# Patient Record
Sex: FEMALE | Race: WHITE | NOT HISPANIC OR LATINO | Employment: OTHER | URBAN - NONMETROPOLITAN AREA
[De-identification: names, ages, dates, MRNs, and addresses within clinical notes are randomized per-mention and may not be internally consistent; named-entity substitution may affect disease eponyms.]

---

## 2017-01-19 DIAGNOSIS — Z12.31 ENCOUNTER FOR SCREENING MAMMOGRAM FOR MALIGNANT NEOPLASM OF BREAST: Primary | ICD-10-CM

## 2017-01-19 RX ORDER — PROMETHAZINE HYDROCHLORIDE 25 MG/1
TABLET ORAL
Qty: 50 TABLET | Refills: 0 | Status: SHIPPED | OUTPATIENT
Start: 2017-01-19 | End: 2017-01-25 | Stop reason: SDUPTHER

## 2017-01-25 ENCOUNTER — OFFICE VISIT (OUTPATIENT)
Dept: FAMILY MEDICINE CLINIC | Facility: CLINIC | Age: 72
End: 2017-01-25

## 2017-01-25 VITALS
DIASTOLIC BLOOD PRESSURE: 84 MMHG | WEIGHT: 140 LBS | SYSTOLIC BLOOD PRESSURE: 110 MMHG | HEIGHT: 61 IN | BODY MASS INDEX: 26.43 KG/M2

## 2017-01-25 DIAGNOSIS — F32.89 OTHER DEPRESSION: ICD-10-CM

## 2017-01-25 DIAGNOSIS — F41.9 ANXIETY: Primary | ICD-10-CM

## 2017-01-25 DIAGNOSIS — R53.83 MALAISE AND FATIGUE: ICD-10-CM

## 2017-01-25 DIAGNOSIS — R53.81 MALAISE AND FATIGUE: ICD-10-CM

## 2017-01-25 PROCEDURE — 99213 OFFICE O/P EST LOW 20 MIN: CPT | Performed by: NURSE PRACTITIONER

## 2017-01-25 RX ORDER — DIAZEPAM 5 MG/1
5 TABLET ORAL EVERY 12 HOURS PRN
Qty: 60 TABLET | Refills: 0 | Status: SHIPPED | OUTPATIENT
Start: 2017-01-25 | End: 2017-03-31 | Stop reason: SDUPTHER

## 2017-01-25 NOTE — PROGRESS NOTES
Chief Complaint   Patient presents with   • Anxiety     refill on meds      Subjective   Alva Milsl is a 71 y.o. female.     Anxiety   Presents for follow-up visit. Symptoms include decreased concentration, depressed mood, excessive worry, nervous/anxious behavior, palpitations and panic. Patient reports no compulsions, confusion, dry mouth, irritability, malaise, shortness of breath or suicidal ideas. Symptoms occur constantly. The severity of symptoms is moderate. The quality of sleep is fair. Nighttime awakenings: none.     Her past medical history is significant for depression. Compliance with medications is %. Side effects of treatment include headaches.   Depression   Visit Type: follow-up  Patient presents with the following symptoms: decreased concentration, depressed mood, excessive worry, nervousness/anxiety, palpitations and panic.  Patient is not experiencing: anhedonia, chest pain, choking sensation, compulsions, confusion, dizziness, dry mouth, fatigue, feelings of hopelessness, feelings of worthlessness, hypersomnia, irritability, malaise, memory impairment, nausea, psychomotor agitation, psychomotor retardation, shortness of breath, suicidal ideas, suicidal planning, thoughts of death, weight gain and weight loss.  Frequency of symptoms: constantly   Severity: severe          The following portions of the patient's history were reviewed and updated as appropriate: allergies, current medications, past social history and problem list.    Review of Systems   Constitutional: Positive for activity change, fatigue and unexpected weight change. Negative for irritability, weight gain and weight loss.   HENT:        Dry mouth constant    Eyes: Negative.    Respiratory: Negative for choking and shortness of breath.    Cardiovascular: Positive for palpitations.   Gastrointestinal: Negative.    Endocrine: Negative.    Genitourinary: Negative.    Musculoskeletal: Positive for arthralgias, back pain,  "gait problem, joint swelling, myalgias, neck pain and neck stiffness.   Skin: Negative.    Allergic/Immunologic: Negative.    Hematological: Negative.    Psychiatric/Behavioral: Positive for agitation and decreased concentration. Negative for confusion and suicidal ideas. The patient is nervous/anxious.        Objective   Visit Vitals   • /84 (BP Location: Left arm, Patient Position: Sitting, Cuff Size: Adult)   • Ht 61\" (154.9 cm)   • Wt 140 lb (63.5 kg)   • BMI 26.45 kg/m2     Physical Exam   Constitutional: She is oriented to person, place, and time. She appears well-developed and well-nourished.   Family stressors, patient is very stressed at this time    HENT:   Head: Normocephalic and atraumatic.   Eyes: Conjunctivae and EOM are normal. Pupils are equal, round, and reactive to light.   Neck: Normal range of motion. Neck supple.   Cardiovascular: Normal rate and regular rhythm.    Pulmonary/Chest: Effort normal and breath sounds normal.   Abdominal: Soft. Bowel sounds are normal.   Musculoskeletal: Normal range of motion. She exhibits tenderness.   Neurological: She is alert and oriented to person, place, and time. She has normal reflexes.   Skin: Skin is warm and dry.   Nursing note and vitals reviewed.      Assessment/Plan   Problem List Items Addressed This Visit     None           New Medications Ordered This Visit   Medications   • diazePAM (VALIUM) 5 MG tablet     Sig: Take 1 tablet by mouth Every 12 (Twelve) Hours As Needed for anxiety or sleep.     Dispense:  60 tablet     Refill:  0       Stress relief discussed in length. Consider therapy, suggest yoga, exercise, meditation -patient is agrees-will call back if worsen for referral     Patient understands the risks associated with this controlled medication, including tolerance and addiction.  she also agrees to only obtain this medication from me, and not from a another provider, unless that provider is covering for me in my absence.  she also " agrees to be compliant in dosing, and not self adjust the dose of medication.  A signed controlled substance agreement is on file, and she has received a controlled substance education sheet at this a previous visit.  she has also signed a consent for treatment with a controlled substance as per [facility] policy. FARRAH was obtained.

## 2017-02-13 RX ORDER — PROMETHAZINE HYDROCHLORIDE 25 MG/1
TABLET ORAL
Qty: 50 TABLET | Refills: 0 | Status: SHIPPED | OUTPATIENT
Start: 2017-02-13 | End: 2017-03-09 | Stop reason: SDUPTHER

## 2017-02-23 ENCOUNTER — TELEPHONE (OUTPATIENT)
Dept: FAMILY MEDICINE CLINIC | Facility: CLINIC | Age: 72
End: 2017-02-23

## 2017-02-23 RX ORDER — CEFUROXIME AXETIL 500 MG/1
500 TABLET ORAL 2 TIMES DAILY
Qty: 14 TABLET | Refills: 0 | Status: SHIPPED | OUTPATIENT
Start: 2017-02-23 | End: 2017-04-21

## 2017-02-23 NOTE — TELEPHONE ENCOUNTER
Patient called requesting rx for sinus infection . Did you want to give her anything or does she need to be seen?

## 2017-03-09 RX ORDER — PROPRANOLOL HYDROCHLORIDE 10 MG/1
10 TABLET ORAL DAILY
Qty: 90 TABLET | Refills: 3 | Status: SHIPPED | OUTPATIENT
Start: 2017-03-09 | End: 2018-03-15

## 2017-03-09 RX ORDER — ACYCLOVIR 400 MG/1
400 TABLET ORAL 3 TIMES DAILY
Qty: 270 TABLET | Refills: 3 | Status: SHIPPED | OUTPATIENT
Start: 2017-03-09 | End: 2017-12-01

## 2017-03-09 RX ORDER — PROMETHAZINE HYDROCHLORIDE 25 MG/1
25 TABLET ORAL EVERY 6 HOURS PRN
Qty: 90 TABLET | Refills: 3 | Status: SHIPPED | OUTPATIENT
Start: 2017-03-09 | End: 2017-06-29 | Stop reason: SDUPTHER

## 2017-03-09 RX ORDER — DULOXETIN HYDROCHLORIDE 60 MG/1
60 CAPSULE, DELAYED RELEASE ORAL DAILY
Qty: 90 CAPSULE | Refills: 3 | Status: SHIPPED | OUTPATIENT
Start: 2017-03-09 | End: 2017-09-11 | Stop reason: SDUPTHER

## 2017-03-09 RX ORDER — LEVOTHYROXINE SODIUM 0.05 MG/1
50 TABLET ORAL DAILY
Qty: 90 TABLET | Refills: 3 | Status: SHIPPED | OUTPATIENT
Start: 2017-03-09 | End: 2017-06-07 | Stop reason: SDUPTHER

## 2017-03-31 ENCOUNTER — OFFICE VISIT (OUTPATIENT)
Dept: FAMILY MEDICINE CLINIC | Facility: CLINIC | Age: 72
End: 2017-03-31

## 2017-03-31 VITALS
BODY MASS INDEX: 26.06 KG/M2 | TEMPERATURE: 98.6 F | OXYGEN SATURATION: 96 % | WEIGHT: 138 LBS | HEIGHT: 61 IN | SYSTOLIC BLOOD PRESSURE: 120 MMHG | HEART RATE: 81 BPM | DIASTOLIC BLOOD PRESSURE: 72 MMHG

## 2017-03-31 DIAGNOSIS — F41.9 ANXIETY: ICD-10-CM

## 2017-03-31 DIAGNOSIS — F32.89 OTHER DEPRESSION: ICD-10-CM

## 2017-03-31 DIAGNOSIS — J06.9 ACUTE UPPER RESPIRATORY INFECTION: Primary | ICD-10-CM

## 2017-03-31 DIAGNOSIS — J40 BRONCHITIS: Primary | ICD-10-CM

## 2017-03-31 PROCEDURE — 99213 OFFICE O/P EST LOW 20 MIN: CPT | Performed by: NURSE PRACTITIONER

## 2017-03-31 PROCEDURE — 96372 THER/PROPH/DIAG INJ SC/IM: CPT | Performed by: NURSE PRACTITIONER

## 2017-03-31 RX ORDER — CLARITHROMYCIN 500 MG/1
500 TABLET, COATED ORAL 2 TIMES DAILY
Qty: 20 TABLET | Refills: 0 | Status: SHIPPED | OUTPATIENT
Start: 2017-03-31 | End: 2017-07-06

## 2017-03-31 RX ORDER — DIAZEPAM 5 MG/1
5 TABLET ORAL EVERY 12 HOURS PRN
Qty: 60 TABLET | Refills: 0 | Status: SHIPPED | OUTPATIENT
Start: 2017-03-31 | End: 2017-07-06 | Stop reason: SDUPTHER

## 2017-03-31 RX ORDER — TRIAMCINOLONE ACETONIDE 40 MG/ML
60 INJECTION, SUSPENSION INTRA-ARTICULAR; INTRAMUSCULAR ONCE
Status: COMPLETED | OUTPATIENT
Start: 2017-03-31 | End: 2017-03-31

## 2017-03-31 RX ADMIN — TRIAMCINOLONE ACETONIDE 60 MG: 40 INJECTION, SUSPENSION INTRA-ARTICULAR; INTRAMUSCULAR at 14:09

## 2017-03-31 NOTE — PROGRESS NOTES
Chief Complaint   Patient presents with   • Bronchitis     rx refill     Subjective   Alva Mills is a 71 y.o. female.     Bronchitis   This is a recurrent problem. The current episode started in the past 7 days. The problem occurs constantly. The problem has been gradually worsening. Associated symptoms include chills, congestion, coughing, fatigue and a sore throat. Pertinent negatives include no abdominal pain, anorexia, arthralgias, change in bowel habit, chest pain, diaphoresis, fever, headaches, joint swelling, myalgias, nausea, neck pain, numbness, rash, swollen glands, urinary symptoms, vertigo, visual change, vomiting or weakness. Nothing aggravates the symptoms. She has tried acetaminophen for the symptoms. The treatment provided mild relief.   Anxiety   Presents for follow-up visit. Symptoms include decreased concentration, depressed mood, excessive worry, nervous/anxious behavior, obsessions, palpitations, panic and shortness of breath. Patient reports no chest pain, compulsions, confusion, dizziness, dry mouth, feeling of choking, hyperventilation, impotence, insomnia, irritability, malaise, muscle tension, nausea, restlessness or suicidal ideas. Symptoms occur constantly. The severity of symptoms is severe. The quality of sleep is good. Nighttime awakenings: none.     Compliance with medications is %.        The following portions of the patient's history were reviewed and updated as appropriate: allergies, current medications, past social history and problem list.    Review of Systems   Constitutional: Positive for chills and fatigue. Negative for diaphoresis, fever and irritability.   HENT: Positive for congestion and sore throat.    Eyes: Negative.    Respiratory: Positive for cough and shortness of breath. Negative for wheezing and stridor.    Cardiovascular: Positive for palpitations. Negative for chest pain.   Gastrointestinal: Negative.  Negative for abdominal pain, anorexia, change in  "bowel habit, nausea and vomiting.   Endocrine: Negative.    Genitourinary: Negative.  Negative for impotence, vaginal discharge and vaginal pain.   Musculoskeletal: Negative.  Negative for arthralgias, joint swelling, myalgias and neck pain.   Skin: Negative.  Negative for rash.   Allergic/Immunologic: Negative.    Neurological: Negative.  Negative for dizziness, vertigo, tremors, seizures, syncope, facial asymmetry, speech difficulty, weakness, light-headedness, numbness and headaches.   Hematological: Negative.  Negative for adenopathy. Does not bruise/bleed easily.   Psychiatric/Behavioral: Positive for decreased concentration. Negative for agitation, behavioral problems, confusion, dysphoric mood, hallucinations, self-injury, sleep disturbance and suicidal ideas. The patient is nervous/anxious. The patient does not have insomnia and is not hyperactive.        Objective   /72  Pulse 81  Temp 98.6 °F (37 °C)  Ht 61\" (154.9 cm)  Wt 138 lb (62.6 kg)  SpO2 96%  BMI 26.07 kg/m2  Physical Exam   Constitutional: She is oriented to person, place, and time. She appears well-developed and well-nourished.   Family stressors, patient is very stressed at this time    HENT:   Head: Normocephalic and atraumatic.   Eyes: Conjunctivae and EOM are normal. Pupils are equal, round, and reactive to light.   Neck: Normal range of motion. Neck supple.   Cardiovascular: Normal rate and regular rhythm.  Exam reveals no gallop and no friction rub.    No murmur heard.  Pulmonary/Chest: Effort normal and breath sounds normal. No respiratory distress. She has no wheezes. She has no rales. She exhibits no tenderness.   Abdominal: Soft. Bowel sounds are normal. She exhibits no distension and no mass. There is no tenderness. There is no rebound and no guarding. No hernia.   Musculoskeletal: Normal range of motion. She exhibits tenderness.   Neurological: She is alert and oriented to person, place, and time. She has normal reflexes. "   Skin: Skin is warm and dry.   Nursing note and vitals reviewed.      Assessment/Plan   Problem List Items Addressed This Visit        Respiratory    Acute upper respiratory infection    Relevant Medications    clarithromycin (BIAXIN) 500 MG tablet       Other    Anxiety - Primary    Depression    Relevant Medications    diazePAM (VALIUM) 5 MG tablet           New Medications Ordered This Visit   Medications   • diazePAM (VALIUM) 5 MG tablet     Sig: Take 1 tablet by mouth Every 12 (Twelve) Hours As Needed for Anxiety or Sleep.     Dispense:  60 tablet     Refill:  0   • clarithromycin (BIAXIN) 500 MG tablet     Sig: Take 1 tablet by mouth 2 (Two) Times a Day.     Dispense:  20 tablet     Refill:  0       Patient understands the risks associated with this controlled medication, including tolerance and addiction.  she also agrees to only obtain this medication from me, and not from a another provider, unless that provider is covering for me in my absence.  she also agrees to be compliant in dosing, and not self adjust the dose of medication.  A signed controlled substance agreement is on file, and she has received a controlled substance education sheet at this a previous visit.  she has also signed a consent for treatment with a controlled substance as per [facility] policy. FARRAH was obtained.

## 2017-04-21 ENCOUNTER — OFFICE VISIT (OUTPATIENT)
Dept: FAMILY MEDICINE CLINIC | Facility: CLINIC | Age: 72
End: 2017-04-21

## 2017-04-21 VITALS
HEIGHT: 61 IN | SYSTOLIC BLOOD PRESSURE: 138 MMHG | TEMPERATURE: 98 F | WEIGHT: 138 LBS | DIASTOLIC BLOOD PRESSURE: 80 MMHG | BODY MASS INDEX: 26.06 KG/M2

## 2017-04-21 DIAGNOSIS — J06.9 ACUTE UPPER RESPIRATORY INFECTION: Primary | ICD-10-CM

## 2017-04-21 PROCEDURE — 99213 OFFICE O/P EST LOW 20 MIN: CPT | Performed by: NURSE PRACTITIONER

## 2017-04-21 RX ORDER — MOXIFLOXACIN HYDROCHLORIDE 400 MG/1
400 TABLET ORAL DAILY
Qty: 10 TABLET | Refills: 0 | Status: SHIPPED | OUTPATIENT
Start: 2017-04-21 | End: 2017-08-09

## 2017-04-21 NOTE — PROGRESS NOTES
Chief Complaint   Patient presents with   • Earache   • Sore Throat     Subjective   Alva Mills is a 71 y.o. female.     Earache    There is pain in the left ear. This is a new problem. The current episode started 1 to 4 weeks ago. The problem occurs every few hours. The problem has been gradually improving. There has been no fever. The pain is at a severity of 2/10. Associated symptoms include a sore throat. Pertinent negatives include no abdominal pain, coughing, diarrhea, ear discharge, headaches, hearing loss, neck pain, rash, rhinorrhea or vomiting. She has tried NSAIDs for the symptoms. The treatment provided mild relief. There is no history of hearing loss.   Sore Throat    This is a recurrent problem. The current episode started 1 to 4 weeks ago. The problem has been gradually improving. There has been no fever. The fever has been present for 3 to 4 days. The pain is at a severity of 2/10. Associated symptoms include congestion and ear pain. Pertinent negatives include no abdominal pain, coughing, diarrhea, drooling, ear discharge, headaches, neck pain, shortness of breath, trouble swallowing or vomiting. She has tried NSAIDs for the symptoms. The treatment provided mild relief.        The following portions of the patient's history were reviewed and updated as appropriate: allergies, current medications, past social history and problem list.    Review of Systems   HENT: Positive for congestion, ear pain, postnasal drip, sinus pressure, sore throat, tinnitus and voice change. Negative for dental problem, drooling, ear discharge, facial swelling, hearing loss, mouth sores, nosebleeds, rhinorrhea, sneezing and trouble swallowing.    Eyes: Negative.  Negative for pain, discharge, redness and itching.   Respiratory: Negative.  Negative for cough, chest tightness, shortness of breath and wheezing.    Cardiovascular: Negative.  Negative for chest pain, palpitations and leg swelling.   Gastrointestinal:  "Negative.  Negative for abdominal pain, diarrhea and vomiting.   Endocrine: Negative.    Genitourinary: Negative.    Musculoskeletal: Negative.  Negative for neck pain and neck stiffness.   Skin: Negative.  Negative for color change and rash.   Allergic/Immunologic: Positive for environmental allergies. Negative for food allergies and immunocompromised state.   Neurological: Negative for dizziness, tremors, syncope, facial asymmetry, speech difficulty, light-headedness and headaches.   Hematological: Negative.  Negative for adenopathy. Does not bruise/bleed easily.   Psychiatric/Behavioral: Negative for agitation, behavioral problems, confusion, decreased concentration, dysphoric mood, hallucinations, self-injury, sleep disturbance and suicidal ideas. The patient is nervous/anxious. The patient is not hyperactive.        Objective   /80  Temp 98 °F (36.7 °C)  Ht 61\" (154.9 cm)  Wt 138 lb (62.6 kg)  BMI 26.07 kg/m2  Physical Exam   Constitutional: She is oriented to person, place, and time. She appears well-developed and well-nourished.   HENT:   Head: Normocephalic.   Right Ear: External ear and ear canal normal. Tympanic membrane is bulging. Tympanic membrane is not perforated and not erythematous.   Left Ear: External ear and ear canal normal. Tympanic membrane is erythematous and bulging. Tympanic membrane is not perforated.   Nose: Nose normal. No mucosal edema or rhinorrhea.   Mouth/Throat: Uvula is midline, oropharynx is clear and moist and mucous membranes are normal.   Eyes: Conjunctivae and lids are normal.   Neck: Trachea normal and normal range of motion.   Cardiovascular: Normal rate, regular rhythm, S1 normal, S2 normal and normal heart sounds.    Pulmonary/Chest: Effort normal and breath sounds normal.   Musculoskeletal: Normal range of motion.   Neurological: She is alert and oriented to person, place, and time.   Skin: Skin is warm and dry.       Assessment/Plan   Problem List Items " Addressed This Visit        Respiratory    Acute upper respiratory infection - Primary    Relevant Medications    moxifloxacin (AVELOX) 400 MG tablet           New Medications Ordered This Visit   Medications   • moxifloxacin (AVELOX) 400 MG tablet     Sig: Take 1 tablet by mouth Daily.     Dispense:  10 tablet     Refill:  0

## 2017-05-18 ENCOUNTER — TELEPHONE (OUTPATIENT)
Dept: FAMILY MEDICINE CLINIC | Facility: CLINIC | Age: 72
End: 2017-05-18

## 2017-05-18 RX ORDER — AZELASTINE 1 MG/ML
2 SPRAY, METERED NASAL 2 TIMES DAILY
Qty: 30 ML | Refills: 5 | Status: SHIPPED | OUTPATIENT
Start: 2017-05-18 | End: 2017-12-01

## 2017-06-07 RX ORDER — LEVOTHYROXINE SODIUM 0.05 MG/1
50 TABLET ORAL DAILY
Qty: 30 TABLET | Refills: 5 | Status: SHIPPED | OUTPATIENT
Start: 2017-06-07 | End: 2017-08-09 | Stop reason: SDUPTHER

## 2017-06-07 RX ORDER — METOPROLOL SUCCINATE 25 MG/1
25 TABLET, EXTENDED RELEASE ORAL DAILY
Qty: 30 TABLET | Refills: 5 | Status: SHIPPED | OUTPATIENT
Start: 2017-06-07 | End: 2017-08-09 | Stop reason: SDUPTHER

## 2017-06-08 ENCOUNTER — TELEPHONE (OUTPATIENT)
Dept: FAMILY MEDICINE CLINIC | Facility: CLINIC | Age: 72
End: 2017-06-08

## 2017-06-08 NOTE — TELEPHONE ENCOUNTER
PATIENT CALLED AND SAID SHE HAD A SINUS INFECTION COMING ON BECAUSE SHE HAS A SORE THROAT . SHE IS WANTING TO SEE IF YOU WOULD GIVE HER SOMETHING FOR IT .

## 2017-06-29 RX ORDER — PROMETHAZINE HYDROCHLORIDE 25 MG/1
25 TABLET ORAL EVERY 6 HOURS PRN
Qty: 90 TABLET | Refills: 0 | Status: SHIPPED | OUTPATIENT
Start: 2017-06-29 | End: 2017-08-16 | Stop reason: SDUPTHER

## 2017-07-06 ENCOUNTER — APPOINTMENT (OUTPATIENT)
Dept: LAB | Facility: HOSPITAL | Age: 72
End: 2017-07-06

## 2017-07-06 ENCOUNTER — OFFICE VISIT (OUTPATIENT)
Dept: FAMILY MEDICINE CLINIC | Facility: CLINIC | Age: 72
End: 2017-07-06

## 2017-07-06 VITALS
BODY MASS INDEX: 26.43 KG/M2 | WEIGHT: 140 LBS | DIASTOLIC BLOOD PRESSURE: 76 MMHG | SYSTOLIC BLOOD PRESSURE: 138 MMHG | HEIGHT: 61 IN

## 2017-07-06 DIAGNOSIS — R22.9 SOFT TISSUE SWELLING: ICD-10-CM

## 2017-07-06 DIAGNOSIS — F41.9 ANXIETY: ICD-10-CM

## 2017-07-06 DIAGNOSIS — R53.83 MALAISE AND FATIGUE: Primary | ICD-10-CM

## 2017-07-06 DIAGNOSIS — I10 ESSENTIAL HYPERTENSION: ICD-10-CM

## 2017-07-06 DIAGNOSIS — L98.9 SKIN LESION: ICD-10-CM

## 2017-07-06 DIAGNOSIS — R53.81 MALAISE AND FATIGUE: Primary | ICD-10-CM

## 2017-07-06 LAB
ALBUMIN SERPL-MCNC: 4 G/DL (ref 3.4–4.8)
ALBUMIN/GLOB SERPL: 1.2 G/DL (ref 1.1–1.8)
ALP SERPL-CCNC: 99 U/L (ref 38–126)
ALT SERPL W P-5'-P-CCNC: 31 U/L (ref 9–52)
ANION GAP SERPL CALCULATED.3IONS-SCNC: 10 MMOL/L (ref 5–15)
ARTICHOKE IGE QN: 102 MG/DL (ref 1–129)
AST SERPL-CCNC: 39 U/L (ref 14–36)
BASOPHILS # BLD AUTO: 0.05 10*3/MM3 (ref 0–0.2)
BASOPHILS NFR BLD AUTO: 0.9 % (ref 0–2)
BILIRUB SERPL-MCNC: 0.4 MG/DL (ref 0.2–1.3)
BUN BLD-MCNC: 10 MG/DL (ref 7–21)
BUN/CREAT SERPL: 12.3 (ref 7–25)
CALCIUM SPEC-SCNC: 9 MG/DL (ref 8.4–10.2)
CHLORIDE SERPL-SCNC: 100 MMOL/L (ref 95–110)
CHOLEST SERPL-MCNC: 209 MG/DL (ref 0–199)
CO2 SERPL-SCNC: 28 MMOL/L (ref 22–31)
CREAT BLD-MCNC: 0.81 MG/DL (ref 0.5–1)
DEPRECATED RDW RBC AUTO: 43.8 FL (ref 36.4–46.3)
EOSINOPHIL # BLD AUTO: 0.15 10*3/MM3 (ref 0–0.7)
EOSINOPHIL NFR BLD AUTO: 2.7 % (ref 0–7)
ERYTHROCYTE [DISTWIDTH] IN BLOOD BY AUTOMATED COUNT: 12.6 % (ref 11.5–14.5)
GFR SERPL CREATININE-BSD FRML MDRD: 70 ML/MIN/1.73 (ref 39–90)
GLOBULIN UR ELPH-MCNC: 3.3 GM/DL (ref 2.3–3.5)
GLUCOSE BLD-MCNC: 90 MG/DL (ref 60–100)
HCT VFR BLD AUTO: 37.4 % (ref 35–45)
HDLC SERPL-MCNC: 35 MG/DL (ref 60–200)
HGB BLD-MCNC: 12.1 G/DL (ref 12–15.5)
IMM GRANULOCYTES # BLD: 0.01 10*3/MM3 (ref 0–0.02)
IMM GRANULOCYTES NFR BLD: 0.2 % (ref 0–0.5)
LDLC/HDLC SERPL: 2.87 {RATIO} (ref 0–3.22)
LYMPHOCYTES # BLD AUTO: 1.44 10*3/MM3 (ref 0.6–4.2)
LYMPHOCYTES NFR BLD AUTO: 25.5 % (ref 10–50)
MAGNESIUM SERPL-MCNC: 2 MG/DL (ref 1.6–2.3)
MCH RBC QN AUTO: 31.1 PG (ref 26.5–34)
MCHC RBC AUTO-ENTMCNC: 32.4 G/DL (ref 31.4–36)
MCV RBC AUTO: 96.1 FL (ref 80–98)
MONOCYTES # BLD AUTO: 0.26 10*3/MM3 (ref 0–0.9)
MONOCYTES NFR BLD AUTO: 4.6 % (ref 0–12)
NEUTROPHILS # BLD AUTO: 3.74 10*3/MM3 (ref 2–8.6)
NEUTROPHILS NFR BLD AUTO: 66.1 % (ref 37–80)
PLATELET # BLD AUTO: 322 10*3/MM3 (ref 150–450)
PMV BLD AUTO: 10.2 FL (ref 8–12)
POTASSIUM BLD-SCNC: 4.4 MMOL/L (ref 3.5–5.1)
PROT SERPL-MCNC: 7.3 G/DL (ref 6.3–8.6)
RBC # BLD AUTO: 3.89 10*6/MM3 (ref 3.77–5.16)
SODIUM BLD-SCNC: 138 MMOL/L (ref 137–145)
TRIGL SERPL-MCNC: 368 MG/DL (ref 20–199)
TSH SERPL DL<=0.05 MIU/L-ACNC: 1.23 MIU/ML (ref 0.46–4.68)
VIT B12 BLD-MCNC: >1000 PG/ML (ref 239–931)
WBC NRBC COR # BLD: 5.65 10*3/MM3 (ref 3.2–9.8)

## 2017-07-06 PROCEDURE — 80053 COMPREHEN METABOLIC PANEL: CPT | Performed by: NURSE PRACTITIONER

## 2017-07-06 PROCEDURE — 90715 TDAP VACCINE 7 YRS/> IM: CPT | Performed by: NURSE PRACTITIONER

## 2017-07-06 PROCEDURE — 90471 IMMUNIZATION ADMIN: CPT | Performed by: NURSE PRACTITIONER

## 2017-07-06 PROCEDURE — 99214 OFFICE O/P EST MOD 30 MIN: CPT | Performed by: NURSE PRACTITIONER

## 2017-07-06 PROCEDURE — 84443 ASSAY THYROID STIM HORMONE: CPT | Performed by: NURSE PRACTITIONER

## 2017-07-06 PROCEDURE — 86705 HEP B CORE ANTIBODY IGM: CPT | Performed by: NURSE PRACTITIONER

## 2017-07-06 PROCEDURE — 36415 COLL VENOUS BLD VENIPUNCTURE: CPT | Performed by: NURSE PRACTITIONER

## 2017-07-06 PROCEDURE — 80061 LIPID PANEL: CPT | Performed by: NURSE PRACTITIONER

## 2017-07-06 PROCEDURE — 87340 HEPATITIS B SURFACE AG IA: CPT | Performed by: NURSE PRACTITIONER

## 2017-07-06 PROCEDURE — 86709 HEPATITIS A IGM ANTIBODY: CPT | Performed by: NURSE PRACTITIONER

## 2017-07-06 PROCEDURE — 82607 VITAMIN B-12: CPT | Performed by: NURSE PRACTITIONER

## 2017-07-06 PROCEDURE — 83735 ASSAY OF MAGNESIUM: CPT | Performed by: NURSE PRACTITIONER

## 2017-07-06 PROCEDURE — 85025 COMPLETE CBC W/AUTO DIFF WBC: CPT | Performed by: NURSE PRACTITIONER

## 2017-07-06 RX ORDER — DIAZEPAM 5 MG/1
5 TABLET ORAL EVERY 12 HOURS PRN
Qty: 60 TABLET | Refills: 0 | Status: SHIPPED | OUTPATIENT
Start: 2017-07-06 | End: 2017-08-09 | Stop reason: SDUPTHER

## 2017-07-06 RX ORDER — DULOXETIN HYDROCHLORIDE 30 MG/1
30 CAPSULE, DELAYED RELEASE ORAL DAILY
Qty: 30 CAPSULE | Refills: 11 | Status: SHIPPED | OUTPATIENT
Start: 2017-07-06 | End: 2017-09-11 | Stop reason: SDUPTHER

## 2017-07-06 NOTE — PROGRESS NOTES
Chief Complaint   Patient presents with   • Follow-up     patient is fasting   • Anxiety     wanting to see if she can get her medication dose increased   • Hypertension   • Hypothyroidism     Subjective   Alva Mills is a 71 y.o. female.     Anxiety   Presents for follow-up visit. Symptoms include depressed mood, excessive worry, insomnia, irritability, malaise, nervous/anxious behavior, panic and restlessness. Patient reports no chest pain, compulsions, confusion, decreased concentration, dizziness, feeling of choking, hyperventilation, impotence, muscle tension, nausea, palpitations, shortness of breath or suicidal ideas. Symptoms occur most days. The quality of sleep is good. Nighttime awakenings: none.     Compliance with medications is %.   Hypertension   This is a recurrent problem. The current episode started more than 1 month ago. The problem has been rapidly worsening since onset. The problem is controlled. Associated symptoms include anxiety and malaise/fatigue. Pertinent negatives include no blurred vision, chest pain, headaches, neck pain, palpitations, peripheral edema, PND, shortness of breath or sweats. Risk factors for coronary artery disease include sedentary lifestyle. Past treatments include ACE inhibitors. The current treatment provides mild improvement. Compliance problems include diet.  Hypertensive end-organ damage includes a thyroid problem. There is no history of angina, kidney disease, CAD/MI, CVA, heart failure, left ventricular hypertrophy, PVD, renovascular disease or retinopathy. There is no history of chronic renal disease, coarctation of the aorta, hyperaldosteronism, hypercortisolism, hyperparathyroidism, a hypertension causing med, pheochromocytoma or sleep apnea.   Hypothyroidism   This is a recurrent problem. The current episode started more than 1 year ago. The problem occurs constantly. The problem has been rapidly worsening. Associated symptoms include congestion,  fatigue and a sore throat. Pertinent negatives include no abdominal pain, anorexia, arthralgias, change in bowel habit, chest pain, chills, coughing, diaphoresis, fever, headaches, joint swelling, myalgias, nausea, neck pain, numbness, rash, swollen glands, urinary symptoms, vertigo, visual change, vomiting or weakness. Nothing aggravates the symptoms. She has tried acetaminophen, sleep, rest and relaxation (synthroid ) for the symptoms. The treatment provided mild relief.        The following portions of the patient's history were reviewed and updated as appropriate: allergies, current medications, past social history and problem list.    Review of Systems   Constitutional: Positive for fatigue, irritability and malaise/fatigue. Negative for activity change, appetite change, chills, diaphoresis, fever and unexpected weight change.   HENT: Positive for congestion, ear pain, postnasal drip, sinus pressure, sore throat, tinnitus and voice change. Negative for dental problem, drooling, ear discharge, facial swelling, hearing loss, mouth sores, nosebleeds, rhinorrhea, sneezing and trouble swallowing.    Eyes: Negative.  Negative for blurred vision, pain, discharge, redness and itching.   Respiratory: Negative.  Negative for cough, chest tightness, shortness of breath and wheezing.    Cardiovascular: Negative.  Negative for chest pain, palpitations, leg swelling and PND.   Gastrointestinal: Negative.  Negative for abdominal pain, anorexia, change in bowel habit, diarrhea, nausea and vomiting.   Endocrine: Negative.    Genitourinary: Negative.  Negative for impotence.   Musculoskeletal: Negative.  Negative for arthralgias, joint swelling, myalgias, neck pain and neck stiffness.   Skin: Negative.  Negative for color change and rash.        Cut left arm -plastic in garage    Allergic/Immunologic: Positive for environmental allergies. Negative for food allergies and immunocompromised state.   Neurological: Negative for  "dizziness, vertigo, tremors, syncope, facial asymmetry, speech difficulty, weakness, light-headedness, numbness and headaches.   Hematological: Negative.  Negative for adenopathy. Does not bruise/bleed easily.   Psychiatric/Behavioral: Negative for agitation, behavioral problems, confusion, decreased concentration, dysphoric mood, hallucinations, self-injury, sleep disturbance and suicidal ideas. The patient is nervous/anxious and has insomnia. The patient is not hyperactive.        Objective   /76  Ht 61\" (154.9 cm)  Wt 140 lb (63.5 kg)  BMI 26.45 kg/m2  Physical Exam   Constitutional: She is oriented to person, place, and time. She appears well-developed and well-nourished.   Family stressors, patient is very stressed at this time    HENT:   Head: Normocephalic and atraumatic.   Mouth/Throat: No oropharyngeal exudate.   Eyes: Conjunctivae and EOM are normal. Pupils are equal, round, and reactive to light. Right eye exhibits no discharge. Left eye exhibits no discharge. No scleral icterus.   Neck: Normal range of motion. Neck supple. No JVD present. No tracheal deviation present. No thyromegaly present.   Cardiovascular: Normal rate and regular rhythm.  Exam reveals no gallop and no friction rub.    No murmur heard.  Pulmonary/Chest: Effort normal and breath sounds normal. No stridor. No respiratory distress. She has no wheezes. She has no rales. She exhibits no tenderness.   Abdominal: Soft. Bowel sounds are normal. She exhibits no distension and no mass. There is no tenderness. There is no rebound and no guarding. No hernia.   Musculoskeletal: Normal range of motion. She exhibits tenderness. She exhibits no edema or deformity.   Lymphadenopathy:     She has no cervical adenopathy.   Neurological: She is alert and oriented to person, place, and time. She has normal reflexes. She displays normal reflexes. No cranial nerve deficit. She exhibits normal muscle tone. Coordination normal.   Skin: Skin is warm " and dry. No rash noted. No erythema. No pallor.   Skin abrasion left arm from cutting something in garage -needs updated tdap    Nursing note and vitals reviewed.      Assessment/Plan   Problem List Items Addressed This Visit        Cardiovascular and Mediastinum    Essential hypertension    Relevant Orders    CBC Auto Differential    Comprehensive Metabolic Panel    Lipid Panel    Magnesium    TSH    Vitamin B12    Hepatitis Diagnostic Profile       Musculoskeletal and Integument    Skin lesion       Other    Anxiety    Relevant Orders    CBC Auto Differential    Comprehensive Metabolic Panel    Lipid Panel    Magnesium    TSH    Vitamin B12    Hepatitis Diagnostic Profile    Malaise and fatigue - Primary    Relevant Orders    CBC Auto Differential    Comprehensive Metabolic Panel    Lipid Panel    Magnesium    TSH    Vitamin B12    Hepatitis Diagnostic Profile    Soft tissue swelling    Relevant Orders    US Head Neck Soft Tissue           New Medications Ordered This Visit   Medications   • DULoxetine (CYMBALTA) 30 MG capsule     Sig: Take 1 capsule by mouth Daily. Patient needs to add 30mg to total 90mg of cymbalta     Dispense:  30 capsule     Refill:  11       It's not just what you eat, but when you eat  Eat breakfast, and eat smaller meals throughout the day. A healthy breakfast can jumpstart your metabolism, while eating small, healthy meals (rather than the standard three large meals) keeps your energy up.   Avoid eating at night. Try to eat dinner earlier and fast for 14-16 hours until breakfast the next morning. Studies suggest that eating only when you’re most active and giving your digestive system a long break each day may help to regulate weight.

## 2017-07-07 LAB
HAV IGM SERPL QL IA: NEGATIVE
HBV CORE IGM SERPL QL IA: NEGATIVE
HBV SURFACE AG SERPL QL IA: NEGATIVE

## 2017-07-10 ENCOUNTER — HOSPITAL ENCOUNTER (OUTPATIENT)
Dept: ULTRASOUND IMAGING | Facility: HOSPITAL | Age: 72
Discharge: HOME | End: 2017-07-10
Admitting: NURSE PRACTITIONER

## 2017-07-10 PROCEDURE — 76536 US EXAM OF HEAD AND NECK: CPT

## 2017-08-09 ENCOUNTER — OFFICE VISIT (OUTPATIENT)
Dept: FAMILY MEDICINE CLINIC | Facility: CLINIC | Age: 72
End: 2017-08-09

## 2017-08-09 VITALS
SYSTOLIC BLOOD PRESSURE: 110 MMHG | HEIGHT: 61 IN | BODY MASS INDEX: 26.81 KG/M2 | WEIGHT: 142 LBS | DIASTOLIC BLOOD PRESSURE: 80 MMHG

## 2017-08-09 DIAGNOSIS — J06.9 ACUTE UPPER RESPIRATORY INFECTION: ICD-10-CM

## 2017-08-09 DIAGNOSIS — F41.9 ANXIETY: Primary | ICD-10-CM

## 2017-08-09 DIAGNOSIS — R53.81 MALAISE AND FATIGUE: ICD-10-CM

## 2017-08-09 DIAGNOSIS — R53.83 MALAISE AND FATIGUE: ICD-10-CM

## 2017-08-09 PROCEDURE — 99213 OFFICE O/P EST LOW 20 MIN: CPT | Performed by: NURSE PRACTITIONER

## 2017-08-09 RX ORDER — LEVOTHYROXINE SODIUM 0.05 MG/1
50 TABLET ORAL DAILY
Qty: 90 TABLET | Refills: 4 | Status: SHIPPED | OUTPATIENT
Start: 2017-08-09 | End: 2018-07-21 | Stop reason: SDUPTHER

## 2017-08-09 RX ORDER — METOPROLOL SUCCINATE 25 MG/1
25 TABLET, EXTENDED RELEASE ORAL DAILY
Qty: 90 TABLET | Refills: 4 | Status: SHIPPED | OUTPATIENT
Start: 2017-08-09 | End: 2018-03-15 | Stop reason: SDUPTHER

## 2017-08-09 RX ORDER — BUSPIRONE HYDROCHLORIDE 5 MG/1
TABLET ORAL
Qty: 90 TABLET | Refills: 3 | Status: SHIPPED | OUTPATIENT
Start: 2017-08-09 | End: 2017-10-23

## 2017-08-09 RX ORDER — DIAZEPAM 5 MG/1
5 TABLET ORAL EVERY 12 HOURS PRN
Qty: 60 TABLET | Refills: 0 | Status: SHIPPED | OUTPATIENT
Start: 2017-08-09 | End: 2017-08-28 | Stop reason: SDUPTHER

## 2017-08-09 NOTE — PROGRESS NOTES
Chief Complaint   Patient presents with   • Anxiety     check meds to see if there is anything else she needs to be on    • Sinus Problem     having alot of drainage and keeps a headache   • Hernia     Subjective   Alva Mills is a 72 y.o. female.     Anxiety   Presents for follow-up visit. Symptoms include depressed mood, excessive worry, insomnia, irritability, malaise, nervous/anxious behavior, panic and restlessness. Patient reports no chest pain, compulsions, confusion, decreased concentration, dizziness, feeling of choking, hyperventilation, impotence, muscle tension, nausea, palpitations, shortness of breath or suicidal ideas. Symptoms occur most days. The quality of sleep is good. Nighttime awakenings: none.     Compliance with medications is %.   Sinus Problem   This is a recurrent problem. The problem has been gradually worsening since onset. There has been no fever. She is experiencing no pain. Associated symptoms include congestion, ear pain, sinus pressure and a sore throat. Pertinent negatives include no chills, coughing, diaphoresis, headaches, neck pain, shortness of breath, sneezing or swollen glands.   Hernia   Associated symptoms include congestion, fatigue and a sore throat. Pertinent negatives include no abdominal pain, arthralgias, chest pain, chills, coughing, diaphoresis, fever, headaches, joint swelling, myalgias, nausea, neck pain, numbness, rash, swollen glands, urinary symptoms, visual change, vomiting or weakness.   Hypertension   This is a recurrent problem. The current episode started more than 1 month ago. The problem has been rapidly worsening since onset. The problem is controlled. Associated symptoms include anxiety and malaise/fatigue. Pertinent negatives include no blurred vision, chest pain, headaches, neck pain, palpitations, peripheral edema, PND, shortness of breath or sweats. Risk factors for coronary artery disease include sedentary lifestyle. Past treatments  include ACE inhibitors. The current treatment provides mild improvement. Compliance problems include diet.  Hypertensive end-organ damage includes a thyroid problem. There is no history of angina, kidney disease, CAD/MI, CVA, heart failure, left ventricular hypertrophy, PVD, renovascular disease or retinopathy. There is no history of chronic renal disease, coarctation of the aorta, hyperaldosteronism, hypercortisolism, hyperparathyroidism, a hypertension causing med, pheochromocytoma or sleep apnea.   Hypothyroidism   This is a recurrent problem. The current episode started more than 1 year ago. The problem occurs constantly. The problem has been rapidly worsening. Associated symptoms include congestion, fatigue and a sore throat. Pertinent negatives include no abdominal pain, arthralgias, chest pain, chills, coughing, diaphoresis, fever, headaches, joint swelling, myalgias, nausea, neck pain, numbness, rash, swollen glands, urinary symptoms, visual change, vomiting or weakness. Nothing aggravates the symptoms. She has tried acetaminophen, sleep, rest and relaxation (synthroid ) for the symptoms. The treatment provided mild relief.        The following portions of the patient's history were reviewed and updated as appropriate: allergies, current medications, past social history and problem list.    Review of Systems   Constitutional: Positive for fatigue, irritability and malaise/fatigue. Negative for activity change, appetite change, chills, diaphoresis, fever and unexpected weight change.   HENT: Positive for congestion, ear pain, postnasal drip, sinus pressure, sore throat, tinnitus and voice change. Negative for dental problem, drooling, ear discharge, facial swelling, hearing loss, mouth sores, nosebleeds, rhinorrhea, sneezing and trouble swallowing.    Eyes: Negative.  Negative for blurred vision, photophobia, pain, discharge, redness, itching and visual disturbance.   Respiratory: Negative.  Negative for  "apnea, cough, choking, chest tightness, shortness of breath and wheezing.    Cardiovascular: Negative.  Negative for chest pain, palpitations, leg swelling and PND.   Gastrointestinal: Negative.  Negative for abdominal pain, diarrhea, nausea and vomiting.   Endocrine: Negative.    Genitourinary: Negative.  Negative for difficulty urinating, dyspareunia, dysuria and impotence.   Musculoskeletal: Negative.  Negative for arthralgias, back pain, gait problem, joint swelling, myalgias, neck pain and neck stiffness.   Skin: Negative.  Negative for color change and rash.   Allergic/Immunologic: Positive for environmental allergies. Negative for food allergies and immunocompromised state.   Neurological: Negative.  Negative for dizziness, tremors, syncope, facial asymmetry, speech difficulty, weakness, light-headedness, numbness and headaches.   Hematological: Negative.  Negative for adenopathy. Does not bruise/bleed easily.   Psychiatric/Behavioral: Positive for agitation. Negative for behavioral problems, confusion, decreased concentration, dysphoric mood, hallucinations, self-injury, sleep disturbance and suicidal ideas. The patient is nervous/anxious and has insomnia. The patient is not hyperactive.    All other systems reviewed and are negative.      Objective   /80  Ht 61\" (154.9 cm)  Wt 142 lb (64.4 kg)  BMI 26.83 kg/m2  Physical Exam   Constitutional: She is oriented to person, place, and time. She appears well-developed and well-nourished.   Family stressors, patient is very stressed at this time    HENT:   Head: Normocephalic and atraumatic.   Mouth/Throat: No oropharyngeal exudate.   Oral cavity dry    Eyes: Conjunctivae and EOM are normal. Pupils are equal, round, and reactive to light. Right eye exhibits no discharge. Left eye exhibits no discharge. No scleral icterus.   Neck: Normal range of motion. Neck supple. No JVD present. No tracheal deviation present. No thyromegaly present.   Cardiovascular: " Normal rate and regular rhythm.  Exam reveals no gallop and no friction rub.    No murmur heard.  Pulmonary/Chest: Effort normal and breath sounds normal. No stridor. No respiratory distress. She has no wheezes. She has no rales. She exhibits no tenderness.   Abdominal: Soft. Bowel sounds are normal. She exhibits no distension and no mass. There is tenderness. There is no rebound and no guarding. No hernia.   Musculoskeletal: Normal range of motion. She exhibits tenderness. She exhibits no edema or deformity.   Lymphadenopathy:     She has no cervical adenopathy.   Neurological: She is alert and oriented to person, place, and time. She has normal reflexes. She displays normal reflexes. No cranial nerve deficit. She exhibits normal muscle tone. Coordination normal.   Skin: Skin is warm and dry. No rash noted. No erythema. No pallor.   Nursing note and vitals reviewed.      Assessment/Plan   Problem List Items Addressed This Visit        Respiratory    Acute upper respiratory infection       Other    Anxiety - Primary    Malaise and fatigue           New Medications Ordered This Visit   Medications   • metoprolol succinate XL (TOPROL-XL) 25 MG 24 hr tablet     Sig: Take 1 tablet by mouth Daily.     Dispense:  90 tablet     Refill:  4   • levothyroxine (SYNTHROID) 50 MCG tablet     Sig: Take 1 tablet by mouth Daily.     Dispense:  90 tablet     Refill:  4   • diazePAM (VALIUM) 5 MG tablet     Sig: Take 1 tablet by mouth Every 12 (Twelve) Hours As Needed for Anxiety or Sleep.     Dispense:  60 tablet     Refill:  0   • busPIRone (BUSPAR) 5 MG tablet     Si-2 tabs tid prn     Dispense:  90 tablet     Refill:  3     Patient understands the risks associated with this controlled medication, including tolerance and addiction.  she also agrees to only obtain this medication from me, and not from a another provider, unless that provider is covering for me in my absence.  she also agrees to be compliant in dosing, and not  self adjust the dose of medication.  A signed controlled substance agreement is on file, and she has received a controlled substance education sheet at this a previous visit.  she has also signed a consent for treatment with a controlled substance as per [facility] policy. FARRAH was obtained.    It's not just what you eat, but when you eat  Eat breakfast, and eat smaller meals throughout the day. A healthy breakfast can jumpstart your metabolism, while eating small, healthy meals (rather than the standard three large meals) keeps your energy up.   Avoid eating at night. Try to eat dinner earlier and fast for 14-16 hours until breakfast the next morning. Studies suggest that eating only when you’re most active and giving your digestive system a long break each day may help to regulate weight.

## 2017-08-16 RX ORDER — PROMETHAZINE HYDROCHLORIDE 25 MG/1
25 TABLET ORAL EVERY 6 HOURS PRN
Qty: 180 TABLET | Refills: 2 | Status: SHIPPED | OUTPATIENT
Start: 2017-08-16 | End: 2018-05-15 | Stop reason: SDUPTHER

## 2017-08-17 ENCOUNTER — TELEPHONE (OUTPATIENT)
Dept: FAMILY MEDICINE CLINIC | Facility: CLINIC | Age: 72
End: 2017-08-17

## 2017-08-17 DIAGNOSIS — L03.032 INFECTED NAILBED OF TOE, LEFT: Primary | ICD-10-CM

## 2017-08-17 RX ORDER — CEPHALEXIN 500 MG/1
500 CAPSULE ORAL 3 TIMES DAILY
Qty: 28 CAPSULE | Refills: 0 | Status: SHIPPED | OUTPATIENT
Start: 2017-08-17 | End: 2017-08-27

## 2017-08-28 ENCOUNTER — OFFICE VISIT (OUTPATIENT)
Dept: FAMILY MEDICINE CLINIC | Facility: CLINIC | Age: 72
End: 2017-08-28

## 2017-08-28 VITALS
HEIGHT: 61 IN | BODY MASS INDEX: 26.62 KG/M2 | TEMPERATURE: 98.3 F | DIASTOLIC BLOOD PRESSURE: 100 MMHG | OXYGEN SATURATION: 99 % | WEIGHT: 141 LBS | SYSTOLIC BLOOD PRESSURE: 142 MMHG

## 2017-08-28 DIAGNOSIS — R05.9 COUGH: Primary | ICD-10-CM

## 2017-08-28 DIAGNOSIS — J06.9 ACUTE UPPER RESPIRATORY INFECTION: ICD-10-CM

## 2017-08-28 DIAGNOSIS — F41.9 ANXIETY: ICD-10-CM

## 2017-08-28 PROCEDURE — 99213 OFFICE O/P EST LOW 20 MIN: CPT | Performed by: NURSE PRACTITIONER

## 2017-08-28 PROCEDURE — 96372 THER/PROPH/DIAG INJ SC/IM: CPT | Performed by: NURSE PRACTITIONER

## 2017-08-28 RX ORDER — CLARITHROMYCIN 500 MG/1
500 TABLET, COATED ORAL 2 TIMES DAILY
Qty: 20 TABLET | Refills: 0 | Status: SHIPPED | OUTPATIENT
Start: 2017-08-28 | End: 2017-10-23

## 2017-08-28 RX ORDER — BENZONATATE 100 MG/1
100 CAPSULE ORAL 3 TIMES DAILY PRN
Qty: 60 CAPSULE | Refills: 0 | Status: SHIPPED | OUTPATIENT
Start: 2017-08-28 | End: 2017-12-01

## 2017-08-28 RX ORDER — DIAZEPAM 5 MG/1
5 TABLET ORAL EVERY 12 HOURS PRN
Qty: 60 TABLET | Refills: 0 | Status: SHIPPED | OUTPATIENT
Start: 2017-08-28 | End: 2019-11-06

## 2017-08-28 RX ORDER — TRIAMCINOLONE ACETONIDE 40 MG/ML
80 INJECTION, SUSPENSION INTRA-ARTICULAR; INTRAMUSCULAR ONCE
Status: COMPLETED | OUTPATIENT
Start: 2017-08-28 | End: 2017-08-28

## 2017-08-28 RX ADMIN — TRIAMCINOLONE ACETONIDE 80 MG: 40 INJECTION, SUSPENSION INTRA-ARTICULAR; INTRAMUSCULAR at 10:40

## 2017-08-28 NOTE — PROGRESS NOTES
Chief Complaint   Patient presents with   • Cough     spitting up phelm   • Headache   • Anxiety     Subjective   Alva Mills is a 72 y.o. female.     Cough   This is a recurrent problem. Associated symptoms include ear pain, postnasal drip and a sore throat. Pertinent negatives include no chest pain, chills, eye redness, fever, headaches, myalgias, rash, rhinorrhea, shortness of breath, sweats or wheezing. Her past medical history is significant for environmental allergies.   Headache    Associated symptoms include ear pain, sinus pressure, a sore throat and tinnitus. Pertinent negatives include no abdominal pain, back pain, coughing, dizziness, eye pain, eye redness, fever, hearing loss, nausea, neck pain, numbness, photophobia, rhinorrhea, swollen glands, visual change, vomiting or weakness.   Anxiety   Presents for follow-up visit. Symptoms include depressed mood, excessive worry, irritability, malaise, nervous/anxious behavior, panic and restlessness. Patient reports no chest pain, compulsions, confusion, decreased concentration, dizziness, feeling of choking, hyperventilation, impotence, muscle tension, nausea, palpitations, shortness of breath or suicidal ideas. Symptoms occur most days. The quality of sleep is good. Nighttime awakenings: none.     Compliance with medications is %.   Sinus Problem   This is a recurrent problem. The problem has been gradually worsening since onset. There has been no fever. She is experiencing no pain. Associated symptoms include congestion, ear pain, sinus pressure and a sore throat. Pertinent negatives include no chills, coughing, diaphoresis, headaches, neck pain, shortness of breath, sneezing or swollen glands.   Hernia   Associated symptoms include congestion, fatigue and a sore throat. Pertinent negatives include no abdominal pain, arthralgias, chest pain, chills, coughing, diaphoresis, fever, headaches, joint swelling, myalgias, nausea, neck pain, numbness,  rash, swollen glands, urinary symptoms, visual change, vomiting or weakness.   Hypertension   This is a recurrent problem. The current episode started more than 1 month ago. The problem has been rapidly worsening since onset. The problem is controlled. Associated symptoms include anxiety and malaise/fatigue. Pertinent negatives include no chest pain, headaches, neck pain, palpitations, peripheral edema, PND, shortness of breath or sweats. Risk factors for coronary artery disease include sedentary lifestyle. Past treatments include ACE inhibitors. The current treatment provides mild improvement. Compliance problems include diet.  Hypertensive end-organ damage includes a thyroid problem. There is no history of angina, kidney disease, CAD/MI, CVA, heart failure, left ventricular hypertrophy, PVD, renovascular disease or retinopathy. There is no history of chronic renal disease, coarctation of the aorta, hyperaldosteronism, hypercortisolism, hyperparathyroidism, a hypertension causing med, pheochromocytoma or sleep apnea.   Hypothyroidism   This is a recurrent problem. The current episode started more than 1 year ago. The problem occurs constantly. The problem has been rapidly worsening. Associated symptoms include congestion, fatigue and a sore throat. Pertinent negatives include no abdominal pain, arthralgias, chest pain, chills, coughing, diaphoresis, fever, headaches, joint swelling, myalgias, nausea, neck pain, numbness, rash, swollen glands, urinary symptoms, visual change, vomiting or weakness. Nothing aggravates the symptoms. She has tried acetaminophen, sleep, rest and relaxation (synthroid ) for the symptoms. The treatment provided mild relief.        The following portions of the patient's history were reviewed and updated as appropriate: allergies, current medications, past social history and problem list.    Review of Systems   Constitutional: Positive for fatigue, irritability and malaise/fatigue. Negative  "for activity change, appetite change, chills, diaphoresis, fever and unexpected weight change.   HENT: Positive for congestion, ear pain, postnasal drip, sinus pressure, sore throat, tinnitus and voice change. Negative for dental problem, drooling, ear discharge, facial swelling, hearing loss, mouth sores, nosebleeds, rhinorrhea, sneezing and trouble swallowing.    Eyes: Negative.  Negative for photophobia, pain, discharge, redness, itching and visual disturbance.   Respiratory: Negative.  Negative for apnea, cough, choking, chest tightness, shortness of breath and wheezing.    Cardiovascular: Negative.  Negative for chest pain, palpitations, leg swelling and PND.   Gastrointestinal: Negative.  Negative for abdominal distention, abdominal pain, anal bleeding, blood in stool, constipation, diarrhea, nausea and vomiting.   Endocrine: Negative.    Genitourinary: Negative.  Negative for difficulty urinating, dyspareunia, dysuria and impotence.   Musculoskeletal: Negative.  Negative for arthralgias, back pain, gait problem, joint swelling, myalgias, neck pain and neck stiffness.   Skin: Negative.  Negative for color change and rash.   Allergic/Immunologic: Positive for environmental allergies. Negative for food allergies and immunocompromised state.   Neurological: Negative.  Negative for dizziness, tremors, syncope, facial asymmetry, speech difficulty, weakness, light-headedness, numbness and headaches.   Hematological: Negative.  Negative for adenopathy. Does not bruise/bleed easily.   Psychiatric/Behavioral: Positive for agitation and dysphoric mood. Negative for behavioral problems, confusion, decreased concentration, hallucinations, self-injury, sleep disturbance and suicidal ideas. The patient is nervous/anxious. The patient is not hyperactive.    All other systems reviewed and are negative.      Objective   /100  Temp 98.3 °F (36.8 °C) (Tympanic)   Ht 61\" (154.9 cm)  Wt 141 lb (64 kg)  SpO2 99%  BMI " 26.64 kg/m2  Physical Exam   Constitutional: She is oriented to person, place, and time. She appears well-developed and well-nourished.   Family stressors, patient is very stressed at this time    HENT:   Head: Normocephalic and atraumatic.   Mouth/Throat: No oropharyngeal exudate.   Oral cavity dry    Eyes: Conjunctivae and EOM are normal. Pupils are equal, round, and reactive to light. Right eye exhibits no discharge. Left eye exhibits no discharge. No scleral icterus.   Neck: Normal range of motion. Neck supple. No JVD present. No tracheal deviation present. No thyromegaly present.   Cardiovascular: Normal rate and regular rhythm.  Exam reveals no gallop and no friction rub.    No murmur heard.  Pulmonary/Chest: Effort normal. No stridor. No respiratory distress. She has wheezes. She has no rales. She exhibits no tenderness.   Abdominal: Soft. Bowel sounds are normal. She exhibits no distension and no mass. There is tenderness. There is no rebound and no guarding. No hernia.   Musculoskeletal: Normal range of motion. She exhibits tenderness. She exhibits no edema or deformity.   Lymphadenopathy:     She has no cervical adenopathy.   Neurological: She is alert and oriented to person, place, and time. She has normal reflexes. She displays normal reflexes. No cranial nerve deficit. She exhibits normal muscle tone. Coordination normal.   Skin: Skin is warm and dry. No rash noted. No erythema. No pallor.   Nursing note and vitals reviewed.      Assessment/Plan   Problem List Items Addressed This Visit        Respiratory    Acute upper respiratory infection    Relevant Medications    clarithromycin (BIAXIN) 500 MG tablet    Cough - Primary    Relevant Medications    triamcinolone acetonide (KENALOG-40) injection 80 mg (Completed) (Start on 8/28/2017 11:15 AM)       Other    Anxiety           New Medications Ordered This Visit   Medications   • clarithromycin (BIAXIN) 500 MG tablet     Sig: Take 1 tablet by mouth 2  (Two) Times a Day.     Dispense:  20 tablet     Refill:  0   • diazePAM (VALIUM) 5 MG tablet     Sig: Take 1 tablet by mouth Every 12 (Twelve) Hours As Needed for Anxiety or Sleep.     Dispense:  60 tablet     Refill:  0   • benzonatate (TESSALON PERLES) 100 MG capsule     Sig: Take 1 capsule by mouth 3 (Three) Times a Day As Needed for Cough.     Dispense:  60 capsule     Refill:  0   • triamcinolone acetonide (KENALOG-40) injection 80 mg       It's not just what you eat, but when you eat  Eat breakfast, and eat smaller meals throughout the day. A healthy breakfast can jumpstart your metabolism, while eating small, healthy meals (rather than the standard three large meals) keeps your energy up.   Avoid eating at night. Try to eat dinner earlier and fast for 14-16 hours until breakfast the next morning. Studies suggest that eating only when you’re most active and giving your digestive system a long break each day may help to regulate weight.     Patient will be getting next rx from Dr Pilar Schaffer due to her tevin report and getting lortab from her monthly-patient instructed that we will not prescribe after this month.

## 2017-09-11 RX ORDER — DULOXETIN HYDROCHLORIDE 60 MG/1
60 CAPSULE, DELAYED RELEASE ORAL DAILY
Qty: 90 CAPSULE | Refills: 3 | Status: SHIPPED | OUTPATIENT
Start: 2017-09-11 | End: 2017-11-29 | Stop reason: SDUPTHER

## 2017-09-11 RX ORDER — DULOXETIN HYDROCHLORIDE 30 MG/1
30 CAPSULE, DELAYED RELEASE ORAL DAILY
Qty: 90 CAPSULE | Refills: 3 | Status: SHIPPED | OUTPATIENT
Start: 2017-09-11 | End: 2018-03-15

## 2017-10-23 ENCOUNTER — OFFICE VISIT (OUTPATIENT)
Dept: FAMILY MEDICINE CLINIC | Facility: CLINIC | Age: 72
End: 2017-10-23

## 2017-10-23 VITALS
WEIGHT: 140.5 LBS | DIASTOLIC BLOOD PRESSURE: 80 MMHG | SYSTOLIC BLOOD PRESSURE: 140 MMHG | BODY MASS INDEX: 26.53 KG/M2 | HEIGHT: 61 IN

## 2017-10-23 DIAGNOSIS — F41.9 ANXIETY: Primary | ICD-10-CM

## 2017-10-23 DIAGNOSIS — F32.89 OTHER DEPRESSION: ICD-10-CM

## 2017-10-23 PROCEDURE — 90471 IMMUNIZATION ADMIN: CPT | Performed by: NURSE PRACTITIONER

## 2017-10-23 PROCEDURE — 99213 OFFICE O/P EST LOW 20 MIN: CPT | Performed by: NURSE PRACTITIONER

## 2017-10-23 PROCEDURE — 90662 IIV NO PRSV INCREASED AG IM: CPT | Performed by: NURSE PRACTITIONER

## 2017-10-23 RX ORDER — BUSPIRONE HYDROCHLORIDE 30 MG/1
30 TABLET ORAL 2 TIMES DAILY
Qty: 60 TABLET | Refills: 11 | Status: SHIPPED | OUTPATIENT
Start: 2017-10-23 | End: 2017-10-23 | Stop reason: SDUPTHER

## 2017-10-23 RX ORDER — BUSPIRONE HYDROCHLORIDE 30 MG/1
30 TABLET ORAL 2 TIMES DAILY
Qty: 180 TABLET | Refills: 3 | Status: SHIPPED | OUTPATIENT
Start: 2017-10-23 | End: 2018-03-15

## 2017-10-23 NOTE — PROGRESS NOTES
Chief Complaint   Patient presents with   • Anxiety     Subjective   Alva Mills is a 72 y.o. female.     Anxiety   Presents for follow-up visit. Symptoms include depressed mood, excessive worry, insomnia, irritability, malaise, nervous/anxious behavior, panic and restlessness. Patient reports no chest pain, compulsions, confusion, decreased concentration, dizziness, feeling of choking, hyperventilation, impotence, muscle tension, nausea, palpitations or suicidal ideas. Symptoms occur most days. The quality of sleep is good. Nighttime awakenings: none.     Compliance with medications is %.   Hernia   Associated symptoms include fatigue. Pertinent negatives include no abdominal pain, arthralgias, chest pain, fever, joint swelling, myalgias, nausea, neck pain, numbness, rash, urinary symptoms, visual change, vomiting or weakness.   Hypertension   This is a recurrent problem. The current episode started more than 1 month ago. The problem has been rapidly worsening since onset. The problem is controlled. Associated symptoms include anxiety and malaise/fatigue. Pertinent negatives include no blurred vision, chest pain, neck pain, palpitations, peripheral edema, PND or sweats. Risk factors for coronary artery disease include sedentary lifestyle. Past treatments include ACE inhibitors. The current treatment provides mild improvement. Compliance problems include diet.  Hypertensive end-organ damage includes a thyroid problem. There is no history of angina, kidney disease, CAD/MI, CVA, heart failure, left ventricular hypertrophy, PVD, renovascular disease or retinopathy. There is no history of chronic renal disease, coarctation of the aorta, hyperaldosteronism, hypercortisolism, hyperparathyroidism, a hypertension causing med, pheochromocytoma or sleep apnea.   Hypothyroidism   This is a recurrent problem. The current episode started more than 1 year ago. The problem occurs constantly. The problem has been rapidly  worsening. Associated symptoms include fatigue. Pertinent negatives include no abdominal pain, arthralgias, chest pain, fever, joint swelling, myalgias, nausea, neck pain, numbness, rash, urinary symptoms, visual change, vomiting or weakness. Nothing aggravates the symptoms. She has tried acetaminophen, sleep, rest and relaxation (synthroid ) for the symptoms. The treatment provided mild relief.        The following portions of the patient's history were reviewed and updated as appropriate: allergies, current medications, past social history and problem list.    Review of Systems   Constitutional: Positive for fatigue, irritability and malaise/fatigue. Negative for activity change, appetite change, fever and unexpected weight change.   HENT: Positive for postnasal drip, tinnitus and voice change. Negative for dental problem, drooling, ear discharge, facial swelling, hearing loss, mouth sores, nosebleeds, rhinorrhea and trouble swallowing.    Eyes: Negative.  Negative for blurred vision, photophobia, pain, discharge, redness, itching and visual disturbance.   Respiratory: Negative.  Negative for apnea, choking, chest tightness and wheezing.    Cardiovascular: Negative.  Negative for chest pain, palpitations, leg swelling and PND.   Gastrointestinal: Negative.  Negative for abdominal pain, diarrhea, nausea and vomiting.   Endocrine: Negative.  Negative for cold intolerance, heat intolerance, polydipsia, polyphagia and polyuria.   Genitourinary: Negative.  Negative for difficulty urinating, dyspareunia, dysuria and impotence.   Musculoskeletal: Negative.  Negative for arthralgias, back pain, gait problem, joint swelling, myalgias, neck pain and neck stiffness.   Skin: Negative.  Negative for color change and rash.   Allergic/Immunologic: Positive for environmental allergies. Negative for food allergies and immunocompromised state.   Neurological: Negative.  Negative for dizziness, tremors, syncope, facial asymmetry,  "speech difficulty, weakness, light-headedness and numbness.   Hematological: Negative.  Negative for adenopathy. Does not bruise/bleed easily.   Psychiatric/Behavioral: Positive for agitation. Negative for behavioral problems, confusion, decreased concentration, dysphoric mood, hallucinations, self-injury, sleep disturbance and suicidal ideas. The patient is nervous/anxious and has insomnia. The patient is not hyperactive.    All other systems reviewed and are negative.      Objective   /80  Ht 61\" (154.9 cm)  Wt 140 lb 8 oz (63.7 kg)  BMI 26.55 kg/m2  Physical Exam   Constitutional: She is oriented to person, place, and time. She appears well-developed and well-nourished.   Family stressors, patient is very stressed at this time    HENT:   Head: Normocephalic and atraumatic.   Mouth/Throat: No oropharyngeal exudate.   Oral cavity dry    Eyes: Conjunctivae and EOM are normal. Pupils are equal, round, and reactive to light. Right eye exhibits no discharge. Left eye exhibits no discharge. No scleral icterus.   Neck: Normal range of motion. Neck supple. No JVD present. No tracheal deviation present. No thyromegaly present.   Cardiovascular: Normal rate and regular rhythm.  Exam reveals no gallop and no friction rub.    No murmur heard.  Pulmonary/Chest: Effort normal and breath sounds normal. No stridor. No respiratory distress. She has no wheezes. She has no rales. She exhibits no tenderness.   Abdominal: Soft. Bowel sounds are normal. She exhibits no distension and no mass. There is tenderness. There is no rebound and no guarding. No hernia.   Musculoskeletal: Normal range of motion. She exhibits tenderness. She exhibits no edema or deformity.   Lymphadenopathy:     She has no cervical adenopathy.   Neurological: She is alert and oriented to person, place, and time. She has normal reflexes. She displays normal reflexes. No cranial nerve deficit. She exhibits normal muscle tone. Coordination normal.   Skin: " Skin is warm and dry. No rash noted. No erythema. No pallor.   Nursing note and vitals reviewed.      Assessment/Plan   Problem List Items Addressed This Visit        Other    Anxiety - Primary    Depression    Relevant Medications    busPIRone (BUSPAR) 30 MG tablet           New Medications Ordered This Visit   Medications   • busPIRone (BUSPAR) 30 MG tablet     Sig: Take 1 tablet by mouth 2 (Two) Times a Day.     Dispense:  180 tablet     Refill:  3      increase bupsar to 30mg bid as directed, follow up if symptoms worsen     It's not just what you eat, but when you eat  Eat breakfast, and eat smaller meals throughout the day. A healthy breakfast can jumpstart your metabolism, while eating small, healthy meals (rather than the standard three large meals) keeps your energy up.   Avoid eating at night. Try to eat dinner earlier and fast for 14-16 hours until breakfast the next morning. Studies suggest that eating only when you’re most active and giving your digestive system a long break each day may help to regulate weight.

## 2017-11-29 RX ORDER — DULOXETIN HYDROCHLORIDE 60 MG/1
60 CAPSULE, DELAYED RELEASE ORAL DAILY
Qty: 90 CAPSULE | Refills: 3 | Status: SHIPPED | OUTPATIENT
Start: 2017-11-29 | End: 2018-03-15

## 2017-12-01 ENCOUNTER — OFFICE VISIT (OUTPATIENT)
Dept: FAMILY MEDICINE CLINIC | Facility: CLINIC | Age: 72
End: 2017-12-01

## 2017-12-01 VITALS
TEMPERATURE: 98.3 F | OXYGEN SATURATION: 96 % | HEART RATE: 74 BPM | SYSTOLIC BLOOD PRESSURE: 118 MMHG | HEIGHT: 61 IN | BODY MASS INDEX: 27.17 KG/M2 | DIASTOLIC BLOOD PRESSURE: 78 MMHG | WEIGHT: 143.9 LBS

## 2017-12-01 DIAGNOSIS — J06.9 ACUTE UPPER RESPIRATORY INFECTION: Primary | ICD-10-CM

## 2017-12-01 DIAGNOSIS — R05.9 COUGH: ICD-10-CM

## 2017-12-01 PROCEDURE — 99213 OFFICE O/P EST LOW 20 MIN: CPT | Performed by: NURSE PRACTITIONER

## 2017-12-01 RX ORDER — CEFUROXIME AXETIL 500 MG/1
500 TABLET ORAL 2 TIMES DAILY
COMMUNITY
Start: 2017-11-27 | End: 2018-03-15

## 2017-12-01 RX ORDER — CLARITHROMYCIN 500 MG/1
500 TABLET, COATED ORAL 2 TIMES DAILY
Qty: 20 TABLET | Refills: 0 | Status: SHIPPED | OUTPATIENT
Start: 2017-12-01 | End: 2018-03-15

## 2017-12-01 RX ORDER — FLUTICASONE PROPIONATE 50 MCG
2 SPRAY, SUSPENSION (ML) NASAL DAILY
Qty: 16 G | Refills: 5 | Status: SHIPPED | OUTPATIENT
Start: 2017-12-01 | End: 2018-03-22 | Stop reason: SDUPTHER

## 2017-12-01 NOTE — PROGRESS NOTES
Chief Complaint   Patient presents with   • Cough   • Nasal Congestion   • Sore Throat   • Headache     Subjective   Alva Mills is a 72 y.o. female.     Cough   This is a recurrent problem. Associated symptoms include ear pain, headaches, postnasal drip and a sore throat. Pertinent negatives include no chest pain, chills, eye redness, fever, myalgias, rash, rhinorrhea, shortness of breath, sweats or wheezing. The treatment provided mild relief. Her past medical history is significant for environmental allergies. There is no history of asthma, bronchiectasis, bronchitis, COPD or pneumonia.   Sore Throat    Associated symptoms include congestion, coughing, ear pain and headaches. Pertinent negatives include no abdominal pain, diarrhea, drooling, ear discharge, neck pain, shortness of breath, swollen glands, trouble swallowing or vomiting.   Headache    Associated symptoms include coughing, ear pain, sinus pressure, a sore throat and tinnitus. Pertinent negatives include no abdominal pain, back pain, dizziness, eye pain, eye redness, fever, hearing loss, nausea, neck pain, numbness, photophobia, rhinorrhea, swollen glands, visual change, vomiting or weakness.   Anxiety   Presents for follow-up visit. Symptoms include depressed mood, excessive worry, irritability, malaise, nervous/anxious behavior, panic and restlessness. Patient reports no chest pain, compulsions, confusion, decreased concentration, dizziness, feeling of choking, hyperventilation, impotence, muscle tension, nausea, palpitations, shortness of breath or suicidal ideas. Symptoms occur most days. The quality of sleep is good. Nighttime awakenings: none.     There is no history of asthma. Compliance with medications is %.   Sinus Problem   This is a recurrent problem. The problem has been gradually worsening since onset. There has been no fever. She is experiencing no pain. Associated symptoms include congestion, coughing, ear pain, headaches,  sinus pressure and a sore throat. Pertinent negatives include no chills, diaphoresis, neck pain, shortness of breath, sneezing or swollen glands.   Hernia   Associated symptoms include congestion, coughing, fatigue, headaches and a sore throat. Pertinent negatives include no abdominal pain, arthralgias, chest pain, chills, diaphoresis, fever, joint swelling, myalgias, nausea, neck pain, numbness, rash, swollen glands, urinary symptoms, visual change, vomiting or weakness.   Hypertension   This is a recurrent problem. The current episode started more than 1 month ago. The problem has been rapidly worsening since onset. The problem is controlled. Associated symptoms include anxiety, headaches and malaise/fatigue. Pertinent negatives include no chest pain, neck pain, palpitations, peripheral edema, PND, shortness of breath or sweats. Risk factors for coronary artery disease include sedentary lifestyle. Past treatments include ACE inhibitors. The current treatment provides mild improvement. Compliance problems include diet.  Hypertensive end-organ damage includes a thyroid problem. There is no history of angina, kidney disease, CAD/MI, CVA, heart failure, left ventricular hypertrophy, PVD, renovascular disease or retinopathy. There is no history of chronic renal disease, coarctation of the aorta, hyperaldosteronism, hypercortisolism, hyperparathyroidism, a hypertension causing med, pheochromocytoma or sleep apnea.   Hypothyroidism   This is a recurrent problem. The current episode started more than 1 year ago. The problem occurs constantly. The problem has been rapidly worsening. Associated symptoms include congestion, coughing, fatigue, headaches and a sore throat. Pertinent negatives include no abdominal pain, arthralgias, chest pain, chills, diaphoresis, fever, joint swelling, myalgias, nausea, neck pain, numbness, rash, swollen glands, urinary symptoms, visual change, vomiting or weakness. Nothing aggravates the  symptoms. She has tried acetaminophen, sleep, rest and relaxation (synthroid ) for the symptoms. The treatment provided mild relief.        The following portions of the patient's history were reviewed and updated as appropriate: allergies, current medications, past social history and problem list.    Review of Systems   Constitutional: Positive for fatigue, irritability and malaise/fatigue. Negative for activity change, appetite change, chills, diaphoresis, fever and unexpected weight change.   HENT: Positive for congestion, ear pain, postnasal drip, sinus pressure, sore throat, tinnitus and voice change. Negative for dental problem, drooling, ear discharge, facial swelling, hearing loss, mouth sores, nosebleeds, rhinorrhea, sneezing and trouble swallowing.    Eyes: Negative.  Negative for photophobia, pain, discharge, redness, itching and visual disturbance.   Respiratory: Positive for cough. Negative for apnea, choking, chest tightness, shortness of breath and wheezing.    Cardiovascular: Negative.  Negative for chest pain, palpitations, leg swelling and PND.   Gastrointestinal: Negative.  Negative for abdominal distention, abdominal pain, anal bleeding, blood in stool, constipation, diarrhea, nausea and vomiting.   Endocrine: Negative.    Genitourinary: Negative.  Negative for difficulty urinating, dyspareunia, dysuria and impotence.   Musculoskeletal: Negative.  Negative for arthralgias, back pain, gait problem, joint swelling, myalgias, neck pain and neck stiffness.   Skin: Negative.  Negative for color change and rash.   Allergic/Immunologic: Positive for environmental allergies. Negative for food allergies and immunocompromised state.   Neurological: Positive for headaches. Negative for dizziness, tremors, syncope, facial asymmetry, speech difficulty, weakness, light-headedness and numbness.   Hematological: Negative.  Negative for adenopathy. Does not bruise/bleed easily.   Psychiatric/Behavioral: Positive  "for agitation and dysphoric mood. Negative for behavioral problems, confusion, decreased concentration, hallucinations, self-injury, sleep disturbance and suicidal ideas. The patient is nervous/anxious. The patient is not hyperactive.    All other systems reviewed and are negative.      Objective   /78 (BP Location: Right arm, Patient Position: Sitting, Cuff Size: Large Adult)  Pulse 74  Temp 98.3 °F (36.8 °C)  Ht 61\" (154.9 cm)  Wt 143 lb 14.4 oz (65.3 kg)  SpO2 96%  BMI 27.19 kg/m2  Physical Exam   Constitutional: She is oriented to person, place, and time. She appears well-developed and well-nourished. No distress.   Family stressors, patient is very stressed at this time    HENT:   Head: Normocephalic and atraumatic.   Mouth/Throat: No oropharyngeal exudate.   Oral cavity dry    Eyes: Conjunctivae and EOM are normal. Pupils are equal, round, and reactive to light. Right eye exhibits no discharge. Left eye exhibits no discharge. No scleral icterus.   Neck: Normal range of motion. Neck supple. No JVD present. No tracheal deviation present. No thyromegaly present.   Cardiovascular: Normal rate and regular rhythm.  Exam reveals no gallop and no friction rub.    No murmur heard.  Pulmonary/Chest: Effort normal. No stridor. No respiratory distress. She has wheezes. She has no rales. She exhibits no tenderness.   Abdominal: Soft. Bowel sounds are normal. She exhibits no distension and no mass. There is tenderness. There is no rebound and no guarding. No hernia.   Musculoskeletal: Normal range of motion. She exhibits tenderness. She exhibits no edema or deformity.   Lymphadenopathy:     She has no cervical adenopathy.   Neurological: She is alert and oriented to person, place, and time. She has normal reflexes. She displays normal reflexes. No cranial nerve deficit. She exhibits normal muscle tone. Coordination normal.   Skin: Skin is warm and dry. No rash noted. She is not diaphoretic. No erythema. No " pallor.   Nursing note and vitals reviewed.      Assessment/Plan   Problem List Items Addressed This Visit        Respiratory    Acute upper respiratory infection - Primary    Relevant Medications    cefuroxime (CEFTIN) 500 MG tablet    clarithromycin (BIAXIN) 500 MG tablet    Cough           New Medications Ordered This Visit   Medications   • cefuroxime (CEFTIN) 500 MG tablet     Sig: Take 500 mg by mouth 2 (Two) Times a Day.   • fluticasone (FLONASE) 50 MCG/ACT nasal spray     Si sprays into each nostril Daily.     Dispense:  16 g     Refill:  5   • clarithromycin (BIAXIN) 500 MG tablet     Sig: Take 1 tablet by mouth 2 (Two) Times a Day.     Dispense:  20 tablet     Refill:  0       fluids, rest meds , return if worsen     Diet discussed, meds as directed, return if worsen

## 2017-12-08 ENCOUNTER — OFFICE VISIT (OUTPATIENT)
Dept: SURGERY | Facility: CLINIC | Age: 72
End: 2017-12-08

## 2017-12-08 VITALS
SYSTOLIC BLOOD PRESSURE: 148 MMHG | DIASTOLIC BLOOD PRESSURE: 86 MMHG | HEIGHT: 61 IN | BODY MASS INDEX: 26.62 KG/M2 | WEIGHT: 141 LBS

## 2017-12-08 DIAGNOSIS — K80.50 COMMON BILE DUCT CALCULI: Primary | ICD-10-CM

## 2017-12-08 PROCEDURE — 99214 OFFICE O/P EST MOD 30 MIN: CPT | Performed by: SURGERY

## 2017-12-08 RX ORDER — FOLIC ACID 0.8 MG
1 TABLET ORAL DAILY
COMMUNITY

## 2017-12-08 NOTE — PROGRESS NOTES
Chief Complaint   Patient presents with   • Abdominal Pain        Abdominal Pain   This is a new problem. The current episode started more than 1 month ago. The onset quality is gradual. The problem occurs intermittently. The problem has been gradually worsening. The pain is located in the RUQ. The pain is moderate. The quality of the pain is colicky. The abdominal pain does not radiate. Pertinent negatives include no anorexia, arthralgias, belching, constipation, diarrhea, dysuria, fever, flatus, frequency, headaches, hematochezia, hematuria, melena, myalgias, nausea, vomiting or weight loss. Nothing aggravates the pain. The pain is relieved by nothing. She has tried nothing for the symptoms.     72 year old woman, cholecystectomy, CBD exploration 20 years ago. Now has RUQ abdominal pain, No hx of pancreatitis or jaundice.  Past Medical History:   Diagnosis Date   • Abdominal pain    • Acquired hypothyroidism    • Acute bronchitis    • Acute sinusitis    • Acute upper respiratory infection    • Benign hypertension    • Breast cyst    • Breast lump     history of, right   • Calf pain    • Common bile duct calculus    • Constipation    • Cough    • Depressive disorder    • Epigastric pain    • External hemorrhoids without complication    • Functional heart murmur    • Gastroesophageal reflux disease    • General medical exam    • Hemorrhoids    • Hyperlipidemia    • Incisional hernia     no evident recurrence at this juncture   • Localized, primary osteoarthritis of ankle or foot    • Muscle strain    • Skin lesion        Past Surgical History:   Procedure Laterality Date   • BILE DUCT EXPLORATION  12/10/2013    Remove bile duct stone (1)    Common duct exploration, stone extraction, choledochoduodenostomy and choledochoscopy.    • BREAST BIOPSY Right 1991    Stereotactic breast biopsy (1)    Right breast    • CHOLECYSTECTOMY OPEN  1989   • HERNIA REPAIR  06/16/2014    Anesth, repair of hernia (1)    laparoscopic  ventral hernioplasty with mesh. Ventral hernia.    • HYSTERECTOMY  10/17/2001    Anesth, hysterectomy (1)    Laparoscopic subtotal hysterectomy & BSO. Enterolysis of sigmoid colon.    • INJECTION OF MEDICATION  08/12/2013    Dexamethasone (2)      • INJECTION OF MEDICATION  01/31/2013    Kenalog (1)      • INJECTION OF MEDICATION  02/11/2015    Rocephin (1)      • OTHER SURGICAL HISTORY  08/12/2013    Small Joint Injection/Aspiration 20600 (2)      • TUBAL ABDOMINAL LIGATION           Current Outpatient Prescriptions:   •  busPIRone (BUSPAR) 30 MG tablet, Take 1 tablet by mouth 2 (Two) Times a Day. (Patient taking differently: Take 15 mg by mouth 2 (Two) Times a Day.), Disp: 180 tablet, Rfl: 3  •  CALCIUM PO, Take 1 tablet by mouth Daily With Breakfast., Disp: , Rfl:   •  carisoprodol (SOMA) 350 MG tablet, Take 350 mg by mouth Every 6 (Six) Hours As Needed., Disp: , Rfl:   •  cetirizine (ZYRTEC ALLERGY) 10 MG tablet, Take 10 mg by mouth Daily., Disp: , Rfl:   •  diazePAM (VALIUM) 5 MG tablet, Take 1 tablet by mouth Every 12 (Twelve) Hours As Needed for Anxiety or Sleep., Disp: 60 tablet, Rfl: 0  •  DULoxetine (CYMBALTA) 30 MG capsule, Take 1 capsule by mouth Daily. Patient needs to add 30mg to total 90mg of cymbalta, Disp: 90 capsule, Rfl: 3  •  DULoxetine (CYMBALTA) 60 MG capsule, Take 1 capsule by mouth Daily. This replaces last rx, Disp: 90 capsule, Rfl: 3  •  fluticasone (FLONASE) 50 MCG/ACT nasal spray, 2 sprays into each nostril Daily., Disp: 16 g, Rfl: 5  •  HYDROcodone-acetaminophen (NORCO)  MG per tablet, Take 1 tablet by mouth Every 6 (Six) Hours As Needed., Disp: , Rfl:   •  levothyroxine (SYNTHROID) 50 MCG tablet, Take 1 tablet by mouth Daily., Disp: 90 tablet, Rfl: 4  •  Magnesium 500 MG capsule, Take 1 capsule by mouth Daily., Disp: , Rfl:   •  metoprolol succinate XL (TOPROL-XL) 25 MG 24 hr tablet, Take 1 tablet by mouth Daily., Disp: 90 tablet, Rfl: 4  •  promethazine (PHENERGAN) 25 MG tablet,  "Take 1 tablet by mouth Every 6 (Six) Hours As Needed for Nausea or Vomiting., Disp: 180 tablet, Rfl: 2  •  cefuroxime (CEFTIN) 500 MG tablet, Take 500 mg by mouth 2 (Two) Times a Day., Disp: , Rfl:   •  clarithromycin (BIAXIN) 500 MG tablet, Take 1 tablet by mouth 2 (Two) Times a Day., Disp: 20 tablet, Rfl: 0  •  propranolol (INDERAL) 10 MG tablet, Take 1 tablet by mouth Daily., Disp: 90 tablet, Rfl: 3  •  rOPINIRole (REQUIP) 1 MG tablet, Take 1 tablet by mouth 3 (Three) Times a Day. Take 1 hour before bedtime., Disp: 90 tablet, Rfl: 5    Allergies   Allergen Reactions   • Clonazepam Other (See Comments)     \"WENT CRAZY\"; CONFUSION   • Hydromorphone Other (See Comments)     \"WENT CRAZY\"; CONFUSION   • Morphine Other (See Comments)     HEADACHE   • Aripiprazole Nausea And Vomiting   • Morphine And Related Other (See Comments)     Headache.       Family History   Problem Relation Age of Onset   • Heart disease Other    • Hypertension Other        Social History     Social History   • Marital status:      Spouse name: N/A   • Number of children: N/A   • Years of education: N/A     Occupational History   • Not on file.     Social History Main Topics   • Smoking status: Former Smoker   • Smokeless tobacco: Never Used      Comment: about 45 years ago (Dec 04 2013)   • Alcohol use Not on file   • Drug use: Not on file   • Sexual activity: Not on file     Other Topics Concern   • Not on file     Social History Narrative       Review of Systems   Constitutional: Negative for fever and weight loss.   Gastrointestinal: Positive for abdominal pain. Negative for anorexia, constipation, diarrhea, flatus, hematochezia, melena, nausea and vomiting.   Genitourinary: Negative for dysuria, frequency and hematuria.   Musculoskeletal: Negative for arthralgias and myalgias.   Neurological: Negative for headaches.       Physical Exam   Constitutional: She is oriented to person, place, and time. She appears well-developed and " well-nourished. No distress.   HENT:   Head: Normocephalic and atraumatic.   Eyes: Conjunctivae are normal. Pupils are equal, round, and reactive to light. No scleral icterus.   Neck: Normal range of motion. Neck supple. No JVD present. No tracheal deviation present. No thyromegaly present.   Cardiovascular: Normal rate and regular rhythm.    Pulmonary/Chest: Effort normal and breath sounds normal. No stridor.   Abdominal: Soft. Bowel sounds are normal. She exhibits no distension and no mass. There is tenderness. There is no rebound and no guarding. No hernia.   Musculoskeletal: Normal range of motion. She exhibits no edema, tenderness or deformity.   Lymphadenopathy:     She has no cervical adenopathy.   Neurological: She is alert and oriented to person, place, and time.   Skin: Skin is warm and dry. No rash noted. She is not diaphoretic. No erythema. No pallor.   Psychiatric: She has a normal mood and affect. Her behavior is normal. Judgment and thought content normal.   Vitals reviewed.        ASSESSMENT    Alva was seen today for abdominal pain.    Diagnoses and all orders for this visit:    Common bile duct calculi  -     CBC & Differential; Future  -     Comprehensive Metabolic Panel; Future  -     MRI Abdomen With Contrast; Future        PLAN    1. Recheck after above          This document has been electronically signed by Thiago Thomas MD on December 14, 2017 9:10 PM

## 2017-12-18 ENCOUNTER — HOSPITAL ENCOUNTER (OUTPATIENT)
Dept: MRI IMAGING | Facility: HOSPITAL | Age: 72
Discharge: HOME | End: 2017-12-18
Admitting: SURGERY

## 2017-12-18 DIAGNOSIS — K80.50 COMMON BILE DUCT CALCULI: ICD-10-CM

## 2017-12-18 PROCEDURE — 74183 MRI ABD W/O CNTR FLWD CNTR: CPT

## 2017-12-18 PROCEDURE — 25010000002 GADOTERIDOL PER 1 ML: Performed by: SURGERY

## 2017-12-18 PROCEDURE — A9576 INJ PROHANCE MULTIPACK: HCPCS | Performed by: SURGERY

## 2017-12-18 RX ADMIN — GADOTERIDOL 14 ML: 279.3 INJECTION, SOLUTION INTRAVENOUS at 07:26

## 2017-12-20 ENCOUNTER — OFFICE VISIT (OUTPATIENT)
Dept: SURGERY | Facility: CLINIC | Age: 72
End: 2017-12-20

## 2017-12-20 VITALS
BODY MASS INDEX: 26.96 KG/M2 | WEIGHT: 142.8 LBS | SYSTOLIC BLOOD PRESSURE: 134 MMHG | HEIGHT: 61 IN | DIASTOLIC BLOOD PRESSURE: 82 MMHG

## 2017-12-20 DIAGNOSIS — R10.13 EPIGASTRIC PAIN: Primary | ICD-10-CM

## 2017-12-20 PROCEDURE — 99212 OFFICE O/P EST SF 10 MIN: CPT | Performed by: SURGERY

## 2017-12-20 NOTE — PROGRESS NOTES
Chief Complaint   Patient presents with   • Follow-up     Multiplanar multisequence image results.        HPI  Study Result   MRI abdomen with and without contrast. MRCP.     HISTORY: Abdominal pain. Evaluate for common bile duct stones.     Prior exam: CT May 19, 2014.     TECHNIQUE: Multiplanar multisequence images are obtained both  prior to and following the intravenous infusion of 14 mL  ProHance.     Computer generated 3-D images, subtraction and rotational images  for MRCP are also obtained.     There is no intrahepatic biliary dilatation. There is air within  the biliary tree, pneumobilia corresponding to findings seen on  prior imaging studies, CT May 19, 2014. There is a dilated  structure in the araceli hepatis probably dilated loop of small  bowel, jejunum from a high choledochojejunostomy. This is similar  in appearance to CT of May 19, 2014.     The liver is normal. No focal masses. Normal spleen. Atrophic  changes in the pancreas with dilatation of the mid and distal  pancreatic duct. This is also similar to prior exam. Benign cyst  left kidney. MRI abdomen with and without contrast, MRCP is  otherwise unremarkable.     IMPRESSION:  CONCLUSION: Pneumobilia. Dilated tubular structure in the araceli  hepatis most likely a loop of bowel, jejunum from a high  choledochojejunostomy.     MRI abdomen with and without contrast, MRCP is otherwise  unremarkable.      Electronically signed by:  Wei Yung MD  12/18/2017 8:39 AM CST  Workstation: [id number]         Current Outpatient Prescriptions:   •  busPIRone (BUSPAR) 30 MG tablet, Take 1 tablet by mouth 2 (Two) Times a Day. (Patient taking differently: Take 15 mg by mouth 2 (Two) Times a Day.), Disp: 180 tablet, Rfl: 3  •  CALCIUM PO, Take 1 tablet by mouth Daily With Breakfast., Disp: , Rfl:   •  carisoprodol (SOMA) 350 MG tablet, Take 350 mg by mouth Every 6 (Six) Hours As Needed., Disp: , Rfl:   •  cefuroxime (CEFTIN) 500 MG tablet, Take 500 mg by mouth 2 (Two)  "Times a Day., Disp: , Rfl:   •  cetirizine (ZYRTEC ALLERGY) 10 MG tablet, Take 10 mg by mouth Daily., Disp: , Rfl:   •  clarithromycin (BIAXIN) 500 MG tablet, Take 1 tablet by mouth 2 (Two) Times a Day., Disp: 20 tablet, Rfl: 0  •  diazePAM (VALIUM) 5 MG tablet, Take 1 tablet by mouth Every 12 (Twelve) Hours As Needed for Anxiety or Sleep., Disp: 60 tablet, Rfl: 0  •  DULoxetine (CYMBALTA) 30 MG capsule, Take 1 capsule by mouth Daily. Patient needs to add 30mg to total 90mg of cymbalta, Disp: 90 capsule, Rfl: 3  •  DULoxetine (CYMBALTA) 60 MG capsule, Take 1 capsule by mouth Daily. This replaces last rx, Disp: 90 capsule, Rfl: 3  •  fluticasone (FLONASE) 50 MCG/ACT nasal spray, 2 sprays into each nostril Daily., Disp: 16 g, Rfl: 5  •  HYDROcodone-acetaminophen (NORCO)  MG per tablet, Take 1 tablet by mouth Every 6 (Six) Hours As Needed., Disp: , Rfl:   •  levothyroxine (SYNTHROID) 50 MCG tablet, Take 1 tablet by mouth Daily., Disp: 90 tablet, Rfl: 4  •  Magnesium 500 MG capsule, Take 1 capsule by mouth Daily., Disp: , Rfl:   •  metoprolol succinate XL (TOPROL-XL) 25 MG 24 hr tablet, Take 1 tablet by mouth Daily., Disp: 90 tablet, Rfl: 4  •  promethazine (PHENERGAN) 25 MG tablet, Take 1 tablet by mouth Every 6 (Six) Hours As Needed for Nausea or Vomiting., Disp: 180 tablet, Rfl: 2  •  propranolol (INDERAL) 10 MG tablet, Take 1 tablet by mouth Daily., Disp: 90 tablet, Rfl: 3  •  rOPINIRole (REQUIP) 1 MG tablet, Take 1 tablet by mouth 3 (Three) Times a Day. Take 1 hour before bedtime., Disp: 90 tablet, Rfl: 5    Allergies   Allergen Reactions   • Clonazepam Other (See Comments)     \"WENT CRAZY\"; CONFUSION   • Hydromorphone Other (See Comments)     \"WENT CRAZY\"; CONFUSION   • Morphine Other (See Comments)     HEADACHE   • Aripiprazole Nausea And Vomiting   • Morphine And Related Other (See Comments)     Headache.       Review of Systems  Nothing to add  Physical Exam   Abdominal: Soft. Bowel sounds are normal. She " exhibits no distension and no mass. There is no tenderness. There is no rebound and no guarding.             ASSESSMENT    Alva was seen today for follow-up.    Diagnoses and all orders for this visit:    Epigastric pain  Comments:  There is currently no evidence of common bile duct stones.  Her pain is likely as a result of abdominal wall pain at the site of previous incision.  No hernia.      PLAN    1.  Recheck as needed.  2.  I'll gladly see her if the bulging or pain worsens          This document has been electronically signed by Thiago Thomas MD on December 21, 2017 8:07 PM

## 2018-03-14 ENCOUNTER — OFFICE VISIT (OUTPATIENT)
Dept: SURGERY | Facility: CLINIC | Age: 73
End: 2018-03-14

## 2018-03-14 VITALS
DIASTOLIC BLOOD PRESSURE: 80 MMHG | HEIGHT: 61 IN | BODY MASS INDEX: 26.85 KG/M2 | SYSTOLIC BLOOD PRESSURE: 120 MMHG | WEIGHT: 142.2 LBS

## 2018-03-14 DIAGNOSIS — R10.13 EPIGASTRIC PAIN: Primary | ICD-10-CM

## 2018-03-14 PROCEDURE — 99213 OFFICE O/P EST LOW 20 MIN: CPT | Performed by: SURGERY

## 2018-03-14 NOTE — PROGRESS NOTES
Chief Complaint   Patient presents with   • Follow-up     Recheck ventral hernia.        HPI  72 year old hx of multiple abdominal surgeries. Has bulging and pain in the upper abdomen along subcostal incision. No hx of incarceration or intestinal obstruction. Pain is chronic and sharp.   Past Medical History:   Diagnosis Date   • Abdominal pain    • Acquired hypothyroidism    • Acute bronchitis    • Acute sinusitis    • Acute upper respiratory infection    • Benign hypertension    • Breast cyst    • Breast lump     history of, right   • Calf pain    • Common bile duct calculus    • Constipation    • Cough    • Depressive disorder    • Epigastric pain    • External hemorrhoids without complication    • Functional heart murmur    • Gastroesophageal reflux disease    • General medical exam    • Hemorrhoids    • Hyperlipidemia    • Incisional hernia     no evident recurrence at this juncture   • Localized, primary osteoarthritis of ankle or foot    • Muscle strain    • Skin lesion        Past Surgical History:   Procedure Laterality Date   • BILE DUCT EXPLORATION  12/10/2013    Remove bile duct stone (1)    Common duct exploration, stone extraction, choledochoduodenostomy and choledochoscopy.    • BREAST BIOPSY Right 1991    Stereotactic breast biopsy (1)    Right breast    • CHOLECYSTECTOMY OPEN  1989   • HERNIA REPAIR  06/16/2014    Anesth, repair of hernia (1)    laparoscopic ventral hernioplasty with mesh. Ventral hernia.    • HYSTERECTOMY  10/17/2001    Anesth, hysterectomy (1)    Laparoscopic subtotal hysterectomy & BSO. Enterolysis of sigmoid colon.    • INJECTION OF MEDICATION  08/12/2013    Dexamethasone (2)      • INJECTION OF MEDICATION  01/31/2013    Kenalog (1)      • INJECTION OF MEDICATION  02/11/2015    Rocephin (1)      • OTHER SURGICAL HISTORY  08/12/2013    Small Joint Injection/Aspiration 20600 (2)      • TUBAL ABDOMINAL LIGATION           Current Outpatient Prescriptions:   •  CALCIUM PO, Take 1  "tablet by mouth Daily With Breakfast., Disp: , Rfl:   •  carisoprodol (SOMA) 350 MG tablet, Take 350 mg by mouth Every 6 (Six) Hours As Needed., Disp: , Rfl:   •  diazePAM (VALIUM) 5 MG tablet, Take 1 tablet by mouth Every 12 (Twelve) Hours As Needed for Anxiety or Sleep., Disp: 60 tablet, Rfl: 0  •  fluticasone (FLONASE) 50 MCG/ACT nasal spray, 2 sprays into each nostril Daily., Disp: 16 g, Rfl: 5  •  HYDROcodone-acetaminophen (NORCO)  MG per tablet, Take 1 tablet by mouth Every 6 (Six) Hours As Needed., Disp: , Rfl:   •  levothyroxine (SYNTHROID) 50 MCG tablet, Take 1 tablet by mouth Daily., Disp: 90 tablet, Rfl: 4  •  Magnesium 500 MG capsule, Take 1 capsule by mouth Daily., Disp: , Rfl:   •  promethazine (PHENERGAN) 25 MG tablet, Take 1 tablet by mouth Every 6 (Six) Hours As Needed for Nausea or Vomiting., Disp: 180 tablet, Rfl: 2  •  desvenlafaxine (PRISTIQ) 100 MG 24 hr tablet, Take 1 tablet by mouth Daily., Disp: 90 tablet, Rfl: 3  •  metoprolol succinate XL (TOPROL-XL) 25 MG 24 hr tablet, Take 1 tablet by mouth Daily., Disp: 90 tablet, Rfl: 3    Allergies   Allergen Reactions   • Clonazepam Other (See Comments)     \"WENT CRAZY\"; CONFUSION   • Hydromorphone Other (See Comments)     \"WENT CRAZY\"; CONFUSION   • Morphine Other (See Comments)     HEADACHE   • Aripiprazole Nausea And Vomiting   • Morphine And Related Other (See Comments)     Headache.       Family History   Problem Relation Age of Onset   • Heart disease Other    • Hypertension Other        Social History     Social History   • Marital status:      Spouse name: N/A   • Number of children: N/A   • Years of education: N/A     Occupational History   • Not on file.     Social History Main Topics   • Smoking status: Former Smoker   • Smokeless tobacco: Never Used      Comment: about 45 years ago (Dec 04 2013)   • Alcohol use Not on file   • Drug use: Unknown   • Sexual activity: Not on file     Other Topics Concern   • Not on file "     Social History Narrative   • No narrative on file       Review of Systems   Gastrointestinal: Positive for abdominal pain. Negative for abdominal distention, anal bleeding, blood in stool, constipation, diarrhea, nausea, rectal pain and vomiting.   All other systems reviewed and are negative.      Physical Exam   Constitutional: She appears well-developed and well-nourished.   HENT:   Head: Normocephalic and atraumatic.   Eyes: EOM are normal. Pupils are equal, round, and reactive to light. Right eye exhibits no discharge. Left eye exhibits no discharge. No scleral icterus.   Neck: Normal range of motion. Neck supple. No JVD present. No tracheal deviation present. No thyromegaly present.   Cardiovascular: Normal rate and regular rhythm.    Pulmonary/Chest: Effort normal. No respiratory distress.   Abdominal: Soft. Bowel sounds are normal. She exhibits no distension and no mass. There is no tenderness. There is no rebound and no guarding.       Lymphadenopathy:     She has no cervical adenopathy.   Vitals reviewed.        ASSESSMENT    Alva was seen today for follow-up.    Diagnoses and all orders for this visit:    Epigastric pain  -     CT abdomen wo contrast; Future        PLAN    1. CT with oral contrast  2. Discussed ventral hernia repair          This document has been electronically signed by Thiago Thomas MD on March 21, 2018 11:25 PM

## 2018-03-14 NOTE — PATIENT INSTRUCTIONS

## 2018-03-15 ENCOUNTER — OFFICE VISIT (OUTPATIENT)
Dept: FAMILY MEDICINE CLINIC | Facility: CLINIC | Age: 73
End: 2018-03-15

## 2018-03-15 VITALS
SYSTOLIC BLOOD PRESSURE: 142 MMHG | HEIGHT: 61 IN | BODY MASS INDEX: 26.81 KG/M2 | DIASTOLIC BLOOD PRESSURE: 88 MMHG | WEIGHT: 142 LBS

## 2018-03-15 DIAGNOSIS — F32.89 OTHER DEPRESSION: ICD-10-CM

## 2018-03-15 DIAGNOSIS — F41.9 ANXIETY: Primary | ICD-10-CM

## 2018-03-15 PROCEDURE — 99213 OFFICE O/P EST LOW 20 MIN: CPT | Performed by: NURSE PRACTITIONER

## 2018-03-15 RX ORDER — DESVENLAFAXINE 100 MG/1
100 TABLET, EXTENDED RELEASE ORAL DAILY
Qty: 30 TABLET | Refills: 11 | Status: SHIPPED | OUTPATIENT
Start: 2018-03-15 | End: 2018-03-15 | Stop reason: SDUPTHER

## 2018-03-15 RX ORDER — DESVENLAFAXINE 100 MG/1
100 TABLET, EXTENDED RELEASE ORAL DAILY
Qty: 90 TABLET | Refills: 3 | Status: SHIPPED | OUTPATIENT
Start: 2018-03-15 | End: 2018-04-13 | Stop reason: DRUGHIGH

## 2018-03-15 RX ORDER — METOPROLOL SUCCINATE 25 MG/1
25 TABLET, EXTENDED RELEASE ORAL DAILY
Qty: 90 TABLET | Refills: 3 | Status: SHIPPED | OUTPATIENT
Start: 2018-03-15 | End: 2019-03-20 | Stop reason: SDUPTHER

## 2018-03-15 NOTE — PROGRESS NOTES
Chief Complaint   Patient presents with   • Anxiety     Subjective   Alva Mills is a 72 y.o. female.     Anxiety   Presents for follow-up visit. Symptoms include depressed mood, excessive worry, insomnia, irritability, malaise, nervous/anxious behavior, panic and restlessness. Patient reports no chest pain, compulsions, confusion, decreased concentration, dizziness, feeling of choking, hyperventilation, impotence, muscle tension, nausea, palpitations or suicidal ideas. Symptoms occur most days. The quality of sleep is good. Nighttime awakenings: none.     Compliance with medications is %.   Hernia   Associated symptoms include fatigue. Pertinent negatives include no abdominal pain, arthralgias, chest pain, chills, diaphoresis, fever, headaches, joint swelling, myalgias, nausea, neck pain, numbness, rash, urinary symptoms, visual change, vomiting or weakness.   Hypertension   This is a recurrent problem. The current episode started more than 1 month ago. The problem has been rapidly worsening since onset. The problem is controlled. Associated symptoms include anxiety and malaise/fatigue. Pertinent negatives include no blurred vision, chest pain, headaches, neck pain, palpitations, peripheral edema, PND or sweats. Risk factors for coronary artery disease include sedentary lifestyle. Past treatments include ACE inhibitors. The current treatment provides mild improvement. Compliance problems include diet.  Hypertensive end-organ damage includes a thyroid problem. There is no history of angina, kidney disease, CAD/MI, CVA, heart failure, left ventricular hypertrophy, PVD, renovascular disease or retinopathy. There is no history of chronic renal disease, coarctation of the aorta, hyperaldosteronism, hypercortisolism, hyperparathyroidism, a hypertension causing med, pheochromocytoma or sleep apnea. Current antihypertension treatment includes ACE inhibitors.   Hypothyroidism   This is a recurrent problem. The  current episode started more than 1 year ago. The problem occurs constantly. The problem has been rapidly worsening. Associated symptoms include fatigue. Pertinent negatives include no abdominal pain, arthralgias, chest pain, chills, diaphoresis, fever, headaches, joint swelling, myalgias, nausea, neck pain, numbness, rash, urinary symptoms, visual change, vomiting or weakness. Nothing aggravates the symptoms. She has tried acetaminophen, sleep, rest and relaxation (synthroid ) for the symptoms. The treatment provided mild relief.       Chief Complaint   Patient presents with   • Anxiety     Subjective   Alva Mills is a 72 y.o. female.     Anxiety   Presents for follow-up visit. Symptoms include depressed mood, excessive worry, insomnia, irritability, malaise, nervous/anxious behavior, panic and restlessness. Patient reports no chest pain, compulsions, confusion, decreased concentration, dizziness, feeling of choking, hyperventilation, impotence, muscle tension, nausea, palpitations or suicidal ideas. Symptoms occur most days. The quality of sleep is good. Nighttime awakenings: none.     Compliance with medications is %.   Hernia   Associated symptoms include fatigue. Pertinent negatives include no abdominal pain, arthralgias, chest pain, fever, joint swelling, myalgias, nausea, neck pain, numbness, rash, urinary symptoms, visual change, vomiting or weakness.   Hypertension   This is a recurrent problem. The current episode started more than 1 month ago. The problem has been rapidly worsening since onset. The problem is controlled. Associated symptoms include anxiety and malaise/fatigue. Pertinent negatives include no blurred vision, chest pain, neck pain, palpitations, peripheral edema, PND or sweats. Risk factors for coronary artery disease include sedentary lifestyle. Past treatments include ACE inhibitors. The current treatment provides mild improvement. Compliance problems include diet.  Hypertensive  end-organ damage includes a thyroid problem. There is no history of angina, kidney disease, CAD/MI, CVA, heart failure, left ventricular hypertrophy, PVD, renovascular disease or retinopathy. There is no history of chronic renal disease, coarctation of the aorta, hyperaldosteronism, hypercortisolism, hyperparathyroidism, a hypertension causing med, pheochromocytoma or sleep apnea.   Hypothyroidism   This is a recurrent problem. The current episode started more than 1 year ago. The problem occurs constantly. The problem has been rapidly worsening. Associated symptoms include fatigue. Pertinent negatives include no abdominal pain, arthralgias, chest pain, fever, joint swelling, myalgias, nausea, neck pain, numbness, rash, urinary symptoms, visual change, vomiting or weakness. Nothing aggravates the symptoms. She has tried acetaminophen, sleep, rest and relaxation (synthroid ) for the symptoms. The treatment provided mild relief.        The following portions of the patient's history were reviewed and updated as appropriate: allergies, current medications, past social history and problem list.    Review of Systems   Constitutional: Positive for fatigue, irritability and malaise/fatigue. Negative for activity change, appetite change, fever and unexpected weight change.   HENT: Positive for postnasal drip, tinnitus and voice change. Negative for dental problem, drooling, ear discharge, facial swelling, hearing loss, mouth sores, nosebleeds, rhinorrhea and trouble swallowing.    Eyes: Negative.  Negative for blurred vision, photophobia, pain, discharge, redness, itching and visual disturbance.   Respiratory: Negative.  Negative for apnea, choking, chest tightness and wheezing.    Cardiovascular: Negative.  Negative for chest pain, palpitations, leg swelling and PND.   Gastrointestinal: Negative.  Negative for abdominal pain, diarrhea, nausea and vomiting.   Endocrine: Negative.  Negative for cold intolerance, heat  "intolerance, polydipsia, polyphagia and polyuria.   Genitourinary: Negative.  Negative for difficulty urinating, dyspareunia, dysuria and impotence.   Musculoskeletal: Negative.  Negative for arthralgias, back pain, gait problem, joint swelling, myalgias, neck pain and neck stiffness.   Skin: Negative.  Negative for color change and rash.   Allergic/Immunologic: Positive for environmental allergies. Negative for food allergies and immunocompromised state.   Neurological: Negative.  Negative for dizziness, tremors, syncope, facial asymmetry, speech difficulty, weakness, light-headedness and numbness.   Hematological: Negative.  Negative for adenopathy. Does not bruise/bleed easily.   Psychiatric/Behavioral: Positive for agitation. Negative for behavioral problems, confusion, decreased concentration, dysphoric mood, hallucinations, self-injury, sleep disturbance and suicidal ideas. The patient is nervous/anxious and has insomnia. The patient is not hyperactive.    All other systems reviewed and are negative.      Objective   /88   Ht 154.9 cm (61\")   Wt 64.4 kg (142 lb)   BMI 26.83 kg/m²   Physical Exam   Constitutional: She is oriented to person, place, and time. She appears well-developed and well-nourished.   Family stressors, patient is very stressed at this time    HENT:   Head: Normocephalic and atraumatic.   Mouth/Throat: No oropharyngeal exudate.   Oral cavity dry    Eyes: Conjunctivae and EOM are normal. Pupils are equal, round, and reactive to light. Right eye exhibits no discharge. Left eye exhibits no discharge. No scleral icterus.   Neck: Normal range of motion. Neck supple. No JVD present. No tracheal deviation present. No thyromegaly present.   Cardiovascular: Normal rate and regular rhythm.  Exam reveals no gallop and no friction rub.    No murmur heard.  Pulmonary/Chest: Effort normal and breath sounds normal. No stridor. No respiratory distress. She has no wheezes. She has no rales. She " exhibits no tenderness.   Abdominal: Soft. Bowel sounds are normal. She exhibits no distension and no mass. There is tenderness. There is no rebound and no guarding. No hernia.   Musculoskeletal: Normal range of motion. She exhibits tenderness. She exhibits no edema or deformity.   Lymphadenopathy:     She has no cervical adenopathy.   Neurological: She is alert and oriented to person, place, and time. She has normal reflexes. She displays normal reflexes. No cranial nerve deficit. She exhibits normal muscle tone. Coordination normal.   Skin: Skin is warm and dry. No rash noted. No erythema. No pallor.   Nursing note and vitals reviewed.      Assessment/Plan   Problem List Items Addressed This Visit        Other    Anxiety - Primary    Depression    Relevant Medications    desvenlafaxine (PRISTIQ) 100 MG 24 hr tablet      Other Visit Diagnoses    None.          New Medications Ordered This Visit   Medications   • desvenlafaxine (PRISTIQ) 100 MG 24 hr tablet     Sig: Take 1 tablet by mouth Daily.     Dispense:  90 tablet     Refill:  3   • metoprolol succinate XL (TOPROL-XL) 25 MG 24 hr tablet     Sig: Take 1 tablet by mouth Daily.     Dispense:  90 tablet     Refill:  3      increase bupsar to 30mg bid as directed, follow up if symptoms worsen     It's not just what you eat, but when you eat  Eat breakfast, and eat smaller meals throughout the day. A healthy breakfast can jumpstart your metabolism, while eating small, healthy meals (rather than the standard three large meals) keeps your energy up.   Avoid eating at night. Try to eat dinner earlier and fast for 14-16 hours until breakfast the next morning. Studies suggest that eating only when you’re most active and giving your digestive system a long break each day may help to regulate weight.         Chief Complaint   Patient presents with   • Anxiety     Subjective   Alva Mills is a 72 y.o. female.     Anxiety   Presents for follow-up visit. Symptoms include  depressed mood, excessive worry, insomnia, irritability, malaise, nervous/anxious behavior, panic and restlessness. Patient reports no chest pain, compulsions, confusion, decreased concentration, dizziness, feeling of choking, hyperventilation, impotence, muscle tension, nausea, palpitations or suicidal ideas. Symptoms occur most days. The quality of sleep is good. Nighttime awakenings: none.     Compliance with medications is %.   Hernia   Associated symptoms include fatigue. Pertinent negatives include no abdominal pain, arthralgias, chest pain, fever, joint swelling, myalgias, nausea, neck pain, numbness, rash, urinary symptoms, visual change, vomiting or weakness.   Hypertension   This is a recurrent problem. The current episode started more than 1 month ago. The problem has been rapidly worsening since onset. The problem is controlled. Associated symptoms include anxiety and malaise/fatigue. Pertinent negatives include no blurred vision, chest pain, neck pain, palpitations, peripheral edema, PND or sweats. Risk factors for coronary artery disease include sedentary lifestyle. Past treatments include ACE inhibitors. The current treatment provides mild improvement. Compliance problems include diet.  Hypertensive end-organ damage includes a thyroid problem. There is no history of angina, kidney disease, CAD/MI, CVA, heart failure, left ventricular hypertrophy, PVD, renovascular disease or retinopathy. There is no history of chronic renal disease, coarctation of the aorta, hyperaldosteronism, hypercortisolism, hyperparathyroidism, a hypertension causing med, pheochromocytoma or sleep apnea.   Hypothyroidism   This is a recurrent problem. The current episode started more than 1 year ago. The problem occurs constantly. The problem has been rapidly worsening. Associated symptoms include fatigue. Pertinent negatives include no abdominal pain, arthralgias, chest pain, fever, joint swelling, myalgias, nausea, neck  pain, numbness, rash, urinary symptoms, visual change, vomiting or weakness. Nothing aggravates the symptoms. She has tried acetaminophen, sleep, rest and relaxation (synthroid ) for the symptoms. The treatment provided mild relief.        The following portions of the patient's history were reviewed and updated as appropriate: allergies, current medications, past social history and problem list.    Review of Systems   Constitutional: Positive for fatigue, irritability and malaise/fatigue. Negative for activity change, appetite change, fever and unexpected weight change.   HENT: Positive for postnasal drip, tinnitus and voice change. Negative for dental problem, drooling, ear discharge, facial swelling, hearing loss, mouth sores, nosebleeds, rhinorrhea and trouble swallowing.    Eyes: Negative.  Negative for blurred vision, photophobia, pain, discharge, redness, itching and visual disturbance.   Respiratory: Negative.  Negative for apnea, choking, chest tightness and wheezing.    Cardiovascular: Negative.  Negative for chest pain, palpitations, leg swelling and PND.   Gastrointestinal: Negative.  Negative for abdominal pain, diarrhea, nausea and vomiting.   Endocrine: Negative.  Negative for cold intolerance, heat intolerance, polydipsia, polyphagia and polyuria.   Genitourinary: Negative.  Negative for difficulty urinating, dyspareunia, dysuria and impotence.   Musculoskeletal: Negative.  Negative for arthralgias, back pain, gait problem, joint swelling, myalgias, neck pain and neck stiffness.   Skin: Negative.  Negative for color change and rash.   Allergic/Immunologic: Positive for environmental allergies. Negative for food allergies and immunocompromised state.   Neurological: Negative.  Negative for dizziness, tremors, syncope, facial asymmetry, speech difficulty, weakness, light-headedness and numbness.   Hematological: Negative.  Negative for adenopathy. Does not bruise/bleed easily.   Psychiatric/Behavioral:  "Positive for agitation. Negative for behavioral problems, confusion, decreased concentration, dysphoric mood, hallucinations, self-injury, sleep disturbance and suicidal ideas. The patient is nervous/anxious and has insomnia. The patient is not hyperactive.    All other systems reviewed and are negative.      Objective   /88   Ht 154.9 cm (61\")   Wt 64.4 kg (142 lb)   BMI 26.83 kg/m²   Physical Exam   Constitutional: She is oriented to person, place, and time. She appears well-developed and well-nourished.   Family stressors, patient is very stressed at this time    HENT:   Head: Normocephalic and atraumatic.   Mouth/Throat: No oropharyngeal exudate.   Oral cavity dry    Eyes: Conjunctivae and EOM are normal. Pupils are equal, round, and reactive to light. Right eye exhibits no discharge. Left eye exhibits no discharge. No scleral icterus.   Neck: Normal range of motion. Neck supple. No JVD present. No tracheal deviation present. No thyromegaly present.   Cardiovascular: Normal rate and regular rhythm.  Exam reveals no gallop and no friction rub.    No murmur heard.  Pulmonary/Chest: Effort normal and breath sounds normal. No stridor. No respiratory distress. She has no wheezes. She has no rales. She exhibits no tenderness.   Abdominal: Soft. Bowel sounds are normal. She exhibits no distension and no mass. There is tenderness. There is no rebound and no guarding. No hernia.   Musculoskeletal: Normal range of motion. She exhibits tenderness. She exhibits no edema or deformity.   Lymphadenopathy:     She has no cervical adenopathy.   Neurological: She is alert and oriented to person, place, and time. She has normal reflexes. She displays normal reflexes. No cranial nerve deficit. She exhibits normal muscle tone. Coordination normal.   Skin: Skin is warm and dry. No rash noted. No erythema. No pallor.   Nursing note and vitals reviewed.      Assessment/Plan   Problem List Items Addressed This Visit        " Other    Anxiety - Primary    Depression    Relevant Medications    desvenlafaxine (PRISTIQ) 100 MG 24 hr tablet      Other Visit Diagnoses    None.          New Medications Ordered This Visit   Medications   • desvenlafaxine (PRISTIQ) 100 MG 24 hr tablet     Sig: Take 1 tablet by mouth Daily.     Dispense:  90 tablet     Refill:  3   • metoprolol succinate XL (TOPROL-XL) 25 MG 24 hr tablet     Sig: Take 1 tablet by mouth Daily.     Dispense:  90 tablet     Refill:  3      increase bupsar to 30mg bid as directed, follow up if symptoms worsen     It's not just what you eat, but when you eat  Eat breakfast, and eat smaller meals throughout the day. A healthy breakfast can jumpstart your metabolism, while eating small, healthy meals (rather than the standard three large meals) keeps your energy up.   Avoid eating at night. Try to eat dinner earlier and fast for 14-16 hours until breakfast the next morning. Studies suggest that eating only when you’re most active and giving your digestive system a long break each day may help to regulate weight.            The following portions of the patient's history were reviewed and updated as appropriate: allergies, current medications, past social history and problem list.    Review of Systems   Constitutional: Positive for fatigue, irritability and malaise/fatigue. Negative for activity change, appetite change, chills, diaphoresis, fever and unexpected weight change.        Hot flashes    HENT: Positive for postnasal drip, tinnitus and voice change. Negative for dental problem, drooling, ear discharge, facial swelling, hearing loss, mouth sores, nosebleeds, rhinorrhea and trouble swallowing.    Eyes: Negative.  Negative for blurred vision, photophobia, pain, discharge, redness, itching and visual disturbance.   Respiratory: Negative.  Negative for apnea, choking, chest tightness and wheezing.    Cardiovascular: Negative.  Negative for chest pain, palpitations, leg swelling and  "PND.   Gastrointestinal: Negative.  Negative for abdominal pain, diarrhea, nausea and vomiting.   Endocrine: Negative.  Negative for cold intolerance, heat intolerance, polydipsia, polyphagia and polyuria.   Genitourinary: Negative.  Negative for difficulty urinating, dyspareunia, dysuria and impotence.   Musculoskeletal: Negative.  Negative for arthralgias, back pain, gait problem, joint swelling, myalgias, neck pain and neck stiffness.   Skin: Negative.  Negative for color change and rash.   Allergic/Immunologic: Positive for environmental allergies. Negative for food allergies and immunocompromised state.   Neurological: Negative.  Negative for dizziness, tremors, syncope, facial asymmetry, speech difficulty, weakness, light-headedness, numbness and headaches.   Hematological: Negative.  Negative for adenopathy. Does not bruise/bleed easily.   Psychiatric/Behavioral: Positive for agitation. Negative for behavioral problems, confusion, decreased concentration, dysphoric mood, hallucinations, self-injury, sleep disturbance and suicidal ideas. The patient is nervous/anxious and has insomnia. The patient is not hyperactive.    All other systems reviewed and are negative.      Objective   /88   Ht 154.9 cm (61\")   Wt 64.4 kg (142 lb)   BMI 26.83 kg/m²   Physical Exam   Constitutional: She is oriented to person, place, and time. She appears well-developed and well-nourished.   Family stressors, patient is very stressed at this time    HENT:   Head: Normocephalic and atraumatic.   Mouth/Throat: No oropharyngeal exudate.   Oral cavity dry    Eyes: Conjunctivae and EOM are normal. Pupils are equal, round, and reactive to light. Right eye exhibits no discharge. Left eye exhibits no discharge. No scleral icterus.   Neck: Normal range of motion. Neck supple. No JVD present. No tracheal deviation present. No thyromegaly present.   Cardiovascular: Normal rate and regular rhythm.  Exam reveals no gallop and no friction " rub.    No murmur heard.  Pulmonary/Chest: Effort normal and breath sounds normal. No stridor. No respiratory distress. She has no wheezes. She has no rales. She exhibits no tenderness.   Abdominal: Soft. Bowel sounds are normal. She exhibits no distension and no mass. There is tenderness. There is no rebound and no guarding. No hernia.   Musculoskeletal: Normal range of motion. She exhibits tenderness. She exhibits no edema or deformity.   Lymphadenopathy:     She has no cervical adenopathy.   Neurological: She is alert and oriented to person, place, and time. She has normal reflexes. She displays normal reflexes. No cranial nerve deficit. She exhibits normal muscle tone. Coordination normal.   Skin: Skin is warm and dry. No rash noted. No erythema. No pallor.   Nursing note and vitals reviewed.      Assessment/Plan   Problem List Items Addressed This Visit        Other    Anxiety - Primary    Depression    Relevant Medications    desvenlafaxine (PRISTIQ) 100 MG 24 hr tablet      Other Visit Diagnoses    None.          New Medications Ordered This Visit   Medications   • desvenlafaxine (PRISTIQ) 100 MG 24 hr tablet     Sig: Take 1 tablet by mouth Daily.     Dispense:  90 tablet     Refill:  3   • metoprolol succinate XL (TOPROL-XL) 25 MG 24 hr tablet     Sig: Take 1 tablet by mouth Daily.     Dispense:  90 tablet     Refill:  3      increase bupsar to 30mg bid as directed, follow up if symptoms worsen     It's not just what you eat, but when you eat  Eat breakfast, and eat smaller meals throughout the day. A healthy breakfast can jumpstart your metabolism, while eating small, healthy meals (rather than the standard three large meals) keeps your energy up.   Avoid eating at night. Try to eat dinner earlier and fast for 14-16 hours until breakfast the next morning. Studies suggest that eating only when you’re most active and giving your digestive system a long break each day may help to regulate weight.     Patient  has tried and failed on multiple medications including zoloft, prozac, wellbutrin, cymbalta, valium , buspar-none have helped and all have multiple side effects----Change from cymbalta to pristiq due to side effects of cymbalta -meds as directed, diet discussed, follow up if worsen

## 2018-03-20 ENCOUNTER — HOSPITAL ENCOUNTER (OUTPATIENT)
Dept: CT IMAGING | Facility: HOSPITAL | Age: 73
Discharge: HOME | End: 2018-03-20
Admitting: SURGERY

## 2018-03-20 DIAGNOSIS — R10.13 EPIGASTRIC PAIN: ICD-10-CM

## 2018-03-20 PROCEDURE — 74150 CT ABDOMEN W/O CONTRAST: CPT

## 2018-03-22 RX ORDER — FLUTICASONE PROPIONATE 50 MCG
2 SPRAY, SUSPENSION (ML) NASAL DAILY
Qty: 16 G | Refills: 5 | Status: SHIPPED | OUTPATIENT
Start: 2018-03-22 | End: 2019-03-11

## 2018-03-23 ENCOUNTER — OFFICE VISIT (OUTPATIENT)
Dept: SURGERY | Facility: CLINIC | Age: 73
End: 2018-03-23

## 2018-03-23 VITALS
HEIGHT: 61 IN | DIASTOLIC BLOOD PRESSURE: 60 MMHG | WEIGHT: 143 LBS | BODY MASS INDEX: 27 KG/M2 | SYSTOLIC BLOOD PRESSURE: 110 MMHG

## 2018-03-23 DIAGNOSIS — K43.9 VENTRAL HERNIA WITHOUT OBSTRUCTION OR GANGRENE: Primary | ICD-10-CM

## 2018-03-23 PROCEDURE — 99213 OFFICE O/P EST LOW 20 MIN: CPT | Performed by: SURGERY

## 2018-03-23 NOTE — PROGRESS NOTES
Chief Complaint   Patient presents with   • Follow-up     Recheck abdominal CT results.        HPI  Here for results of CT scanning. While there is a clinical hernia, it does not show up well on CT scan while she is supine. She continues to have discomfort in the area. No hx of incarceration or obstruction.    Study Result        EXAMINATION:  Computed Tomography           REGION:    Abdomen    INDICATION:   Abdominal pain and hernia, R10.13 Epigastric pain    HISTORY:  RACHEL. IMAGING:    CT A/P 5/19/14          TECHNIQUE:      - reconstructions:    axial, coronal, sagittal      - contrast:      oral:  Yes ;   intravenous:  no     - Please note:        - Lack of IV contrast limits assessment of solid organ  parenchyma, urinary system, or vascular structures.        This exam was performed according to the departmental  dose-optimization program which includes automated exposure  control, adjustment of the mA and/or kV according to patient size  and/or use of iterative reconstruction technique.                COMMENTS:     THORAX (INFERIOR):  The lung bases are clear.     The pleura is without fluid or a mass.     The heart size is normal size and there is no pericardial fluid.          ABDOMEN:    Limited assessment of the solid organ parenchyma is grossly  unremarkable demonstrating no evidence of organomegaly.    Status post cholecystectomy with a small degree of intrahepatic  biliary air, presumably attributable to prior sphincterotomy.  Limited assessment of the viscera is grossly unremarkable  demonstrating normal caliber bowel loops.    No evidence of free fluid or free intraperitoneal air.     There is been interval progression of a vertebral body  compression deformity at the T10 level. In the interval, the  patient is undergone posterior lumbar spine stabilization surgery  involving levels  L3-L5 with laminectomy defects.     RETROPERITONEUM:  Limited assessment of the kidneys demonstrates overall  normal  size.     Limited assessment of the ureters demonstrates normal caliber and  course.    The adrenal glands are of normal size and contour.     No gross evidence of significant retroperitoneal adenopathy.    The vascular structures are grossly within normal limits for age.                 .        IMPRESSION:  CONCLUSION:     1. Limited examination due to the lack of intravenous contrast.         2. No evidence of an acute intra-abdominal process.    3. Interval progression of a vertebral body compression deformity  at T11 and status post inferior lumbar spine posterior  stabilization and multilevel laminectomies.       Past Medical History:   Diagnosis Date   • Abdominal pain    • Acquired hypothyroidism    • Acute bronchitis    • Acute sinusitis    • Acute upper respiratory infection    • Benign hypertension    • Breast cyst    • Breast lump     history of, right   • Calf pain    • Common bile duct calculus    • Constipation    • Cough    • Depressive disorder    • Epigastric pain    • External hemorrhoids without complication    • Fibrocystic breast    • Functional heart murmur    • Gastroesophageal reflux disease    • General medical exam    • Hemorrhoids    • Hyperlipidemia    • Incisional hernia     no evident recurrence at this juncture   • Localized, primary osteoarthritis of ankle or foot    • Muscle strain    • Skin lesion        Past Surgical History:   Procedure Laterality Date   • BILE DUCT EXPLORATION  12/10/2013    Remove bile duct stone (1)    Common duct exploration, stone extraction, choledochoduodenostomy and choledochoscopy.    • BREAST BIOPSY Right 1991    Stereotactic breast biopsy (1)    Right breast    • CHOLECYSTECTOMY OPEN  1989   • HERNIA REPAIR  06/16/2014    Anesth, repair of hernia (1)    laparoscopic ventral hernioplasty with mesh. Ventral hernia.    • HYSTERECTOMY  10/17/2001    Anesth, hysterectomy (1)    Laparoscopic subtotal hysterectomy & BSO. Enterolysis of sigmoid colon.   "  • INJECTION OF MEDICATION  08/12/2013    Dexamethasone (2)      • INJECTION OF MEDICATION  01/31/2013    Kenalog (1)      • INJECTION OF MEDICATION  02/11/2015    Rocephin (1)      • OOPHORECTOMY     • OTHER SURGICAL HISTORY  08/12/2013    Small Joint Injection/Aspiration 20600 (2)      • TUBAL ABDOMINAL LIGATION           Current Outpatient Prescriptions:   •  CALCIUM PO, Take 1 tablet by mouth Daily With Breakfast., Disp: , Rfl:   •  carisoprodol (SOMA) 350 MG tablet, Take 350 mg by mouth Every 6 (Six) Hours As Needed., Disp: , Rfl:   •  desvenlafaxine (PRISTIQ) 100 MG 24 hr tablet, Take 1 tablet by mouth Daily., Disp: 90 tablet, Rfl: 3  •  diazePAM (VALIUM) 5 MG tablet, Take 1 tablet by mouth Every 12 (Twelve) Hours As Needed for Anxiety or Sleep., Disp: 60 tablet, Rfl: 0  •  fluticasone (FLONASE) 50 MCG/ACT nasal spray, 2 sprays into each nostril Daily., Disp: 16 g, Rfl: 5  •  HYDROcodone-acetaminophen (NORCO)  MG per tablet, Take 1 tablet by mouth Every 6 (Six) Hours As Needed., Disp: , Rfl:   •  levothyroxine (SYNTHROID) 50 MCG tablet, Take 1 tablet by mouth Daily., Disp: 90 tablet, Rfl: 4  •  Magnesium 500 MG capsule, Take 1 capsule by mouth Daily., Disp: , Rfl:   •  metoprolol succinate XL (TOPROL-XL) 25 MG 24 hr tablet, Take 1 tablet by mouth Daily., Disp: 90 tablet, Rfl: 3  •  promethazine (PHENERGAN) 25 MG tablet, Take 1 tablet by mouth Every 6 (Six) Hours As Needed for Nausea or Vomiting., Disp: 180 tablet, Rfl: 2    Allergies   Allergen Reactions   • Clonazepam Other (See Comments)     \"WENT CRAZY\"; CONFUSION   • Hydromorphone Other (See Comments)     \"WENT CRAZY\"; CONFUSION   • Morphine Other (See Comments)     HEADACHE   • Aripiprazole Nausea And Vomiting   • Morphine And Related Other (See Comments)     Headache.       Family History   Problem Relation Age of Onset   • Heart disease Other    • Hypertension Other        Social History     Social History   • Marital status:      Spouse " name: N/A   • Number of children: N/A   • Years of education: N/A     Occupational History   • Not on file.     Social History Main Topics   • Smoking status: Former Smoker   • Smokeless tobacco: Never Used      Comment: about 45 years ago (Dec 04 2013)   • Alcohol use Not on file   • Drug use: Unknown   • Sexual activity: Not on file     Other Topics Concern   • Not on file     Social History Narrative   • No narrative on file       Review of Systems   Constitutional: Negative for activity change, appetite change, chills, diaphoresis, fatigue, fever and unexpected weight change.   HENT: Negative.    Eyes: Negative.    Respiratory: Negative.    Cardiovascular: Negative.    Gastrointestinal: Positive for abdominal pain. Negative for abdominal distention, anal bleeding, blood in stool, constipation, diarrhea, nausea, rectal pain and vomiting.   Endocrine: Negative.    Genitourinary: Negative.    Musculoskeletal: Negative for arthralgias and back pain.   Skin: Negative.    Allergic/Immunologic: Negative.    Neurological: Negative.    Hematological: Negative.    Psychiatric/Behavioral: Negative.        Physical Exam   Constitutional: She is oriented to person, place, and time. She appears well-developed and well-nourished. No distress.   HENT:   Head: Normocephalic and atraumatic.   Eyes: EOM are normal. Pupils are equal, round, and reactive to light. No scleral icterus.   Neck: Normal range of motion. Neck supple. No JVD present. No tracheal deviation present. No thyromegaly present.   Cardiovascular: Normal rate and regular rhythm.    Pulmonary/Chest: Effort normal and breath sounds normal. No stridor.   Abdominal: Soft. Bowel sounds are normal. She exhibits no distension and no mass. There is no tenderness. There is no rebound and no guarding. A hernia is present.       Musculoskeletal: Normal range of motion. She exhibits no edema.   Lymphadenopathy:     She has no cervical adenopathy.   Neurological: She is alert  "and oriented to person, place, and time.   Skin: Skin is warm and dry. She is not diaphoretic. No erythema.   Psychiatric: She has a normal mood and affect. Her behavior is normal. Judgment and thought content normal.   Vitals reviewed.        ASSESSMENT    Alva was seen today for follow-up.    Diagnoses and all orders for this visit:    Ventral hernia without obstruction or gangrene        PLAN  1. Laparoscopically assisted ventral hernia repair is suggested. Multiple other operations complicate this procedure.  The following were discussed:    What are the indications that have led your doctor to the opinion that an operation is necessary?    A symptomatic bulge is present for which operation has been suggested.    What, if any, alternative treatments are available for your condition?    Alternatives to surgery have been explained including watchful waiting, noting that patients who are asymptomatic or \"minimally symptomatic\" may be managed without surgical intervention.    What will be the likely result if you don't have the operation?    Hernias may grow in size, or become tender, incarcerated, or cause intestinal obstruction. While the risk of these events is low, the risk with symptomatic hernias is higher.     What are the basic procedures involved in the operation?    A small incision or incisions are made and the defect is repaired and supported with permanent mesh.     What are the risks?    There are risks of bleeding, infection requiring antibiotics and possibly further surgery, injury to nearby structures, nerve injury, wound complications, recurrence of hernia. The risk of chronic pain may be as high as 10%, although the risk debilitating pain is approximately 2%. Urinary retention requiring catheterization may occur due to spasm of the bladder muscles in 1 in 100, and is more common in elderly males.   Events such as severe bleeding, the need for blood transfusion(s), heart irregularity or stoppage " may occur, but are uncommon. Blood clot in the leg (DVT) causing pain and swelling is possible. In rare cases part of the clot may break off and go to the lungs.   Any complication may require a prolonged hospitalization, a modified incision or additional surgery.    How is the operation expected to improve your health or quality of life?    Operations will decrease risks of serious events. It will relieve the discomfort of bulging.    Is hospitalization necessary and, if so, how long can you expect to be hospitalized?    The operation is usually performed on an outpatient basis under general anesthesia. Occasionally, overnight stay is necessary due to medical co-morbidities, the nature of the operation, or pain control.    What can you expect during your recovery period?    Incisional pain controlled is controlled with a combination of narcotic and non-narcotic oral pain medicines. The incisional areas may become swollen and bruised.       When can you expect to resume normal activities?    Activities (except lifting and straining) may be resumed within a few days. There is to be no lifting over 5 pounds for 6 weeks.    Are there likely to be residual effects from the operation?    Usually none, although pain and numbness are possible.     2. She desires to think about it  3. Recheck in 1 week          This document has been electronically signed by Thiago Thomas MD on March 25, 2018 12:21 PM

## 2018-03-23 NOTE — PATIENT INSTRUCTIONS

## 2018-03-29 ENCOUNTER — TELEPHONE (OUTPATIENT)
Dept: FAMILY MEDICINE CLINIC | Facility: CLINIC | Age: 73
End: 2018-03-29

## 2018-03-29 NOTE — TELEPHONE ENCOUNTER
Patient called and said she didn't think the Pristiq was working has been on it for almost 2 weeks now but says she is having more anxiety. Please advise

## 2018-03-29 NOTE — TELEPHONE ENCOUNTER
Tell her to give it a little more time to work if not better by Monday then go back to the cymbalta

## 2018-03-30 ENCOUNTER — OFFICE VISIT (OUTPATIENT)
Dept: SURGERY | Facility: CLINIC | Age: 73
End: 2018-03-30

## 2018-03-30 VITALS
BODY MASS INDEX: 27 KG/M2 | WEIGHT: 143 LBS | DIASTOLIC BLOOD PRESSURE: 88 MMHG | HEIGHT: 61 IN | SYSTOLIC BLOOD PRESSURE: 132 MMHG

## 2018-03-30 DIAGNOSIS — K43.9 VENTRAL HERNIA WITHOUT OBSTRUCTION OR GANGRENE: Primary | ICD-10-CM

## 2018-03-30 PROCEDURE — 99212 OFFICE O/P EST SF 10 MIN: CPT | Performed by: SURGERY

## 2018-04-01 NOTE — PROGRESS NOTES
Chief Complaint   Patient presents with   • Follow-up     Recheck abdominal pain.      HPI  Pain has diminished for now. Hernia is still noted, but is not symptomatic. No hx of incarceration or obstruction. Normal bowel function is noted.  Past Medical History:   Diagnosis Date   • Abdominal pain    • Acquired hypothyroidism    • Acute bronchitis    • Acute sinusitis    • Acute upper respiratory infection    • Benign hypertension    • Breast cyst    • Breast lump     history of, right   • Calf pain    • Common bile duct calculus    • Constipation    • Cough    • Depressive disorder    • Epigastric pain    • External hemorrhoids without complication    • Fibrocystic breast    • Functional heart murmur    • Gastroesophageal reflux disease    • General medical exam    • Hemorrhoids    • Hyperlipidemia    • Incisional hernia     no evident recurrence at this juncture   • Localized, primary osteoarthritis of ankle or foot    • Muscle strain    • Skin lesion        Past Surgical History:   Procedure Laterality Date   • BILE DUCT EXPLORATION  12/10/2013    Remove bile duct stone (1)    Common duct exploration, stone extraction, choledochoduodenostomy and choledochoscopy.    • BREAST BIOPSY Right 1991    Stereotactic breast biopsy (1)    Right breast    • CHOLECYSTECTOMY OPEN  1989   • HERNIA REPAIR  06/16/2014    Anesth, repair of hernia (1)    laparoscopic ventral hernioplasty with mesh. Ventral hernia.    • HYSTERECTOMY  10/17/2001    Anesth, hysterectomy (1)    Laparoscopic subtotal hysterectomy & BSO. Enterolysis of sigmoid colon.    • INJECTION OF MEDICATION  08/12/2013    Dexamethasone (2)      • INJECTION OF MEDICATION  01/31/2013    Kenalog (1)      • INJECTION OF MEDICATION  02/11/2015    Rocephin (1)      • OOPHORECTOMY     • OTHER SURGICAL HISTORY  08/12/2013    Small Joint Injection/Aspiration 20600 (2)      • TUBAL ABDOMINAL LIGATION           Current Outpatient Prescriptions:   •  CALCIUM PO, Take 1 tablet by  "mouth Daily With Breakfast., Disp: , Rfl:   •  carisoprodol (SOMA) 350 MG tablet, Take 350 mg by mouth Every 6 (Six) Hours As Needed., Disp: , Rfl:   •  desvenlafaxine (PRISTIQ) 100 MG 24 hr tablet, Take 1 tablet by mouth Daily., Disp: 90 tablet, Rfl: 3  •  diazePAM (VALIUM) 5 MG tablet, Take 1 tablet by mouth Every 12 (Twelve) Hours As Needed for Anxiety or Sleep., Disp: 60 tablet, Rfl: 0  •  fluticasone (FLONASE) 50 MCG/ACT nasal spray, 2 sprays into each nostril Daily., Disp: 16 g, Rfl: 5  •  HYDROcodone-acetaminophen (NORCO)  MG per tablet, Take 1 tablet by mouth Every 6 (Six) Hours As Needed., Disp: , Rfl:   •  levothyroxine (SYNTHROID) 50 MCG tablet, Take 1 tablet by mouth Daily., Disp: 90 tablet, Rfl: 4  •  Magnesium 500 MG capsule, Take 1 capsule by mouth Daily., Disp: , Rfl:   •  metoprolol succinate XL (TOPROL-XL) 25 MG 24 hr tablet, Take 1 tablet by mouth Daily., Disp: 90 tablet, Rfl: 3  •  promethazine (PHENERGAN) 25 MG tablet, Take 1 tablet by mouth Every 6 (Six) Hours As Needed for Nausea or Vomiting., Disp: 180 tablet, Rfl: 2    Allergies   Allergen Reactions   • Clonazepam Other (See Comments)     \"WENT CRAZY\"; CONFUSION   • Hydromorphone Other (See Comments)     \"WENT CRAZY\"; CONFUSION   • Morphine Other (See Comments)     HEADACHE   • Aripiprazole Nausea And Vomiting   • Morphine And Related Other (See Comments)     Headache.       Family History   Problem Relation Age of Onset   • Heart disease Other    • Hypertension Other        Social History     Social History   • Marital status:      Spouse name: N/A   • Number of children: N/A   • Years of education: N/A     Occupational History   • Not on file.     Social History Main Topics   • Smoking status: Former Smoker   • Smokeless tobacco: Never Used      Comment: about 45 years ago (Dec 04 2013)   • Alcohol use Not on file   • Drug use: Unknown   • Sexual activity: Not on file     Other Topics Concern   • Not on file     Social History " Narrative   • No narrative on file       Review of Systems   Gastrointestinal: Negative for abdominal distention, abdominal pain, anal bleeding, blood in stool, constipation, diarrhea, nausea, rectal pain and vomiting.       Physical Exam   Abdominal: Soft. Bowel sounds are normal. She exhibits no distension and no mass. There is no tenderness. There is no rebound and no guarding. No hernia.             ASSESSMENT    Alva was seen today for follow-up.    Diagnoses and all orders for this visit:    Ventral hernia without obstruction or gangrene        PLAN    1. Recheck 2 months- desires no surgeries at the present time          This document has been electronically signed by Thiago Thomas MD on April 1, 2018 1:27 PM

## 2018-04-13 ENCOUNTER — TELEPHONE (OUTPATIENT)
Dept: FAMILY MEDICINE CLINIC | Facility: CLINIC | Age: 73
End: 2018-04-13

## 2018-04-13 RX ORDER — DESVENLAFAXINE SUCCINATE 50 MG/1
50 TABLET, EXTENDED RELEASE ORAL DAILY
Qty: 30 TABLET | Refills: 5 | Status: SHIPPED | OUTPATIENT
Start: 2018-04-13 | End: 2018-04-30 | Stop reason: SDUPTHER

## 2018-04-13 NOTE — TELEPHONE ENCOUNTER
Alva has called again regarding the Pristiq saying she thinks the dose is to high . She is having hot flashes and dizziness and her balance is off. Also she wanted something for toenail fungus.

## 2018-04-13 NOTE — TELEPHONE ENCOUNTER
Can decrease to 50mg as long as her anxiety is controlled-also add lamisil daily-I think the dose is 250 or 300 x 2 months

## 2018-04-30 RX ORDER — DESVENLAFAXINE SUCCINATE 50 MG/1
50 TABLET, EXTENDED RELEASE ORAL DAILY
Qty: 30 TABLET | Refills: 5 | Status: SHIPPED | OUTPATIENT
Start: 2018-04-30 | End: 2018-05-07

## 2018-05-07 ENCOUNTER — OFFICE VISIT (OUTPATIENT)
Dept: FAMILY MEDICINE CLINIC | Facility: CLINIC | Age: 73
End: 2018-05-07

## 2018-05-07 VITALS
BODY MASS INDEX: 26.71 KG/M2 | WEIGHT: 141.5 LBS | HEIGHT: 61 IN | SYSTOLIC BLOOD PRESSURE: 128 MMHG | DIASTOLIC BLOOD PRESSURE: 82 MMHG

## 2018-05-07 DIAGNOSIS — R05.9 COUGH: Primary | ICD-10-CM

## 2018-05-07 PROCEDURE — 99213 OFFICE O/P EST LOW 20 MIN: CPT | Performed by: NURSE PRACTITIONER

## 2018-05-07 RX ORDER — DESVENLAFAXINE 100 MG/1
100 TABLET, EXTENDED RELEASE ORAL DAILY
Qty: 90 TABLET | Refills: 3 | Status: SHIPPED | OUTPATIENT
Start: 2018-05-07 | End: 2019-07-30 | Stop reason: SDUPTHER

## 2018-05-07 RX ORDER — BENZONATATE 100 MG/1
100 CAPSULE ORAL 3 TIMES DAILY PRN
Qty: 90 CAPSULE | Refills: 3 | Status: ON HOLD | OUTPATIENT
Start: 2018-05-07 | End: 2019-01-08

## 2018-05-07 RX ORDER — DEXTROMETHORPHAN HYDROBROMIDE AND PROMETHAZINE HYDROCHLORIDE 15; 6.25 MG/5ML; MG/5ML
5 SYRUP ORAL 4 TIMES DAILY PRN
Qty: 180 ML | Refills: 0 | Status: SHIPPED | OUTPATIENT
Start: 2018-05-07 | End: 2018-06-27

## 2018-05-07 RX ORDER — ALBUTEROL SULFATE 90 UG/1
2 AEROSOL, METERED RESPIRATORY (INHALATION) EVERY 4 HOURS PRN
Qty: 1 INHALER | Refills: 2 | Status: SHIPPED | OUTPATIENT
Start: 2018-05-07 | End: 2020-08-07 | Stop reason: ALTCHOICE

## 2018-05-15 RX ORDER — PROMETHAZINE HYDROCHLORIDE 25 MG/1
TABLET ORAL
Qty: 180 TABLET | Refills: 1 | Status: SHIPPED | OUTPATIENT
Start: 2018-05-15 | End: 2018-11-15 | Stop reason: SDUPTHER

## 2018-06-01 ENCOUNTER — OFFICE VISIT (OUTPATIENT)
Dept: SURGERY | Facility: CLINIC | Age: 73
End: 2018-06-01

## 2018-06-01 VITALS
WEIGHT: 138.2 LBS | HEIGHT: 61 IN | DIASTOLIC BLOOD PRESSURE: 80 MMHG | BODY MASS INDEX: 26.09 KG/M2 | SYSTOLIC BLOOD PRESSURE: 130 MMHG

## 2018-06-01 DIAGNOSIS — K43.9 VENTRAL HERNIA WITHOUT OBSTRUCTION OR GANGRENE: Primary | ICD-10-CM

## 2018-06-01 PROCEDURE — 99212 OFFICE O/P EST SF 10 MIN: CPT | Performed by: SURGERY

## 2018-06-01 RX ORDER — PENICILLIN V POTASSIUM 500 MG/1
TABLET ORAL
COMMUNITY
Start: 2018-05-30 | End: 2018-06-27

## 2018-06-01 NOTE — PROGRESS NOTES
Chief Complaint   Patient presents with   • Follow-up     Recheck abdominal pain        HPI    Pain remains diminished for now. Hernia is still noted, but is not symptomatic. No hx of incarceration or obstruction. Normal bowel function is noted.    Past Medical History:   Diagnosis Date   • Abdominal pain    • Acquired hypothyroidism    • Acute bronchitis    • Acute sinusitis    • Acute upper respiratory infection    • Benign hypertension    • Breast cyst    • Breast lump     history of, right   • Calf pain    • Common bile duct calculus    • Constipation    • Cough    • Depressive disorder    • Epigastric pain    • External hemorrhoids without complication    • Fibrocystic breast    • Functional heart murmur    • Gastroesophageal reflux disease    • General medical exam    • Hemorrhoids    • Hyperlipidemia    • Incisional hernia     no evident recurrence at this juncture   • Localized, primary osteoarthritis of ankle or foot    • Muscle strain    • Skin lesion        Past Surgical History:   Procedure Laterality Date   • BILE DUCT EXPLORATION  12/10/2013    Remove bile duct stone (1)    Common duct exploration, stone extraction, choledochoduodenostomy and choledochoscopy.    • BREAST BIOPSY Right 1991    Stereotactic breast biopsy (1)    Right breast    • CHOLECYSTECTOMY OPEN  1989   • HERNIA REPAIR  06/16/2014    Anesth, repair of hernia (1)    laparoscopic ventral hernioplasty with mesh. Ventral hernia.    • HYSTERECTOMY  10/17/2001    Anesth, hysterectomy (1)    Laparoscopic subtotal hysterectomy & BSO. Enterolysis of sigmoid colon.    • INJECTION OF MEDICATION  08/12/2013    Dexamethasone (2)      • INJECTION OF MEDICATION  01/31/2013    Kenalog (1)      • INJECTION OF MEDICATION  02/11/2015    Rocephin (1)      • OOPHORECTOMY     • OTHER SURGICAL HISTORY  08/12/2013    Small Joint Injection/Aspiration 20600 (2)      • TUBAL ABDOMINAL LIGATION           Current Outpatient Prescriptions:   •  albuterol  "(PROVENTIL HFA;VENTOLIN HFA) 108 (90 Base) MCG/ACT inhaler, Inhale 2 puffs Every 4 (Four) Hours As Needed for Wheezing., Disp: 1 inhaler, Rfl: 2  •  benzonatate (TESSALON PERLES) 100 MG capsule, Take 1 capsule by mouth 3 (Three) Times a Day As Needed for Cough., Disp: 90 capsule, Rfl: 3  •  CALCIUM PO, Take 1 tablet by mouth Daily With Breakfast., Disp: , Rfl:   •  carisoprodol (SOMA) 350 MG tablet, Take 350 mg by mouth Every 6 (Six) Hours As Needed., Disp: , Rfl:   •  desvenlafaxine (PRISTIQ) 100 MG 24 hr tablet, Take 1 tablet by mouth Daily., Disp: 90 tablet, Rfl: 3  •  diazePAM (VALIUM) 5 MG tablet, Take 1 tablet by mouth Every 12 (Twelve) Hours As Needed for Anxiety or Sleep., Disp: 60 tablet, Rfl: 0  •  fluticasone (FLONASE) 50 MCG/ACT nasal spray, 2 sprays into each nostril Daily., Disp: 16 g, Rfl: 5  •  HYDROcodone-acetaminophen (NORCO)  MG per tablet, Take 1 tablet by mouth Every 6 (Six) Hours As Needed., Disp: , Rfl:   •  levothyroxine (SYNTHROID) 50 MCG tablet, Take 1 tablet by mouth Daily., Disp: 90 tablet, Rfl: 4  •  Magnesium 500 MG capsule, Take 1 capsule by mouth Daily., Disp: , Rfl:   •  metoprolol succinate XL (TOPROL-XL) 25 MG 24 hr tablet, Take 1 tablet by mouth Daily., Disp: 90 tablet, Rfl: 3  •  penicillin v potassium (VEETID) 500 MG tablet, , Disp: , Rfl:   •  promethazine (PHENERGAN) 25 MG tablet, TAKE 1 TABLET EVERY 6 HOURSAS NEEDED FOR NAUSEA OR    VOMITING, Disp: 180 tablet, Rfl: 1  •  promethazine-dextromethorphan (PROMETHAZINE-DM) 6.25-15 MG/5ML syrup, Take 5 mL by mouth 4 (Four) Times a Day As Needed for Cough., Disp: 180 mL, Rfl: 0    Allergies   Allergen Reactions   • Clonazepam Other (See Comments)     \"WENT CRAZY\"; CONFUSION   • Hydromorphone Other (See Comments)     \"WENT CRAZY\"; CONFUSION   • Morphine Other (See Comments)     HEADACHE   • Aripiprazole Nausea And Vomiting   • Morphine And Related Other (See Comments)     Headache.       Family History   Problem Relation Age of " Onset   • Heart disease Other    • Hypertension Other        Social History     Social History   • Marital status:      Spouse name: N/A   • Number of children: N/A   • Years of education: N/A     Occupational History   • Not on file.     Social History Main Topics   • Smoking status: Former Smoker   • Smokeless tobacco: Never Used      Comment: about 45 years ago (Dec 04 2013)   • Alcohol use Not on file   • Drug use: Unknown   • Sexual activity: Not on file     Other Topics Concern   • Not on file     Social History Narrative   • No narrative on file       Review of Systems   Gastrointestinal: Positive for abdominal pain. Negative for abdominal distention, anal bleeding, blood in stool, constipation, diarrhea, nausea, rectal pain and vomiting.       Physical Exam   Abdominal: Soft. Bowel sounds are normal. She exhibits no distension and no mass. There is tenderness. There is no rebound and no guarding. A hernia (Unchanged) is present.             ASSESSMENT    Alva was seen today for follow-up.    Diagnoses and all orders for this visit:    Ventral hernia without obstruction or gangrene        PLAN    1. Recheck 4 months          This document has been electronically signed by Thiago Thomas MD on June 5, 2018 4:39 PM

## 2018-06-11 ENCOUNTER — OFFICE VISIT (OUTPATIENT)
Dept: FAMILY MEDICINE CLINIC | Facility: CLINIC | Age: 73
End: 2018-06-11

## 2018-06-11 VITALS
SYSTOLIC BLOOD PRESSURE: 140 MMHG | WEIGHT: 134 LBS | DIASTOLIC BLOOD PRESSURE: 82 MMHG | HEIGHT: 61 IN | BODY MASS INDEX: 25.3 KG/M2

## 2018-06-11 DIAGNOSIS — F41.9 ANXIETY: Primary | ICD-10-CM

## 2018-06-11 PROCEDURE — 99213 OFFICE O/P EST LOW 20 MIN: CPT | Performed by: NURSE PRACTITIONER

## 2018-06-11 RX ORDER — BUPROPION HYDROCHLORIDE 75 MG/1
75 TABLET ORAL 2 TIMES DAILY
Qty: 60 TABLET | Refills: 11 | Status: SHIPPED | OUTPATIENT
Start: 2018-06-11 | End: 2018-06-27

## 2018-06-11 NOTE — PROGRESS NOTES
Chief Complaint   Patient presents with   • Anxiety     Subjective   Alva Mills is a 72 y.o. female.     Anxiety   Presents for follow-up visit. Symptoms include depressed mood, excessive worry, insomnia, irritability, malaise, nervous/anxious behavior, panic and restlessness. Patient reports no chest pain, compulsions, confusion, decreased concentration, dizziness, feeling of choking, hyperventilation, impotence, muscle tension, nausea, palpitations or suicidal ideas. Symptoms occur most days. The quality of sleep is good. Nighttime awakenings: none.     Compliance with medications is %.   Hernia   Associated symptoms include fatigue. Pertinent negatives include no abdominal pain, arthralgias, chest pain, chills, coughing, diaphoresis, fever, headaches, joint swelling, myalgias, nausea, neck pain, numbness, rash, urinary symptoms, visual change, vomiting or weakness.   Hypertension   This is a recurrent problem. The current episode started more than 1 month ago. The problem has been rapidly worsening since onset. The problem is controlled. Associated symptoms include anxiety and malaise/fatigue. Pertinent negatives include no blurred vision, chest pain, headaches, neck pain, palpitations, peripheral edema, PND or sweats. Risk factors for coronary artery disease include sedentary lifestyle. Current antihypertension treatment includes ACE inhibitors. The current treatment provides mild improvement. Compliance problems include diet.  There is no history of angina, kidney disease, CAD/MI, CVA, heart failure, left ventricular hypertrophy, PVD or retinopathy. Identifiable causes of hypertension include a thyroid problem. There is no history of chronic renal disease, coarctation of the aorta, hyperaldosteronism, hypercortisolism, hyperparathyroidism, a hypertension causing med, pheochromocytoma, renovascular disease or sleep apnea.   Hypothyroidism   This is a recurrent problem. The current episode started more  than 1 year ago. The problem occurs constantly. The problem has been rapidly worsening. Associated symptoms include fatigue. Pertinent negatives include no abdominal pain, arthralgias, chest pain, chills, coughing, diaphoresis, fever, headaches, joint swelling, myalgias, nausea, neck pain, numbness, rash, urinary symptoms, visual change, vomiting or weakness. Nothing aggravates the symptoms. She has tried acetaminophen, sleep, rest and relaxation (synthroid ) for the symptoms. The treatment provided mild relief.        The following portions of the patient's history were reviewed and updated as appropriate: allergies, current medications, past social history and problem list.    Review of Systems   Constitutional: Positive for fatigue, irritability and malaise/fatigue. Negative for activity change, appetite change, chills, diaphoresis, fever and unexpected weight change.        Hot flashes    HENT: Positive for postnasal drip, tinnitus and voice change. Negative for dental problem, drooling, ear discharge, facial swelling, hearing loss, mouth sores, nosebleeds, rhinorrhea and trouble swallowing.    Eyes: Negative.  Negative for blurred vision, photophobia, pain, discharge, redness, itching and visual disturbance.   Respiratory: Negative.  Negative for apnea, cough, choking, chest tightness and wheezing.    Cardiovascular: Negative.  Negative for chest pain, palpitations, leg swelling and PND.   Gastrointestinal: Negative.  Negative for abdominal pain, anal bleeding, diarrhea, nausea and vomiting.   Endocrine: Negative.  Negative for cold intolerance, heat intolerance, polydipsia, polyphagia and polyuria.   Genitourinary: Negative.  Negative for difficulty urinating, dyspareunia, dysuria and impotence.   Musculoskeletal: Negative.  Negative for arthralgias, back pain, gait problem, joint swelling, myalgias, neck pain and neck stiffness.   Skin: Negative.  Negative for color change, pallor, rash and wound.  "  Allergic/Immunologic: Positive for environmental allergies. Negative for food allergies and immunocompromised state.   Neurological: Negative.  Negative for dizziness, tremors, syncope, facial asymmetry, speech difficulty, weakness, light-headedness, numbness and headaches.   Hematological: Negative.  Negative for adenopathy. Does not bruise/bleed easily.   Psychiatric/Behavioral: Positive for agitation and behavioral problems. Negative for confusion, decreased concentration, dysphoric mood, hallucinations, self-injury, sleep disturbance and suicidal ideas. The patient is nervous/anxious and has insomnia. The patient is not hyperactive.         Is going to therapy -not scheduled regularly    All other systems reviewed and are negative.      Objective   /82   Ht 154.9 cm (61\")   Wt 60.8 kg (134 lb)   BMI 25.32 kg/m²   Physical Exam   Constitutional: She is oriented to person, place, and time. She appears well-developed and well-nourished.   Family stressors, patient is very stressed at this time    HENT:   Head: Normocephalic and atraumatic.   Mouth/Throat: No oropharyngeal exudate.   Oral cavity dry improving    Eyes: Conjunctivae and EOM are normal. Pupils are equal, round, and reactive to light. Right eye exhibits no discharge. Left eye exhibits no discharge. No scleral icterus.   Neck: Normal range of motion. Neck supple. No JVD present. No tracheal deviation present. No thyromegaly present.   Cardiovascular: Normal rate, regular rhythm and normal heart sounds.  Exam reveals no gallop and no friction rub.    No murmur heard.  Pulmonary/Chest: Effort normal and breath sounds normal. No stridor. No respiratory distress. She has no wheezes. She has no rales. She exhibits no tenderness.   Abdominal: Soft. Bowel sounds are normal. She exhibits no distension and no mass. There is tenderness. There is no rebound and no guarding. No hernia.   Musculoskeletal: Normal range of motion. She exhibits tenderness. " She exhibits no edema or deformity.   Lymphadenopathy:     She has no cervical adenopathy.   Neurological: She is alert and oriented to person, place, and time. She has normal reflexes. She displays normal reflexes. No cranial nerve deficit or sensory deficit. She exhibits normal muscle tone. Coordination normal.   Skin: Skin is warm and dry. No rash noted. No erythema. No pallor.   Nursing note and vitals reviewed.      Assessment/Plan   Problem List Items Addressed This Visit        Other    Anxiety - Primary           New Medications Ordered This Visit   Medications   • buPROPion (WELLBUTRIN) 75 MG tablet     Sig: Take 1 tablet by mouth 2 (Two) Times a Day.     Dispense:  60 tablet     Refill:  11      continue therapy as directed get a scheduled appointment

## 2018-06-27 ENCOUNTER — OFFICE VISIT (OUTPATIENT)
Dept: FAMILY MEDICINE CLINIC | Facility: CLINIC | Age: 73
End: 2018-06-27

## 2018-06-27 VITALS
DIASTOLIC BLOOD PRESSURE: 80 MMHG | SYSTOLIC BLOOD PRESSURE: 150 MMHG | HEIGHT: 61 IN | WEIGHT: 137 LBS | BODY MASS INDEX: 25.86 KG/M2

## 2018-06-27 DIAGNOSIS — L29.9 ITCHING: Primary | ICD-10-CM

## 2018-06-27 DIAGNOSIS — F41.9 ANXIETY: ICD-10-CM

## 2018-06-27 PROCEDURE — 99213 OFFICE O/P EST LOW 20 MIN: CPT | Performed by: NURSE PRACTITIONER

## 2018-06-27 RX ORDER — PERMETHRIN 50 MG/G
CREAM TOPICAL ONCE
Qty: 60 G | Refills: 1 | Status: SHIPPED | OUTPATIENT
Start: 2018-06-27 | End: 2018-06-27

## 2018-06-27 RX ORDER — HYDROXYZINE HYDROCHLORIDE 10 MG/1
10 TABLET, FILM COATED ORAL 3 TIMES DAILY PRN
Qty: 90 TABLET | Refills: 5 | Status: ON HOLD | OUTPATIENT
Start: 2018-06-27 | End: 2019-01-08

## 2018-06-27 RX ORDER — TRIAMCINOLONE ACETONIDE 1 MG/G
CREAM TOPICAL 3 TIMES DAILY
Qty: 80 G | Refills: 5 | Status: SHIPPED | OUTPATIENT
Start: 2018-06-27 | End: 2018-08-09

## 2018-06-27 NOTE — PROGRESS NOTES
Chief Complaint   Patient presents with   • Anxiety   • Itching     mostly her arms and at night when it's worse     Subjective   Alva Mills is a 72 y.o. female.     Anxiety   Presents for follow-up visit. Symptoms include depressed mood, excessive worry, insomnia, irritability, malaise, nervous/anxious behavior, panic and restlessness. Patient reports no chest pain, compulsions, confusion, decreased concentration, dizziness, feeling of choking, hyperventilation, impotence, muscle tension, nausea, palpitations, shortness of breath or suicidal ideas. Symptoms occur most days. The quality of sleep is good. Nighttime awakenings: none.     Compliance with medications is %.   Itching   Associated symptoms include fatigue. Pertinent negatives include no abdominal pain, arthralgias, chest pain, chills, coughing, diaphoresis, fever, headaches, joint swelling, myalgias, nausea, neck pain, numbness, rash, urinary symptoms, visual change, vomiting or weakness.   Hernia   Associated symptoms include fatigue. Pertinent negatives include no abdominal pain, arthralgias, chest pain, chills, coughing, diaphoresis, fever, headaches, joint swelling, myalgias, nausea, neck pain, numbness, rash, urinary symptoms, visual change, vomiting or weakness.   Hypertension   This is a recurrent problem. The current episode started more than 1 month ago. The problem has been rapidly worsening since onset. The problem is controlled. Associated symptoms include anxiety and malaise/fatigue. Pertinent negatives include no blurred vision, chest pain, headaches, neck pain, palpitations, peripheral edema, PND, shortness of breath or sweats. Risk factors for coronary artery disease include sedentary lifestyle. Current antihypertension treatment includes ACE inhibitors. The current treatment provides mild improvement. Compliance problems include diet.  There is no history of angina, kidney disease, CAD/MI, CVA, heart failure, left ventricular  hypertrophy, PVD or retinopathy. Identifiable causes of hypertension include a thyroid problem. There is no history of chronic renal disease, coarctation of the aorta, hyperaldosteronism, hypercortisolism, hyperparathyroidism, a hypertension causing med, pheochromocytoma, renovascular disease or sleep apnea.   Hypothyroidism   This is a recurrent problem. The current episode started more than 1 year ago. The problem occurs constantly. The problem has been rapidly worsening. Associated symptoms include fatigue. Pertinent negatives include no abdominal pain, arthralgias, chest pain, chills, coughing, diaphoresis, fever, headaches, joint swelling, myalgias, nausea, neck pain, numbness, rash, urinary symptoms, visual change, vomiting or weakness. Nothing aggravates the symptoms. She has tried acetaminophen, sleep, rest and relaxation (synthroid ) for the symptoms. The treatment provided mild relief.        The following portions of the patient's history were reviewed and updated as appropriate: allergies, current medications, past social history and problem list.    Review of Systems   Constitutional: Positive for fatigue, irritability and malaise/fatigue. Negative for activity change, appetite change, chills, diaphoresis, fever and unexpected weight change.        Hot flashes    HENT: Positive for postnasal drip, tinnitus and voice change. Negative for dental problem, drooling, ear discharge, facial swelling, hearing loss, mouth sores, nosebleeds, rhinorrhea and trouble swallowing.    Eyes: Negative.  Negative for blurred vision, photophobia, pain, discharge, redness, itching and visual disturbance.   Respiratory: Negative.  Negative for apnea, cough, choking, chest tightness, shortness of breath and wheezing.    Cardiovascular: Negative.  Negative for chest pain, palpitations, leg swelling and PND.   Gastrointestinal: Negative.  Negative for abdominal pain, anal bleeding, blood in stool, constipation, diarrhea, nausea  "and vomiting.   Endocrine: Negative.  Negative for cold intolerance, heat intolerance, polydipsia, polyphagia and polyuria.   Genitourinary: Negative.  Negative for difficulty urinating, dyspareunia, dysuria and impotence.   Musculoskeletal: Negative.  Negative for arthralgias, back pain, gait problem, joint swelling, myalgias, neck pain and neck stiffness.   Skin: Positive for itching. Negative for color change, pallor, rash and wound.   Allergic/Immunologic: Positive for environmental allergies. Negative for food allergies and immunocompromised state.   Neurological: Negative.  Negative for dizziness, tremors, syncope, facial asymmetry, speech difficulty, weakness, light-headedness, numbness and headaches.   Hematological: Negative.  Negative for adenopathy. Does not bruise/bleed easily.   Psychiatric/Behavioral: Positive for agitation and behavioral problems. Negative for confusion, decreased concentration, dysphoric mood, hallucinations, self-injury, sleep disturbance and suicidal ideas. The patient is nervous/anxious and has insomnia. The patient is not hyperactive.         Is going to therapy -not scheduled regularly    All other systems reviewed and are negative.      Objective   /80   Ht 154.9 cm (61\")   Wt 62.1 kg (137 lb)   BMI 25.89 kg/m²   Physical Exam   Constitutional: She is oriented to person, place, and time. She appears well-developed and well-nourished.   Family stressors, patient is very stressed at this time    HENT:   Head: Normocephalic and atraumatic.   Right Ear: External ear normal.   Left Ear: External ear normal.   Mouth/Throat: No oropharyngeal exudate.   Oral cavity dry improving    Eyes: Conjunctivae and EOM are normal. Pupils are equal, round, and reactive to light. Right eye exhibits no discharge. Left eye exhibits no discharge. No scleral icterus.   Neck: Normal range of motion. Neck supple. No JVD present. No tracheal deviation present. No thyromegaly present. "   Cardiovascular: Normal rate, regular rhythm and normal heart sounds.  Exam reveals no gallop and no friction rub.    No murmur heard.  Pulmonary/Chest: Effort normal and breath sounds normal. No stridor. No respiratory distress. She has no wheezes. She has no rales. She exhibits no tenderness.   Abdominal: Soft. Bowel sounds are normal. She exhibits no distension and no mass. There is tenderness. There is no rebound and no guarding. No hernia.   Musculoskeletal: Normal range of motion. She exhibits tenderness. She exhibits no edema or deformity.   Lymphadenopathy:     She has no cervical adenopathy.   Neurological: She is alert and oriented to person, place, and time. She has normal reflexes. She displays normal reflexes. No cranial nerve deficit or sensory deficit. She exhibits normal muscle tone. Coordination normal.   Skin: Skin is warm and dry. No rash noted. No erythema. No pallor.   Nursing note and vitals reviewed.      Assessment/Plan   Problem List Items Addressed This Visit        Nervous and Auditory    Itching - Primary       Other    Anxiety           New Medications Ordered This Visit   Medications   • hydrOXYzine (ATARAX) 10 MG tablet     Sig: Take 1 tablet by mouth 3 (Three) Times a Day As Needed for Itching or Anxiety.     Dispense:  90 tablet     Refill:  5   • triamcinolone (KENALOG) 0.1 % cream     Sig: Apply  topically 3 (Three) Times a Day.     Dispense:  80 g     Refill:  5   • permethrin (ELIMITE) 5 % cream     Sig: Apply  topically 1 (One) Time for 1 dose. Put on head and soles and wash off in the am-wash sheets tomorrow.     Dispense:  60 g     Refill:  1       Stress relief discussed in length. Consider therapy, suggest yoga, exercise, meditation -patient is agrees-will call back if worsen for referral

## 2018-07-18 ENCOUNTER — TELEPHONE (OUTPATIENT)
Dept: FAMILY MEDICINE CLINIC | Facility: CLINIC | Age: 73
End: 2018-07-18

## 2018-07-18 NOTE — TELEPHONE ENCOUNTER
Alva kirkpatrick ans said the rx you gave her for the her rash and it has not helped . Please advise

## 2018-07-19 RX ORDER — PERMETHRIN 50 MG/G
CREAM TOPICAL ONCE
Qty: 60 G | Refills: 1 | Status: SHIPPED | OUTPATIENT
Start: 2018-07-19 | End: 2018-07-19

## 2018-07-23 RX ORDER — LEVOTHYROXINE SODIUM 50 MCG
TABLET ORAL
Qty: 90 TABLET | Refills: 3 | Status: SHIPPED | OUTPATIENT
Start: 2018-07-23 | End: 2019-07-30 | Stop reason: SDUPTHER

## 2018-08-09 ENCOUNTER — TELEPHONE (OUTPATIENT)
Dept: FAMILY MEDICINE CLINIC | Facility: CLINIC | Age: 73
End: 2018-08-09

## 2018-08-09 ENCOUNTER — OFFICE VISIT (OUTPATIENT)
Dept: FAMILY MEDICINE CLINIC | Facility: CLINIC | Age: 73
End: 2018-08-09

## 2018-08-09 VITALS
BODY MASS INDEX: 25.3 KG/M2 | HEIGHT: 61 IN | SYSTOLIC BLOOD PRESSURE: 120 MMHG | DIASTOLIC BLOOD PRESSURE: 80 MMHG | WEIGHT: 134 LBS

## 2018-08-09 DIAGNOSIS — L29.9 ITCHING: ICD-10-CM

## 2018-08-09 DIAGNOSIS — B35.1 TOENAIL FUNGUS: ICD-10-CM

## 2018-08-09 DIAGNOSIS — R21 RASH: Primary | ICD-10-CM

## 2018-08-09 PROCEDURE — 99212 OFFICE O/P EST SF 10 MIN: CPT | Performed by: NURSE PRACTITIONER

## 2018-08-09 RX ORDER — TERBINAFINE HYDROCHLORIDE 250 MG/1
250 TABLET ORAL DAILY
Qty: 30 TABLET | Refills: 2 | Status: SHIPPED | OUTPATIENT
Start: 2018-08-09 | End: 2018-08-30

## 2018-08-09 NOTE — PROGRESS NOTES
"  Chief Complaint   Patient presents with   • Rash     on arms     Subjective   Alva Mills is a 73 y.o. female.     Rash   This is a new problem. The current episode started in the past 7 days. The problem has been gradually worsening since onset. The rash is diffuse. The rash is characterized by redness, scaling and swelling. Pertinent negatives include no anorexia, congestion, cough, diarrhea, eye pain, facial edema, fatigue, fever, joint pain, nail changes, rhinorrhea, shortness of breath, sore throat or vomiting. Past treatments include antibiotic cream, antihistamine, anti-itch cream and topical steroids. The treatment provided mild relief. There is no history of allergies, asthma, eczema or varicella.        The following portions of the patient's history were reviewed and updated as appropriate: allergies, current medications, past social history and problem list.    Review of Systems   Constitutional: Negative.  Negative for fatigue and fever.   HENT: Negative for congestion, rhinorrhea and sore throat.    Eyes: Negative for photophobia, pain and redness.   Respiratory: Negative.  Negative for apnea, cough, choking, chest tightness and shortness of breath.    Cardiovascular: Negative.  Negative for chest pain, palpitations and leg swelling.   Gastrointestinal: Negative.  Negative for abdominal distention, abdominal pain, anal bleeding, anorexia, blood in stool, constipation, diarrhea and vomiting.   Endocrine: Negative.    Genitourinary: Negative.    Musculoskeletal: Negative.  Negative for arthralgias, back pain and joint pain.   Skin: Positive for rash. Negative for color change, nail changes and pallor.        Toenail fungus    Allergic/Immunologic: Negative.    Neurological: Negative.    Hematological: Negative.    Psychiatric/Behavioral: Negative.  Negative for sleep disturbance and suicidal ideas.       Objective   /80   Ht 154.9 cm (61\")   Wt 60.8 kg (134 lb)   BMI 25.32 kg/m²   Physical " Exam   Constitutional: She is oriented to person, place, and time. She appears well-developed and well-nourished.   Family stressors, patient is very stressed at this time    HENT:   Head: Normocephalic and atraumatic.   Right Ear: External ear normal.   Left Ear: External ear normal.   Mouth/Throat: No oropharyngeal exudate.   Oral cavity dry improving -thicked irregular toenails    Eyes: Pupils are equal, round, and reactive to light. Conjunctivae and EOM are normal. Right eye exhibits no discharge. Left eye exhibits no discharge. No scleral icterus.   Neck: Normal range of motion. Neck supple. No JVD present. No tracheal deviation present. No thyromegaly present.   Cardiovascular: Normal rate, regular rhythm and normal heart sounds.  Exam reveals no gallop and no friction rub.    No murmur heard.  Pulmonary/Chest: Effort normal and breath sounds normal. No stridor. No respiratory distress. She has no wheezes. She has no rales. She exhibits no tenderness.   Abdominal: Soft. Bowel sounds are normal. She exhibits no distension and no mass. There is tenderness. There is no rebound and no guarding. No hernia.   Musculoskeletal: Normal range of motion. She exhibits tenderness. She exhibits no edema or deformity.   Lymphadenopathy:     She has no cervical adenopathy.   Neurological: She is alert and oriented to person, place, and time. She has normal reflexes. She displays normal reflexes. No cranial nerve deficit or sensory deficit. She exhibits normal muscle tone. Coordination normal.   Skin: Skin is warm and dry. Rash noted. No erythema. No pallor.   Toenail fungus both feet    Nursing note and vitals reviewed.      Assessment/Plan   Problem List Items Addressed This Visit        Nervous and Auditory    Itching    Relevant Medications    terbinafine (LAMISIL) 250 MG tablet    Other Relevant Orders    Comprehensive Metabolic Panel       Musculoskeletal and Integument    Rash - Primary    Relevant Medications     terbinafine (LAMISIL) 250 MG tablet    Other Relevant Orders    Comprehensive Metabolic Panel    Toenail fungus    Relevant Medications    terbinafine (LAMISIL) 250 MG tablet    Other Relevant Orders    Comprehensive Metabolic Panel           New Medications Ordered This Visit   Medications   • terbinafine (LAMISIL) 250 MG tablet     Sig: Take 1 tablet by mouth Daily.     Dispense:  30 tablet     Refill:  2       It's not just what you eat, but when you eat  Eat breakfast, and eat smaller meals throughout the day. A healthy breakfast can jumpstart your metabolism, while eating small, healthy meals (rather than the standard three large meals) keeps your energy up.   Avoid eating at night. Try to eat dinner earlier and fast for 14-16 hours until breakfast the next morning. Studies suggest that eating only when you’re most active and giving your digestive system a long break each day may help to regulate weight.     The patient has cortisone at home to use as directed    Liver enzymes before lamsil-orders are present

## 2018-08-09 NOTE — TELEPHONE ENCOUNTER
Patient seen this morning for Rash and she forgot to tell you she wanted something for a toenail fungus.

## 2018-08-23 ENCOUNTER — OFFICE VISIT (OUTPATIENT)
Dept: FAMILY MEDICINE CLINIC | Facility: CLINIC | Age: 73
End: 2018-08-23

## 2018-08-23 VITALS
SYSTOLIC BLOOD PRESSURE: 118 MMHG | HEIGHT: 61 IN | TEMPERATURE: 98.7 F | DIASTOLIC BLOOD PRESSURE: 80 MMHG | WEIGHT: 131 LBS | BODY MASS INDEX: 24.73 KG/M2

## 2018-08-23 DIAGNOSIS — R21 RASH: ICD-10-CM

## 2018-08-23 DIAGNOSIS — J06.9 ACUTE UPPER RESPIRATORY INFECTION: Primary | ICD-10-CM

## 2018-08-23 PROCEDURE — 99213 OFFICE O/P EST LOW 20 MIN: CPT | Performed by: NURSE PRACTITIONER

## 2018-08-23 PROCEDURE — 96372 THER/PROPH/DIAG INJ SC/IM: CPT | Performed by: NURSE PRACTITIONER

## 2018-08-23 RX ORDER — TRIAMCINOLONE ACETONIDE 40 MG/ML
80 INJECTION, SUSPENSION INTRA-ARTICULAR; INTRAMUSCULAR ONCE
Status: COMPLETED | OUTPATIENT
Start: 2018-08-23 | End: 2018-08-23

## 2018-08-23 RX ORDER — BETAMETHASONE DIPROPIONATE 0.5 MG/G
CREAM TOPICAL DAILY
Qty: 45 G | Refills: 1 | Status: SHIPPED | OUTPATIENT
Start: 2018-08-23 | End: 2019-03-11

## 2018-08-23 RX ORDER — FLUCONAZOLE 150 MG/1
150 TABLET ORAL ONCE
Qty: 1 TABLET | Refills: 0 | Status: SHIPPED | OUTPATIENT
Start: 2018-08-23 | End: 2018-08-23

## 2018-08-23 RX ORDER — DESVENLAFAXINE 100 MG/1
TABLET, EXTENDED RELEASE ORAL
COMMUNITY
End: 2018-08-23 | Stop reason: SDUPTHER

## 2018-08-23 RX ORDER — CLARITHROMYCIN 500 MG/1
500 TABLET, COATED ORAL EVERY 12 HOURS SCHEDULED
Qty: 20 TABLET | Refills: 0 | Status: SHIPPED | OUTPATIENT
Start: 2018-08-23 | End: 2019-01-03

## 2018-08-23 RX ADMIN — TRIAMCINOLONE ACETONIDE 80 MG: 40 INJECTION, SUSPENSION INTRA-ARTICULAR; INTRAMUSCULAR at 11:23

## 2018-08-23 NOTE — PROGRESS NOTES
Chief Complaint   Patient presents with   • Sinusitis   • Sore Throat     Subjective   Alva Mills is a 73 y.o. female.     Sinusitis   This is a recurrent problem. The current episode started in the past 7 days. The problem has been gradually worsening since onset. There has been no fever. The fever has been present for less than 1 day. Her pain is at a severity of 4/10. The pain is moderate. Associated symptoms include congestion, coughing, sinus pressure, sneezing and a sore throat. Pertinent negatives include no chills, diaphoresis, ear pain, headaches, hoarse voice, neck pain, shortness of breath or swollen glands. Past treatments include acetaminophen and sitting up. The treatment provided mild relief.   Sore Throat    This is a recurrent problem. The current episode started 1 to 4 weeks ago. The problem has been gradually worsening. There has been no fever. The pain is at a severity of 2/10. The pain is mild. Associated symptoms include congestion and coughing. Pertinent negatives include no abdominal pain, diarrhea, drooling, ear discharge, ear pain, headaches, hoarse voice, neck pain, shortness of breath, stridor, swollen glands, trouble swallowing or vomiting. She has had no exposure to strep or mono.   Rash   This is a recurrent problem. The current episode started in the past 7 days. The problem has been gradually worsening since onset. The rash is diffuse. Associated symptoms include congestion, coughing, fatigue, rhinorrhea and a sore throat. Pertinent negatives include no diarrhea, facial edema, fever, joint pain, nail changes, shortness of breath or vomiting. Past treatments include antihistamine, anti-itch cream, moisturizer and topical steroids. The treatment provided mild relief. Her past medical history is significant for allergies. There is no history of asthma, eczema or varicella.        The following portions of the patient's history were reviewed and updated as appropriate: allergies,  "current medications, past social history and problem list.    Review of Systems   Constitutional: Positive for activity change, appetite change and fatigue. Negative for chills, diaphoresis and fever.   HENT: Positive for congestion, postnasal drip, rhinorrhea, sinus pain, sinus pressure, sneezing, sore throat and voice change. Negative for dental problem, drooling, ear discharge, ear pain, hoarse voice, tinnitus and trouble swallowing.    Eyes: Negative.    Respiratory: Positive for cough. Negative for shortness of breath and stridor.    Cardiovascular: Negative.    Gastrointestinal: Negative for abdominal pain, diarrhea and vomiting.   Endocrine: Negative.    Musculoskeletal: Negative for joint pain and neck pain.   Skin: Positive for rash. Negative for color change, nail changes and pallor.   Allergic/Immunologic: Negative.    Neurological: Negative for headaches.       Objective   /80   Temp 98.7 °F (37.1 °C) (Tympanic)   Ht 154.9 cm (61\")   Wt 59.4 kg (131 lb)   BMI 24.75 kg/m²   Physical Exam   Constitutional: She is oriented to person, place, and time. She appears well-developed and well-nourished.   Family stressors, patient is very stressed at this time    HENT:   Head: Normocephalic and atraumatic.   Mouth/Throat: Oropharyngeal exudate present.   Oral cavity moist-thick pnd -mid sinus pressure    Eyes: Pupils are equal, round, and reactive to light. Conjunctivae and EOM are normal. Right eye exhibits no discharge. Left eye exhibits no discharge. No scleral icterus.   Neck: Normal range of motion. Neck supple. No JVD present. No tracheal deviation present. No thyromegaly present.   Cardiovascular: Normal rate, regular rhythm, normal heart sounds and intact distal pulses.  Exam reveals no gallop and no friction rub.    No murmur heard.  Pulmonary/Chest: Effort normal and breath sounds normal. No stridor. No respiratory distress. She has no wheezes. She has no rales. She exhibits no tenderness. "   Abdominal: Soft. Bowel sounds are normal. She exhibits no distension and no mass. There is tenderness. There is no rebound and no guarding. No hernia.   Musculoskeletal: Normal range of motion. She exhibits tenderness. She exhibits no edema or deformity.   Lymphadenopathy:     She has no cervical adenopathy.   Neurological: She is alert and oriented to person, place, and time. She has normal reflexes. She displays normal reflexes. No cranial nerve deficit or sensory deficit. She exhibits normal muscle tone. Coordination normal.   Skin: Skin is warm and dry. No rash noted. No erythema. No pallor.   Nursing note and vitals reviewed.      Assessment/Plan   Problem List Items Addressed This Visit        Respiratory    Acute upper respiratory infection - Primary    Relevant Medications    clarithromycin (BIAXIN) 500 MG tablet    triamcinolone acetonide (KENALOG-40) injection 80 mg (Completed)       Musculoskeletal and Integument    Rash    Relevant Medications    betamethasone dipropionate (DIPROLENE) 0.05 % cream           New Medications Ordered This Visit   Medications   • clarithromycin (BIAXIN) 500 MG tablet     Sig: Take 1 tablet by mouth Every 12 (Twelve) Hours.     Dispense:  20 tablet     Refill:  0   • fluconazole (DIFLUCAN) 150 MG tablet     Sig: Take 1 tablet by mouth 1 (One) Time for 1 dose.     Dispense:  1 tablet     Refill:  0   • betamethasone dipropionate (DIPROLENE) 0.05 % cream     Sig: Apply  topically to the appropriate area as directed Daily.     Dispense:  45 g     Refill:  1   • triamcinolone acetonide (KENALOG-40) injection 80 mg

## 2018-08-30 ENCOUNTER — APPOINTMENT (OUTPATIENT)
Dept: LAB | Facility: HOSPITAL | Age: 73
End: 2018-08-30

## 2018-08-30 ENCOUNTER — OFFICE VISIT (OUTPATIENT)
Dept: FAMILY MEDICINE CLINIC | Facility: CLINIC | Age: 73
End: 2018-08-30

## 2018-08-30 VITALS
BODY MASS INDEX: 24.73 KG/M2 | HEIGHT: 61 IN | WEIGHT: 131 LBS | DIASTOLIC BLOOD PRESSURE: 80 MMHG | SYSTOLIC BLOOD PRESSURE: 130 MMHG

## 2018-08-30 DIAGNOSIS — R21 RASH: Primary | ICD-10-CM

## 2018-08-30 LAB
ALBUMIN SERPL-MCNC: 4.2 G/DL (ref 3.4–4.8)
ALBUMIN/GLOB SERPL: 1.2 G/DL (ref 1.1–1.8)
ALP SERPL-CCNC: 114 U/L (ref 38–126)
ALT SERPL W P-5'-P-CCNC: 61 U/L (ref 9–52)
ANION GAP SERPL CALCULATED.3IONS-SCNC: 9 MMOL/L (ref 5–15)
AST SERPL-CCNC: 60 U/L (ref 14–36)
BILIRUB SERPL-MCNC: 0.5 MG/DL (ref 0.2–1.3)
BUN BLD-MCNC: 13 MG/DL (ref 7–21)
BUN/CREAT SERPL: 18.1 (ref 7–25)
CALCIUM SPEC-SCNC: 9.3 MG/DL (ref 8.4–10.2)
CHLORIDE SERPL-SCNC: 101 MMOL/L (ref 95–110)
CO2 SERPL-SCNC: 30 MMOL/L (ref 22–31)
CREAT BLD-MCNC: 0.72 MG/DL (ref 0.5–1)
GFR SERPL CREATININE-BSD FRML MDRD: 80 ML/MIN/1.73 (ref 39–90)
GLOBULIN UR ELPH-MCNC: 3.5 GM/DL (ref 2.3–3.5)
GLUCOSE BLD-MCNC: 68 MG/DL (ref 60–100)
POTASSIUM BLD-SCNC: 3.9 MMOL/L (ref 3.5–5.1)
PROT SERPL-MCNC: 7.7 G/DL (ref 6.3–8.6)
SODIUM BLD-SCNC: 140 MMOL/L (ref 137–145)

## 2018-08-30 PROCEDURE — 80053 COMPREHEN METABOLIC PANEL: CPT | Performed by: NURSE PRACTITIONER

## 2018-08-30 PROCEDURE — 86757 RICKETTSIA ANTIBODY: CPT | Performed by: NURSE PRACTITIONER

## 2018-08-30 PROCEDURE — 99213 OFFICE O/P EST LOW 20 MIN: CPT | Performed by: NURSE PRACTITIONER

## 2018-08-30 PROCEDURE — 36415 COLL VENOUS BLD VENIPUNCTURE: CPT | Performed by: NURSE PRACTITIONER

## 2018-08-30 RX ORDER — HYDROXYZINE HYDROCHLORIDE 10 MG/1
10 TABLET, FILM COATED ORAL 3 TIMES DAILY PRN
Qty: 60 TABLET | Refills: 1 | Status: ON HOLD | OUTPATIENT
Start: 2018-08-30 | End: 2019-01-08

## 2018-08-30 RX ORDER — FAMCICLOVIR 500 MG/1
500 TABLET ORAL 3 TIMES DAILY
Qty: 30 TABLET | Refills: 1 | Status: ON HOLD | OUTPATIENT
Start: 2018-08-30 | End: 2019-01-08

## 2018-08-30 RX ORDER — FAMCICLOVIR 500 MG/1
500 TABLET ORAL 3 TIMES DAILY
Qty: 30 TABLET | Refills: 1 | Status: SHIPPED | OUTPATIENT
Start: 2018-08-30 | End: 2018-08-30 | Stop reason: SDUPTHER

## 2018-08-30 NOTE — PROGRESS NOTES
Chief Complaint   Patient presents with   • Rash     right arm, itches      Subjective   Alva Mills is a 73 y.o. female.     Rash   This is a recurrent problem. The current episode started in the past 7 days. The problem has been gradually worsening since onset. The rash is diffuse (right arm ). Associated symptoms include fatigue and rhinorrhea. Pertinent negatives include no cough, diarrhea, eye pain, facial edema, fever, joint pain, nail changes, shortness of breath, sore throat or vomiting. Past treatments include antihistamine, anti-itch cream, moisturizer and topical steroids. The treatment provided mild relief. Her past medical history is significant for allergies. There is no history of asthma, eczema or varicella.        The following portions of the patient's history were reviewed and updated as appropriate: allergies, current medications, past social history and problem list.    Review of Systems   Constitutional: Positive for fatigue. Negative for fever.   HENT: Positive for rhinorrhea. Negative for sore throat.    Eyes: Negative.  Negative for photophobia, pain and redness.   Respiratory: Negative for apnea, cough, choking, chest tightness, shortness of breath, wheezing and stridor.    Cardiovascular: Negative.  Negative for chest pain, palpitations and leg swelling.   Gastrointestinal: Negative.  Negative for abdominal distention, abdominal pain, anal bleeding, blood in stool, constipation, diarrhea, nausea and vomiting.   Endocrine: Negative.    Genitourinary: Negative.    Musculoskeletal: Negative.  Negative for arthralgias, back pain, gait problem, joint pain, neck pain and neck stiffness.   Skin: Positive for rash. Negative for color change, nail changes and pallor.         Has tried keflex and medrol in the past   Allergic/Immunologic: Negative.  Negative for environmental allergies, food allergies and immunocompromised state.   Neurological: Negative.  Negative for dizziness and facial  "asymmetry.   Hematological: Negative.  Negative for adenopathy. Does not bruise/bleed easily.   Psychiatric/Behavioral: Negative.  Negative for sleep disturbance and suicidal ideas.   All other systems reviewed and are negative.      Objective   /80   Ht 154.9 cm (61\")   Wt 59.4 kg (131 lb)   BMI 24.75 kg/m²   Physical Exam   Constitutional: She is oriented to person, place, and time. She appears well-developed and well-nourished.   Family stressors, patient is very stressed at this time    HENT:   Head: Normocephalic and atraumatic.   Right Ear: External ear normal.   Left Ear: External ear normal.   Mouth/Throat: No oropharyngeal exudate.   Oral cavity dry improving -thicked irregular toenails    Eyes: Pupils are equal, round, and reactive to light. Conjunctivae and EOM are normal. Right eye exhibits no discharge. Left eye exhibits no discharge. No scleral icterus.   Neck: Normal range of motion. Neck supple. No JVD present. No tracheal deviation present. No thyromegaly present.   Cardiovascular: Normal rate, regular rhythm, normal heart sounds and intact distal pulses.  Exam reveals no gallop and no friction rub.    No murmur heard.  Pulmonary/Chest: Effort normal and breath sounds normal. No stridor. No respiratory distress. She has no wheezes. She has no rales. She exhibits no tenderness.   Abdominal: Soft. Bowel sounds are normal. She exhibits no distension and no mass. There is tenderness. There is no rebound and no guarding. No hernia.   Musculoskeletal: Normal range of motion. She exhibits tenderness. She exhibits no edema or deformity.   Lymphadenopathy:     She has no cervical adenopathy.   Neurological: She is alert and oriented to person, place, and time. She has normal reflexes. She displays normal reflexes. No cranial nerve deficit or sensory deficit. She exhibits normal muscle tone. Coordination normal.   Skin: Skin is warm and dry. Rash noted. No erythema. No pallor.   Toenail fungus both " feet    Nursing note and vitals reviewed.      Assessment/Plan   Problem List Items Addressed This Visit        Musculoskeletal and Integument    Rash - Primary    Relevant Medications    famciclovir (FAMVIR) 500 MG tablet    Other Relevant Orders    Ambulatory Referral to Dermatology    [facility] ([facility] / )           New Medications Ordered This Visit   Medications   • hydrOXYzine (ATARAX) 10 MG tablet     Sig: Take 1 tablet by mouth 3 (Three) Times a Day As Needed for Itching.     Dispense:  60 tablet     Refill:  1   • famciclovir (FAMVIR) 500 MG tablet     Sig: Take 1 tablet by mouth 3 (Three) Times a Day.     Dispense:  30 tablet     Refill:  1      try antiviral and see if helps-meds as directed-refer to dermatology as directed

## 2018-09-01 LAB
R RICKETTSI IGG SER QL IA: NEGATIVE
R RICKETTSI IGM TITR SER: 0.24 INDEX (ref 0–0.89)

## 2018-09-04 ENCOUNTER — TELEPHONE (OUTPATIENT)
Dept: FAMILY MEDICINE CLINIC | Facility: CLINIC | Age: 73
End: 2018-09-04

## 2018-09-04 NOTE — TELEPHONE ENCOUNTER
Per DOMI Saucedo, Ms. Mills has been called with her recent lab results & recommendations.  Continue her current medications and follow-up as planned or sooner if any problems.      ----- Message from DOMI Vizcaino sent at 9/4/2018 12:37 PM CDT -----  Can you let her know negative results

## 2018-09-11 ENCOUNTER — LAB (OUTPATIENT)
Dept: LAB | Facility: HOSPITAL | Age: 73
End: 2018-09-11

## 2018-09-11 ENCOUNTER — TRANSCRIBE ORDERS (OUTPATIENT)
Dept: LAB | Facility: HOSPITAL | Age: 73
End: 2018-09-11

## 2018-09-11 DIAGNOSIS — L29.9 PRURITUS: ICD-10-CM

## 2018-09-11 DIAGNOSIS — L93.0 LUPUS ERYTHEMATOSUS, UNSPECIFIED FORM: Primary | ICD-10-CM

## 2018-09-11 DIAGNOSIS — R53.83 OTHER FATIGUE: ICD-10-CM

## 2018-09-11 DIAGNOSIS — L93.0 LUPUS ERYTHEMATOSUS, UNSPECIFIED FORM: ICD-10-CM

## 2018-09-11 LAB
ALBUMIN SERPL-MCNC: 4.2 G/DL (ref 3.4–4.8)
ALBUMIN/GLOB SERPL: 1.4 G/DL (ref 1.1–1.8)
ALP SERPL-CCNC: 98 U/L (ref 38–126)
ALT SERPL W P-5'-P-CCNC: 33 U/L (ref 9–52)
ANION GAP SERPL CALCULATED.3IONS-SCNC: 9 MMOL/L (ref 5–15)
AST SERPL-CCNC: 35 U/L (ref 14–36)
BASOPHILS # BLD AUTO: 0.04 10*3/MM3 (ref 0–0.2)
BASOPHILS NFR BLD AUTO: 0.7 % (ref 0–2)
BILIRUB SERPL-MCNC: 0.4 MG/DL (ref 0.2–1.3)
BUN BLD-MCNC: 13 MG/DL (ref 7–21)
BUN/CREAT SERPL: 16.9 (ref 7–25)
CALCIUM SPEC-SCNC: 9 MG/DL (ref 8.4–10.2)
CHLORIDE SERPL-SCNC: 104 MMOL/L (ref 95–110)
CO2 SERPL-SCNC: 28 MMOL/L (ref 22–31)
CREAT BLD-MCNC: 0.77 MG/DL (ref 0.5–1)
DEPRECATED RDW RBC AUTO: 47.9 FL (ref 36.4–46.3)
EOSINOPHIL # BLD AUTO: 0 10*3/MM3 (ref 0–0.7)
EOSINOPHIL NFR BLD AUTO: 0 % (ref 0–7)
ERYTHROCYTE [DISTWIDTH] IN BLOOD BY AUTOMATED COUNT: 13.1 % (ref 11.5–14.5)
GFR SERPL CREATININE-BSD FRML MDRD: 74 ML/MIN/1.73 (ref 39–90)
GLOBULIN UR ELPH-MCNC: 3.1 GM/DL (ref 2.3–3.5)
GLUCOSE BLD-MCNC: 128 MG/DL (ref 60–100)
HCT VFR BLD AUTO: 40.7 % (ref 35–45)
HGB BLD-MCNC: 13.1 G/DL (ref 12–15.5)
IMM GRANULOCYTES # BLD: 0.01 10*3/MM3 (ref 0–0.02)
IMM GRANULOCYTES NFR BLD: 0.2 % (ref 0–0.5)
LYMPHOCYTES # BLD AUTO: 1.44 10*3/MM3 (ref 0.6–4.2)
LYMPHOCYTES NFR BLD AUTO: 27 % (ref 10–50)
MCH RBC QN AUTO: 32 PG (ref 26.5–34)
MCHC RBC AUTO-ENTMCNC: 32.2 G/DL (ref 31.4–36)
MCV RBC AUTO: 99.3 FL (ref 80–98)
MONOCYTES # BLD AUTO: 0.41 10*3/MM3 (ref 0–0.9)
MONOCYTES NFR BLD AUTO: 7.7 % (ref 0–12)
NEUTROPHILS # BLD AUTO: 3.44 10*3/MM3 (ref 2–8.6)
NEUTROPHILS NFR BLD AUTO: 64.4 % (ref 37–80)
PLATELET # BLD AUTO: 365 10*3/MM3 (ref 150–450)
PMV BLD AUTO: 9.9 FL (ref 8–12)
POTASSIUM BLD-SCNC: 3.9 MMOL/L (ref 3.5–5.1)
PROT SERPL-MCNC: 7.3 G/DL (ref 6.3–8.6)
RBC # BLD AUTO: 4.1 10*6/MM3 (ref 3.77–5.16)
SODIUM BLD-SCNC: 141 MMOL/L (ref 137–145)
T4 SERPL-MCNC: 6.81 MCG/DL (ref 5.5–11)
TSH SERPL DL<=0.05 MIU/L-ACNC: 1.16 MIU/ML (ref 0.46–4.68)
WBC NRBC COR # BLD: 5.34 10*3/MM3 (ref 3.2–9.8)

## 2018-09-11 PROCEDURE — 85025 COMPLETE CBC W/AUTO DIFF WBC: CPT

## 2018-09-11 PROCEDURE — 84443 ASSAY THYROID STIM HORMONE: CPT

## 2018-09-11 PROCEDURE — 80053 COMPREHEN METABOLIC PANEL: CPT

## 2018-09-11 PROCEDURE — 86038 ANTINUCLEAR ANTIBODIES: CPT

## 2018-09-11 PROCEDURE — 36415 COLL VENOUS BLD VENIPUNCTURE: CPT

## 2018-09-11 PROCEDURE — 84436 ASSAY OF TOTAL THYROXINE: CPT

## 2018-09-12 LAB — ANA SER QL IA: NEGATIVE

## 2018-10-01 ENCOUNTER — OFFICE VISIT (OUTPATIENT)
Dept: SURGERY | Facility: CLINIC | Age: 73
End: 2018-10-01

## 2018-10-01 VITALS
BODY MASS INDEX: 24.39 KG/M2 | WEIGHT: 129.2 LBS | SYSTOLIC BLOOD PRESSURE: 130 MMHG | HEIGHT: 61 IN | HEART RATE: 63 BPM | TEMPERATURE: 97.3 F | DIASTOLIC BLOOD PRESSURE: 78 MMHG

## 2018-10-01 DIAGNOSIS — K43.9 VENTRAL HERNIA WITHOUT OBSTRUCTION OR GANGRENE: Primary | ICD-10-CM

## 2018-10-01 PROCEDURE — 99212 OFFICE O/P EST SF 10 MIN: CPT | Performed by: SURGERY

## 2018-10-01 RX ORDER — METOPROLOL SUCCINATE 25 MG/1
TABLET, EXTENDED RELEASE ORAL
Status: ON HOLD | COMMUNITY
End: 2019-01-08

## 2018-10-01 RX ORDER — DEXTROMETHORPHAN HYDROBROMIDE AND PROMETHAZINE HYDROCHLORIDE 15; 6.25 MG/5ML; MG/5ML
SYRUP ORAL
Status: ON HOLD | COMMUNITY
End: 2019-01-08

## 2018-10-01 RX ORDER — FLUTICASONE PROPIONATE 50 MCG
SPRAY, SUSPENSION (ML) NASAL
Status: ON HOLD | COMMUNITY
End: 2019-01-08

## 2018-10-01 RX ORDER — LEVOTHYROXINE SODIUM 0.05 MG/1
1 TABLET ORAL DAILY
COMMUNITY
End: 2019-01-11 | Stop reason: HOSPADM

## 2018-10-01 RX ORDER — PROPRANOLOL HYDROCHLORIDE 10 MG/1
10 TABLET ORAL
Status: ON HOLD | COMMUNITY
Start: 2016-10-13 | End: 2019-01-08

## 2018-10-01 RX ORDER — ROPINIROLE 1 MG/1
TABLET, FILM COATED ORAL
Status: ON HOLD | COMMUNITY
End: 2019-01-08

## 2018-10-01 NOTE — PATIENT INSTRUCTIONS

## 2018-10-04 PROBLEM — K43.9 VENTRAL HERNIA WITHOUT OBSTRUCTION OR GANGRENE: Status: ACTIVE | Noted: 2018-10-04

## 2018-10-04 NOTE — PROGRESS NOTES
Chief Complaint   Patient presents with   • Follow-up     Recheck Ventral hernia.        HPI  Patient is 73 years old and has a history of multiple abdominal operations.  She is again rechecked for a upper abdominal wall hernia.  Previous incisions are included a Chevron incision as well as other midline incisions.  In the area of the hernia, there are no incisions.  No history of incarceration or obstruction.  She has had no increase in size or symptomatology  Past Medical History:   Diagnosis Date   • Abdominal pain    • Acquired hypothyroidism    • Acute bronchitis    • Acute sinusitis    • Acute upper respiratory infection    • Benign hypertension    • Breast cyst    • Breast lump     history of, right   • Calf pain    • Common bile duct calculus    • Constipation    • Cough    • Depressive disorder    • Epigastric pain    • External hemorrhoids without complication    • Fibrocystic breast    • Functional heart murmur    • Gastroesophageal reflux disease    • General medical exam    • Hemorrhoids    • Hyperlipidemia    • Incisional hernia     no evident recurrence at this juncture   • Localized, primary osteoarthritis of ankle or foot    • Muscle strain    • Skin lesion        Past Surgical History:   Procedure Laterality Date   • BILE DUCT EXPLORATION  12/10/2013    Remove bile duct stone (1)    Common duct exploration, stone extraction, choledochoduodenostomy and choledochoscopy.    • BREAST BIOPSY Right 1991    Stereotactic breast biopsy (1)    Right breast    • CHOLECYSTECTOMY OPEN  1989   • HERNIA REPAIR  06/16/2014    Anesth, repair of hernia (1)    laparoscopic ventral hernioplasty with mesh. Ventral hernia.    • HYSTERECTOMY  10/17/2001    Anesth, hysterectomy (1)    Laparoscopic subtotal hysterectomy & BSO. Enterolysis of sigmoid colon.    • INJECTION OF MEDICATION  08/12/2013    Dexamethasone (2)      • INJECTION OF MEDICATION  01/31/2013    Kenalog (1)      • INJECTION OF MEDICATION  02/11/2015     Rocephin (1)      • OOPHORECTOMY     • OTHER SURGICAL HISTORY  08/12/2013    Small Joint Injection/Aspiration 20600 (2)      • TUBAL ABDOMINAL LIGATION           Current Outpatient Prescriptions:   •  albuterol (PROVENTIL HFA;VENTOLIN HFA) 108 (90 Base) MCG/ACT inhaler, Inhale 2 puffs Every 4 (Four) Hours As Needed for Wheezing., Disp: 1 inhaler, Rfl: 2  •  AZELASTINE HCL NA, azelastine 137 mcg (0.1 %) nasal spray aerosol, Disp: , Rfl:   •  CALCIUM PO, Take 1 tablet by mouth Daily With Breakfast., Disp: , Rfl:   •  carisoprodol (SOMA) 350 MG tablet, Take 350 mg by mouth Every 6 (Six) Hours As Needed., Disp: , Rfl:   •  Cetirizine HCl (ZYRTEC ALLERGY) 10 MG capsule, Take 10 mg by mouth., Disp: , Rfl:   •  desvenlafaxine (PRISTIQ) 100 MG 24 hr tablet, Take 1 tablet by mouth Daily., Disp: 90 tablet, Rfl: 3  •  diazePAM (VALIUM) 5 MG tablet, Take 1 tablet by mouth Every 12 (Twelve) Hours As Needed for Anxiety or Sleep., Disp: 60 tablet, Rfl: 0  •  HYDROcodone-acetaminophen (NORCO)  MG per tablet, Take 1 tablet by mouth Every 6 (Six) Hours As Needed., Disp: , Rfl:   •  levothyroxine (SYNTHROID) 50 MCG tablet, Take 1 tablet by mouth Daily., Disp: , Rfl:   •  Magnesium 500 MG capsule, Take 1 capsule by mouth Daily., Disp: , Rfl:   •  metoprolol succinate XL (TOPROL-XL) 25 MG 24 hr tablet, Take 1 tablet by mouth Daily., Disp: 90 tablet, Rfl: 3  •  metoprolol succinate XL (TOPROL-XL) 25 MG 24 hr tablet, metoprolol succinate ER 25 mg tablet,extended release 24 hr, Disp: , Rfl:   •  promethazine (PHENERGAN) 25 MG tablet, TAKE 1 TABLET EVERY 6 HOURSAS NEEDED FOR NAUSEA OR    VOMITING, Disp: 180 tablet, Rfl: 1  •  rOPINIRole (REQUIP) 1 MG tablet, ropinirole 1 mg tablet, Disp: , Rfl:   •  SYNTHROID 50 MCG tablet, TAKE 1 TABLET DAILY, Disp: 90 tablet, Rfl: 3  •  benzonatate (TESSALON PERLES) 100 MG capsule, Take 1 capsule by mouth 3 (Three) Times a Day As Needed for Cough., Disp: 90 capsule, Rfl: 3  •  betamethasone  "dipropionate (DIPROLENE) 0.05 % cream, Apply  topically to the appropriate area as directed Daily., Disp: 45 g, Rfl: 1  •  clarithromycin (BIAXIN) 500 MG tablet, Take 1 tablet by mouth Every 12 (Twelve) Hours., Disp: 20 tablet, Rfl: 0  •  famciclovir (FAMVIR) 500 MG tablet, Take 1 tablet by mouth 3 (Three) Times a Day., Disp: 30 tablet, Rfl: 1  •  fluticasone (FLONASE) 50 MCG/ACT nasal spray, 2 sprays into each nostril Daily., Disp: 16 g, Rfl: 5  •  fluticasone (FLONASE) 50 MCG/ACT nasal spray, fluticasone 50 mcg/actuation nasal spray,suspension, Disp: , Rfl:   •  hydrOXYzine (ATARAX) 10 MG tablet, Take 1 tablet by mouth 3 (Three) Times a Day As Needed for Itching or Anxiety., Disp: 90 tablet, Rfl: 5  •  hydrOXYzine (ATARAX) 10 MG tablet, Take 1 tablet by mouth 3 (Three) Times a Day As Needed for Itching., Disp: 60 tablet, Rfl: 1  •  promethazine-dextromethorphan (PROMETHAZINE-DM) 6.25-15 MG/5ML syrup, promethazine-DM 6.25 mg-15 mg/5 mL syrup, Disp: , Rfl:   •  propranolol (INDERAL) 10 MG tablet, Take 10 mg by mouth., Disp: , Rfl:     Allergies   Allergen Reactions   • Clonazepam Other (See Comments)     \"WENT CRAZY\"; CONFUSION   • Hydromorphone Other (See Comments)     \"WENT CRAZY\"; CONFUSION   • Morphine Other (See Comments)     HEADACHE   • Aripiprazole Nausea And Vomiting   • Morphine And Related Other (See Comments)     Headache.       Family History   Problem Relation Age of Onset   • Heart disease Other    • Hypertension Other        Social History     Social History   • Marital status:      Spouse name: N/A   • Number of children: N/A   • Years of education: N/A     Occupational History   • Not on file.     Social History Main Topics   • Smoking status: Former Smoker   • Smokeless tobacco: Never Used      Comment: about 45 years ago (Dec 04 2013)   • Alcohol use Not on file   • Drug use: Unknown   • Sexual activity: Not on file     Other Topics Concern   • Not on file     Social History Narrative   • " No narrative on file       Review of Systems   Gastrointestinal: Negative for abdominal distention, abdominal pain, anal bleeding, blood in stool, constipation, diarrhea, nausea, rectal pain and vomiting.       Physical Exam   Abdominal: Soft. Bowel sounds are normal. She exhibits no distension and no mass. There is no tenderness. There is no rebound and no guarding. A hernia is present.             ASSESSMENT    Alva was seen today for follow-up.    Diagnoses and all orders for this visit:    Ventral hernia without obstruction or gangrene        PLAN    1.  Recheck should she become symptomatic.              This document has been electronically signed by Thiago Thomas MD on October 4, 2018 4:11 PM

## 2018-11-15 RX ORDER — PROMETHAZINE HYDROCHLORIDE 25 MG/1
TABLET ORAL
Qty: 180 TABLET | Refills: 0 | Status: SHIPPED | OUTPATIENT
Start: 2018-11-15 | End: 2019-04-29 | Stop reason: SDUPTHER

## 2019-01-03 ENCOUNTER — OFFICE VISIT (OUTPATIENT)
Dept: FAMILY MEDICINE CLINIC | Facility: CLINIC | Age: 74
End: 2019-01-03

## 2019-01-03 VITALS
HEIGHT: 61 IN | DIASTOLIC BLOOD PRESSURE: 80 MMHG | SYSTOLIC BLOOD PRESSURE: 152 MMHG | WEIGHT: 128 LBS | BODY MASS INDEX: 24.17 KG/M2

## 2019-01-03 DIAGNOSIS — R11.0 NAUSEA: Primary | ICD-10-CM

## 2019-01-03 DIAGNOSIS — F41.9 ANXIETY: ICD-10-CM

## 2019-01-03 PROCEDURE — 99213 OFFICE O/P EST LOW 20 MIN: CPT | Performed by: NURSE PRACTITIONER

## 2019-01-03 RX ORDER — PROCHLORPERAZINE MALEATE 5 MG/1
5 TABLET ORAL EVERY 6 HOURS PRN
Qty: 100 TABLET | Refills: 5 | Status: SHIPPED | OUTPATIENT
Start: 2019-01-03 | End: 2019-11-06

## 2019-01-03 RX ORDER — AMOXICILLIN 500 MG/1
CAPSULE ORAL
COMMUNITY
Start: 2019-01-02 | End: 2019-01-11 | Stop reason: HOSPADM

## 2019-01-03 NOTE — PROGRESS NOTES
Chief Complaint   Patient presents with   • Anxiety   • Nausea     Subjective   Alva Mills is a 73 y.o. female.     Anxiety   Presents for follow-up visit. Symptoms include depressed mood, excessive worry, insomnia, irritability, malaise, nausea, nervous/anxious behavior, panic and restlessness. Patient reports no chest pain, compulsions, confusion, decreased concentration, dizziness, dry mouth, feeling of choking, hyperventilation, impotence, muscle tension, obsessions, palpitations, shortness of breath or suicidal ideas. Symptoms occur most days. The quality of sleep is good. Nighttime awakenings: none.     Compliance with medications is %.   Nausea   Associated symptoms include fatigue and nausea. Pertinent negatives include no abdominal pain, arthralgias, chest pain, chills, coughing, diaphoresis, fever, headaches, joint swelling, myalgias, neck pain, numbness, rash, urinary symptoms, visual change, vomiting or weakness.   Hernia   Associated symptoms include fatigue and nausea. Pertinent negatives include no abdominal pain, arthralgias, chest pain, chills, coughing, diaphoresis, fever, headaches, joint swelling, myalgias, neck pain, numbness, rash, urinary symptoms, visual change, vomiting or weakness.   Hypertension   This is a recurrent problem. The current episode started more than 1 month ago. The problem has been rapidly worsening since onset. The problem is controlled. Associated symptoms include anxiety and malaise/fatigue. Pertinent negatives include no blurred vision, chest pain, headaches, neck pain, palpitations, peripheral edema, PND, shortness of breath or sweats. Risk factors for coronary artery disease include sedentary lifestyle. Current antihypertension treatment includes ACE inhibitors. The current treatment provides mild improvement. Compliance problems include diet.  There is no history of angina, kidney disease, CAD/MI, CVA, heart failure, left ventricular hypertrophy, PVD or  retinopathy. Identifiable causes of hypertension include a thyroid problem. There is no history of chronic renal disease, coarctation of the aorta, hyperaldosteronism, hypercortisolism, hyperparathyroidism, a hypertension causing med, pheochromocytoma, renovascular disease or sleep apnea.   Hypothyroidism   This is a recurrent problem. The current episode started more than 1 year ago. The problem occurs constantly. The problem has been rapidly worsening. Associated symptoms include fatigue and nausea. Pertinent negatives include no abdominal pain, arthralgias, chest pain, chills, coughing, diaphoresis, fever, headaches, joint swelling, myalgias, neck pain, numbness, rash, urinary symptoms, visual change, vomiting or weakness. Nothing aggravates the symptoms. She has tried acetaminophen, sleep, rest and relaxation (synthroid ) for the symptoms. The treatment provided mild relief.        The following portions of the patient's history were reviewed and updated as appropriate: allergies, current medications, past social history and problem list.    Review of Systems   Constitutional: Positive for fatigue, irritability and malaise/fatigue. Negative for activity change, appetite change, chills, diaphoresis, fever and unexpected weight change.        Hot flashes    HENT: Positive for postnasal drip, tinnitus and voice change. Negative for dental problem, drooling, ear discharge, facial swelling, hearing loss, mouth sores, nosebleeds, rhinorrhea and trouble swallowing.    Eyes: Negative.  Negative for blurred vision, photophobia, pain, discharge, redness, itching and visual disturbance.   Respiratory: Negative.  Negative for apnea, cough, choking, chest tightness, shortness of breath and wheezing.    Cardiovascular: Negative.  Negative for chest pain, palpitations, leg swelling and PND.   Gastrointestinal: Positive for nausea. Negative for abdominal distention, abdominal pain, anal bleeding, blood in stool, constipation,  "diarrhea, rectal pain and vomiting.   Endocrine: Negative.  Negative for cold intolerance, heat intolerance, polydipsia, polyphagia and polyuria.   Genitourinary: Negative.  Negative for difficulty urinating, dyspareunia, dysuria and impotence.   Musculoskeletal: Negative.  Negative for arthralgias, back pain, gait problem, joint swelling, myalgias, neck pain and neck stiffness.   Skin: Negative.  Negative for color change, pallor, rash and wound.   Allergic/Immunologic: Positive for environmental allergies. Negative for food allergies and immunocompromised state.   Neurological: Negative.  Negative for dizziness, tremors, syncope, facial asymmetry, speech difficulty, weakness, light-headedness, numbness and headaches.   Hematological: Negative.  Negative for adenopathy. Does not bruise/bleed easily.   Psychiatric/Behavioral: Positive for agitation and behavioral problems. Negative for confusion, decreased concentration, dysphoric mood, hallucinations, self-injury, sleep disturbance and suicidal ideas. The patient is nervous/anxious and has insomnia. The patient is not hyperactive.         Is going to therapy -not scheduled regularly    All other systems reviewed and are negative.      Objective   /80   Ht 154.9 cm (61\")   Wt 58.1 kg (128 lb)   BMI 24.19 kg/m²   Physical Exam   Constitutional: She is oriented to person, place, and time. She appears well-developed and well-nourished.   Family stressors, patient is very stressed at this time    HENT:   Head: Normocephalic and atraumatic.   Mouth/Throat: No oropharyngeal exudate.   Oral cavity dry improving    Eyes: Conjunctivae and EOM are normal. Pupils are equal, round, and reactive to light. Right eye exhibits no discharge. Left eye exhibits no discharge. No scleral icterus.   Neck: Normal range of motion. Neck supple. No JVD present. No tracheal deviation present. No thyromegaly present.   Cardiovascular: Normal rate, regular rhythm and normal heart " sounds. Exam reveals no gallop and no friction rub.   No murmur heard.  Pulmonary/Chest: Effort normal and breath sounds normal. No stridor. No respiratory distress. She has no wheezes. She has no rales. She exhibits no tenderness.   Abdominal: Soft. Bowel sounds are normal. She exhibits no distension and no mass. There is tenderness. There is no rebound and no guarding. No hernia.   Musculoskeletal: Normal range of motion. She exhibits tenderness. She exhibits no edema or deformity.   Lymphadenopathy:     She has no cervical adenopathy.   Neurological: She is alert and oriented to person, place, and time. She has normal reflexes. She displays normal reflexes. No cranial nerve deficit or sensory deficit. She exhibits normal muscle tone. Coordination normal.   Skin: Skin is warm and dry. No rash noted. No erythema. No pallor.   Nursing note and vitals reviewed.      Assessment/Plan   Problem List Items Addressed This Visit        Digestive    Nausea - Primary       Other    Anxiety           New Medications Ordered This Visit   Medications   • prochlorperazine (COMPAZINE) 5 MG tablet     Sig: Take 1 tablet by mouth Every 6 (Six) Hours As Needed for Nausea or Vomiting.     Dispense:  100 tablet     Refill:  5      continue therapy as scheduled next week as directed -diet discussed

## 2019-01-08 ENCOUNTER — APPOINTMENT (OUTPATIENT)
Dept: CT IMAGING | Facility: HOSPITAL | Age: 74
End: 2019-01-08

## 2019-01-08 ENCOUNTER — HOSPITAL ENCOUNTER (OUTPATIENT)
Facility: HOSPITAL | Age: 74
Discharge: HOME | End: 2019-01-11
Attending: EMERGENCY MEDICINE | Admitting: INTERNAL MEDICINE

## 2019-01-08 ENCOUNTER — APPOINTMENT (OUTPATIENT)
Dept: MRI IMAGING | Facility: HOSPITAL | Age: 74
End: 2019-01-08

## 2019-01-08 DIAGNOSIS — K83.8 DILATION OF BILIARY TRACT: ICD-10-CM

## 2019-01-08 DIAGNOSIS — K83.8 PNEUMOBILIA: Primary | ICD-10-CM

## 2019-01-08 DIAGNOSIS — K83.1 COMMON BILE DUCT (CBD) OBSTRUCTION: ICD-10-CM

## 2019-01-08 DIAGNOSIS — K80.50 CHOLEDOCHOLITHIASIS: ICD-10-CM

## 2019-01-08 LAB
ALBUMIN SERPL-MCNC: 4.3 G/DL (ref 3.4–4.8)
ALBUMIN/GLOB SERPL: 1.7 G/DL (ref 1.1–1.8)
ALP SERPL-CCNC: 82 U/L (ref 38–126)
ALT SERPL W P-5'-P-CCNC: 101 U/L (ref 9–52)
ANION GAP SERPL CALCULATED.3IONS-SCNC: 10 MMOL/L (ref 5–15)
AST SERPL-CCNC: 294 U/L (ref 14–36)
BASOPHILS # BLD AUTO: 0.03 10*3/MM3 (ref 0–0.2)
BASOPHILS NFR BLD AUTO: 0.2 % (ref 0–2)
BILIRUB SERPL-MCNC: 0.5 MG/DL (ref 0.2–1.3)
BILIRUB UR QL STRIP: NEGATIVE
BUN BLD-MCNC: 12 MG/DL (ref 7–21)
BUN/CREAT SERPL: 17.4 (ref 7–25)
CALCIUM SPEC-SCNC: 9.2 MG/DL (ref 8.4–10.2)
CHLORIDE SERPL-SCNC: 97 MMOL/L (ref 95–110)
CLARITY UR: CLEAR
CO2 SERPL-SCNC: 30 MMOL/L (ref 22–31)
COLOR UR: YELLOW
CREAT BLD-MCNC: 0.69 MG/DL (ref 0.5–1)
D-LACTATE SERPL-SCNC: 1.1 MMOL/L (ref 0.5–2)
DEPRECATED RDW RBC AUTO: 42.7 FL (ref 36.4–46.3)
EOSINOPHIL # BLD AUTO: 0.01 10*3/MM3 (ref 0–0.7)
EOSINOPHIL NFR BLD AUTO: 0.1 % (ref 0–7)
ERYTHROCYTE [DISTWIDTH] IN BLOOD BY AUTOMATED COUNT: 12 % (ref 11.5–14.5)
GFR SERPL CREATININE-BSD FRML MDRD: 84 ML/MIN/1.73 (ref 39–90)
GLOBULIN UR ELPH-MCNC: 2.5 GM/DL (ref 2.3–3.5)
GLUCOSE BLD-MCNC: 118 MG/DL (ref 60–100)
GLUCOSE UR STRIP-MCNC: NEGATIVE MG/DL
HCT VFR BLD AUTO: 39.4 % (ref 35–45)
HGB BLD-MCNC: 13.1 G/DL (ref 12–15.5)
HGB UR QL STRIP.AUTO: NEGATIVE
HOLD SPECIMEN: NORMAL
HOLD SPECIMEN: NORMAL
IMM GRANULOCYTES # BLD AUTO: 0.05 10*3/MM3 (ref 0–0.02)
IMM GRANULOCYTES NFR BLD AUTO: 0.4 % (ref 0–0.5)
KETONES UR QL STRIP: NEGATIVE
LEUKOCYTE ESTERASE UR QL STRIP.AUTO: NEGATIVE
LIPASE SERPL-CCNC: 133 U/L (ref 23–300)
LYMPHOCYTES # BLD AUTO: 1.04 10*3/MM3 (ref 0.6–4.2)
LYMPHOCYTES NFR BLD AUTO: 8.2 % (ref 10–50)
MCH RBC QN AUTO: 32.6 PG (ref 26.5–34)
MCHC RBC AUTO-ENTMCNC: 33.2 G/DL (ref 31.4–36)
MCV RBC AUTO: 98 FL (ref 80–98)
MONOCYTES # BLD AUTO: 1.05 10*3/MM3 (ref 0–0.9)
MONOCYTES NFR BLD AUTO: 8.3 % (ref 0–12)
NEUTROPHILS # BLD AUTO: 10.48 10*3/MM3 (ref 2–8.6)
NEUTROPHILS NFR BLD AUTO: 82.8 % (ref 37–80)
NITRITE UR QL STRIP: NEGATIVE
PH UR STRIP.AUTO: 6 [PH] (ref 5–9)
PLATELET # BLD AUTO: 383 10*3/MM3 (ref 150–450)
PMV BLD AUTO: 9.7 FL (ref 8–12)
POTASSIUM BLD-SCNC: 3.5 MMOL/L (ref 3.5–5.1)
PROT SERPL-MCNC: 6.8 G/DL (ref 6.3–8.6)
PROT UR QL STRIP: NEGATIVE
RBC # BLD AUTO: 4.02 10*6/MM3 (ref 3.77–5.16)
SODIUM BLD-SCNC: 137 MMOL/L (ref 137–145)
SP GR UR STRIP: 1.02 (ref 1–1.03)
UROBILINOGEN UR QL STRIP: NORMAL
WBC NRBC COR # BLD: 12.66 10*3/MM3 (ref 3.2–9.8)
WHOLE BLOOD HOLD SPECIMEN: NORMAL
WHOLE BLOOD HOLD SPECIMEN: NORMAL

## 2019-01-08 PROCEDURE — 83690 ASSAY OF LIPASE: CPT | Performed by: EMERGENCY MEDICINE

## 2019-01-08 PROCEDURE — G0378 HOSPITAL OBSERVATION PER HR: HCPCS

## 2019-01-08 PROCEDURE — 25010000002 LORAZEPAM PER 2 MG: Performed by: EMERGENCY MEDICINE

## 2019-01-08 PROCEDURE — 96361 HYDRATE IV INFUSION ADD-ON: CPT

## 2019-01-08 PROCEDURE — 25010000002 ONDANSETRON PER 1 MG: Performed by: EMERGENCY MEDICINE

## 2019-01-08 PROCEDURE — 94799 UNLISTED PULMONARY SVC/PX: CPT

## 2019-01-08 PROCEDURE — 81003 URINALYSIS AUTO W/O SCOPE: CPT | Performed by: EMERGENCY MEDICINE

## 2019-01-08 PROCEDURE — 99284 EMERGENCY DEPT VISIT MOD MDM: CPT

## 2019-01-08 PROCEDURE — 94760 N-INVAS EAR/PLS OXIMETRY 1: CPT

## 2019-01-08 PROCEDURE — 83605 ASSAY OF LACTIC ACID: CPT | Performed by: EMERGENCY MEDICINE

## 2019-01-08 PROCEDURE — 80053 COMPREHEN METABOLIC PANEL: CPT | Performed by: EMERGENCY MEDICINE

## 2019-01-08 PROCEDURE — 25010000002 KETOROLAC TROMETHAMINE PER 15 MG: Performed by: EMERGENCY MEDICINE

## 2019-01-08 PROCEDURE — 96375 TX/PRO/DX INJ NEW DRUG ADDON: CPT

## 2019-01-08 PROCEDURE — 74181 MRI ABDOMEN W/O CONTRAST: CPT

## 2019-01-08 PROCEDURE — 25010000002 IOPAMIDOL 61 % SOLUTION: Performed by: EMERGENCY MEDICINE

## 2019-01-08 PROCEDURE — 74177 CT ABD & PELVIS W/CONTRAST: CPT

## 2019-01-08 PROCEDURE — 99214 OFFICE O/P EST MOD 30 MIN: CPT | Performed by: INTERNAL MEDICINE

## 2019-01-08 PROCEDURE — 85025 COMPLETE CBC W/AUTO DIFF WBC: CPT | Performed by: EMERGENCY MEDICINE

## 2019-01-08 RX ORDER — LEVOTHYROXINE SODIUM 0.05 MG/1
50 TABLET ORAL DAILY
Status: DISCONTINUED | OUTPATIENT
Start: 2019-01-08 | End: 2019-01-08

## 2019-01-08 RX ORDER — DIAZEPAM 5 MG/1
5 TABLET ORAL EVERY 12 HOURS PRN
Status: DISCONTINUED | OUTPATIENT
Start: 2019-01-08 | End: 2019-01-11 | Stop reason: HOSPADM

## 2019-01-08 RX ORDER — FENOFIBRATE 145 MG/1
145 TABLET, COATED ORAL DAILY
COMMUNITY
End: 2019-06-28 | Stop reason: SDUPTHER

## 2019-01-08 RX ORDER — HYDROCODONE BITARTRATE AND ACETAMINOPHEN 10; 325 MG/1; MG/1
1 TABLET ORAL EVERY 6 HOURS PRN
Status: DISCONTINUED | OUTPATIENT
Start: 2019-01-08 | End: 2019-01-11 | Stop reason: HOSPADM

## 2019-01-08 RX ORDER — AZELASTINE 1 MG/ML
2 SPRAY, METERED NASAL DAILY
Status: DISCONTINUED | OUTPATIENT
Start: 2019-01-08 | End: 2019-01-11 | Stop reason: HOSPADM

## 2019-01-08 RX ORDER — VENLAFAXINE HYDROCHLORIDE 75 MG/1
75 CAPSULE, EXTENDED RELEASE ORAL
Status: DISCONTINUED | OUTPATIENT
Start: 2019-01-09 | End: 2019-01-11 | Stop reason: HOSPADM

## 2019-01-08 RX ORDER — ONDANSETRON 2 MG/ML
4 INJECTION INTRAMUSCULAR; INTRAVENOUS EVERY 6 HOURS PRN
Status: DISCONTINUED | OUTPATIENT
Start: 2019-01-08 | End: 2019-01-11 | Stop reason: HOSPADM

## 2019-01-08 RX ORDER — SODIUM CHLORIDE 0.9 % (FLUSH) 0.9 %
3 SYRINGE (ML) INJECTION EVERY 12 HOURS SCHEDULED
Status: DISCONTINUED | OUTPATIENT
Start: 2019-01-08 | End: 2019-01-11 | Stop reason: HOSPADM

## 2019-01-08 RX ORDER — PROMETHAZINE HYDROCHLORIDE 25 MG/1
25 TABLET ORAL EVERY 6 HOURS PRN
Status: DISCONTINUED | OUTPATIENT
Start: 2019-01-08 | End: 2019-01-11 | Stop reason: HOSPADM

## 2019-01-08 RX ORDER — SODIUM CHLORIDE 0.9 % (FLUSH) 0.9 %
10 SYRINGE (ML) INJECTION AS NEEDED
Status: DISCONTINUED | OUTPATIENT
Start: 2019-01-08 | End: 2019-01-11 | Stop reason: HOSPADM

## 2019-01-08 RX ORDER — KETOROLAC TROMETHAMINE 15 MG/ML
15 INJECTION, SOLUTION INTRAMUSCULAR; INTRAVENOUS EVERY 6 HOURS PRN
Status: COMPLETED | OUTPATIENT
Start: 2019-01-08 | End: 2019-01-09

## 2019-01-08 RX ORDER — CARISOPRODOL 350 MG/1
350 TABLET ORAL EVERY 6 HOURS SCHEDULED
Status: DISCONTINUED | OUTPATIENT
Start: 2019-01-08 | End: 2019-01-11 | Stop reason: HOSPADM

## 2019-01-08 RX ORDER — SODIUM CHLORIDE 9 MG/ML
125 INJECTION, SOLUTION INTRAVENOUS CONTINUOUS
Status: DISCONTINUED | OUTPATIENT
Start: 2019-01-08 | End: 2019-01-10 | Stop reason: SDUPTHER

## 2019-01-08 RX ORDER — TRIAMCINOLONE ACETONIDE 1 MG/G
CREAM TOPICAL DAILY
Status: DISCONTINUED | OUTPATIENT
Start: 2019-01-08 | End: 2019-01-11 | Stop reason: HOSPADM

## 2019-01-08 RX ORDER — METOPROLOL SUCCINATE 25 MG/1
25 TABLET, EXTENDED RELEASE ORAL DAILY
Status: DISCONTINUED | OUTPATIENT
Start: 2019-01-09 | End: 2019-01-11 | Stop reason: HOSPADM

## 2019-01-08 RX ORDER — LANOLIN ALCOHOL/MO/W.PET/CERES
400 CREAM (GRAM) TOPICAL DAILY
Status: DISCONTINUED | OUTPATIENT
Start: 2019-01-09 | End: 2019-01-11 | Stop reason: HOSPADM

## 2019-01-08 RX ORDER — SODIUM CHLORIDE 0.9 % (FLUSH) 0.9 %
3-10 SYRINGE (ML) INJECTION AS NEEDED
Status: DISCONTINUED | OUTPATIENT
Start: 2019-01-08 | End: 2019-01-11 | Stop reason: HOSPADM

## 2019-01-08 RX ORDER — PROCHLORPERAZINE MALEATE 5 MG/1
5 TABLET ORAL EVERY 6 HOURS PRN
Status: DISCONTINUED | OUTPATIENT
Start: 2019-01-08 | End: 2019-01-11 | Stop reason: HOSPADM

## 2019-01-08 RX ORDER — ONDANSETRON 2 MG/ML
4 INJECTION INTRAMUSCULAR; INTRAVENOUS ONCE
Status: COMPLETED | OUTPATIENT
Start: 2019-01-08 | End: 2019-01-08

## 2019-01-08 RX ORDER — KETOROLAC TROMETHAMINE 15 MG/ML
15 INJECTION, SOLUTION INTRAMUSCULAR; INTRAVENOUS ONCE
Status: COMPLETED | OUTPATIENT
Start: 2019-01-08 | End: 2019-01-08

## 2019-01-08 RX ORDER — LORAZEPAM 2 MG/ML
0.5 INJECTION INTRAMUSCULAR ONCE
Status: COMPLETED | OUTPATIENT
Start: 2019-01-08 | End: 2019-01-08

## 2019-01-08 RX ORDER — LEVOTHYROXINE SODIUM 0.05 MG/1
50 TABLET ORAL DAILY
Status: DISCONTINUED | OUTPATIENT
Start: 2019-01-09 | End: 2019-01-11 | Stop reason: HOSPADM

## 2019-01-08 RX ORDER — FENOFIBRATE 145 MG/1
145 TABLET, COATED ORAL DAILY
Status: DISCONTINUED | OUTPATIENT
Start: 2019-01-09 | End: 2019-01-11 | Stop reason: HOSPADM

## 2019-01-08 RX ADMIN — SODIUM CHLORIDE 125 ML/HR: 900 INJECTION, SOLUTION INTRAVENOUS at 09:26

## 2019-01-08 RX ADMIN — IOPAMIDOL 70 ML: 612 INJECTION, SOLUTION INTRAVENOUS at 11:49

## 2019-01-08 RX ADMIN — SODIUM CHLORIDE 500 ML: 9 INJECTION, SOLUTION INTRAVENOUS at 09:41

## 2019-01-08 RX ADMIN — CARISOPRODOL 350 MG: 350 TABLET ORAL at 17:48

## 2019-01-08 RX ADMIN — LORAZEPAM 0.5 MG: 2 INJECTION INTRAMUSCULAR; INTRAVENOUS at 14:06

## 2019-01-08 RX ADMIN — SODIUM CHLORIDE 125 ML/HR: 900 INJECTION, SOLUTION INTRAVENOUS at 16:27

## 2019-01-08 RX ADMIN — CARISOPRODOL 350 MG: 350 TABLET ORAL at 23:50

## 2019-01-08 RX ADMIN — AZELASTINE HYDROCHLORIDE 2 SPRAY: 137 SPRAY, METERED NASAL at 17:48

## 2019-01-08 RX ADMIN — ONDANSETRON 4 MG: 2 INJECTION INTRAMUSCULAR; INTRAVENOUS at 09:41

## 2019-01-08 RX ADMIN — DIAZEPAM 5 MG: 5 TABLET ORAL at 23:50

## 2019-01-08 RX ADMIN — SODIUM CHLORIDE, PRESERVATIVE FREE 3 ML: 5 INJECTION INTRAVENOUS at 21:30

## 2019-01-08 RX ADMIN — KETOROLAC TROMETHAMINE 15 MG: 15 INJECTION, SOLUTION INTRAMUSCULAR; INTRAVENOUS at 09:41

## 2019-01-08 NOTE — CONSULTS
SUBJECTIVE:   1/8/2019    Name: Alva Mills  DOD: 1945    REASON FOR CONSULT: Abnormal liver enzyme. Abnormal CT scan.    Chief Complaint:     Chief Complaint   Patient presents with   • Abdominal Pain       Subjective     Patient is 73 y.o. female presents with abdominal pain in epigastric, started this morning, severity was 10 out of 10. Pain is better after receiving pain medication, severity now is 3 out of 10. Patient denies nausea, vomiting, fever or chills. She underwent cholecystectomy around 1998, she reports she had bile duct stones several years later, underwent ERCP but the stone could not be removed by ERCP because it was too large and she later underwent surgery to get the stone out. She says all her surgeries were done by Dr. Thomas.   She denies drinking alcohol, she's a former smoker. She denies past history of liver disease.        ROS/HISTORY/ CURRENT MEDICATIONS/OBJECTIVE/VS/PE:   Review of Systems:   Review of Systems   Constitutional: Negative for chills and fever.   HENT: Negative for sore throat and trouble swallowing.    Eyes: Negative for discharge and redness.   Respiratory: Negative for shortness of breath and wheezing.    Cardiovascular: Negative for chest pain, palpitations and leg swelling.   Gastrointestinal: Positive for abdominal pain. Negative for constipation, nausea and vomiting.   Musculoskeletal: Negative for neck pain and neck stiffness.   Skin: Negative for color change.   Neurological: Negative for seizures and speech difficulty.   Hematological: Negative for adenopathy.   Psychiatric/Behavioral: Negative for confusion and hallucinations.        History:     Past Medical History:   Diagnosis Date   • Abdominal pain    • Acquired hypothyroidism    • Acute bronchitis    • Acute sinusitis    • Acute upper respiratory infection    • Benign hypertension    • Breast cyst    • Breast lump     history of, right   • Calf pain    • Common bile duct calculus    • Constipation    •  Cough    • Depressive disorder    • Elevated cholesterol    • Epigastric pain    • External hemorrhoids without complication    • Fibrocystic breast    • Functional heart murmur    • Gastroesophageal reflux disease    • General medical exam    • Hemorrhoids    • Hyperlipidemia    • Incisional hernia     no evident recurrence at this juncture   • Localized, primary osteoarthritis of ankle or foot    • Muscle strain    • Skin lesion      Past Surgical History:   Procedure Laterality Date   • BACK SURGERY      x2   • BILE DUCT EXPLORATION  12/10/2013    Remove bile duct stone (1)    Common duct exploration, stone extraction, choledochoduodenostomy and choledochoscopy.    • BREAST BIOPSY Right 1991    Stereotactic breast biopsy (1)    Right breast    • CHOLECYSTECTOMY OPEN  1989   • HERNIA REPAIR  06/16/2014    Anesth, repair of hernia (1)    laparoscopic ventral hernioplasty with mesh. Ventral hernia.    • HYSTERECTOMY  10/17/2001    Anesth, hysterectomy (1)    Laparoscopic subtotal hysterectomy & BSO. Enterolysis of sigmoid colon.    • INJECTION OF MEDICATION  08/12/2013    Dexamethasone (2)      • INJECTION OF MEDICATION  01/31/2013    Kenalog (1)      • INJECTION OF MEDICATION  02/11/2015    Rocephin (1)      • OOPHORECTOMY     • OTHER SURGICAL HISTORY  08/12/2013    Small Joint Injection/Aspiration 20600 (2)      • TUBAL ABDOMINAL LIGATION       Family History   Problem Relation Age of Onset   • Heart disease Other    • Hypertension Other      Social History     Tobacco Use   • Smoking status: Former Smoker   • Smokeless tobacco: Never Used   • Tobacco comment: about 45 years ago (Dec 04 2013)   Substance Use Topics   • Alcohol use: No     Frequency: Never   • Drug use: No     Medications Prior to Admission   Medication Sig Dispense Refill Last Dose   • betamethasone dipropionate (DIPROLENE) 0.05 % cream Apply  topically to the appropriate area as directed Daily. 45 g 1 1/7/2019 at 2000   • CALCIUM PO Take 1 tablet  by mouth Daily With Breakfast. 500g   1/8/2019 at 0500   • desvenlafaxine (PRISTIQ) 100 MG 24 hr tablet Take 1 tablet by mouth Daily. 90 tablet 3 1/8/2019 at 0500   • diazePAM (VALIUM) 5 MG tablet Take 1 tablet by mouth Every 12 (Twelve) Hours As Needed for Anxiety or Sleep. 60 tablet 0 1/7/2019 at 0500   • fenofibrate (TRICOR) 145 MG tablet Take 145 mg by mouth Daily.   1/8/2019 at 0500   • fluticasone (FLONASE) 50 MCG/ACT nasal spray 2 sprays into each nostril Daily. 16 g 5 Past Week at Unknown time   • HYDROcodone-acetaminophen (NORCO)  MG per tablet Take 1 tablet by mouth Every 6 (Six) Hours As Needed.   1/8/2019 at 1300   • levothyroxine (SYNTHROID) 50 MCG tablet Take 1 tablet by mouth Daily.   1/8/2019 at 0500   • Magnesium 500 MG capsule Take 1 capsule by mouth Daily.   1/8/2019 at 0500   • metoprolol succinate XL (TOPROL-XL) 25 MG 24 hr tablet Take 1 tablet by mouth Daily. 90 tablet 3 1/8/2019 at 0500   • prochlorperazine (COMPAZINE) 5 MG tablet Take 1 tablet by mouth Every 6 (Six) Hours As Needed for Nausea or Vomiting. 100 tablet 5 1/8/2019 at 0500   • promethazine (PHENERGAN) 25 MG tablet TAKE 1 TABLET EVERY 6 HOURSAS NEEDED FOR NAUSEA OR    VOMITING 180 tablet 0 1/8/2019 at 1000   • SYNTHROID 50 MCG tablet TAKE 1 TABLET DAILY 90 tablet 3 1/8/2019 at 0500   • albuterol (PROVENTIL HFA;VENTOLIN HFA) 108 (90 Base) MCG/ACT inhaler Inhale 2 puffs Every 4 (Four) Hours As Needed for Wheezing. 1 inhaler 2 1/6/2019   • amoxicillin (AMOXIL) 500 MG capsule    1/8/2019 at 0500   • AZELASTINE HCL NA azelastine 137 mcg (0.1 %) nasal spray aerosol   1/6/2019   • carisoprodol (SOMA) 350 MG tablet Take 350 mg by mouth Every 6 (Six) Hours As Needed.   1/8/2019 at 0500     Allergies:  Clonazepam; Hydromorphone; Morphine; Aripiprazole; and Morphine and related    I have reviewed the patient's medical history, surgical history and family history in the available medical record system.     Current Medications:      Current Facility-Administered Medications   Medication Dose Route Frequency Provider Last Rate Last Dose   • azelastine (ASTELIN) nasal spray 2 spray  2 spray Each Nare Daily Levill, Meenakshi G, APRN       • [START ON 1/9/2019] calcium carbonate-vitamin d 600-400 MG-UNIT per tablet 1 tablet  1 tablet Oral Daily Levill, Meenakshi G, APRN       • carisoprodol (SOMA) tablet 350 mg  350 mg Oral Q6H Kody Hendricks, DO       • diazePAM (VALIUM) tablet 5 mg  5 mg Oral Q12H PRN Kody Hendricks, DO       • [START ON 1/9/2019] fenofibrate (TRICOR) tablet 145 mg  145 mg Oral Daily Levill, Meenakshi G, APRN       • HYDROcodone-acetaminophen (NORCO)  MG per tablet 1 tablet  1 tablet Oral Q6H PRN Kody Hendricks, DO       • ketorolac (TORADOL) injection 15 mg  15 mg Intravenous Q6H PRN Levill, Meenakshi G, APRN       • [START ON 1/9/2019] levothyroxine (SYNTHROID, LEVOTHROID) tablet 50 mcg  50 mcg Oral Daily Levill, Meenakshi G, APRN       • [START ON 1/9/2019] Magnesium Oxide tablet 400 mg  400 mg Oral Daily Levill, Meenakshi G, APRN       • [START ON 1/9/2019] metoprolol succinate XL (TOPROL-XL) 24 hr tablet 25 mg  25 mg Oral Daily Levill, Meenakshi G, APRN       • ondansetron (ZOFRAN) injection 4 mg  4 mg Intravenous Q6H PRN Levill, Meenakshi G, APRN       • prochlorperazine (COMPAZINE) tablet 5 mg  5 mg Oral Q6H PRN Levill, Meenakshi G, APRN       • promethazine (PHENERGAN) tablet 25 mg  25 mg Oral Q6H PRN Levill, Meenakshi G, APRN       • sodium chloride 0.9 % flush 10 mL  10 mL Intravenous PRN Ted Leach MD       • sodium chloride 0.9 % flush 3 mL  3 mL Intravenous Q12H Levill, Meenakshi G, APRN       • sodium chloride 0.9 % flush 3-10 mL  3-10 mL Intravenous PRN Levill, Meenakshi G, APRN       • Sodium chloride 0.9 % infusion  125 mL/hr Intravenous Continuous Ted Leach  mL/hr at 01/08/19 1627 125 mL/hr at 01/08/19 1627   • triamcinolone (KENALOG) 0.1 % cream   Topical Daily Meenakshi Cortez APRN       • [START ON 1/9/2019]  venlafaxine XR (EFFEXOR-XR) 24 hr capsule 75 mg  75 mg Oral Daily With Breakfast Meenakshi Cortez, APRN           Objective     Physical Exam:   Temp:  [97 °F (36.1 °C)-97.3 °F (36.3 °C)] 97.3 °F (36.3 °C)  Heart Rate:  [61-85] 68  Resp:  [16-18] 16  BP: (137-190)/(61-82) 140/74    Physical Exam:  General Appearance:    Alert, cooperative, in no acute distress   Head:    Normocephalic, without obvious abnormality, atraumatic   Eyes:            Lids and lashes normal, conjunctivae and sclerae normal, no icterus, no pallor, corneas clear.   Ears:    External ears appear intact with no abnormalities noted   Throat:   No oral lesions, no thrush, oral mucosa moist   Neck:   No adenopathy, supple, trachea midline, no thyromegaly   Back:     No kyphosis present, no scoliosis present, no tenderness to percussion or palpation.   Lungs:     Clear to auscultation,respirations regular, even and                   unlabored    Heart:    Regular rhythm and normal rate, normal S1 and S2   Breast Exam:    Deferred   Abdomen:     Normal bowel sounds, soft, no masses palpable, no organomegaly, tenderness in epigastric and periumbilical, non-distended, no guarding, no rebound tenderness.   Genitalia:    Deferred   Extremities:   Moves all extremities well, no edema, no cyanosis, no              redness   Pulses:   deferred   Skin:   No bleeding.   Lymph nodes:   No palpable cervical adenopathy   Neurologic:  Awake, alert, cooperative.      Results Review:     Laboratory Data:   Results from last 7 days   Lab Units  01/08/19   0935   GLUCOSE mg/dL  118*   BUN mg/dL  12   CREATININE mg/dL  0.69   SODIUM mmol/L  137   POTASSIUM mmol/L  3.5   CHLORIDE mmol/L  97   CO2 mmol/L  30.0   CALCIUM mg/dL  9.2   TOTAL PROTEIN g/dL  6.8   ALBUMIN g/dL  4.30   ALT (SGPT) U/L  101*   AST (SGOT) U/L  294*   ALK PHOS U/L  82   BILIRUBIN mg/dL  0.5   EGFR IF NONAFRICN AM mL/min/1.73  84   GLOBULIN gm/dL  2.5   A/G RATIO g/dL  1.7   BUN / CREAT RATIO    17.4   ANION GAP mmol/L  10.0     Results from last 7 days   Lab Units  01/08/19   0935   WBC 10*3/mm3  12.66*   RBC 10*6/mm3  4.02   HEMOGLOBIN g/dL  13.1   HEMATOCRIT %  39.4   MCV fL  98.0   MCH pg  32.6   MCHC g/dL  33.2   RDW %  12.0   RDW-SD fl  42.7   MPV fL  9.7   PLATELETS 10*3/mm3  383             Results from last 7 days   Lab Units  01/08/19   0935   LIPASE U/L  133           Radiology Review:  Imaging Results (last 7 days)     Procedure Component Value Units Date/Time    MRI abdomen wo contrast mrcp [[id number]] Resulted:  01/08/19 1359     Updated:  01/08/19 1449    CT Abdomen Pelvis With Contrast [[id number]] Collected:  01/08/19 1138     Updated:  01/08/19 1411    Narrative:         PROCEDURE: Ct abdomen and pelvis with contrast    REASON FOR EXAM: ventral hernia pain    FINDINGS: Comparison study dated  March 20, 2018. Axial computer  tomography sequential imaging was performed from the diaphragms  through the symphysis pubis with IV contrast administration.  Sagittal and coronal reformates was performed. This exam was  performed according to our departmental dose optimization  program, which includes automated exposure control, adjustment of  the mA and/or KV according to patient size and/or use of  iterative reconstruction technique.     Imaging through lung bases reveals no abnormality.    The liver is normal. The gallbladder surgically absent.  Intrahepatic and extrahepatic biliary dilatation with pneumobilia  as well as contrast. Proximal common bile duct small 5.2 mm  choledocholithiasis. The right common bile duct measures 1.8 cm  in greatest diameter. There is focal proximal right common bile  duct caliber change. Soft tissue prominence in the region of the  head of the pancreas in the common bile duct region with  associated dilated intra-  Hepatic biliary system as well as pancreatic duct. The pancreas  is otherwise unremarkable. The spleen is normal. Bilateral  adrenal glands are normal.  Right kidney and ureter are normal.  Left kidney upper pole small simple renal cortical cyst which  measures 1.3 cm in greatest diameter. Left kidney and ureter are  otherwise unremarkable. The bladder is normal. The uterus is  surgically absent. Nonspecific soft tissue prominence in region  of the head of the pancreas and ampulla of the duodenum.  Otherwise hollow viscera is unremarkable. No lymphadenopathy in  the abdomen or the pelvis. No acute osseous abnormality. Stable  postsurgical changes with bilateral decompressive laminectomies  at the L3 and L4 vertebral body level with associated discectomy  at the L3-L4 and L4-L5 disc space level with small intervertebral  fusion device in place at the L3-4 disc space level and Ray cage  device in place at the L4-L5 disc space level. Bilateral  posterior tramaine fusion of the L3 through the L5 vertebral body with  satisfactory anatomical alignment.      Impression:       1. Diffusely dilated intra and extrahepatic biliary system with  contrast as well as pneumobilia and focal caliber change in the  region of the proximal common bile duct. Diffusely dilated  pancreatic duct. Proximal common bile duct 5.2 mm  choledocholithiasis. Soft tissue prominence in the region of the  duodenal ampulla and head of the pancreas with associated focal  caliber change of the common bile duct would be suspicious for  postsurgical changes versus duodenal ampulla or pancreatic head  neoplasm. Recommend GI consultation.  2. Stable extensive lumbar spine postsurgical changes as  described above..  3. Remainder CT abdomen pelvis study with contrast unremarkable..    Electronically signed by:  Teodoro Roberts MD  1/8/2019 12:37 PM CST  Workstation: [id number]          I reviewed the patient's new clinical results.    I reviewed the patient's new imaging results and agree with the interpretation.     ASSESSMENT/PLAN:   ASSESSMENT:     Active Problems:    Pneumobilia.    Abdominal pain in  epigastric.  Elevated liver enzymes. Transaminases. (Alk phos and bili are normal.)  Abnormal CT scan abdomen/pelvis.  Diffusely dilated intra and extrahepatic biliary system with  contrast as well as pneumobilia and focal caliber change in the  region of the proximal common bile duct.   Diffusely dilated pancreatic duct. Proximal common bile duct 5.2 mm  choledocholithiasis.   Soft tissue prominence in the region of the duodenal ampulla and head of the pancreas with associated focal caliber change of the common bile duct would be suspicious for  postsurgical changes versus duodenal ampulla or pancreatic head neoplasm.  Leucocytosis.      PLAN:   MRI abdomen MRCP for further evaluation. Awaiting result.  Follow up liver enzymes.  PT/INR.      I discussed the assessment and recommendations with the patient. She verbalized understanding. All questions were answered. The patient denies any further question.  Discussed with patient's nurse.         Aderemi Jaiyeola, MD  01/08/19  5:36 PM    EMR Dragon/Transcription disclaimer:   Some of this note may be an electronic transcription/translation of spoken language to printed text. The electronic translation of spoken language may permit erroneous, or at times, nonsensical words or phrases to be inadvertently transcribed; Although I have reviewed the note for such errors, some may still exist.

## 2019-01-08 NOTE — PLAN OF CARE
Problem: Patient Care Overview  Goal: Plan of Care Review  Outcome: Ongoing (interventions implemented as appropriate)   01/08/19 2776   Coping/Psychosocial   Plan of Care Reviewed With patient   Plan of Care Review   Progress no change   OTHER   Outcome Summary Pt arrived to unit from ER. VS stable; pt NPO per DOMI Arrieta. Will continue to monitor.     Goal: Individualization and Mutuality  Outcome: Ongoing (interventions implemented as appropriate)      Problem: Pain, Acute (Adult)  Goal: Identify Related Risk Factors and Signs and Symptoms  Outcome: Outcome(s) achieved Date Met: 01/08/19    Goal: Acceptable Pain Control/Comfort Level  Outcome: Ongoing (interventions implemented as appropriate)

## 2019-01-08 NOTE — H&P
[facility] Medicine Admission      Date of Admission: 1/8/2019      Primary Care Physician: Estefania Wylie APRN      Chief Complaint: Abdominal pain and nausea.    HPI: This is a 73-year-old  female with past medical history of hypertension, anxiety and depression, HLD and GERD that presents to [facility] on 1/8/2019 with complaints of right upper quadrant abdominal pain and nausea that began this morning.  Lab work shows elevated liver enzymes and white blood cell count (12.66).  CT of the abdomen and pelvis showed proximal common bile duct at 5.2 mm choledocholithiasis.  Dr. Murillo with gastroenterology has been consulted.  MRI is pending.  Lipase and amylase unremarkable.    Concurrent Medical History:  has a past medical history of Abdominal pain, Acquired hypothyroidism, Acute bronchitis, Acute sinusitis, Acute upper respiratory infection, Benign hypertension, Breast cyst, Breast lump, Calf pain, Common bile duct calculus, Constipation, Cough, Depressive disorder, Elevated cholesterol, Epigastric pain, External hemorrhoids without complication, Fibrocystic breast, Functional heart murmur, Gastroesophageal reflux disease, General medical exam, Hemorrhoids, Hyperlipidemia, Incisional hernia, Localized, primary osteoarthritis of ankle or foot, Muscle strain, and Skin lesion.    Past Surgical History:  has a past surgical history that includes Hysterectomy (10/17/2001); Hernia repair (06/16/2014); Cholecystectomy open (1989); Injection of Medication (08/12/2013); Injection of Medication (01/31/2013); Bile duct exploration (12/10/2013); Injection of Medication (02/11/2015); Other surgical history (08/12/2013); Breast biopsy (Right, 1991); Tubal ligation; Oophorectomy; and Back surgery.    Family History: family history includes Heart disease in her other; Hypertension in her other.    Social History:  reports that she has quit smoking. she has never used  "smokeless tobacco. She reports that she does not drink alcohol or use drugs.    Allergies:   Allergies   Allergen Reactions   • Clonazepam Other (See Comments)     \"WENT CRAZY\"; CONFUSION   • Hydromorphone Other (See Comments)     \"WENT CRAZY\"; CONFUSION   • Morphine Other (See Comments)     HEADACHE   • Aripiprazole Nausea And Vomiting   • Morphine And Related Other (See Comments)     Headache.       Medications:   Prior to Admission medications    Medication Sig Start Date End Date Taking? Authorizing Provider   betamethasone dipropionate (DIPROLENE) 0.05 % cream Apply  topically to the appropriate area as directed Daily. 8/23/18  Yes Estefania Wylie APRN   CALCIUM PO Take 1 tablet by mouth Daily With Breakfast. 500g   Yes Kev Gramajo MD   desvenlafaxine (PRISTIQ) 100 MG 24 hr tablet Take 1 tablet by mouth Daily. 5/7/18  Yes Estefania Wylie APRN   diazePAM (VALIUM) 5 MG tablet Take 1 tablet by mouth Every 12 (Twelve) Hours As Needed for Anxiety or Sleep. 8/28/17  Yes Estefania Wylie APRN   fenofibrate (TRICOR) 145 MG tablet Take 145 mg by mouth Daily.   Yes Kev Gramajo MD   fluticasone (FLONASE) 50 MCG/ACT nasal spray 2 sprays into each nostril Daily. 3/22/18  Yes Estefania Wylie APRN   HYDROcodone-acetaminophen (NORCO)  MG per tablet Take 1 tablet by mouth Every 6 (Six) Hours As Needed.   Yes Kev Gramajo MD   levothyroxine (SYNTHROID) 50 MCG tablet Take 1 tablet by mouth Daily.   Yes Kev Gramajo MD   Magnesium 500 MG capsule Take 1 capsule by mouth Daily.   Yes Kev Gramajo MD   metoprolol succinate XL (TOPROL-XL) 25 MG 24 hr tablet Take 1 tablet by mouth Daily. 3/15/18  Yes Estefania Wylie APRN   prochlorperazine (COMPAZINE) 5 MG tablet Take 1 tablet by mouth Every 6 (Six) Hours As Needed for Nausea or Vomiting. 1/3/19  Yes Estefania Wylie APRN   promethazine (PHENERGAN) 25 MG tablet TAKE 1 TABLET EVERY 6 HOURSAS NEEDED FOR NAUSEA OR    VOMITING 11/15/18  " Yes Estefania Wylie APRN   SYNTHROID 50 MCG tablet TAKE 1 TABLET DAILY 7/23/18  Yes Estefania Wylie APRN   albuterol (PROVENTIL HFA;VENTOLIN HFA) 108 (90 Base) MCG/ACT inhaler Inhale 2 puffs Every 4 (Four) Hours As Needed for Wheezing. 5/7/18   Estefania Wylie APRN   amoxicillin (AMOXIL) 500 MG capsule  1/2/19   ProviderKev MD   AZELASTINE HCL NA azelastine 137 mcg (0.1 %) nasal spray aerosol    ProviderKev MD   carisoprodol (SOMA) 350 MG tablet Take 350 mg by mouth Every 6 (Six) Hours As Needed.    ProviderKev MD   benzonatate (TESSALON PERLES) 100 MG capsule Take 1 capsule by mouth 3 (Three) Times a Day As Needed for Cough. 5/7/18 1/8/19  Estefania Wylie APRN   Cetirizine HCl (ZYRTEC ALLERGY) 10 MG capsule Take 10 mg by mouth.  1/8/19  Kev Gramajo MD   famciclovir (FAMVIR) 500 MG tablet Take 1 tablet by mouth 3 (Three) Times a Day. 8/30/18 1/8/19  Estefania Wylie APRN   fluticasone (FLONASE) 50 MCG/ACT nasal spray fluticasone 50 mcg/actuation nasal spray,suspension  1/8/19  ProviderKev MD   hydrOXYzine (ATARAX) 10 MG tablet Take 1 tablet by mouth 3 (Three) Times a Day As Needed for Itching or Anxiety. 6/27/18 1/8/19  Estefania Wylie APRN   hydrOXYzine (ATARAX) 10 MG tablet Take 1 tablet by mouth 3 (Three) Times a Day As Needed for Itching. 8/30/18 1/8/19  Estefania Wylie APRN   metoprolol succinate XL (TOPROL-XL) 25 MG 24 hr tablet metoprolol succinate ER 25 mg tablet,extended release 24 hr  1/8/19  ProviderKev MD   promethazine-dextromethorphan (PROMETHAZINE-DM) 6.25-15 MG/5ML syrup promethazine-DM 6.25 mg-15 mg/5 mL syrup  1/8/19  Provider, MD Kev   propranolol (INDERAL) 10 MG tablet Take 10 mg by mouth. 10/13/16 1/8/19  Provider, MD Kev   rOPINIRole (REQUIP) 1 MG tablet ropinirole 1 mg tablet  1/8/19  Provider, MD Kev       Review of Systems:  Review of Systems   Constitutional: Negative for activity change and fatigue.   HENT:  Negative for ear pain and sore throat.    Eyes: Negative for pain and discharge.   Respiratory: Negative for cough and shortness of breath.    Cardiovascular: Negative for chest pain and palpitations.   Gastrointestinal: Positive for abdominal pain and nausea.   Endocrine: Negative for cold intolerance and heat intolerance.   Genitourinary: Negative for difficulty urinating and dysuria.   Musculoskeletal: Negative for arthralgias and gait problem.   Skin: Negative for color change and rash.   Neurological: Negative for dizziness and weakness.   Psychiatric/Behavioral: Negative for agitation and confusion.      Otherwise complete ROS is negative except as mentioned above.    Physical Exam:   Temp:  [97 °F (36.1 °C)-97.3 °F (36.3 °C)] 97.3 °F (36.3 °C)  Heart Rate:  [61-85] 67  Resp:  [16-18] 18  BP: (137-190)/(61-82) 140/74  Physical Exam   Constitutional: She is oriented to person, place, and time. She appears well-developed and well-nourished.   HENT:   Head: Normocephalic and atraumatic.   Eyes: EOM are normal. Pupils are equal, round, and reactive to light.   Neck: Normal range of motion. Neck supple.   Cardiovascular: Normal rate and regular rhythm.   Pulmonary/Chest: Effort normal and breath sounds normal.   Abdominal: Soft. Bowel sounds are normal.   Musculoskeletal: Normal range of motion.   Neurological: She is alert and oriented to person, place, and time.   Skin: Skin is warm and dry.   Psychiatric: She has a normal mood and affect. Her behavior is normal.         Results Reviewed:  I have personally reviewed current lab, radiology, and data and agree with results.  Lab Results (last 24 hours)     Procedure Component Value Units Date/Time    Guys Draw [[id number]] Collected:  01/08/19 0935    Specimen:  Blood Updated:  01/08/19 1045    Narrative:       The following orders were created for panel order Guys Draw.  Procedure                               Abnormality         Status                      ---------                               -----------         ------                     Light Blue Top[[id number]]                                   Final result               Green Top (Gel)[[id number]]                                  Final result               Lavender Top[[id number]]                                     Final result               Gold Top - SST[[id number]]                                   Final result                 Please view results for these tests on the individual orders.    Lavender Top [[id number]] Collected:  01/08/19 0935    Specimen:  Blood Updated:  01/08/19 1045     Extra Tube hold for add-on     Comment: Auto resulted       Gold Top - SST [[id number]] Collected:  01/08/19 0935    Specimen:  Blood Updated:  01/08/19 1045     Extra Tube Hold for add-ons.     Comment: Auto resulted.       Light Blue Top [[id number]] Collected:  01/08/19 0935    Specimen:  Blood Updated:  01/08/19 1045     Extra Tube hold for add-on     Comment: Auto resulted       Green Top (Gel) [[id number]] Collected:  01/08/19 0935    Specimen:  Blood Updated:  01/08/19 1045     Extra Tube Hold for add-ons.     Comment: Auto resulted.       Lactic Acid, Plasma [[id number]]  (Normal) Collected:  01/08/19 0935    Specimen:  Blood Updated:  01/08/19 1002     Lactate 1.1 mmol/L     Comprehensive Metabolic Panel [[id number]]  (Abnormal) Collected:  01/08/19 0935    Specimen:  Blood Updated:  01/08/19 1001     Glucose 118 mg/dL      BUN 12 mg/dL      Creatinine 0.69 mg/dL      Sodium 137 mmol/L      Potassium 3.5 mmol/L      Chloride 97 mmol/L      CO2 30.0 mmol/L      Calcium 9.2 mg/dL      Total Protein 6.8 g/dL      Albumin 4.30 g/dL      ALT (SGPT) 101 U/L      AST (SGOT) 294 U/L      Alkaline Phosphatase 82 U/L      Total Bilirubin 0.5 mg/dL      eGFR Non African Amer 84 mL/min/1.73      Globulin 2.5 gm/dL      A/G Ratio 1.7 g/dL      BUN/Creatinine Ratio 17.4     Anion Gap 10.0 mmol/L     Narrative:       The MDRD GFR  formula is only valid for adults with stable renal function between ages 18 and 70.    Lipase [[id number]]  (Normal) Collected:  01/08/19 0935    Specimen:  Blood Updated:  01/08/19 1001     Lipase 133 U/L     Urinalysis With Microscopic If Indicated (No Culture) - Urine, Clean Catch [[id number]]  (Normal) Collected:  01/08/19 0935    Specimen:  Urine, Clean Catch Updated:  01/08/19 0951     Color, UA Yellow     Appearance, UA Clear     pH, UA 6.0     Specific Gravity, UA 1.023     Glucose, UA Negative     Ketones, UA Negative     Bilirubin, UA Negative     Blood, UA Negative     Protein, UA Negative     Leuk Esterase, UA Negative     Nitrite, UA Negative     Urobilinogen, UA 0.2 E.U./dL    Narrative:       Urine microscopic not indicated.    CBC & Differential [[id number]] Collected:  01/08/19 0935    Specimen:  Blood Updated:  01/08/19 0951    Narrative:       The following orders were created for panel order CBC & Differential.  Procedure                               Abnormality         Status                     ---------                               -----------         ------                     CBC Auto Differential[[id number]]        Abnormal            Final result                 Please view results for these tests on the individual orders.    CBC Auto Differential [[id number]]  (Abnormal) Collected:  01/08/19 0935    Specimen:  Blood Updated:  01/08/19 0951     WBC 12.66 10*3/mm3      RBC 4.02 10*6/mm3      Hemoglobin 13.1 g/dL      Hematocrit 39.4 %      MCV 98.0 fL      MCH 32.6 pg      MCHC 33.2 g/dL      RDW 12.0 %      RDW-SD 42.7 fl      MPV 9.7 fL      Platelets 383 10*3/mm3      Neutrophil % 82.8 %      Lymphocyte % 8.2 %      Monocyte % 8.3 %      Eosinophil % 0.1 %      Basophil % 0.2 %      Immature Grans % 0.4 %      Neutrophils, Absolute 10.48 10*3/mm3      Lymphocytes, Absolute 1.04 10*3/mm3      Monocytes, Absolute 1.05 10*3/mm3      Eosinophils, Absolute 0.01 10*3/mm3      Basophils, Absolute  0.03 10*3/mm3      Immature Grans, Absolute 0.05 10*3/mm3         Imaging Results (last 24 hours)     Procedure Component Value Units Date/Time    MRI abdomen wo contrast mrcp [[id number]] Resulted:  01/08/19 1359     Updated:  01/08/19 1449    CT Abdomen Pelvis With Contrast [[id number]] Collected:  01/08/19 1138     Updated:  01/08/19 1411    Narrative:         PROCEDURE: Ct abdomen and pelvis with contrast    REASON FOR EXAM: ventral hernia pain    FINDINGS: Comparison study dated  March 20, 2018. Axial computer  tomography sequential imaging was performed from the diaphragms  through the symphysis pubis with IV contrast administration.  Sagittal and coronal reformates was performed. This exam was  performed according to our departmental dose optimization  program, which includes automated exposure control, adjustment of  the mA and/or KV according to patient size and/or use of  iterative reconstruction technique.     Imaging through lung bases reveals no abnormality.    The liver is normal. The gallbladder surgically absent.  Intrahepatic and extrahepatic biliary dilatation with pneumobilia  as well as contrast. Proximal common bile duct small 5.2 mm  choledocholithiasis. The right common bile duct measures 1.8 cm  in greatest diameter. There is focal proximal right common bile  duct caliber change. Soft tissue prominence in the region of the  head of the pancreas in the common bile duct region with  associated dilated intra-  Hepatic biliary system as well as pancreatic duct. The pancreas  is otherwise unremarkable. The spleen is normal. Bilateral  adrenal glands are normal. Right kidney and ureter are normal.  Left kidney upper pole small simple renal cortical cyst which  measures 1.3 cm in greatest diameter. Left kidney and ureter are  otherwise unremarkable. The bladder is normal. The uterus is  surgically absent. Nonspecific soft tissue prominence in region  of the head of the pancreas and ampulla of the  duodenum.  Otherwise hollow viscera is unremarkable. No lymphadenopathy in  the abdomen or the pelvis. No acute osseous abnormality. Stable  postsurgical changes with bilateral decompressive laminectomies  at the L3 and L4 vertebral body level with associated discectomy  at the L3-L4 and L4-L5 disc space level with small intervertebral  fusion device in place at the L3-4 disc space level and Ray cage  device in place at the L4-L5 disc space level. Bilateral  posterior tramaine fusion of the L3 through the L5 vertebral body with  satisfactory anatomical alignment.      Impression:       1. Diffusely dilated intra and extrahepatic biliary system with  contrast as well as pneumobilia and focal caliber change in the  region of the proximal common bile duct. Diffusely dilated  pancreatic duct. Proximal common bile duct 5.2 mm  choledocholithiasis. Soft tissue prominence in the region of the  duodenal ampulla and head of the pancreas with associated focal  caliber change of the common bile duct would be suspicious for  postsurgical changes versus duodenal ampulla or pancreatic head  neoplasm. Recommend GI consultation.  2. Stable extensive lumbar spine postsurgical changes as  described above..  3. Remainder CT abdomen pelvis study with contrast unremarkable..    Electronically signed by:  Teodoro Roberts MD  1/8/2019 12:37 PM CST  Workstation: [id number]            Assessment:        Active Hospital Problems    Diagnosis Date Noted   • Pneumobilia [K83.8] 01/08/2019       Plan:  1.  Abdominal pain, RUQ/ nausea:  Proximal common bile duct 5.2 mm choledocholithiasis.  GI consultation appreciated.  MRI of the abdomen pending.  Ketorolac when necessary.  IV fluids.    2.  Hypothyroidism: Continue home Synthroid.  3.  Anxiety and depression: Continue home Effexor and Valium.  4.  Chronic pain: Continue Norco and Soma.  5.  Hypertension: Continue home metoprolol.    I discussed the patients findings and my recommendations with:  Patient    Patient wishes to be a full code.      This document has been electronically signed by DOMI Phillips on January 8, 2019 5:02 PM

## 2019-01-08 NOTE — ED PROVIDER NOTES
Subjective   Patient is a 73-year-old female with history of ventral hernia of the abdominal wall post a cholecystectomy.  They has been a watchful waiting approach to this herniation.  Patient notes that she's had increased pain nausea since about 5:30 this morning.  Patient denies any diarrhea.  Patient states she has passed some gas this morning.  This may be a little less than her normal period patient is the pain and cramping was worse at home and has improved somewhat before her arrival to the emergency department.  There is been no vomiting to this point.  Patient has been under the care of Dr. Thomas, her surgeon.            Review of Systems   Constitutional: Negative.  Negative for appetite change, chills and fever.   HENT: Negative.  Negative for congestion.    Eyes: Negative.  Negative for photophobia and visual disturbance.   Respiratory: Negative.  Negative for cough, chest tightness and shortness of breath.    Cardiovascular: Negative.  Negative for chest pain and palpitations.   Gastrointestinal: Positive for abdominal pain. Negative for constipation, diarrhea, nausea and vomiting.   Endocrine: Negative.    Genitourinary: Negative.  Negative for decreased urine volume, dysuria, flank pain and hematuria.   Musculoskeletal: Negative.  Negative for arthralgias, back pain, myalgias, neck pain and neck stiffness.   Skin: Negative.  Negative for pallor.   Neurological: Negative.  Negative for dizziness, syncope, weakness, light-headedness, numbness and headaches.   Psychiatric/Behavioral: Negative.  Negative for confusion and suicidal ideas. The patient is not nervous/anxious.    All other systems reviewed and are negative.      Past Medical History:   Diagnosis Date   • Abdominal pain    • Acquired hypothyroidism    • Acute bronchitis    • Acute sinusitis    • Acute upper respiratory infection    • Benign hypertension    • Breast cyst    • Breast lump     history of, right   • Calf pain    • Common bile duct  "calculus    • Constipation    • Cough    • Depressive disorder    • Epigastric pain    • External hemorrhoids without complication    • Fibrocystic breast    • Functional heart murmur    • Gastroesophageal reflux disease    • General medical exam    • Hemorrhoids    • Hyperlipidemia    • Incisional hernia     no evident recurrence at this juncture   • Localized, primary osteoarthritis of ankle or foot    • Muscle strain    • Skin lesion        Allergies   Allergen Reactions   • Clonazepam Other (See Comments)     \"WENT CRAZY\"; CONFUSION   • Hydromorphone Other (See Comments)     \"WENT CRAZY\"; CONFUSION   • Morphine Other (See Comments)     HEADACHE   • Aripiprazole Nausea And Vomiting   • Morphine And Related Other (See Comments)     Headache.       Past Surgical History:   Procedure Laterality Date   • BILE DUCT EXPLORATION  12/10/2013    Remove bile duct stone (1)    Common duct exploration, stone extraction, choledochoduodenostomy and choledochoscopy.    • BREAST BIOPSY Right 1991    Stereotactic breast biopsy (1)    Right breast    • CHOLECYSTECTOMY OPEN  1989   • HERNIA REPAIR  06/16/2014    Anesth, repair of hernia (1)    laparoscopic ventral hernioplasty with mesh. Ventral hernia.    • HYSTERECTOMY  10/17/2001    Anesth, hysterectomy (1)    Laparoscopic subtotal hysterectomy & BSO. Enterolysis of sigmoid colon.    • INJECTION OF MEDICATION  08/12/2013    Dexamethasone (2)      • INJECTION OF MEDICATION  01/31/2013    Kenalog (1)      • INJECTION OF MEDICATION  02/11/2015    Rocephin (1)      • OOPHORECTOMY     • OTHER SURGICAL HISTORY  08/12/2013    Small Joint Injection/Aspiration 20600 (2)      • TUBAL ABDOMINAL LIGATION         Family History   Problem Relation Age of Onset   • Heart disease Other    • Hypertension Other        Social History     Socioeconomic History   • Marital status:      Spouse name: Not on file   • Number of children: Not on file   • Years of education: Not on file   • Highest " education level: Not on file   Tobacco Use   • Smoking status: Former Smoker   • Smokeless tobacco: Never Used   • Tobacco comment: about 45 years ago (Dec 04 2013)   Substance and Sexual Activity   • Alcohol use: No     Frequency: Never   • Drug use: No           Objective   Physical Exam   Constitutional: She is oriented to person, place, and time. She appears well-developed and well-nourished. She appears distressed.   HENT:   Head: Normocephalic and atraumatic.   Nose: Nose normal.   Mouth/Throat: Oropharynx is clear and moist.   Eyes: Conjunctivae and EOM are normal. No scleral icterus.   Neck: Normal range of motion. Neck supple. No JVD present.   Cardiovascular: Normal rate, regular rhythm, normal heart sounds and intact distal pulses. Exam reveals no gallop and no friction rub.   No murmur heard.  Pulmonary/Chest: Effort normal. No respiratory distress. She has no wheezes. She has no rales. She exhibits no tenderness.   Abdominal: Soft. She exhibits no distension and no mass. There is tenderness in the epigastric area and periumbilical area. There is no rebound and no guarding. A hernia is present.   There does appear to be herniated bowel segments in the ventral abdominal wall superior to the umbilicus.  This extends to the epigastric region.  This is a bowel with gas and it does appear to be reducible at this time.  There are no peritoneal signs noted.   Musculoskeletal: Normal range of motion. She exhibits no edema, tenderness or deformity.   Lymphadenopathy:     She has no cervical adenopathy.   Neurological: She is alert and oriented to person, place, and time. No cranial nerve deficit. She exhibits normal muscle tone.   Skin: Skin is warm and dry. Capillary refill takes less than 2 seconds. No rash noted. She is not diaphoretic. No erythema. No pallor.   Psychiatric: She has a normal mood and affect. Her behavior is normal. Judgment and thought content normal.   Nursing note and vitals  reviewed.      Procedures           ED Course      Labs Reviewed   COMPREHENSIVE METABOLIC PANEL - Abnormal; Notable for the following components:       Result Value    Glucose 118 (*)     ALT (SGPT) 101 (*)     AST (SGOT) 294 (*)     All other components within normal limits    Narrative:     The MDRD GFR formula is only valid for adults with stable renal function between ages 18 and 70.   CBC WITH AUTO DIFFERENTIAL - Abnormal; Notable for the following components:    WBC 12.66 (*)     Neutrophil % 82.8 (*)     Lymphocyte % 8.2 (*)     Neutrophils, Absolute 10.48 (*)     Monocytes, Absolute 1.05 (*)     Immature Grans, Absolute 0.05 (*)     All other components within normal limits   LIPASE - Normal   URINALYSIS W/ MICROSCOPIC IF INDICATED (NO CULTURE) - Normal    Narrative:     Urine microscopic not indicated.   LACTIC ACID, PLASMA - Normal   RAINBOW DRAW    Narrative:     The following orders were created for panel order [place] Draw.  Procedure                               Abnormality         Status                     ---------                               -----------         ------                     Light Blue Top[[id number]]                                   Final result               Green Top (Gel)[[id number]]                                  Final result               Lavender Top[[id number]]                                     Final result               Gold Top - SST[[id number]]                                   Final result                 Please view results for these tests on the individual orders.   CBC AND DIFFERENTIAL    Narrative:     The following orders were created for panel order CBC & Differential.  Procedure                               Abnormality         Status                     ---------                               -----------         ------                     CBC Auto Differential[[id number]]        Abnormal            Final result                 Please view results for these tests  on the individual orders.   LIGHT BLUE TOP   GREEN TOP   LAVENDER TOP   GOLD TOP - SST       CT Abdomen Pelvis With Contrast   Final Result   1. Diffusely dilated intra and extrahepatic biliary system with   contrast as well as pneumobilia and focal caliber change in the   region of the proximal common bile duct. Diffusely dilated   pancreatic duct. Proximal common bile duct 5.2 mm   choledocholithiasis. Soft tissue prominence in the region of the   duodenal ampulla and head of the pancreas with associated focal   caliber change of the common bile duct would be suspicious for   postsurgical changes versus duodenal ampulla or pancreatic head   neoplasm. Recommend GI consultation.   2. Stable extensive lumbar spine postsurgical changes as   described above..   3. Remainder CT abdomen pelvis study with contrast unremarkable..      Electronically signed by:  Teodoro Roberts MD  1/8/2019 12:37 PM CST   Workstation: [id number]      MRI abdomen wo contrast mrcp    (Results Pending)       Patient with dilated common bile duct elevated LFTs.  Patient with the appearance of new choledocholithiasis in the setting of worsening pain.  Plan inpatient MRCP, possibly ERCP with GI consultation.            MDM      Final diagnoses:   Pneumobilia   Dilation of biliary tract   Choledocholithiasis            Ted Leach MD  01/08/19 2107

## 2019-01-09 LAB
ALBUMIN SERPL-MCNC: 4 G/DL (ref 3.4–4.8)
ALP SERPL-CCNC: 111 U/L (ref 38–126)
ALT SERPL W P-5'-P-CCNC: 272 U/L (ref 9–52)
ANION GAP SERPL CALCULATED.3IONS-SCNC: 8 MMOL/L (ref 5–15)
AST SERPL-CCNC: 343 U/L (ref 14–36)
B PERT DNA SPEC QL NAA+PROBE: NOT DETECTED
BASOPHILS # BLD AUTO: 0.03 10*3/MM3 (ref 0–0.2)
BASOPHILS NFR BLD AUTO: 0.1 % (ref 0–2)
BILIRUB CONJ SERPL-MCNC: 0 MG/DL (ref 0–0.3)
BILIRUB INDIRECT SERPL-MCNC: 0.6 MG/DL (ref 0–1.1)
BILIRUB SERPL-MCNC: 0.8 MG/DL (ref 0.2–1.3)
BUN BLD-MCNC: 11 MG/DL (ref 7–21)
BUN/CREAT SERPL: 15.3 (ref 7–25)
C PNEUM DNA NPH QL NAA+NON-PROBE: NOT DETECTED
CALCIUM SPEC-SCNC: 8.5 MG/DL (ref 8.4–10.2)
CHLORIDE SERPL-SCNC: 100 MMOL/L (ref 95–110)
CO2 SERPL-SCNC: 29 MMOL/L (ref 22–31)
CREAT BLD-MCNC: 0.72 MG/DL (ref 0.5–1)
DEPRECATED RDW RBC AUTO: 42.9 FL (ref 36.4–46.3)
EOSINOPHIL # BLD AUTO: 0.05 10*3/MM3 (ref 0–0.7)
EOSINOPHIL NFR BLD AUTO: 0.2 % (ref 0–7)
ERYTHROCYTE [DISTWIDTH] IN BLOOD BY AUTOMATED COUNT: 11.9 % (ref 11.5–14.5)
FLUAV H1 2009 PAND RNA NPH QL NAA+PROBE: NOT DETECTED
FLUAV H1 HA GENE NPH QL NAA+PROBE: NOT DETECTED
FLUAV H3 RNA NPH QL NAA+PROBE: NOT DETECTED
FLUAV SUBTYP SPEC NAA+PROBE: NOT DETECTED
FLUBV RNA ISLT QL NAA+PROBE: NOT DETECTED
GFR SERPL CREATININE-BSD FRML MDRD: 80 ML/MIN/1.73 (ref 39–90)
GLUCOSE BLD-MCNC: 112 MG/DL (ref 60–100)
HADV DNA SPEC NAA+PROBE: NOT DETECTED
HAV IGM SERPL QL IA: NEGATIVE
HBV CORE IGM SERPL QL IA: NEGATIVE
HBV SURFACE AG SERPL QL IA: NEGATIVE
HCOV 229E RNA SPEC QL NAA+PROBE: NOT DETECTED
HCOV HKU1 RNA SPEC QL NAA+PROBE: NOT DETECTED
HCOV NL63 RNA SPEC QL NAA+PROBE: NOT DETECTED
HCOV OC43 RNA SPEC QL NAA+PROBE: NOT DETECTED
HCT VFR BLD AUTO: 38.4 % (ref 35–45)
HCV AB SER DONR QL: NEGATIVE
HGB BLD-MCNC: 12.8 G/DL (ref 12–15.5)
HMPV RNA NPH QL NAA+NON-PROBE: NOT DETECTED
HPIV1 RNA SPEC QL NAA+PROBE: NOT DETECTED
HPIV2 RNA SPEC QL NAA+PROBE: NOT DETECTED
HPIV3 RNA NPH QL NAA+PROBE: NOT DETECTED
HPIV4 P GENE NPH QL NAA+PROBE: NOT DETECTED
IMM GRANULOCYTES # BLD AUTO: 0.1 10*3/MM3 (ref 0–0.02)
IMM GRANULOCYTES NFR BLD AUTO: 0.5 % (ref 0–0.5)
LYMPHOCYTES # BLD AUTO: 0.88 10*3/MM3 (ref 0.6–4.2)
LYMPHOCYTES NFR BLD AUTO: 4.1 % (ref 10–50)
M PNEUMO IGG SER IA-ACNC: NOT DETECTED
MCH RBC QN AUTO: 32.6 PG (ref 26.5–34)
MCHC RBC AUTO-ENTMCNC: 33.3 G/DL (ref 31.4–36)
MCV RBC AUTO: 97.7 FL (ref 80–98)
MONOCYTES # BLD AUTO: 2.49 10*3/MM3 (ref 0–0.9)
MONOCYTES NFR BLD AUTO: 11.7 % (ref 0–12)
NEUTROPHILS # BLD AUTO: 17.69 10*3/MM3 (ref 2–8.6)
NEUTROPHILS NFR BLD AUTO: 83.4 % (ref 37–80)
PLATELET # BLD AUTO: 323 10*3/MM3 (ref 150–450)
PMV BLD AUTO: 9.6 FL (ref 8–12)
POTASSIUM BLD-SCNC: 3.9 MMOL/L (ref 3.5–5.1)
PROT SERPL-MCNC: 6.6 G/DL (ref 6.3–8.6)
RBC # BLD AUTO: 3.93 10*6/MM3 (ref 3.77–5.16)
RHINOVIRUS RNA SPEC NAA+PROBE: NOT DETECTED
RSV RNA NPH QL NAA+NON-PROBE: NOT DETECTED
SODIUM BLD-SCNC: 137 MMOL/L (ref 137–145)
WBC NRBC COR # BLD: 21.24 10*3/MM3 (ref 3.2–9.8)

## 2019-01-09 PROCEDURE — 87040 BLOOD CULTURE FOR BACTERIA: CPT | Performed by: HOSPITALIST

## 2019-01-09 PROCEDURE — 87633 RESP VIRUS 12-25 TARGETS: CPT | Performed by: HOSPITALIST

## 2019-01-09 PROCEDURE — 87486 CHLMYD PNEUM DNA AMP PROBE: CPT | Performed by: HOSPITALIST

## 2019-01-09 PROCEDURE — 80074 ACUTE HEPATITIS PANEL: CPT | Performed by: HOSPITALIST

## 2019-01-09 PROCEDURE — 25010000002 KETOROLAC TROMETHAMINE PER 15 MG: Performed by: NURSE PRACTITIONER

## 2019-01-09 PROCEDURE — 36415 COLL VENOUS BLD VENIPUNCTURE: CPT | Performed by: HOSPITALIST

## 2019-01-09 PROCEDURE — 99214 OFFICE O/P EST MOD 30 MIN: CPT | Performed by: INTERNAL MEDICINE

## 2019-01-09 PROCEDURE — 87798 DETECT AGENT NOS DNA AMP: CPT | Performed by: HOSPITALIST

## 2019-01-09 PROCEDURE — 85025 COMPLETE CBC W/AUTO DIFF WBC: CPT | Performed by: NURSE PRACTITIONER

## 2019-01-09 PROCEDURE — 96366 THER/PROPH/DIAG IV INF ADDON: CPT

## 2019-01-09 PROCEDURE — 80076 HEPATIC FUNCTION PANEL: CPT | Performed by: INTERNAL MEDICINE

## 2019-01-09 PROCEDURE — 96365 THER/PROPH/DIAG IV INF INIT: CPT

## 2019-01-09 PROCEDURE — G0378 HOSPITAL OBSERVATION PER HR: HCPCS

## 2019-01-09 PROCEDURE — 25010000002 LEVOFLOXACIN PER 250 MG: Performed by: HOSPITALIST

## 2019-01-09 PROCEDURE — 87581 M.PNEUMON DNA AMP PROBE: CPT | Performed by: HOSPITALIST

## 2019-01-09 PROCEDURE — 96376 TX/PRO/DX INJ SAME DRUG ADON: CPT

## 2019-01-09 PROCEDURE — 80048 BASIC METABOLIC PNL TOTAL CA: CPT | Performed by: NURSE PRACTITIONER

## 2019-01-09 PROCEDURE — 25010000002 ONDANSETRON PER 1 MG: Performed by: NURSE PRACTITIONER

## 2019-01-09 PROCEDURE — 96361 HYDRATE IV INFUSION ADD-ON: CPT

## 2019-01-09 RX ORDER — DOCUSATE SODIUM 100 MG/1
100 CAPSULE, LIQUID FILLED ORAL 2 TIMES DAILY
Status: DISCONTINUED | OUTPATIENT
Start: 2019-01-09 | End: 2019-01-11 | Stop reason: HOSPADM

## 2019-01-09 RX ORDER — LABETALOL HYDROCHLORIDE 5 MG/ML
10 INJECTION, SOLUTION INTRAVENOUS EVERY 6 HOURS PRN
Status: DISCONTINUED | OUTPATIENT
Start: 2019-01-09 | End: 2019-01-11 | Stop reason: HOSPADM

## 2019-01-09 RX ORDER — LEVOFLOXACIN 5 MG/ML
750 INJECTION, SOLUTION INTRAVENOUS EVERY 24 HOURS
Status: DISCONTINUED | OUTPATIENT
Start: 2019-01-09 | End: 2019-01-11 | Stop reason: HOSPADM

## 2019-01-09 RX ADMIN — HYDROCODONE BITARTRATE AND ACETAMINOPHEN 1 TABLET: 10; 325 TABLET ORAL at 23:37

## 2019-01-09 RX ADMIN — DIAZEPAM 5 MG: 5 TABLET ORAL at 23:38

## 2019-01-09 RX ADMIN — CARISOPRODOL 350 MG: 350 TABLET ORAL at 23:38

## 2019-01-09 RX ADMIN — KETOROLAC TROMETHAMINE 15 MG: 15 INJECTION, SOLUTION INTRAMUSCULAR; INTRAVENOUS at 17:36

## 2019-01-09 RX ADMIN — DOCUSATE SODIUM 100 MG: 100 CAPSULE, LIQUID FILLED ORAL at 20:01

## 2019-01-09 RX ADMIN — POLYETHYLENE GLYCOL 3350 17 G: 17 POWDER, FOR SOLUTION ORAL at 16:31

## 2019-01-09 RX ADMIN — SODIUM CHLORIDE 125 ML/HR: 900 INJECTION, SOLUTION INTRAVENOUS at 09:14

## 2019-01-09 RX ADMIN — ONDANSETRON HYDROCHLORIDE 4 MG: 2 INJECTION INTRAMUSCULAR; INTRAVENOUS at 06:40

## 2019-01-09 RX ADMIN — HYDROCODONE BITARTRATE AND ACETAMINOPHEN 1 TABLET: 10; 325 TABLET ORAL at 13:42

## 2019-01-09 RX ADMIN — Medication 400 MG: at 13:41

## 2019-01-09 RX ADMIN — LEVOFLOXACIN 750 MG: 5 INJECTION, SOLUTION INTRAVENOUS at 16:17

## 2019-01-09 RX ADMIN — SODIUM CHLORIDE 125 ML/HR: 900 INJECTION, SOLUTION INTRAVENOUS at 00:00

## 2019-01-09 RX ADMIN — METRONIDAZOLE 500 MG: 500 INJECTION, SOLUTION INTRAVENOUS at 13:39

## 2019-01-09 RX ADMIN — VENLAFAXINE HYDROCHLORIDE 75 MG: 75 CAPSULE, EXTENDED RELEASE ORAL at 13:41

## 2019-01-09 RX ADMIN — METRONIDAZOLE 500 MG: 500 INJECTION, SOLUTION INTRAVENOUS at 19:55

## 2019-01-09 RX ADMIN — Medication 1 TABLET: at 13:42

## 2019-01-09 RX ADMIN — AZELASTINE HYDROCHLORIDE 2 SPRAY: 137 SPRAY, METERED NASAL at 13:43

## 2019-01-09 RX ADMIN — SODIUM CHLORIDE 125 ML/HR: 900 INJECTION, SOLUTION INTRAVENOUS at 19:55

## 2019-01-09 RX ADMIN — TRIAMCINOLONE ACETONIDE: 1 CREAM TOPICAL at 13:42

## 2019-01-09 RX ADMIN — SODIUM CHLORIDE, PRESERVATIVE FREE 3 ML: 5 INJECTION INTRAVENOUS at 20:00

## 2019-01-09 RX ADMIN — FENOFIBRATE 145 MG: 145 TABLET ORAL at 13:42

## 2019-01-09 RX ADMIN — CARISOPRODOL 350 MG: 350 TABLET ORAL at 17:36

## 2019-01-09 RX ADMIN — KETOROLAC TROMETHAMINE 15 MG: 15 INJECTION, SOLUTION INTRAMUSCULAR; INTRAVENOUS at 01:25

## 2019-01-09 RX ADMIN — METOPROLOL SUCCINATE 25 MG: 25 TABLET, EXTENDED RELEASE ORAL at 13:41

## 2019-01-09 RX ADMIN — KETOROLAC TROMETHAMINE 15 MG: 15 INJECTION, SOLUTION INTRAMUSCULAR; INTRAVENOUS at 09:14

## 2019-01-09 RX ADMIN — LEVOTHYROXINE SODIUM 50 MCG: 50 TABLET ORAL at 13:42

## 2019-01-09 NOTE — PATIENT INSTRUCTIONS

## 2019-01-09 NOTE — PROGRESS NOTES
[facility]  INPATIENT PROGRESS NOTE    Length of Stay: 0  Date of Admission: 1/8/2019  Primary Care Physician: Estefania Vicente APRN    Subjective   Chief Complaint: Abdominal pain, nausea  HPI:  Persistent abdominal pain and nausea.  Febrile today; WBC trending up.  Liver enzymes trending up, but with normal bilirubin.  MRCP concerning for possible pancreatic mass/stones.  Patient to get ERCP tomorrow by Dr. Allen.    Review of Systems   Respiratory: Negative for shortness of breath.    Cardiovascular: Negative for chest pain.   Gastrointestinal: Positive for abdominal pain and nausea.        All pertinent negatives and positives are as above. All other systems have been reviewed and are negative unless otherwise stated.     Objective    Temp:  [97.3 °F (36.3 °C)-101.9 °F (38.8 °C)] 101.9 °F (38.8 °C)  Heart Rate:  [] 106  Resp:  [16-20] 20  BP: (140-184)/(61-89) 184/89    Physical Exam   Constitutional: She appears well-developed.   HENT:   Head: Normocephalic and atraumatic.   Eyes: Pupils are equal, round, and reactive to light.   Neck: Normal range of motion.   Cardiovascular: Tachycardia present.   Pulmonary/Chest: She has decreased breath sounds. She has no wheezes.   Abdominal: Soft. There is tenderness.   Musculoskeletal: She exhibits no edema.   Neurological: She is alert.   Skin: Skin is warm and dry.   Psychiatric: She has a normal mood and affect.           Results Review:  I have reviewed the labs, radiology results, and diagnostic studies.    Laboratory Data:   Results from last 7 days   Lab Units  01/09/19   0633  01/08/19   0935   SODIUM mmol/L  137  137   POTASSIUM mmol/L  3.9  3.5   CHLORIDE mmol/L  100  97   CO2 mmol/L  29.0  30.0   BUN mg/dL  11  12   CREATININE mg/dL  0.72  0.69   GLUCOSE mg/dL  112*  118*   CALCIUM mg/dL  8.5  9.2   BILIRUBIN mg/dL  0.8  0.5   ALK PHOS U/L  111  82   ALT (SGPT) U/L  272*  101*   AST  (SGOT) U/L  343*  294*   ANION GAP mmol/L  8.0  10.0     Estimated Creatinine Clearance: 52.8 mL/min (by C-G formula based on SCr of 0.72 mg/dL).          Results from last 7 days   Lab Units  01/09/19   0633  01/08/19   0935   WBC 10*3/mm3  21.24*  12.66*   HEMOGLOBIN g/dL  12.8  13.1   HEMATOCRIT %  38.4  39.4   PLATELETS 10*3/mm3  323  383           Culture Data:   No results found for: BLOODCX  No results found for: URINECX  No results found for: RESPCX  No results found for: WOUNDCX  No results found for: STOOLCX  No components found for: BODYFLD    Radiology Data:   Imaging Results (last 24 hours)     Procedure Component Value Units Date/Time    MRI abdomen wo contrast mrcp [[id number]] Collected:  01/08/19 1359     Updated:  01/09/19 0710    Narrative:         EXAMINATION:  MRCP/MRI OF THE ABDOMEN WITHOUT AND WITH CONTRAST    CLINICAL INFORMATION: Epigastric pain, choledocholithiasis    DESCRIPTION:  Technique: Multiplanar imaging was performed using multiple pulse  sequences. MRCP was also performed.  Computer generated 3-D reconstructions/MIPS were performed.  Contrast: None  Comparison: CT abdomen and pelvis 1/8/2019    FINDINGS:  Liver: No hepatic mass is visualized.  Gallbladder and bile ducts: There is severe dilatation of the  common bile duct measuring 2.4 cm. Multiple stones are seen in  the common bile duct. There is also mild to moderate intrahepatic  biliary dilatation. There is pneumobilia. The gallbladder has  been resected.  Pancreas: Pancreatic duct is dilated, measuring up to 0.8 cm in  diameter.  Spleen: The spleen is normal.      Kidneys: There is no evidence of hydronephrosis. There is a cyst  in the left kidney.      Adrenal glands: There is no adrenal nodule.      Peritoneum, retroperitoneum and gastrointestinal tract: There is  no ascites.      Lymph nodes: No suspicious lymph node enlargement is seen.  Aorta and main branches: The aorta and main branches are normal  in caliber.           Impression:       CONCLUSION:  Choledocholithiasis.  Severely dilated common bile duct and moderate dilatation of the  intrahepatic biliary ducts.  Dilatation of the pancreatic duct.  The above findings could be due to a pancreatic head or ampullary  mass versus an obstructing stone.     Electronically signed by:  Oli Mayers MD  1/9/2019 7:08 AM CST  Workstation: [id number]    CT Abdomen Pelvis With Contrast [[id number]] Collected:  01/08/19 1138     Updated:  01/08/19 1411    Narrative:         PROCEDURE: Ct abdomen and pelvis with contrast    REASON FOR EXAM: ventral hernia pain    FINDINGS: Comparison study dated  March 20, 2018. Axial computer  tomography sequential imaging was performed from the diaphragms  through the symphysis pubis with IV contrast administration.  Sagittal and coronal reformates was performed. This exam was  performed according to our departmental dose optimization  program, which includes automated exposure control, adjustment of  the mA and/or KV according to patient size and/or use of  iterative reconstruction technique.     Imaging through lung bases reveals no abnormality.    The liver is normal. The gallbladder surgically absent.  Intrahepatic and extrahepatic biliary dilatation with pneumobilia  as well as contrast. Proximal common bile duct small 5.2 mm  choledocholithiasis. The right common bile duct measures 1.8 cm  in greatest diameter. There is focal proximal right common bile  duct caliber change. Soft tissue prominence in the region of the  head of the pancreas in the common bile duct region with  associated dilated intra-  Hepatic biliary system as well as pancreatic duct. The pancreas  is otherwise unremarkable. The spleen is normal. Bilateral  adrenal glands are normal. Right kidney and ureter are normal.  Left kidney upper pole small simple renal cortical cyst which  measures 1.3 cm in greatest diameter. Left kidney and ureter are  otherwise unremarkable. The bladder is  normal. The uterus is  surgically absent. Nonspecific soft tissue prominence in region  of the head of the pancreas and ampulla of the duodenum.  Otherwise hollow viscera is unremarkable. No lymphadenopathy in  the abdomen or the pelvis. No acute osseous abnormality. Stable  postsurgical changes with bilateral decompressive laminectomies  at the L3 and L4 vertebral body level with associated discectomy  at the L3-L4 and L4-L5 disc space level with small intervertebral  fusion device in place at the L3-4 disc space level and Ray cage  device in place at the L4-L5 disc space level. Bilateral  posterior tramaine fusion of the L3 through the L5 vertebral body with  satisfactory anatomical alignment.      Impression:       1. Diffusely dilated intra and extrahepatic biliary system with  contrast as well as pneumobilia and focal caliber change in the  region of the proximal common bile duct. Diffusely dilated  pancreatic duct. Proximal common bile duct 5.2 mm  choledocholithiasis. Soft tissue prominence in the region of the  duodenal ampulla and head of the pancreas with associated focal  caliber change of the common bile duct would be suspicious for  postsurgical changes versus duodenal ampulla or pancreatic head  neoplasm. Recommend GI consultation.  2. Stable extensive lumbar spine postsurgical changes as  described above..  3. Remainder CT abdomen pelvis study with contrast unremarkable..    Electronically signed by:  Teodoro Roberts MD  1/8/2019 12:37 PM CST  Workstation: [id number]          I have reviewed the patient's current medications.     Assessment/Plan     Active Hospital Problems    Diagnosis   • Pneumobilia       Plan:    #1 right upper quadrant abdominal pain/nausea: Stable.  Continue current treatment for now.  IV fluids.  MRCP with possible stones/pancreatic mass.  Getting ERCP tomorrow.  May need EUS and another facility.  GI following  #2 sepsis: New.  ?  Source.  Febrile, elevated white blood count.  Blood  cultures ×2, start the patient on Levaquin and Flagyl for now.  Check upper respiratory panel.  Urinalysis normal yesterday.  Check acute hepatitis panel  #3 hypertension: Uncontrolled.  Start the patient back on home medications.  Monitor  #4 hypothyroidism: Stable.  Continue Synthroid  #5 chronic pain: Stable.  Continue current treatment  #6 anxiety and depression: Stable.  Continue current treatment      Signed     Dr Kody Hendricks, DO   1/9/2019  1:34 PM

## 2019-01-09 NOTE — PLAN OF CARE
Problem: Patient Care Overview  Goal: Plan of Care Review  Outcome: Ongoing (interventions implemented as appropriate)   01/09/19 0025   Coping/Psychosocial   Plan of Care Reviewed With patient   Plan of Care Review   Progress no change   OTHER   Outcome Summary Patient resting in bed. VS stable. Patient has been NPO since midnight for possible procedure today. No s/s of distress at this time. Will continue to monitor this patient.      Goal: Discharge Needs Assessment  Outcome: Ongoing (interventions implemented as appropriate)      Problem: Pain, Acute (Adult)  Goal: Acceptable Pain Control/Comfort Level  Outcome: Ongoing (interventions implemented as appropriate)

## 2019-01-09 NOTE — PLAN OF CARE
Problem: Pain, Acute (Adult)  Intervention: Monitor and Manage Analgesia   19 1257   Safety Management   Medication Review/Management medications reviewed;high risk medications identified      19 1257   Safety Management   Medication Review/Management medications reviewed;high risk medications identified     Intervention: Mutually Develop/Implement Acute Pain Management Plan   19 1257   Promote Oxygenation/Perfusion   Pain Management Interventions see MAR   Cognitive Interventions   Sensory Stimulation Regulation care clustered;lighting decreased     Intervention: Support/Optimize Psychosocial Response to Acute Pain   19 1257   Coping/Psychosocial Interventions   Supportive Measures active listening utilized;positive reinforcement provided   Psychosocial Support   Diversional Activities television   Interventions   Trust Relationship/Rapport care explained         Problem: Fall Risk (Adult)  Intervention: Monitor/Assist with Self Care   19 1257   Activity   Activity Assistance Provided assistance, 1 person     Intervention: Reduce Risk/Promote Restraint Free Environment   19 1257   Safety Management   Environmental Safety Modification clutter free environment maintained;lighting adjusted;mattress on floor   Safety Promotion/Fall Prevention fall prevention program maintained   Prevent  Drop/Fall   Safety/Security Measures bed alarm set;family to remain at bedside      19 1257   Safety Management   Environmental Safety Modification clutter free environment maintained;lighting adjusted;mattress on floor   Safety Promotion/Fall Prevention fall prevention program maintained   Prevent [place] Drop/Fall   Safety/Security Measures bed alarm set;family to remain at bedside     Intervention: Review Medications/Identify Contributors to Fall Risk   19 1257   Safety Management   Medication Review/Management medications reviewed;high risk medications identified       Goal:  Absence of Fall  Outcome: Ongoing (interventions implemented as appropriate)   01/09/19 1257   Fall Risk (Adult)   Absence of Fall making progress toward outcome

## 2019-01-09 NOTE — CONSULTS
"Adult Nutrition  Assessment    Patient Name:  Alva Mills  YOB: 1945  MRN: [id number]  Admit Date:  1/8/2019    Assessment Date:  1/9/2019    Comments:  RD consult r/t MST score 2. Pt admitted w/abdominal pain, now awaiting MRI results to determine plan. Currently NPO for possible surgical intervention. Pt reports that many foods cause n/v and she has to be \"careful\" w/food selections. No significant weight changes. RD will monitor hospital course and make recs accordingly.     Reason for Assessment     Row Name 01/09/19 1111          Reason for Assessment    Reason For Assessment  identified at risk by screening criteria     Identified At Risk by Screening Criteria  MST SCORE 2+         Nutrition/Diet History     Row Name 01/09/19 1112          Nutrition/Diet History    Typical Food/Fluid Intake  Pt says she may have weighed \"a few more pounds\" 6 months ago, but hasn't had any significant weight loss. Reports she has to \"watch the foods i eat\" d/t many things cause n/v., but she doesn't mention anything specific.         Anthropometrics     Row Name 01/09/19 0625          Anthropometrics    Weight  59.7 kg (131 lb 9.6 oz)         Labs/Tests/Procedures/Meds     Row Name 01/09/19 1114          Labs/Procedures/Meds    Lab Results Reviewed  reviewed, pertinent     Lab Results Comments          Diagnostic Tests/Procedures    Diagnostic Test/Procedure Reviewed  reviewed, pertinent     Diagnostic Test/Procedures Comments  MRI        Medications    Pertinent Medications Reviewed  reviewed           Estimated/Assessed Needs     Row Name 01/09/19 1114          Calculation Measurements    Weight Used For Calculations  49.9 kg (110 lb)        Estimated/Assessed Needs    Additional Documentation  Fluid Requirements (Group);Calorie Requirements (Group);Protein Requirements (Group)        Calorie Requirements    Estimated Calorie Requirement (kcal/day)  1500        KCAL/KG    14 Kcal/Kg (kcal)  698.54     " 15 Kcal/Kg (kcal)  748.44     18 Kcal/Kg (kcal)  898.13     20 Kcal/Kg (kcal)  997.92     25 Kcal/Kg (kcal)  1247.4     30 Kcal/Kg (kcal)  1496.88     35 Kcal/Kg (kcal)  1746.36     40 Kcal/Kg (kcal)  1995.84     45 Kcal/Kg (kcal)  2245.32     50 Kcal/Kg (kcal)  2494.8        [place]-St. Jeor Equation    RMR ([place]-St. Jeor Equation)  946.34        Protein Requirements    Est Protein Requirement Amount (gms/kg)  0.8 gm protein     Estimated Protein Requirements (gms/day)  39.92        Fluid Requirements    Estimated Fluid Requirements (mL/day)  1500     RDA Method (mL)  1500     Rob-Charis Method (over 20 kg)  2497.92         Nutrition Prescription Ordered     Row Name 01/09/19 1115          Nutrition Prescription PO    Current PO Diet  NPO         Evaluation of Received Nutrient/Fluid Intake     Row Name 01/09/19 1114          Calculation Measurements    Weight Used For Calculations  49.9 kg (110 lb)         Evaluation of Prescribed Nutrient/Fluid Intake     Row Name 01/09/19 1114          Calculation Measurements    Weight Used For Calculations  49.9 kg (110 lb)             Electronically signed by:  Roma Gonzalez RD  01/09/19 11:20 AM

## 2019-01-10 ENCOUNTER — APPOINTMENT (OUTPATIENT)
Dept: GENERAL RADIOLOGY | Facility: HOSPITAL | Age: 74
End: 2019-01-10

## 2019-01-10 ENCOUNTER — ANESTHESIA (OUTPATIENT)
Dept: GASTROENTEROLOGY | Facility: HOSPITAL | Age: 74
End: 2019-01-10

## 2019-01-10 ENCOUNTER — ANESTHESIA EVENT (OUTPATIENT)
Dept: GASTROENTEROLOGY | Facility: HOSPITAL | Age: 74
End: 2019-01-10

## 2019-01-10 LAB
ALBUMIN SERPL-MCNC: 3.2 G/DL (ref 3.4–4.8)
ALP SERPL-CCNC: 95 U/L (ref 38–126)
ALT SERPL W P-5'-P-CCNC: 148 U/L (ref 9–52)
ANION GAP SERPL CALCULATED.3IONS-SCNC: 6 MMOL/L (ref 5–15)
AST SERPL-CCNC: 99 U/L (ref 14–36)
BASOPHILS # BLD AUTO: 0.01 10*3/MM3 (ref 0–0.2)
BASOPHILS NFR BLD AUTO: 0.1 % (ref 0–2)
BILIRUB CONJ SERPL-MCNC: 0 MG/DL (ref 0–0.3)
BILIRUB INDIRECT SERPL-MCNC: 0.7 MG/DL (ref 0–1.1)
BILIRUB SERPL-MCNC: 0.7 MG/DL (ref 0.2–1.3)
BUN BLD-MCNC: 9 MG/DL (ref 7–21)
BUN/CREAT SERPL: 15.5 (ref 7–25)
CALCIUM SPEC-SCNC: 8.6 MG/DL (ref 8.4–10.2)
CHLORIDE SERPL-SCNC: 102 MMOL/L (ref 95–110)
CO2 SERPL-SCNC: 27 MMOL/L (ref 22–31)
CREAT BLD-MCNC: 0.58 MG/DL (ref 0.5–1)
DEPRECATED RDW RBC AUTO: 42.8 FL (ref 36.4–46.3)
EOSINOPHIL # BLD AUTO: 0.09 10*3/MM3 (ref 0–0.7)
EOSINOPHIL NFR BLD AUTO: 0.8 % (ref 0–7)
ERYTHROCYTE [DISTWIDTH] IN BLOOD BY AUTOMATED COUNT: 11.9 % (ref 11.5–14.5)
GFR SERPL CREATININE-BSD FRML MDRD: 102 ML/MIN/1.73 (ref 39–90)
GLUCOSE BLD-MCNC: 107 MG/DL (ref 60–100)
GLUCOSE BLDC GLUCOMTR-MCNC: 89 MG/DL (ref 70–130)
HCT VFR BLD AUTO: 32 % (ref 35–45)
HGB BLD-MCNC: 10.8 G/DL (ref 12–15.5)
IMM GRANULOCYTES # BLD AUTO: 0.03 10*3/MM3 (ref 0–0.02)
IMM GRANULOCYTES NFR BLD AUTO: 0.3 % (ref 0–0.5)
LYMPHOCYTES # BLD AUTO: 0.51 10*3/MM3 (ref 0.6–4.2)
LYMPHOCYTES NFR BLD AUTO: 4.7 % (ref 10–50)
MCH RBC QN AUTO: 33 PG (ref 26.5–34)
MCHC RBC AUTO-ENTMCNC: 33.8 G/DL (ref 31.4–36)
MCV RBC AUTO: 97.9 FL (ref 80–98)
MONOCYTES # BLD AUTO: 1.05 10*3/MM3 (ref 0–0.9)
MONOCYTES NFR BLD AUTO: 9.6 % (ref 0–12)
NEUTROPHILS # BLD AUTO: 9.21 10*3/MM3 (ref 2–8.6)
NEUTROPHILS NFR BLD AUTO: 84.5 % (ref 37–80)
PLATELET # BLD AUTO: 238 10*3/MM3 (ref 150–450)
PMV BLD AUTO: 9.6 FL (ref 8–12)
POTASSIUM BLD-SCNC: 4 MMOL/L (ref 3.5–5.1)
PROT SERPL-MCNC: 5.9 G/DL (ref 6.3–8.6)
RBC # BLD AUTO: 3.27 10*6/MM3 (ref 3.77–5.16)
SODIUM BLD-SCNC: 135 MMOL/L (ref 137–145)
WBC NRBC COR # BLD: 10.9 10*3/MM3 (ref 3.2–9.8)

## 2019-01-10 PROCEDURE — A9270 NON-COVERED ITEM OR SERVICE: HCPCS | Performed by: HOSPITALIST

## 2019-01-10 PROCEDURE — 74328 X-RAY BILE DUCT ENDOSCOPY: CPT

## 2019-01-10 PROCEDURE — 25010000002 IOPAMIDOL 61 % SOLUTION: Performed by: INTERNAL MEDICINE

## 2019-01-10 PROCEDURE — 96376 TX/PRO/DX INJ SAME DRUG ADON: CPT

## 2019-01-10 PROCEDURE — 25010000002 SUCCINYLCHOLINE PER 20 MG: Performed by: NURSE ANESTHETIST, CERTIFIED REGISTERED

## 2019-01-10 PROCEDURE — 25010000002 ONDANSETRON PER 1 MG: Performed by: NURSE PRACTITIONER

## 2019-01-10 PROCEDURE — 80048 BASIC METABOLIC PNL TOTAL CA: CPT | Performed by: NURSE PRACTITIONER

## 2019-01-10 PROCEDURE — G0378 HOSPITAL OBSERVATION PER HR: HCPCS

## 2019-01-10 PROCEDURE — 25010000002 KETOROLAC TROMETHAMINE PER 15 MG: Performed by: INTERNAL MEDICINE

## 2019-01-10 PROCEDURE — 63710000001 HYDROCODONE-ACETAMINOPHEN 10-325 MG TABLET: Performed by: HOSPITALIST

## 2019-01-10 PROCEDURE — 82962 GLUCOSE BLOOD TEST: CPT

## 2019-01-10 PROCEDURE — 85025 COMPLETE CBC W/AUTO DIFF WBC: CPT | Performed by: NURSE PRACTITIONER

## 2019-01-10 PROCEDURE — C2625 STENT, NON-COR, TEM W/DEL SY: HCPCS | Performed by: INTERNAL MEDICINE

## 2019-01-10 PROCEDURE — 96361 HYDRATE IV INFUSION ADD-ON: CPT

## 2019-01-10 PROCEDURE — 96366 THER/PROPH/DIAG IV INF ADDON: CPT

## 2019-01-10 PROCEDURE — 63710000001 DIAZEPAM 5 MG TABLET: Performed by: HOSPITALIST

## 2019-01-10 PROCEDURE — 88305 TISSUE EXAM BY PATHOLOGIST: CPT | Performed by: INTERNAL MEDICINE

## 2019-01-10 PROCEDURE — C1769 GUIDE WIRE: HCPCS | Performed by: INTERNAL MEDICINE

## 2019-01-10 PROCEDURE — 25010000002 PROPOFOL 10 MG/ML EMULSION: Performed by: NURSE ANESTHETIST, CERTIFIED REGISTERED

## 2019-01-10 PROCEDURE — 63710000001 DOCUSATE SODIUM 100 MG CAPSULE: Performed by: HOSPITALIST

## 2019-01-10 PROCEDURE — 25010000002 FENTANYL CITRATE (PF) 100 MCG/2ML SOLUTION: Performed by: NURSE ANESTHETIST, CERTIFIED REGISTERED

## 2019-01-10 PROCEDURE — 25010000002 LEVOFLOXACIN PER 250 MG: Performed by: HOSPITALIST

## 2019-01-10 PROCEDURE — 88305 TISSUE EXAM BY PATHOLOGIST: CPT | Performed by: PATHOLOGY

## 2019-01-10 PROCEDURE — 63710000001 CARISOPRODOL 350 MG TABLET: Performed by: HOSPITALIST

## 2019-01-10 PROCEDURE — 80076 HEPATIC FUNCTION PANEL: CPT | Performed by: INTERNAL MEDICINE

## 2019-01-10 DEVICE — BILIARY STENT WITH DELIVERY SYSTEM
Type: IMPLANTABLE DEVICE | Status: FUNCTIONAL
Brand: FLEXIMA™ BILIARY

## 2019-01-10 RX ORDER — KETOROLAC TROMETHAMINE 30 MG/ML
15 INJECTION, SOLUTION INTRAMUSCULAR; INTRAVENOUS ONCE
Status: COMPLETED | OUTPATIENT
Start: 2019-01-10 | End: 2019-01-10

## 2019-01-10 RX ORDER — ROCURONIUM BROMIDE 10 MG/ML
INJECTION, SOLUTION INTRAVENOUS AS NEEDED
Status: DISCONTINUED | OUTPATIENT
Start: 2019-01-10 | End: 2019-01-10 | Stop reason: SURG

## 2019-01-10 RX ORDER — SODIUM CHLORIDE 9 MG/ML
30 INJECTION, SOLUTION INTRAVENOUS CONTINUOUS
Status: DISCONTINUED | OUTPATIENT
Start: 2019-01-10 | End: 2019-01-11 | Stop reason: HOSPADM

## 2019-01-10 RX ORDER — FENTANYL CITRATE 50 UG/ML
INJECTION, SOLUTION INTRAMUSCULAR; INTRAVENOUS AS NEEDED
Status: DISCONTINUED | OUTPATIENT
Start: 2019-01-10 | End: 2019-01-10 | Stop reason: SURG

## 2019-01-10 RX ORDER — ONDANSETRON 2 MG/ML
4 INJECTION INTRAMUSCULAR; INTRAVENOUS ONCE AS NEEDED
Status: DISCONTINUED | OUTPATIENT
Start: 2019-01-10 | End: 2019-01-10 | Stop reason: HOSPADM

## 2019-01-10 RX ORDER — LIDOCAINE HYDROCHLORIDE 20 MG/ML
INJECTION, SOLUTION INFILTRATION; PERINEURAL AS NEEDED
Status: DISCONTINUED | OUTPATIENT
Start: 2019-01-10 | End: 2019-01-10 | Stop reason: SURG

## 2019-01-10 RX ORDER — PROPOFOL 10 MG/ML
VIAL (ML) INTRAVENOUS AS NEEDED
Status: DISCONTINUED | OUTPATIENT
Start: 2019-01-10 | End: 2019-01-10 | Stop reason: SURG

## 2019-01-10 RX ORDER — SUCCINYLCHOLINE CHLORIDE 20 MG/ML
INJECTION INTRAMUSCULAR; INTRAVENOUS AS NEEDED
Status: DISCONTINUED | OUTPATIENT
Start: 2019-01-10 | End: 2019-01-10 | Stop reason: SURG

## 2019-01-10 RX ADMIN — SODIUM CHLORIDE 125 ML/HR: 900 INJECTION, SOLUTION INTRAVENOUS at 05:48

## 2019-01-10 RX ADMIN — METRONIDAZOLE 500 MG: 500 INJECTION, SOLUTION INTRAVENOUS at 02:50

## 2019-01-10 RX ADMIN — PROPOFOL 20 MG: 10 INJECTION, EMULSION INTRAVENOUS at 15:50

## 2019-01-10 RX ADMIN — CARISOPRODOL 350 MG: 350 TABLET ORAL at 23:51

## 2019-01-10 RX ADMIN — ROCURONIUM BROMIDE 5 MG: 10 INJECTION INTRAVENOUS at 15:42

## 2019-01-10 RX ADMIN — ONDANSETRON HYDROCHLORIDE 4 MG: 2 INJECTION INTRAMUSCULAR; INTRAVENOUS at 10:33

## 2019-01-10 RX ADMIN — LIDOCAINE HYDROCHLORIDE 100 MG: 20 INJECTION, SOLUTION INFILTRATION; PERINEURAL at 15:41

## 2019-01-10 RX ADMIN — DOCUSATE SODIUM 100 MG: 100 CAPSULE, LIQUID FILLED ORAL at 21:45

## 2019-01-10 RX ADMIN — FENTANYL CITRATE 50 MCG: 50 INJECTION, SOLUTION INTRAMUSCULAR; INTRAVENOUS at 16:19

## 2019-01-10 RX ADMIN — SUCCINYLCHOLINE CHLORIDE 100 MG: 20 INJECTION, SOLUTION INTRAMUSCULAR; INTRAVENOUS at 15:41

## 2019-01-10 RX ADMIN — SODIUM CHLORIDE: 900 INJECTION, SOLUTION INTRAVENOUS at 15:36

## 2019-01-10 RX ADMIN — LEVOFLOXACIN 750 MG: 5 INJECTION, SOLUTION INTRAVENOUS at 13:02

## 2019-01-10 RX ADMIN — METRONIDAZOLE 500 MG: 500 INJECTION, SOLUTION INTRAVENOUS at 12:01

## 2019-01-10 RX ADMIN — HYDROCODONE BITARTRATE AND ACETAMINOPHEN 1 TABLET: 10; 325 TABLET ORAL at 17:45

## 2019-01-10 RX ADMIN — SODIUM CHLORIDE, PRESERVATIVE FREE 3 ML: 5 INJECTION INTRAVENOUS at 08:49

## 2019-01-10 RX ADMIN — METRONIDAZOLE 500 MG: 500 INJECTION, SOLUTION INTRAVENOUS at 18:36

## 2019-01-10 RX ADMIN — IOPAMIDOL 10 ML: 612 INJECTION, SOLUTION INTRAVENOUS at 16:19

## 2019-01-10 RX ADMIN — KETOROLAC TROMETHAMINE 15 MG: 30 INJECTION, SOLUTION INTRAMUSCULAR at 10:37

## 2019-01-10 RX ADMIN — ONDANSETRON HYDROCHLORIDE 4 MG: 2 INJECTION INTRAMUSCULAR; INTRAVENOUS at 15:41

## 2019-01-10 RX ADMIN — PROPOFOL 70 MG: 10 INJECTION, EMULSION INTRAVENOUS at 15:42

## 2019-01-10 RX ADMIN — AZELASTINE HYDROCHLORIDE 2 SPRAY: 137 SPRAY, METERED NASAL at 08:49

## 2019-01-10 RX ADMIN — SODIUM CHLORIDE, PRESERVATIVE FREE 3 ML: 5 INJECTION INTRAVENOUS at 21:46

## 2019-01-10 RX ADMIN — DIAZEPAM 5 MG: 5 TABLET ORAL at 21:45

## 2019-01-10 RX ADMIN — HYDROCODONE BITARTRATE AND ACETAMINOPHEN 1 TABLET: 10; 325 TABLET ORAL at 23:51

## 2019-01-10 RX ADMIN — SODIUM CHLORIDE 30 ML/HR: 9 INJECTION, SOLUTION INTRAVENOUS at 15:18

## 2019-01-10 RX ADMIN — FENTANYL CITRATE 50 MCG: 50 INJECTION, SOLUTION INTRAMUSCULAR; INTRAVENOUS at 15:41

## 2019-01-10 NOTE — ANESTHESIA PROCEDURE NOTES
ANESTHESIA INTUBATION  Urgency: elective    Airway not difficult    General Information and Staff    Patient location during procedure: OR  CRNA: Marshall Trejo CRNA    Indications and Patient Condition  Indications for airway management: airway protection    Preoxygenated: yes  MILS not maintained throughout  Mask difficulty assessment: 0 - not attempted    Final Airway Details  Final airway type: endotracheal airway      Successful airway: ETT  Cuffed: yes   Successful intubation technique: video laryngoscopy  Facilitating devices/methods: intubating stylet and cricoid pressure  Endotracheal tube insertion site: oral  Blade: Marte  Blade size: 4  ETT size (mm): 7.0  Cormack-Lehane Classification: grade I - full view of glottis  Placement verified by: bronchoscopy, capnometry and palpation of cuff   Measured from: lips  ETT to lips (cm): 17  Number of attempts at approach: 1

## 2019-01-10 NOTE — ANESTHESIA PREPROCEDURE EVALUATION
Anesthesia Evaluation     Patient summary reviewed   NPO Solid Status: > 8 hours  NPO Liquid Status: > 2 hours           Airway   Mallampati: II  TM distance: >3 FB  Dental      Pulmonary - normal exam   (+) a smoker Former,   Cardiovascular - normal exam    ECG reviewed  Patient on routine beta blocker    (+) hypertension poorly controlled, valvular problems/murmurs, hyperlipidemia,       Neuro/Psych  (+) psychiatric history Anxiety and Depression,     GI/Hepatic/Renal/Endo    (+)  GERD,  hypothyroidism,     Musculoskeletal     Abdominal  - normal exam   Substance History      OB/GYN          Other   (+) arthritis                     Anesthesia Plan    ASA 3     general     intravenous induction   Anesthetic plan, all risks, benefits, and alternatives have been provided, discussed and informed consent has been obtained with: patient.

## 2019-01-10 NOTE — CONSULTS
Amanda Allen DO,[facility]  Gastroenterology  Hepatology  Endoscopy  Board Certified in Internal Medicine and gastroenterology  [address], [address]  [place]. [address]  - [phone]   F [phone]     GASTROENTEROLOGY CONSULT NOTE   AMANDA ALLEN DO.         SUBJECTIVE:   1/9/2019    Name: Alva Mills  DOD: 1945    REASON FOR CONSULT: Abnormal imaging.  History of primary common bile duct stones status post endoscopic removal as well as choledochoduodenostomy    Chief Complaint:     Chief Complaint   Patient presents with   • Abdominal Pain       Subjective : Upper abdominal pain was reminiscent of common bile duct stones    Patient is 73 y.o. female , personal history of primary common bile duct stones status post endoscopic removal with recurrence and inability to remove stone status post choledochoduodenostomy and then recurrence within the patient's common bile duct requiring repeated ERCP work by Dr. Greer, presents with for abdominal pain.    Upper abdominal pain is moderately severe.  She came to the hospital with this moderately severe pain.  Since I last saw her 3 years ago, the patient has had recurrent attacks of this but not to this degree.  Liver enzymes are elevated.  Imaging study shows the patient have stones in the bile duct with a dilated bile duct.  The patient has a intact choledochoduodenostomy I feel.  There is air within the biliary system.  The patient does have a dilated pancreatic duct that has been evaluated in the past.  This is been evaluated by MRI as well as endoscopic ultrasound in the past that shows no evidence of any pancreatic head mass.    The patient has a known history of gastroparesis.  She also has a history of small bowel bacterial overgrowth.    She says that she saw Dr. Lindsey for several times and except for getting the stones out of the bile duct, no other interventions were done.  He discussed the possibility of a  choledochojejunostomy.  This was never done..  I offered for her to see him in consultation and do ERCP if she desires.  She said that she had rather everything be done here if it is possible.    The patient has a history of gastroparesis.  The patient does have chronic use of pain medications.  She has been on Reglan in the past.     ROS/HISTORY/ CURRENT MEDICATIONS/OBJECTIVE/VS/PE:   Review of Systems:   Review of Systems.  The patient has no difficulty with vision, smell, trouble swallowing, chest pain, shortness of breath, hemoptysis.  Epigastric pain and nausea and vomiting is noted.  The patient has no diarrhea.  No evidence of any rectal bleeding.  No hematuria or dysuria no neurological changes no new skin lesions are present.  Denies any spontaneous bleeding or claudication disorders.    History:     Past Medical History:   Diagnosis Date   • Abdominal pain    • Acquired hypothyroidism    • Acute bronchitis    • Acute sinusitis    • Acute upper respiratory infection    • Benign hypertension    • Breast cyst    • Breast lump     history of, right   • Calf pain    • Common bile duct calculus    • Constipation    • Cough    • Depressive disorder    • Elevated cholesterol    • Epigastric pain    • External hemorrhoids without complication    • Fibrocystic breast    • Functional heart murmur    • Gastroesophageal reflux disease    • General medical exam    • Hemorrhoids    • Hyperlipidemia    • Incisional hernia     no evident recurrence at this juncture   • Localized, primary osteoarthritis of ankle or foot    • Muscle strain    • Skin lesion      Past Surgical History:   Procedure Laterality Date   • BACK SURGERY      x2   • BILE DUCT EXPLORATION  12/10/2013    Remove bile duct stone (1)    Common duct exploration, stone extraction, choledochoduodenostomy and choledochoscopy.    • BREAST BIOPSY Right 1991    Stereotactic breast biopsy (1)    Right breast    • CHOLECYSTECTOMY OPEN  1989   • HERNIA REPAIR   06/16/2014    Anesth, repair of hernia (1)    laparoscopic ventral hernioplasty with mesh. Ventral hernia.    • HYSTERECTOMY  10/17/2001    Anesth, hysterectomy (1)    Laparoscopic subtotal hysterectomy & BSO. Enterolysis of sigmoid colon.    • INJECTION OF MEDICATION  08/12/2013    Dexamethasone (2)      • INJECTION OF MEDICATION  01/31/2013    Kenalog (1)      • INJECTION OF MEDICATION  02/11/2015    Rocephin (1)      • OOPHORECTOMY     • OTHER SURGICAL HISTORY  08/12/2013    Small Joint Injection/Aspiration 20600 (2)      • TUBAL ABDOMINAL LIGATION       Family History   Problem Relation Age of Onset   • Heart disease Other    • Hypertension Other      Social History     Tobacco Use   • Smoking status: Former Smoker   • Smokeless tobacco: Never Used   • Tobacco comment: about 45 years ago (Dec 04 2013)   Substance Use Topics   • Alcohol use: No     Frequency: Never   • Drug use: No     Medications Prior to Admission   Medication Sig Dispense Refill Last Dose   • betamethasone dipropionate (DIPROLENE) 0.05 % cream Apply  topically to the appropriate area as directed Daily. 45 g 1 1/7/2019 at 2000   • CALCIUM PO Take 1 tablet by mouth Daily With Breakfast. 500g   1/8/2019 at 0500   • desvenlafaxine (PRISTIQ) 100 MG 24 hr tablet Take 1 tablet by mouth Daily. 90 tablet 3 1/8/2019 at 0500   • diazePAM (VALIUM) 5 MG tablet Take 1 tablet by mouth Every 12 (Twelve) Hours As Needed for Anxiety or Sleep. 60 tablet 0 1/7/2019 at 0500   • fenofibrate (TRICOR) 145 MG tablet Take 145 mg by mouth Daily.   1/8/2019 at 0500   • fluticasone (FLONASE) 50 MCG/ACT nasal spray 2 sprays into each nostril Daily. 16 g 5 Past Week at Unknown time   • HYDROcodone-acetaminophen (NORCO)  MG per tablet Take 1 tablet by mouth Every 6 (Six) Hours As Needed.   1/8/2019 at 1300   • levothyroxine (SYNTHROID) 50 MCG tablet Take 1 tablet by mouth Daily.   1/8/2019 at 0500   • Magnesium 500 MG capsule Take 1 capsule by mouth Daily.   1/8/2019  at 0500   • metoprolol succinate XL (TOPROL-XL) 25 MG 24 hr tablet Take 1 tablet by mouth Daily. 90 tablet 3 1/8/2019 at 0500   • prochlorperazine (COMPAZINE) 5 MG tablet Take 1 tablet by mouth Every 6 (Six) Hours As Needed for Nausea or Vomiting. 100 tablet 5 1/8/2019 at 0500   • promethazine (PHENERGAN) 25 MG tablet TAKE 1 TABLET EVERY 6 HOURSAS NEEDED FOR NAUSEA OR    VOMITING 180 tablet 0 1/8/2019 at 1000   • SYNTHROID 50 MCG tablet TAKE 1 TABLET DAILY 90 tablet 3 1/8/2019 at 0500   • albuterol (PROVENTIL HFA;VENTOLIN HFA) 108 (90 Base) MCG/ACT inhaler Inhale 2 puffs Every 4 (Four) Hours As Needed for Wheezing. 1 inhaler 2 1/6/2019   • amoxicillin (AMOXIL) 500 MG capsule    1/8/2019 at 0500   • AZELASTINE HCL NA azelastine 137 mcg (0.1 %) nasal spray aerosol   1/6/2019   • carisoprodol (SOMA) 350 MG tablet Take 350 mg by mouth Every 6 (Six) Hours As Needed.   1/8/2019 at 0500     Allergies:  Clonazepam; Hydromorphone; Morphine; Aripiprazole; and Morphine and related    I have reviewed the patient's medical history, surgical history and family history in the available medical record system.     Current Medications:     Current Facility-Administered Medications   Medication Dose Route Frequency Provider Last Rate Last Dose   • azelastine (ASTELIN) nasal spray 2 spray  2 spray Each Nare Daily Levill, Meenakshi G, APRN   2 spray at 01/09/19 1343   • calcium carbonate-vitamin d 600-400 MG-UNIT per tablet 1 tablet  1 tablet Oral Daily Levill, Meenakshi G, APRN   1 tablet at 01/09/19 1342   • carisoprodol (SOMA) tablet 350 mg  350 mg Oral Q6H Kody Hendricks DO   350 mg at 01/09/19 1736   • diazePAM (VALIUM) tablet 5 mg  5 mg Oral Q12H PRN Kody Hendricks DO   5 mg at 01/08/19 2350   • docusate sodium (COLACE) capsule 100 mg  100 mg Oral BID Kody Hendricks DO       • fenofibrate (TRICOR) tablet 145 mg  145 mg Oral Daily Meenakshi Cortez APRN   145 mg at 01/09/19 1342   • HYDROcodone-acetaminophen (NORCO)  MG  per tablet 1 tablet  1 tablet Oral Q6H PRN Kody Hendricks, DO   1 tablet at 01/09/19 1342   • labetalol (NORMODYNE,TRANDATE) injection 10 mg  10 mg Intravenous Q6H PRN Kody Hendricks, DO       • levoFLOXacin (LEVAQUIN) 750 mg/150 mL D5W (premix) (LEVAQUIN) 750 mg  750 mg Intravenous Q24H Kody Hendricks, DO   Stopped at 01/09/19 1717   • levothyroxine (SYNTHROID, LEVOTHROID) tablet 50 mcg  50 mcg Oral Daily Levill, Meenakshi G, APRN   50 mcg at 01/09/19 1342   • Magnesium Oxide tablet 400 mg  400 mg Oral Daily Levill, Meenakshi G, APRN   400 mg at 01/09/19 1341   • metoprolol succinate XL (TOPROL-XL) 24 hr tablet 25 mg  25 mg Oral Daily Levill, Meenakshi G, APRN   25 mg at 01/09/19 1341   • metroNIDAZOLE (FLAGYL) IVPB 500 mg  500 mg Intravenous Q8H Kody Hendricks, DO   Stopped at 01/09/19 1439   • ondansetron (ZOFRAN) injection 4 mg  4 mg Intravenous Q6H PRN Levill, Meenakshi G, APRN   4 mg at 01/09/19 0640   • polyethylene glycol 3350 powder (packet)  17 g Oral Daily Kody Hendricks, DO   17 g at 01/09/19 1631   • prochlorperazine (COMPAZINE) tablet 5 mg  5 mg Oral Q6H PRN Levill, Meenakshi G, APRN       • promethazine (PHENERGAN) tablet 25 mg  25 mg Oral Q6H PRN Levill, Meenakshi G, APRN       • sodium chloride 0.9 % flush 10 mL  10 mL Intravenous PRN Ted Leach MD       • sodium chloride 0.9 % flush 3 mL  3 mL Intravenous Q12H Levill, Meenakshi G, APRN   3 mL at 01/08/19 2130   • sodium chloride 0.9 % flush 3-10 mL  3-10 mL Intravenous PRN Levill, Meenakshi G, APRN       • Sodium chloride 0.9 % infusion  125 mL/hr Intravenous Continuous Ted Leach  mL/hr at 01/09/19 0914 125 mL/hr at 01/09/19 0914   • triamcinolone (KENALOG) 0.1 % cream   Topical Daily Levill, Meenakshi G, APRN       • venlafaxine XR (EFFEXOR-XR) 24 hr capsule 75 mg  75 mg Oral Daily With Breakfast Levill, Meenakshi G, APRN   75 mg at 01/09/19 1341       Objective     Physical Exam:   Temp:  [98.7 °F (37.1 °C)-101.9 °F (38.8 °C)] 98.7 °F (37.1  °C)  Heart Rate:  [] 76  Resp:  [18-20] 20  BP: (140-184)/(61-89) 152/65    Physical Exam:  General Appearance:    Alert, cooperative, in no acute distress   Head:    Normocephalic, without obvious abnormality, atraumatic   Eyes:            Lids and lashes normal, conjunctivae and sclerae normal, no   icterus, no pallor, corneas clear, PERRLA   Ears:    Ears appear intact with no abnormalities noted   Throat:   No oral lesions, no thrush, oral mucosa moist   Neck:   No adenopathy, supple, trachea midline, no thyromegaly, no     carotid bruit, no JVD   Back:     No kyphosis present, no scoliosis present, no skin lesions,       erythema or scars, no tenderness to percussion or                   palpation,   range of motion normal   Lungs:     Clear to auscultation,respirations regular, even and                   unlabored    Heart:    Regular rhythm and normal rate, normal S1 and S2, no            murmur, no gallop, no rub, no click   Breast Exam:    Deferred   Abdomen:   Well-healed surgical wounds generalized tenderness.   Genitalia:    Deferred   Extremities:   Moves all extremities well, no edema, no cyanosis, no              redness   Pulses:   Pulses palpable and equal bilaterally   Skin:   No bleeding, bruising or rash   Lymph nodes:   No palpable adenopathy   Neurologic:   Cranial nerves 2 - 12 grossly intact, sensation intact, DTR        present and equal bilaterally      Results Review:     Lab Results   Component Value Date    WBC 21.24 (H) 01/09/2019    WBC 12.66 (H) 01/08/2019    WBC 5.34 09/11/2018    HGB 12.8 01/09/2019    HGB 13.1 01/08/2019    HGB 13.1 09/11/2018    HCT 38.4 01/09/2019    HCT 39.4 01/08/2019    HCT 40.7 09/11/2018     01/09/2019     01/08/2019     09/11/2018     Results from last 7 days   Lab Units  01/09/19   0633  01/08/19   0935   ALK PHOS U/L  111  82   ALT (SGPT) U/L  272*  101*   AST (SGOT) U/L  343*  294*     Results from last 7 days   Lab Units   01/09/19   0633  01/08/19   0935   BILIRUBIN mg/dL  0.8  0.5   ALK PHOS U/L  111  82     Lipase   Date Value Ref Range Status   01/08/2019 133 23 - 300 U/L Final     Lab Results   Component Value Date    INR 1.0 03/27/2016    INR 1.0 02/05/2015          Radiology Review:  Imaging Results (last 72 hours)     Procedure Component Value Units Date/Time    MRI abdomen wo contrast mrcp [[id number]] Collected:  01/08/19 1359     Updated:  01/09/19 0710    Narrative:         EXAMINATION:  MRCP/MRI OF THE ABDOMEN WITHOUT AND WITH CONTRAST    CLINICAL INFORMATION: Epigastric pain, choledocholithiasis    DESCRIPTION:  Technique: Multiplanar imaging was performed using multiple pulse  sequences. MRCP was also performed.  Computer generated 3-D reconstructions/MIPS were performed.  Contrast: None  Comparison: CT abdomen and pelvis 1/8/2019    FINDINGS:  Liver: No hepatic mass is visualized.  Gallbladder and bile ducts: There is severe dilatation of the  common bile duct measuring 2.4 cm. Multiple stones are seen in  the common bile duct. There is also mild to moderate intrahepatic  biliary dilatation. There is pneumobilia. The gallbladder has  been resected.  Pancreas: Pancreatic duct is dilated, measuring up to 0.8 cm in  diameter.  Spleen: The spleen is normal.      Kidneys: There is no evidence of hydronephrosis. There is a cyst  in the left kidney.      Adrenal glands: There is no adrenal nodule.      Peritoneum, retroperitoneum and gastrointestinal tract: There is  no ascites.      Lymph nodes: No suspicious lymph node enlargement is seen.  Aorta and main branches: The aorta and main branches are normal  in caliber.          Impression:       CONCLUSION:  Choledocholithiasis.  Severely dilated common bile duct and moderate dilatation of the  intrahepatic biliary ducts.  Dilatation of the pancreatic duct.  The above findings could be due to a pancreatic head or ampullary  mass versus an obstructing stone.     Electronically  signed by:  Oli Mayers MD  1/9/2019 7:08 AM CST  Workstation: [id number]    CT Abdomen Pelvis With Contrast [[id number]] Collected:  01/08/19 1138     Updated:  01/08/19 1411    Narrative:         PROCEDURE: Ct abdomen and pelvis with contrast    REASON FOR EXAM: ventral hernia pain    FINDINGS: Comparison study dated  March 20, 2018. Axial computer  tomography sequential imaging was performed from the diaphragms  through the symphysis pubis with IV contrast administration.  Sagittal and coronal reformates was performed. This exam was  performed according to our departmental dose optimization  program, which includes automated exposure control, adjustment of  the mA and/or KV according to patient size and/or use of  iterative reconstruction technique.     Imaging through lung bases reveals no abnormality.    The liver is normal. The gallbladder surgically absent.  Intrahepatic and extrahepatic biliary dilatation with pneumobilia  as well as contrast. Proximal common bile duct small 5.2 mm  choledocholithiasis. The right common bile duct measures 1.8 cm  in greatest diameter. There is focal proximal right common bile  duct caliber change. Soft tissue prominence in the region of the  head of the pancreas in the common bile duct region with  associated dilated intra-  Hepatic biliary system as well as pancreatic duct. The pancreas  is otherwise unremarkable. The spleen is normal. Bilateral  adrenal glands are normal. Right kidney and ureter are normal.  Left kidney upper pole small simple renal cortical cyst which  measures 1.3 cm in greatest diameter. Left kidney and ureter are  otherwise unremarkable. The bladder is normal. The uterus is  surgically absent. Nonspecific soft tissue prominence in region  of the head of the pancreas and ampulla of the duodenum.  Otherwise hollow viscera is unremarkable. No lymphadenopathy in  the abdomen or the pelvis. No acute osseous abnormality. Stable  postsurgical changes with  bilateral decompressive laminectomies  at the L3 and L4 vertebral body level with associated discectomy  at the L3-L4 and L4-L5 disc space level with small intervertebral  fusion device in place at the L3-4 disc space level and Ray cage  device in place at the L4-L5 disc space level. Bilateral  posterior tramaine fusion of the L3 through the L5 vertebral body with  satisfactory anatomical alignment.      Impression:       1. Diffusely dilated intra and extrahepatic biliary system with  contrast as well as pneumobilia and focal caliber change in the  region of the proximal common bile duct. Diffusely dilated  pancreatic duct. Proximal common bile duct 5.2 mm  choledocholithiasis. Soft tissue prominence in the region of the  duodenal ampulla and head of the pancreas with associated focal  caliber change of the common bile duct would be suspicious for  postsurgical changes versus duodenal ampulla or pancreatic head  neoplasm. Recommend GI consultation.  2. Stable extensive lumbar spine postsurgical changes as  described above..  3. Remainder CT abdomen pelvis study with contrast unremarkable..    Electronically signed by:  Teodoro Roberts MD  1/8/2019 12:37 PM CST  Workstation: [id number]           I reviewed the patient's new clinical results.  I reviewed the patient's new imaging results and agree with the interpretation.     ASSESSMENT/PLAN:   ASSESSMENT:   1.  Primary common bile duct stones, recurrent  2.  Common bile duct stones status post endoscopic removal as well as biliary diversion with a choledochoduodenostomy  3.  Gastroparesis      PLAN:   1.  Endoscopic retrograde cholangiogram with removal of common bile duct stone is reasonable.  I told her that this may not take care of all of her problems.  She understands that we will do a removal of the common bile duct stones through the ampulla and placement stent.  This stent will be in place for 2 months.  We will then remove it.  2.  She will need to have an ERCP done  every 6 months to ensure that stones do not reform in the bile duct.  They will ultimately occur because of this is what is happening with her  3.  I have asked Dr. Thomas to see her again for the consideration of a choledochojejunostomy for better biliary drainage.  I am not sure that that is going to be of very much for her.  4.  She understands that this is going to be a recurring problem for her and needs to be managed with endoscopic therapy as much as possible.    Risk and benefits associated with the procedure are reviewed with the patient.  The patient wishes to proceed      Homero Allen DO  01/09/19  7:26 PM

## 2019-01-10 NOTE — PLAN OF CARE
Problem: Patient Care Overview  Goal: Plan of Care Review  Outcome: Ongoing (interventions implemented as appropriate)   01/09/19 9427   Coping/Psychosocial   Plan of Care Reviewed With patient   Plan of Care Review   Progress no change   OTHER   Outcome Summary Patient resting in bed. VS stable. Patient will be NPO after midnight for ERCP tomorrow. Patient c/o pain to hed with PRN painmedication given. No s/s of distress at this time. Will continue to monitor this patient.      Goal: Discharge Needs Assessment  Outcome: Ongoing (interventions implemented as appropriate)      Problem: Pain, Acute (Adult)  Goal: Acceptable Pain Control/Comfort Level  Outcome: Ongoing (interventions implemented as appropriate)      Problem: Fall Risk (Adult)  Goal: Identify Related Risk Factors and Signs and Symptoms  Outcome: Ongoing (interventions implemented as appropriate)

## 2019-01-10 NOTE — ANESTHESIA POSTPROCEDURE EVALUATION
Patient: Alva Mills    Procedure Summary     Date:  01/10/19 Room / Location:  [facility] ENDOSCOPY 2 / [facility] ENDOSCOPY    Anesthesia Start:  1536 Anesthesia Stop:  1623    Procedure:  ENDOSCOPIC RETROGRADE CHOLANGIOPANCREATOGRAPHY (Left ) Diagnosis:      Surgeon:  Homero Allen DO Provider:  Marshall Trejo CRNA    Anesthesia Type:  general ASA Status:  3          Anesthesia Type: general  Last vitals  BP   (!) 189/86 (01/10/19 1513)   Temp   97.2 °F (36.2 °C) (01/10/19 1513)   Pulse   92 (01/10/19 1513)   Resp   18 (01/10/19 1513)     SpO2   98 % (01/10/19 1513)     Post Anesthesia Care and Evaluation    Patient location during evaluation: PACU  Level of consciousness: awake and awake and alert  Pain score: 0  Pain management: adequate  Airway patency: patent  Anesthetic complications: No anesthetic complications  PONV Status: none  Cardiovascular status: acceptable and hemodynamically stable  Respiratory status: acceptable and spontaneous ventilation  Hydration status: acceptable

## 2019-01-10 NOTE — PROGRESS NOTES
[facility] Medicine Services  INPATIENT PROGRESS NOTE    Length of Stay: 0  Date of Admission: 1/8/2019  Primary Care Physician: Estefania Vicente APRN    Subjective   Chief Complaint: Abdominal pain, nausea  HPI:  symptoms including abdominal pain and nausea much better.  Afebrile since yesterday.  White blood count trending down.  Getting ERCP today.    Review of Systems   Respiratory: Negative for shortness of breath.    Cardiovascular: Negative for chest pain.   Gastrointestinal: Positive for abdominal pain.        All pertinent negatives and positives are as above. All other systems have been reviewed and are negative unless otherwise stated.     Objective    Temp:  [97.2 °F (36.2 °C)-99.1 °F (37.3 °C)] 97.2 °F (36.2 °C)  Heart Rate:  [65-92] 92  Resp:  [18] 18  BP: (119-189)/(63-86) 189/86    Physical Exam   Constitutional: She appears well-developed.   HENT:   Head: Normocephalic and atraumatic.   Eyes: Pupils are equal, round, and reactive to light.   Neck: Normal range of motion.   Cardiovascular: Normal rate.   Pulmonary/Chest: She has decreased breath sounds. She has no wheezes.   Abdominal: Soft. There is tenderness in the epigastric area.   Mild epigastric pain   Musculoskeletal: She exhibits no edema.   Neurological: She is alert.   Skin: Skin is warm and dry.   Psychiatric: She has a normal mood and affect.           Results Review:  I have reviewed the labs, radiology results, and diagnostic studies.    Laboratory Data:   Results from last 7 days   Lab Units  01/10/19   0656  01/09/19   0633  01/08/19   0935   SODIUM mmol/L  135*  137  137   POTASSIUM mmol/L  4.0  3.9  3.5   CHLORIDE mmol/L  102  100  97   CO2 mmol/L  27.0  29.0  30.0   BUN mg/dL  9  11  12   CREATININE mg/dL  0.58  0.72  0.69   GLUCOSE mg/dL  107*  112*  118*   CALCIUM mg/dL  8.6  8.5  9.2   BILIRUBIN mg/dL  0.7  0.8  0.5   ALK PHOS U/L  95  111  82   ALT (SGPT) U/L  148*  272*  101*    AST (SGOT) U/L  99*  343*  294*   ANION GAP mmol/L  6.0  8.0  10.0     Estimated Creatinine Clearance: 52.8 mL/min (by C-G formula based on SCr of 0.58 mg/dL).          Results from last 7 days   Lab Units  01/10/19   0656  01/09/19   0633  01/08/19   0935   WBC 10*3/mm3  10.90*  21.24*  12.66*   HEMOGLOBIN g/dL  10.8*  12.8  13.1   HEMATOCRIT %  32.0*  38.4  39.4   PLATELETS 10*3/mm3  238  323  383           Culture Data:   Blood Culture   Date Value Ref Range Status   01/09/2019 No growth at 24 hours  Preliminary     No results found for: URINECX  No results found for: RESPCX  No results found for: WOUNDCX  No results found for: STOOLCX  No components found for: BODYFLD    Radiology Data:   Imaging Results (last 24 hours)     ** No results found for the last 24 hours. **          I have reviewed the patient's current medications.     Assessment/Plan     Active Hospital Problems    Diagnosis   • Pneumobilia       Plan:    #1 right upper quadrant abdominal pain with nausea: Much improved.  Continue current treatment.  MRCP with possible stones/pancreatic mass.  ERCP today.  May need EUS at another facility.  GI and general surgery following  #2 sepsis: New, improved.  ?  Source.  Afebrile since yesterday.  Blood cultures negative so far, improving white blood count.  Continue Levaquin and Flagyl for now.  Upper respiratory panel negative and urinalysis was normal.  Acute hepatitis panel normal  #3 hypertension: Improved.  Continue current treatment for now.  Continue to monitor closely  #4 hypothyroidism: Stable.  Continue current treatment  #5 chronic pain: Stable.  Continue current treatment  #6 anxiety and depression: Stable.  Continue current treatment    Signed     Dr Kody Hendricks,    1/10/2019  3:27 PM

## 2019-01-11 VITALS
TEMPERATURE: 98.2 F | DIASTOLIC BLOOD PRESSURE: 65 MMHG | SYSTOLIC BLOOD PRESSURE: 138 MMHG | RESPIRATION RATE: 18 BRPM | OXYGEN SATURATION: 98 % | HEIGHT: 61 IN | BODY MASS INDEX: 25.43 KG/M2 | HEART RATE: 82 BPM | WEIGHT: 134.7 LBS

## 2019-01-11 LAB
ALBUMIN SERPL-MCNC: 3.3 G/DL (ref 3.4–4.8)
ALP SERPL-CCNC: 101 U/L (ref 38–126)
ALT SERPL W P-5'-P-CCNC: 107 U/L (ref 9–52)
ANION GAP SERPL CALCULATED.3IONS-SCNC: 8 MMOL/L (ref 5–15)
AST SERPL-CCNC: 50 U/L (ref 14–36)
BASOPHILS # BLD AUTO: 0.02 10*3/MM3 (ref 0–0.2)
BASOPHILS NFR BLD AUTO: 0.2 % (ref 0–2)
BILIRUB CONJ SERPL-MCNC: 0 MG/DL (ref 0–0.3)
BILIRUB INDIRECT SERPL-MCNC: 0.5 MG/DL (ref 0–1.1)
BILIRUB SERPL-MCNC: 0.4 MG/DL (ref 0.2–1.3)
BUN BLD-MCNC: 8 MG/DL (ref 7–21)
BUN/CREAT SERPL: 12.3 (ref 7–25)
CALCIUM SPEC-SCNC: 8.7 MG/DL (ref 8.4–10.2)
CHLORIDE SERPL-SCNC: 102 MMOL/L (ref 95–110)
CO2 SERPL-SCNC: 28 MMOL/L (ref 22–31)
CREAT BLD-MCNC: 0.65 MG/DL (ref 0.5–1)
DEPRECATED RDW RBC AUTO: 45.3 FL (ref 36.4–46.3)
EOSINOPHIL # BLD AUTO: 0.22 10*3/MM3 (ref 0–0.7)
EOSINOPHIL NFR BLD AUTO: 2.3 % (ref 0–7)
ERYTHROCYTE [DISTWIDTH] IN BLOOD BY AUTOMATED COUNT: 12.3 % (ref 11.5–14.5)
GFR SERPL CREATININE-BSD FRML MDRD: 90 ML/MIN/1.73 (ref 39–90)
GLUCOSE BLD-MCNC: 106 MG/DL (ref 60–100)
HCT VFR BLD AUTO: 35.2 % (ref 35–45)
HGB BLD-MCNC: 11.4 G/DL (ref 12–15.5)
IMM GRANULOCYTES # BLD AUTO: 0.03 10*3/MM3 (ref 0–0.02)
IMM GRANULOCYTES NFR BLD AUTO: 0.3 % (ref 0–0.5)
LYMPHOCYTES # BLD AUTO: 0.81 10*3/MM3 (ref 0.6–4.2)
LYMPHOCYTES NFR BLD AUTO: 8.4 % (ref 10–50)
MCH RBC QN AUTO: 32.3 PG (ref 26.5–34)
MCHC RBC AUTO-ENTMCNC: 32.4 G/DL (ref 31.4–36)
MCV RBC AUTO: 99.7 FL (ref 80–98)
MONOCYTES # BLD AUTO: 0.69 10*3/MM3 (ref 0–0.9)
MONOCYTES NFR BLD AUTO: 7.2 % (ref 0–12)
NEUTROPHILS # BLD AUTO: 7.85 10*3/MM3 (ref 2–8.6)
NEUTROPHILS NFR BLD AUTO: 81.6 % (ref 37–80)
NRBC BLD AUTO-RTO: 0 /100 WBC (ref 0–0)
PLATELET # BLD AUTO: 312 10*3/MM3 (ref 150–450)
PMV BLD AUTO: 9.3 FL (ref 8–12)
POTASSIUM BLD-SCNC: 3.5 MMOL/L (ref 3.5–5.1)
PROT SERPL-MCNC: 6 G/DL (ref 6.3–8.6)
RBC # BLD AUTO: 3.53 10*6/MM3 (ref 3.77–5.16)
SODIUM BLD-SCNC: 138 MMOL/L (ref 137–145)
WBC NRBC COR # BLD: 9.62 10*3/MM3 (ref 3.2–9.8)

## 2019-01-11 PROCEDURE — 25010000002 LEVOFLOXACIN PER 250 MG: Performed by: HOSPITALIST

## 2019-01-11 PROCEDURE — A9270 NON-COVERED ITEM OR SERVICE: HCPCS | Performed by: NURSE PRACTITIONER

## 2019-01-11 PROCEDURE — 63710000001 FENOFIBRATE 145 MG TABLET: Performed by: NURSE PRACTITIONER

## 2019-01-11 PROCEDURE — 63710000001 HYDROCODONE-ACETAMINOPHEN 10-325 MG TABLET: Performed by: HOSPITALIST

## 2019-01-11 PROCEDURE — 36415 COLL VENOUS BLD VENIPUNCTURE: CPT | Performed by: NURSE PRACTITIONER

## 2019-01-11 PROCEDURE — G0378 HOSPITAL OBSERVATION PER HR: HCPCS

## 2019-01-11 PROCEDURE — A9270 NON-COVERED ITEM OR SERVICE: HCPCS | Performed by: HOSPITALIST

## 2019-01-11 PROCEDURE — 80048 BASIC METABOLIC PNL TOTAL CA: CPT | Performed by: NURSE PRACTITIONER

## 2019-01-11 PROCEDURE — 63710000001 CALCIUM CARBONATE-VITAMIN D 600-400 MG-UNIT TABLET: Performed by: NURSE PRACTITIONER

## 2019-01-11 PROCEDURE — 63710000001 DOCUSATE SODIUM 100 MG CAPSULE: Performed by: HOSPITALIST

## 2019-01-11 PROCEDURE — 63710000001 MAGNESIUM OXIDE 400 (241.3 MG) MG TABLET: Performed by: NURSE PRACTITIONER

## 2019-01-11 PROCEDURE — 80076 HEPATIC FUNCTION PANEL: CPT | Performed by: INTERNAL MEDICINE

## 2019-01-11 PROCEDURE — 63710000001 METOPROLOL SUCCINATE XL 25 MG TABLET SUSTAINED-RELEASE 24 HOUR: Performed by: NURSE PRACTITIONER

## 2019-01-11 PROCEDURE — 63710000001 LEVOTHYROXINE 50 MCG TABLET: Performed by: NURSE PRACTITIONER

## 2019-01-11 PROCEDURE — 85025 COMPLETE CBC W/AUTO DIFF WBC: CPT | Performed by: NURSE PRACTITIONER

## 2019-01-11 PROCEDURE — 63710000001 CARISOPRODOL 350 MG TABLET: Performed by: HOSPITALIST

## 2019-01-11 RX ORDER — LEVOFLOXACIN 750 MG/1
750 TABLET ORAL DAILY
Qty: 8 TABLET | Refills: 0 | Status: SHIPPED | OUTPATIENT
Start: 2019-01-11 | End: 2019-01-19

## 2019-01-11 RX ORDER — LACTOBACILLUS RHAMNOSUS GG 10B CELL
1 CAPSULE ORAL 2 TIMES DAILY
Qty: 16 CAPSULE | Refills: 0 | Status: SHIPPED | OUTPATIENT
Start: 2019-01-11 | End: 2019-01-19

## 2019-01-11 RX ORDER — METRONIDAZOLE 500 MG/1
500 TABLET ORAL 3 TIMES DAILY
Qty: 24 TABLET | Refills: 0 | Status: SHIPPED | OUTPATIENT
Start: 2019-01-11 | End: 2019-01-19

## 2019-01-11 RX ORDER — PSEUDOEPHEDRINE HCL 30 MG
100 TABLET ORAL 2 TIMES DAILY
Qty: 60 EACH | Refills: 0 | Status: SHIPPED | OUTPATIENT
Start: 2019-01-11 | End: 2019-02-10

## 2019-01-11 RX ADMIN — CARISOPRODOL 350 MG: 350 TABLET ORAL at 12:04

## 2019-01-11 RX ADMIN — SODIUM CHLORIDE 30 ML/HR: 9 INJECTION, SOLUTION INTRAVENOUS at 02:37

## 2019-01-11 RX ADMIN — Medication 1 TABLET: at 09:38

## 2019-01-11 RX ADMIN — METOPROLOL SUCCINATE 25 MG: 25 TABLET, EXTENDED RELEASE ORAL at 09:38

## 2019-01-11 RX ADMIN — METRONIDAZOLE 500 MG: 500 INJECTION, SOLUTION INTRAVENOUS at 02:37

## 2019-01-11 RX ADMIN — HYDROCODONE BITARTRATE AND ACETAMINOPHEN 1 TABLET: 10; 325 TABLET ORAL at 06:12

## 2019-01-11 RX ADMIN — Medication 400 MG: at 09:39

## 2019-01-11 RX ADMIN — FENOFIBRATE 145 MG: 145 TABLET ORAL at 09:38

## 2019-01-11 RX ADMIN — LEVOFLOXACIN 750 MG: 5 INJECTION, SOLUTION INTRAVENOUS at 12:04

## 2019-01-11 RX ADMIN — LEVOTHYROXINE SODIUM 50 MCG: 50 TABLET ORAL at 09:39

## 2019-01-11 RX ADMIN — CARISOPRODOL 350 MG: 350 TABLET ORAL at 06:12

## 2019-01-11 RX ADMIN — METRONIDAZOLE 500 MG: 500 INJECTION, SOLUTION INTRAVENOUS at 09:52

## 2019-01-11 RX ADMIN — DOCUSATE SODIUM 100 MG: 100 CAPSULE, LIQUID FILLED ORAL at 09:45

## 2019-01-11 NOTE — PLAN OF CARE
Problem: Patient Care Overview  Goal: Plan of Care Review  Outcome: Ongoing (interventions implemented as appropriate)   01/11/19 2993   Coping/Psychosocial   Plan of Care Reviewed With patient   Plan of Care Review   Progress improving   OTHER   Outcome Summary Patient's diet advanced to regular diet. Patient currently tolerating diet well, and has no complaints of nausea or vomiting. Encouraged patient to continue to have good intake of meals at home and have good intake of protein for healing.

## 2019-01-11 NOTE — PROGRESS NOTES
I received a call from Joann in radiology that I need to speak to DR. Mayers regarding the addendum to the MRI/MRCP report to compare to past imaging.  I spoke to Dr. Mayers and discussed the case, he reviewed and compared the current CT/MRI to the prior CTs and MRI in 2013, 2014 and 2017 he says he will make addendum to the report.

## 2019-01-11 NOTE — CONSULTS
Adult Nutrition  Assessment    Patient Name:  Alva Mills  YOB: 1945  MRN: [id number]  Admit Date:  1/8/2019    Assessment Date:  1/11/2019    Comments:  Patient with dx of pneumobilia,choledocholithiasis, and multiple duodenal ulcers s/p stent in bile duct. Patient advanced to a regular diet, and tolerating regular diet well. Patient reports that her appetite is slowly increasing and that she does not have any nausea and vomiting at this time. Patient encouraged to continue to eat high-protein foods to promote healing, and continue to have good intake of meals.     Reason for Assessment     Row Name 01/11/19 1430          Reason for Assessment    Reason For Assessment  follow-up protocol  (Pended)          Nutrition/Diet History     Row Name 01/11/19 1431          Nutrition/Diet History    Typical Food/Fluid Intake  Patient sitting up and feeling well today.   (Pended)            Labs/Tests/Procedures/Meds     Row Name 01/11/19 1432          Labs/Procedures/Meds    Lab Results Reviewed  reviewed, pertinent  (Pended)      Lab Results Comments  Glucose-106  (Pended)         Diagnostic Tests/Procedures    Diagnostic Test/Procedure Reviewed  reviewed, pertinent  (Pended)      Diagnostic Test/Procedures Comments  Stent in bile duct placed.   (Pended)         Medications    Pertinent Medications Reviewed  reviewed  (Pended)              Nutrition Prescription Ordered     Row Name 01/11/19 1432          Nutrition Prescription PO    Current PO Diet  Regular  (Pended)      Fluid Consistency  Thin  (Pended)                  Electronically signed by:  Laura Simpson  01/11/19 2:32 PM

## 2019-01-11 NOTE — DISCHARGE SUMMARY
[facility] Medicine Services  DISCHARGE SUMMARY       Date of Admission: 1/8/2019  Date of Discharge:  1/11/2019  Primary Care Physician: Estefania Vicente APRN    Presenting Problem/History of Present Illness:  Choledocholithiasis [K80.50]  Pneumobilia [K83.8]  Dilation of biliary tract [K83.8]       Final Discharge Diagnoses:  Active Hospital Problems    Diagnosis   • Pneumobilia       Consults:   Consults     Date and Time Order Name Status Description    1/10/2019 0030 Inpatient General Surgery Consult      1/8/2019 1350 Gastroenterology (on-call MD unless specified) Completed           Procedures Performed: Procedure(s):  ENDOSCOPIC RETROGRADE CHOLANGIOPANCREATOGRAPHY           01/10 1539 ERCP    Pertinent Test Results:   Lab Results (most recent)     Procedure Component Value Units Date/Time    Blood Culture - Blood, Arm, Left [[id number]] Collected:  01/09/19 1058    Specimen:  Blood from Arm, Left Updated:  01/11/19 1115     Blood Culture No growth at 2 days    Tissue Pathology Exam [[id number]] Collected:  01/10/19 1610    Specimen:  Tissue from Gastric, Antrum Updated:  01/11/19 0757    Basic Metabolic Panel [[id number]]  (Abnormal) Collected:  01/11/19 0631    Specimen:  Blood Updated:  01/11/19 0702     Glucose 106 mg/dL      BUN 8 mg/dL      Creatinine 0.65 mg/dL      Sodium 138 mmol/L      Potassium 3.5 mmol/L      Chloride 102 mmol/L      CO2 28.0 mmol/L      Calcium 8.7 mg/dL      eGFR Non African Amer 90 mL/min/1.73      BUN/Creatinine Ratio 12.3     Anion Gap 8.0 mmol/L     Narrative:       The MDRD GFR formula is only valid for adults with stable renal function between ages 18 and 70.    Hepatic Function Panel [[id number]]  (Abnormal) Collected:  01/11/19 0631    Specimen:  Blood Updated:  01/11/19 0702     Total Protein 6.0 g/dL      Albumin 3.30 g/dL      ALT (SGPT) 107 U/L      AST (SGOT) 50 U/L      Alkaline Phosphatase 101 U/L      Total Bilirubin  0.4 mg/dL      Bilirubin, Direct 0.0 mg/dL      Bilirubin, Indirect 0.5 mg/dL     CBC & Differential [[id number]] Collected:  01/11/19 0631    Specimen:  Blood Updated:  01/11/19 0653    Narrative:       The following orders were created for panel order CBC & Differential.  Procedure                               Abnormality         Status                     ---------                               -----------         ------                     CBC Auto Differential[[id number]]        Abnormal            Final result                 Please view results for these tests on the individual orders.    CBC Auto Differential [[id number]]  (Abnormal) Collected:  01/11/19 0631    Specimen:  Blood Updated:  01/11/19 0653     WBC 9.62 10*3/mm3      RBC 3.53 10*6/mm3      Hemoglobin 11.4 g/dL      Hematocrit 35.2 %      MCV 99.7 fL      MCH 32.3 pg      MCHC 32.4 g/dL      RDW 12.3 %      RDW-SD 45.3 fl      MPV 9.3 fL      Platelets 312 10*3/mm3      Neutrophil % 81.6 %      Lymphocyte % 8.4 %      Monocyte % 7.2 %      Eosinophil % 2.3 %      Basophil % 0.2 %      Immature Grans % 0.3 %      Neutrophils, Absolute 7.85 10*3/mm3      Lymphocytes, Absolute 0.81 10*3/mm3      Monocytes, Absolute 0.69 10*3/mm3      Eosinophils, Absolute 0.22 10*3/mm3      Basophils, Absolute 0.02 10*3/mm3      Immature Grans, Absolute 0.03 10*3/mm3      nRBC 0.0 /100 WBC     POC Glucose Once [[id number]]  (Normal) Collected:  01/10/19 1644    Specimen:  Blood Updated:  01/10/19 1655     Glucose 89 mg/dL      Comment: : [id number] [facility] AMANDAMeter ID: [id number]       Hepatic Function Panel [[id number]]  (Abnormal) Collected:  01/10/19 0656    Specimen:  Blood Updated:  01/10/19 0747     Total Protein 5.9 g/dL      Albumin 3.20 g/dL      ALT (SGPT) 148 U/L      AST (SGOT) 99 U/L      Alkaline Phosphatase 95 U/L      Total Bilirubin 0.7 mg/dL      Bilirubin, Direct 0.0 mg/dL      Bilirubin, Indirect 0.7 mg/dL     Basic Metabolic Panel  [[id number]]  (Abnormal) Collected:  01/10/19 0656    Specimen:  Blood Updated:  01/10/19 0730     Glucose 107 mg/dL      BUN 9 mg/dL      Creatinine 0.58 mg/dL      Sodium 135 mmol/L      Potassium 4.0 mmol/L      Chloride 102 mmol/L      CO2 27.0 mmol/L      Calcium 8.6 mg/dL      eGFR Non African Amer 102 mL/min/1.73      BUN/Creatinine Ratio 15.5     Anion Gap 6.0 mmol/L     Narrative:       The MDRD GFR formula is only valid for adults with stable renal function between ages 18 and 70.    CBC & Differential [[id number]] Collected:  01/10/19 0656    Specimen:  Blood Updated:  01/10/19 0717    Narrative:       The following orders were created for panel order CBC & Differential.  Procedure                               Abnormality         Status                     ---------                               -----------         ------                     CBC Auto Differential[[id number]]        Abnormal            Final result                 Please view results for these tests on the individual orders.    CBC Auto Differential [[id number]]  (Abnormal) Collected:  01/10/19 0656    Specimen:  Blood Updated:  01/10/19 0717     WBC 10.90 10*3/mm3      RBC 3.27 10*6/mm3      Hemoglobin 10.8 g/dL      Hematocrit 32.0 %      MCV 97.9 fL      MCH 33.0 pg      MCHC 33.8 g/dL      RDW 11.9 %      RDW-SD 42.8 fl      MPV 9.6 fL      Platelets 238 10*3/mm3      Neutrophil % 84.5 %      Lymphocyte % 4.7 %      Monocyte % 9.6 %      Eosinophil % 0.8 %      Basophil % 0.1 %      Immature Grans % 0.3 %      Neutrophils, Absolute 9.21 10*3/mm3      Lymphocytes, Absolute 0.51 10*3/mm3      Monocytes, Absolute 1.05 10*3/mm3      Eosinophils, Absolute 0.09 10*3/mm3      Basophils, Absolute 0.01 10*3/mm3      Immature Grans, Absolute 0.03 10*3/mm3     Respiratory Panel, PCR - Swab, Nasopharynx [[id number]]  (Normal) Collected:  01/09/19 1628    Specimen:  Swab from Nasopharynx Updated:  01/09/19 4895     ADENOVIRUS, PCR Not Detected      Coronavirus 229E Not Detected     Coronavirus HKU1 Not Detected     Coronavirus NL63 Not Detected     Coronavirus OC43 Not Detected     Human Metapneumovirus Not Detected     Human Rhinovirus/Enterovirus Not Detected     Influenza B PCR Not Detected     Parainfluenza Virus 1 Not Detected     Parainfluenza Virus 2 Not Detected     Parainfluenza Virus 3 Not Detected     Parainfluenza Virus 4 Not Detected     Bordetella pertussis pcr Not Detected     Influenza A H1 2009 PCR Not Detected     Chlamydophila pneumoniae PCR Not Detected     Mycoplasma pneumo by PCR Not Detected     Influenza A PCR Not Detected     Influenza A H3 Not Detected     Influenza A H1 Not Detected     RSV, PCR Not Detected    Hepatitis Panel, Acute [[id number]]  (Normal) Collected:  01/09/19 1258    Specimen:  Blood Updated:  01/09/19 1437     Hepatitis C Ab Negative     Hep A IgM Negative     Hep B C IgM Negative     Hepatitis B Surface Ag Negative    Hepatic Function Panel [[id number]]  (Abnormal) Collected:  01/09/19 0633    Specimen:  Blood Updated:  01/09/19 0917     Total Protein 6.6 g/dL      Albumin 4.00 g/dL      ALT (SGPT) 272 U/L      AST (SGOT) 343 U/L      Alkaline Phosphatase 111 U/L      Total Bilirubin 0.8 mg/dL      Bilirubin, Direct 0.0 mg/dL      Bilirubin, Indirect 0.6 mg/dL     Basic Metabolic Panel [[id number]]  (Abnormal) Collected:  01/09/19 0633    Specimen:  Blood Updated:  01/09/19 0705     Glucose 112 mg/dL      BUN 11 mg/dL      Creatinine 0.72 mg/dL      Sodium 137 mmol/L      Potassium 3.9 mmol/L      Chloride 100 mmol/L      CO2 29.0 mmol/L      Calcium 8.5 mg/dL      eGFR Non African Amer 80 mL/min/1.73      BUN/Creatinine Ratio 15.3     Anion Gap 8.0 mmol/L     Narrative:       The MDRD GFR formula is only valid for adults with stable renal function between ages 18 and 70.    CBC & Differential [[id number]] Collected:  01/09/19 0633    Specimen:  Blood Updated:  01/09/19 0643    Narrative:       The following orders  were created for panel order CBC & Differential.  Procedure                               Abnormality         Status                     ---------                               -----------         ------                     CBC Auto Differential[[id number]]        Abnormal            Final result                 Please view results for these tests on the individual orders.    CBC Auto Differential [[id number]]  (Abnormal) Collected:  01/09/19 0633    Specimen:  Blood Updated:  01/09/19 0643     WBC 21.24 10*3/mm3      RBC 3.93 10*6/mm3      Hemoglobin 12.8 g/dL      Hematocrit 38.4 %      MCV 97.7 fL      MCH 32.6 pg      MCHC 33.3 g/dL      RDW 11.9 %      RDW-SD 42.9 fl      MPV 9.6 fL      Platelets 323 10*3/mm3      Neutrophil % 83.4 %      Lymphocyte % 4.1 %      Monocyte % 11.7 %      Eosinophil % 0.2 %      Basophil % 0.1 %      Immature Grans % 0.5 %      Neutrophils, Absolute 17.69 10*3/mm3      Lymphocytes, Absolute 0.88 10*3/mm3      Monocytes, Absolute 2.49 10*3/mm3      Eosinophils, Absolute 0.05 10*3/mm3      Basophils, Absolute 0.03 10*3/mm3      Immature Grans, Absolute 0.10 10*3/mm3     [place] Draw [[id number]] Collected:  01/08/19 0935    Specimen:  Blood Updated:  01/08/19 1045    Narrative:       The following orders were created for panel order [place] Draw.  Procedure                               Abnormality         Status                     ---------                               -----------         ------                     Light Blue Top[[id number]]                                   Final result               Green Top (Gel)[[id number]]                                  Final result               Lavender Top[[id number]]                                     Final result               Gold Top - SST[[id number]]                                   Final result                 Please view results for these tests on the individual orders.    Lavender Top [[id number]] Collected:  01/08/19 0935     Specimen:  Blood Updated:  01/08/19 1045     Extra Tube hold for add-on     Comment: Auto resulted       Gold Top - SST [[id number]] Collected:  01/08/19 0935    Specimen:  Blood Updated:  01/08/19 1045     Extra Tube Hold for add-ons.     Comment: Auto resulted.       Light Blue Top [[id number]] Collected:  01/08/19 0935    Specimen:  Blood Updated:  01/08/19 1045     Extra Tube hold for add-on     Comment: Auto resulted       Green Top (Gel) [[id number]] Collected:  01/08/19 0935    Specimen:  Blood Updated:  01/08/19 1045     Extra Tube Hold for add-ons.     Comment: Auto resulted.       Lactic Acid, Plasma [[id number]]  (Normal) Collected:  01/08/19 0935    Specimen:  Blood Updated:  01/08/19 1002     Lactate 1.1 mmol/L     Comprehensive Metabolic Panel [[id number]]  (Abnormal) Collected:  01/08/19 0935    Specimen:  Blood Updated:  01/08/19 1001     Glucose 118 mg/dL      BUN 12 mg/dL      Creatinine 0.69 mg/dL      Sodium 137 mmol/L      Potassium 3.5 mmol/L      Chloride 97 mmol/L      CO2 30.0 mmol/L      Calcium 9.2 mg/dL      Total Protein 6.8 g/dL      Albumin 4.30 g/dL      ALT (SGPT) 101 U/L      AST (SGOT) 294 U/L      Alkaline Phosphatase 82 U/L      Total Bilirubin 0.5 mg/dL      eGFR Non African Amer 84 mL/min/1.73      Globulin 2.5 gm/dL      A/G Ratio 1.7 g/dL      BUN/Creatinine Ratio 17.4     Anion Gap 10.0 mmol/L     Narrative:       The MDRD GFR formula is only valid for adults with stable renal function between ages 18 and 70.    Lipase [[id number]]  (Normal) Collected:  01/08/19 0935    Specimen:  Blood Updated:  01/08/19 1001     Lipase 133 U/L     Urinalysis With Microscopic If Indicated (No Culture) - Urine, Clean Catch [[id number]]  (Normal) Collected:  01/08/19 0935    Specimen:  Urine, Clean Catch Updated:  01/08/19 0951     Color, UA Yellow     Appearance, UA Clear     pH, UA 6.0     Specific Gravity, UA 1.023     Glucose, UA Negative     Ketones, UA Negative     Bilirubin, UA Negative      Blood, UA Negative     Protein, UA Negative     Leuk Esterase, UA Negative     Nitrite, UA Negative     Urobilinogen, UA 0.2 E.U./dL    Narrative:       Urine microscopic not indicated.    CBC & Differential [[id number]] Collected:  01/08/19 0935    Specimen:  Blood Updated:  01/08/19 0951    Narrative:       The following orders were created for panel order CBC & Differential.  Procedure                               Abnormality         Status                     ---------                               -----------         ------                     CBC Auto Differential[[id number]]        Abnormal            Final result                 Please view results for these tests on the individual orders.    CBC Auto Differential [[id number]]  (Abnormal) Collected:  01/08/19 0935    Specimen:  Blood Updated:  01/08/19 0951     WBC 12.66 10*3/mm3      RBC 4.02 10*6/mm3      Hemoglobin 13.1 g/dL      Hematocrit 39.4 %      MCV 98.0 fL      MCH 32.6 pg      MCHC 33.2 g/dL      RDW 12.0 %      RDW-SD 42.7 fl      MPV 9.7 fL      Platelets 383 10*3/mm3      Neutrophil % 82.8 %      Lymphocyte % 8.2 %      Monocyte % 8.3 %      Eosinophil % 0.1 %      Basophil % 0.2 %      Immature Grans % 0.4 %      Neutrophils, Absolute 10.48 10*3/mm3      Lymphocytes, Absolute 1.04 10*3/mm3      Monocytes, Absolute 1.05 10*3/mm3      Eosinophils, Absolute 0.01 10*3/mm3      Basophils, Absolute 0.03 10*3/mm3      Immature Grans, Absolute 0.05 10*3/mm3         Imaging Results (most recent)     Procedure Component Value Units Date/Time    MRI abdomen wo contrast mrcp [[id number]] Collected:  01/08/19 1359     Updated:  01/11/19 1120    Addenda:         ADDENDUM   ADDENDUM #1       Prior CT scans of the abdomen dated 11/24/2013, 1/22/2014,  5/19/2014, and MRI dated 12/18/2017, and the CT abdomen dated  3/20/2018 were compared. Beginning with the 2014 CTs, there is no  significant change in the appearance of the common bile duct and  intrahepatic  ducts except for the size of the common bile duct  stones and debris which appear greater than that seen on prior  studies. By report, Dr. Allen performed ERCP after this MRI and  retrieved bits of food.  The pancreatic duct on the current MRI is slightly larger than on  prior exams, currently measuring 8 mm in diameter and previously  7 mm in 2013.    The lack of significant change compared to prior exams is  reassuring, however the cause of the pancreatic ductal dilatation  is uncertain and a nonobstructing mass or lesion in the distal  pancreatic duct or ampulla cannot entirely be excluded.    Findings and were discussed with Dr. Jaiyeola at 11:15 AM on  1/11/2019.    Electronically signed by:  Oli Mayers MD  1/11/2019 11:19 AM  CST Workstation: [id number]    Signed:  01/11/19 1119 by Oli Mayers MD    Narrative:         EXAMINATION:  MRCP/MRI OF THE ABDOMEN WITHOUT AND WITH CONTRAST    CLINICAL INFORMATION: Epigastric pain, choledocholithiasis    DESCRIPTION:  Technique: Multiplanar imaging was performed using multiple pulse  sequences. MRCP was also performed.  Computer generated 3-D reconstructions/MIPS were performed.  Contrast: None  Comparison: CT abdomen and pelvis 1/8/2019    FINDINGS:  Liver: No hepatic mass is visualized.  Gallbladder and bile ducts: There is severe dilatation of the  common bile duct measuring 2.4 cm. Multiple stones are seen in  the common bile duct. There is also mild to moderate intrahepatic  biliary dilatation. There is pneumobilia. The gallbladder has  been resected.  Pancreas: Pancreatic duct is dilated, measuring up to 0.8 cm in  diameter.  Spleen: The spleen is normal.      Kidneys: There is no evidence of hydronephrosis. There is a cyst  in the left kidney.      Adrenal glands: There is no adrenal nodule.      Peritoneum, retroperitoneum and gastrointestinal tract: There is  no ascites.      Lymph nodes: No suspicious lymph node enlargement is seen.  Aorta and main  branches: The aorta and main branches are normal  in caliber.          Impression:       CONCLUSION:  Choledocholithiasis.  Severely dilated common bile duct and moderate dilatation of the  intrahepatic biliary ducts.  Dilatation of the pancreatic duct.  The above findings could be due to a pancreatic head or ampullary  mass versus an obstructing stone.     Electronically signed by:  Oli Mayers MD  1/9/2019 7:08 AM CST  Workstation: [id number]    FL ercp biliary duct only [[id number]] Collected:  01/10/19 1558     Updated:  01/10/19 2022    Narrative:       Fluoroscopy ERCP biliary duct only    HISTORY: Common bile duct stones.    ERCP was performed.  Fluoroscopy time 3.56 minutes.  10.0 mL of Isovue-300 injected.  Five spot films obtained.    COMPARISON: [facility] fifth 2015. Correlation CT and MRI January 8, 2019.    Opacification of a markedly dilated common bile duct.  Filling defects in common bile duct, compatible with debris and  calculi.  Minimally dilated intrahepatic biliary radicles.  Final film demonstrates placement of a stent within the common  bile duct.    Pedicle screws and rods in the lumbar spine.    Refer to AMANDA KEARNEY MD procedural note for further details.      Impression:       CONCLUSION:  As above    [id number]    Electronically signed by:  Jostin Kimble MD  1/10/2019 8:21 PM CST  Workstation: [id number]    CT Abdomen Pelvis With Contrast [[id number]] Collected:  01/08/19 1138     Updated:  01/08/19 1411    Narrative:         PROCEDURE: Ct abdomen and pelvis with contrast    REASON FOR EXAM: ventral hernia pain    FINDINGS: Comparison study dated  March 20, 2018. Axial computer  tomography sequential imaging was performed from the diaphragms  through the symphysis pubis with IV contrast administration.  Sagittal and coronal reformates was performed. This exam was  performed according to our departmental dose optimization  program, which includes automated exposure control, adjustment of  the  mA and/or KV according to patient size and/or use of  iterative reconstruction technique.     Imaging through lung bases reveals no abnormality.    The liver is normal. The gallbladder surgically absent.  Intrahepatic and extrahepatic biliary dilatation with pneumobilia  as well as contrast. Proximal common bile duct small 5.2 mm  choledocholithiasis. The right common bile duct measures 1.8 cm  in greatest diameter. There is focal proximal right common bile  duct caliber change. Soft tissue prominence in the region of the  head of the pancreas in the common bile duct region with  associated dilated intra-  Hepatic biliary system as well as pancreatic duct. The pancreas  is otherwise unremarkable. The spleen is normal. Bilateral  adrenal glands are normal. Right kidney and ureter are normal.  Left kidney upper pole small simple renal cortical cyst which  measures 1.3 cm in greatest diameter. Left kidney and ureter are  otherwise unremarkable. The bladder is normal. The uterus is  surgically absent. Nonspecific soft tissue prominence in region  of the head of the pancreas and ampulla of the duodenum.  Otherwise hollow viscera is unremarkable. No lymphadenopathy in  the abdomen or the pelvis. No acute osseous abnormality. Stable  postsurgical changes with bilateral decompressive laminectomies  at the L3 and L4 vertebral body level with associated discectomy  at the L3-L4 and L4-L5 disc space level with small intervertebral  fusion device in place at the L3-4 disc space level and Ray cage  device in place at the L4-L5 disc space level. Bilateral  posterior tramaine fusion of the L3 through the L5 vertebral body with  satisfactory anatomical alignment.      Impression:       1. Diffusely dilated intra and extrahepatic biliary system with  contrast as well as pneumobilia and focal caliber change in the  region of the proximal common bile duct. Diffusely dilated  pancreatic duct. Proximal common bile duct 5.2  "mm  choledocholithiasis. Soft tissue prominence in the region of the  duodenal ampulla and head of the pancreas with associated focal  caliber change of the common bile duct would be suspicious for  postsurgical changes versus duodenal ampulla or pancreatic head  neoplasm. Recommend GI consultation.  2. Stable extensive lumbar spine postsurgical changes as  described above..  3. Remainder CT abdomen pelvis study with contrast unremarkable..    Electronically signed by:  Teodoro Roberts MD  1/8/2019 12:37 PM CST  Workstation: [id number]          Chief Complaint on Day of Discharge: Abdominal pain and nausea much better.  Patient denies any chest pain, new weakness or any other concerning symptoms.  Patient wants to go home.    Hospital Course:  The patient is a 73 y.o. female who presented to [facility] with the following:    From H&P:\"73-year-old  female with past medical history of hypertension, anxiety and depression, HLD and GERD that presents to [facility] on 1/8/2019 with complaints of right upper quadrant abdominal pain and nausea that began this morning.  Lab work shows elevated liver enzymes and white blood cell count (12.66).  CT of the abdomen and pelvis showed proximal common bile duct at 5.2 mm choledocholithiasis.  Dr. Murillo with gastroenterology has been consulted.  MRI is pending.  Lipase and amylase unremarkable.\"    Hospital course: Patient was admitted for abdominal pain with the proximal biliary ductal dilation with the choledocholithiasis.  Dr. Allen was consulted and did the ERCP with the following results:  -Bile gastritis with hemorrhage.  - Biopsy was performed in the gastric antrum.  - Widely patent choledochoduodenostomy, characterized by healthy appearing mucosa was found.  - Prior biliary sphincterotomy appeared open.  - The examination was suspicious for sludge.  - The entire main bile duct was severely dilated, acquired.  - The biliary tree was swept and sludge " "was found..    - Food in the bile duct from the choledochoduodenostomy. Sump syndrome  - Multiple duodenal ulcers  - Resume regular diet.    Recommended GI follow-up in one month and stent removal ERCP in about 2 months.  There was a concern for possible mass in the pancreas.  Discussed with Dr. Allen and according to him, the findings of chronic and no need for further work up.  Dr. Allen okay with discharge.  Patient was febrile during the admission and sepsis workup was negative so far.  Patient's white blood count improved with Levaquin and Flagyl.  We'll continue the antibiotics as outpatient for now.  Patient's symptoms including abdominal pain, nausea has resolved.  Patient was to go home and will discharge home in stable condition.    Condition on Discharge:  Fair     Physical Exam on Discharge:  /65 (BP Location: Left arm, Patient Position: Lying)   Pulse 82   Temp 98.2 °F (36.8 °C) (Temporal)   Resp 18   Ht 154.9 cm (61\")   Wt 61.1 kg (134 lb 11.2 oz)   SpO2 98%   BMI 25.45 kg/m²   Physical Exam   Constitutional: She appears well-developed.   HENT:   Head: Normocephalic and atraumatic.   Eyes: Pupils are equal, round, and reactive to light.   Neck: Normal range of motion.   Cardiovascular: Normal rate.   Pulmonary/Chest: She has decreased breath sounds. She has no wheezes.   Abdominal: Soft. There is tenderness in the epigastric area.   Musculoskeletal: She exhibits no edema.   Neurological: She is alert.   Skin: Skin is warm and dry.   Psychiatric: She has a normal mood and affect.         Discharge Disposition:  Home or Self Care    Discharge Medications:     Discharge Medications      New Medications      Instructions Start Date   docusate sodium 100 MG capsule   100 mg, Oral, 2 Times Daily      levoFLOXacin 750 MG tablet  Commonly known as:  LEVAQUIN   750 mg, Oral, Daily      metroNIDAZOLE 500 MG tablet  Commonly known as:  FLAGYL   500 mg, Oral, 3 Times Daily      polyethylene glycol " pack packet  Commonly known as:  MIRALAX   17 g, Oral, Daily      probiotic capsule capsule   1 capsule, Oral, 2 Times Daily         Changes to Medications      Instructions Start Date   SYNTHROID 50 MCG tablet  Generic drug:  levothyroxine  What changed:  Another medication with the same name was removed. Continue taking this medication, and follow the directions you see here.   TAKE 1 TABLET DAILY         Continue These Medications      Instructions Start Date   albuterol sulfate  (90 Base) MCG/ACT inhaler  Commonly known as:  PROVENTIL HFA;VENTOLIN HFA;PROAIR HFA   2 puffs, Inhalation, Every 4 Hours PRN      AZELASTINE HCL NA   azelastine 137 mcg (0.1 %) nasal spray aerosol      betamethasone dipropionate 0.05 % cream  Commonly known as:  DIPROLENE   Topical, Daily      CALCIUM PO   1 tablet, Oral, Daily With Breakfast, 500g      carisoprodol 350 MG tablet  Commonly known as:  SOMA   350 mg, Oral, Every 6 Hours PRN      desvenlafaxine 100 MG 24 hr tablet  Commonly known as:  PRISTIQ   100 mg, Oral, Daily      diazePAM 5 MG tablet  Commonly known as:  VALIUM   5 mg, Oral, Every 12 Hours PRN      fenofibrate 145 MG tablet  Commonly known as:  TRICOR   145 mg, Oral, Daily      fluticasone 50 MCG/ACT nasal spray  Commonly known as:  FLONASE   2 sprays, Nasal, Daily      HYDROcodone-acetaminophen  MG per tablet  Commonly known as:  NORCO   1 tablet, Oral, Every 6 Hours PRN      Magnesium 500 MG capsule   1 capsule, Oral, Daily      metoprolol succinate XL 25 MG 24 hr tablet  Commonly known as:  TOPROL-XL   25 mg, Oral, Daily      prochlorperazine 5 MG tablet  Commonly known as:  COMPAZINE   5 mg, Oral, Every 6 Hours PRN      promethazine 25 MG tablet  Commonly known as:  PHENERGAN   TAKE 1 TABLET EVERY 6 HOURSAS NEEDED FOR NAUSEA OR    VOMITING         Stop These Medications    amoxicillin 500 MG capsule  Commonly known as:  AMOXIL            Discharge Diet: regular - no lettuce    Activity at Discharge:  ad myles    Discharge Care Plan/Instructions: Complete a course of antibiotics.  Follow-up with the GI in 1 month    Follow-up Appointments:   Future Appointments   Date Time Provider Department Center   1/17/2019 10:45 AM Estefania Vicente, [facility]  [facility] None       Test Results Pending at Discharge:    Order Current Status    Tissue Pathology Exam In process    Blood Culture - Blood, Arm, Left Preliminary result          Signed     Dr Kody Hendricks,    1/11/2019  12:22 PM    Time: More than 30 minutes spent on discharge

## 2019-01-11 NOTE — DISCHARGE INSTR - APPOINTMENTS
Follow up   Dr. Allen  February 14, 2019 at 4:15    If you have any questions or concerns please call

## 2019-01-11 NOTE — NURSING NOTE
Discussed with Geeta in MRI regarding request from Dr. Jaiyeola. Requesting that I have addendum to patients MRI.  Geeta and I agree that I could not do this and she should be the one to discuss this with the radiologist.

## 2019-01-11 NOTE — PLAN OF CARE
Problem: Patient Care Overview  Goal: Plan of Care Review  Outcome: Ongoing (interventions implemented as appropriate)  Pt feeling better after ERCP. Vital signs stable. Pt ate some regular food at suppertime.    Problem: Pain, Acute (Adult)  Goal: Acceptable Pain Control/Comfort Level  Outcome: Ongoing (interventions implemented as appropriate)      Problem: Fall Risk (Adult)  Goal: Absence of Fall  Outcome: Ongoing (interventions implemented as appropriate)

## 2019-01-11 NOTE — PLAN OF CARE
Problem: Patient Care Overview  Goal: Plan of Care Review  Outcome: Ongoing (interventions implemented as appropriate)   01/11/19 0103   Coping/Psychosocial   Plan of Care Reviewed With patient   Plan of Care Review   Progress no change     Goal: Individualization and Mutuality  Outcome: Ongoing (interventions implemented as appropriate)    Goal: Discharge Needs Assessment  Outcome: Ongoing (interventions implemented as appropriate)    Goal: Interprofessional Rounds/Family Conf  Outcome: Ongoing (interventions implemented as appropriate)      Problem: Pain, Acute (Adult)  Goal: Acceptable Pain Control/Comfort Level  Outcome: Ongoing (interventions implemented as appropriate)      Problem: Fall Risk (Adult)  Goal: Identify Related Risk Factors and Signs and Symptoms  Outcome: Ongoing (interventions implemented as appropriate)    Goal: Absence of Fall  Outcome: Ongoing (interventions implemented as appropriate)

## 2019-01-14 LAB
BACTERIA SPEC AEROBE CULT: NORMAL
LAB AP CASE REPORT: NORMAL
PATH REPORT.FINAL DX SPEC: NORMAL
PATH REPORT.GROSS SPEC: NORMAL

## 2019-01-17 ENCOUNTER — OFFICE VISIT (OUTPATIENT)
Dept: FAMILY MEDICINE CLINIC | Facility: CLINIC | Age: 74
End: 2019-01-17

## 2019-01-17 VITALS
WEIGHT: 122 LBS | HEIGHT: 61 IN | DIASTOLIC BLOOD PRESSURE: 82 MMHG | BODY MASS INDEX: 23.03 KG/M2 | SYSTOLIC BLOOD PRESSURE: 122 MMHG

## 2019-01-17 DIAGNOSIS — D51.8 OTHER VITAMIN B12 DEFICIENCY ANEMIA: Primary | ICD-10-CM

## 2019-01-17 PROBLEM — D64.9 ABSOLUTE ANEMIA: Status: ACTIVE | Noted: 2019-01-17

## 2019-01-17 PROCEDURE — 99212 OFFICE O/P EST SF 10 MIN: CPT | Performed by: NURSE PRACTITIONER

## 2019-01-17 NOTE — PROGRESS NOTES
Chief Complaint   Patient presents with   • Follow-up     [facility] from Jan 8th   • Fatigue     Wanting to see if she can start b12 to get her strength back     Subjective   Alva Mills is a 73 y.o. female.     Anxiety   Presents for follow-up visit. Symptoms include depressed mood, excessive worry, insomnia, irritability, malaise, nausea, nervous/anxious behavior, panic and restlessness. Patient reports no chest pain, compulsions, confusion, decreased concentration, dizziness, dry mouth, feeling of choking, hyperventilation, impotence, muscle tension, obsessions, palpitations, shortness of breath or suicidal ideas. Symptoms occur most days. The quality of sleep is good. Nighttime awakenings: none.     Compliance with medications is %.   Nausea   Associated symptoms include fatigue and nausea. Pertinent negatives include no abdominal pain, arthralgias, chest pain, chills, congestion, coughing, diaphoresis, fever, headaches, joint swelling, myalgias, neck pain, numbness, rash, sore throat, urinary symptoms, visual change, vomiting or weakness.   Hernia   Associated symptoms include fatigue and nausea. Pertinent negatives include no abdominal pain, arthralgias, chest pain, chills, congestion, coughing, diaphoresis, fever, headaches, joint swelling, myalgias, neck pain, numbness, rash, sore throat, urinary symptoms, visual change, vomiting or weakness.   Hypertension   This is a recurrent problem. The current episode started more than 1 month ago. The problem has been rapidly worsening since onset. The problem is controlled. Associated symptoms include anxiety and malaise/fatigue. Pertinent negatives include no blurred vision, chest pain, headaches, neck pain, palpitations, peripheral edema, PND, shortness of breath or sweats. Risk factors for coronary artery disease include sedentary lifestyle. Current antihypertension treatment includes ACE inhibitors. The current treatment provides mild improvement.  Compliance problems include diet.  There is no history of angina, kidney disease, CAD/MI, CVA, heart failure, left ventricular hypertrophy, PVD or retinopathy. Identifiable causes of hypertension include a thyroid problem. There is no history of chronic renal disease, coarctation of the aorta, hyperaldosteronism, hypercortisolism, hyperparathyroidism, a hypertension causing med, pheochromocytoma, renovascular disease or sleep apnea.   Hypothyroidism   This is a recurrent problem. The current episode started more than 1 year ago. The problem occurs constantly. The problem has been rapidly worsening. Associated symptoms include fatigue and nausea. Pertinent negatives include no abdominal pain, arthralgias, chest pain, chills, congestion, coughing, diaphoresis, fever, headaches, joint swelling, myalgias, neck pain, numbness, rash, sore throat, urinary symptoms, visual change, vomiting or weakness. Nothing aggravates the symptoms. She has tried acetaminophen, sleep, rest and relaxation (synthroid ) for the symptoms. The treatment provided mild relief.        The following portions of the patient's history were reviewed and updated as appropriate: allergies, current medications, past social history and problem list.    Review of Systems   Constitutional: Positive for fatigue, irritability and malaise/fatigue. Negative for activity change, appetite change, chills, diaphoresis, fever and unexpected weight change.        Hot flashes    HENT: Positive for postnasal drip, tinnitus and voice change. Negative for congestion, dental problem, drooling, ear discharge, ear pain, facial swelling, hearing loss, mouth sores, nosebleeds, rhinorrhea, sinus pressure, sinus pain, sneezing, sore throat and trouble swallowing.    Eyes: Negative.  Negative for blurred vision, photophobia, pain, discharge, redness, itching and visual disturbance.   Respiratory: Negative.  Negative for apnea, cough, choking, chest tightness, shortness of  "breath, wheezing and stridor.    Cardiovascular: Negative.  Negative for chest pain, palpitations, leg swelling and PND.   Gastrointestinal: Positive for nausea. Negative for abdominal distention, abdominal pain, anal bleeding, blood in stool, constipation, diarrhea, rectal pain and vomiting.   Endocrine: Negative.  Negative for cold intolerance, heat intolerance, polydipsia, polyphagia and polyuria.   Genitourinary: Negative.  Negative for difficulty urinating, dyspareunia, dysuria and impotence.   Musculoskeletal: Negative.  Negative for arthralgias, back pain, gait problem, joint swelling, myalgias, neck pain and neck stiffness.   Skin: Negative.  Negative for color change, pallor, rash and wound.   Allergic/Immunologic: Positive for environmental allergies. Negative for food allergies and immunocompromised state.   Neurological: Negative.  Negative for dizziness, tremors, syncope, facial asymmetry, speech difficulty, weakness, light-headedness, numbness and headaches.   Hematological: Negative.  Negative for adenopathy. Does not bruise/bleed easily.   Psychiatric/Behavioral: Positive for agitation and behavioral problems. Negative for confusion, decreased concentration, dysphoric mood, hallucinations, self-injury, sleep disturbance and suicidal ideas. The patient is nervous/anxious and has insomnia. The patient is not hyperactive.         Is going to therapy -not scheduled regularly    All other systems reviewed and are negative.      Objective   /82   Ht 154.9 cm (61\")   Wt 55.3 kg (122 lb)   BMI 23.05 kg/m²   Physical Exam   Constitutional: She is oriented to person, place, and time. She appears well-developed and well-nourished. No distress.   Family stressors, patient is very stressed at this time    HENT:   Head: Normocephalic and atraumatic.   Right Ear: External ear normal.   Left Ear: External ear normal.   Mouth/Throat: No oropharyngeal exudate.   Oral cavity dry improving    Eyes: Conjunctivae " and EOM are normal. Pupils are equal, round, and reactive to light. Right eye exhibits no discharge. Left eye exhibits no discharge. No scleral icterus.   Neck: Normal range of motion. Neck supple. No JVD present. No tracheal deviation present. No thyromegaly present.   Cardiovascular: Normal rate, regular rhythm, normal heart sounds and intact distal pulses. Exam reveals no gallop and no friction rub.   No murmur heard.  Pulmonary/Chest: Effort normal and breath sounds normal. No stridor. No respiratory distress. She has no wheezes. She has no rales. She exhibits no tenderness.   Abdominal: Soft. Bowel sounds are normal. She exhibits no distension and no mass. There is tenderness. There is no rebound and no guarding. No hernia.   Musculoskeletal: Normal range of motion. She exhibits tenderness. She exhibits no edema or deformity.   Lymphadenopathy:     She has no cervical adenopathy.   Neurological: She is alert and oriented to person, place, and time. She has normal reflexes. She displays normal reflexes. No cranial nerve deficit or sensory deficit. She exhibits normal muscle tone. Coordination normal.   Skin: Skin is warm and dry. No rash noted. She is not diaphoretic. No erythema. No pallor.   Nursing note and vitals reviewed.      Assessment/Plan   Problem List Items Addressed This Visit        Hematopoietic and Hemostatic    Absolute anemia - Primary         No orders of the defined types were placed in this encounter.     start b12 injections every 2 weeks as directed, increase fluids, diet discussed, follow up with gastro as directed

## 2019-01-24 ENCOUNTER — CLINICAL SUPPORT (OUTPATIENT)
Dept: FAMILY MEDICINE CLINIC | Facility: CLINIC | Age: 74
End: 2019-01-24

## 2019-01-24 DIAGNOSIS — E53.8 VITAMIN B 12 DEFICIENCY: Primary | ICD-10-CM

## 2019-01-24 PROCEDURE — 96372 THER/PROPH/DIAG INJ SC/IM: CPT | Performed by: NURSE PRACTITIONER

## 2019-01-24 RX ORDER — CYANOCOBALAMIN 1000 UG/ML
1000 INJECTION, SOLUTION INTRAMUSCULAR; SUBCUTANEOUS
Status: DISCONTINUED | OUTPATIENT
Start: 2019-01-24 | End: 2019-03-11

## 2019-01-24 RX ADMIN — CYANOCOBALAMIN 1000 MCG: 1000 INJECTION, SOLUTION INTRAMUSCULAR; SUBCUTANEOUS at 10:53

## 2019-01-30 ENCOUNTER — OFFICE VISIT (OUTPATIENT)
Dept: FAMILY MEDICINE CLINIC | Facility: CLINIC | Age: 74
End: 2019-01-30

## 2019-01-30 VITALS
BODY MASS INDEX: 23.22 KG/M2 | TEMPERATURE: 98.5 F | WEIGHT: 123 LBS | DIASTOLIC BLOOD PRESSURE: 82 MMHG | HEIGHT: 61 IN | SYSTOLIC BLOOD PRESSURE: 140 MMHG

## 2019-01-30 DIAGNOSIS — H57.89 EYE IRRITATION: ICD-10-CM

## 2019-01-30 DIAGNOSIS — F41.9 ANXIETY: ICD-10-CM

## 2019-01-30 DIAGNOSIS — J06.9 ACUTE UPPER RESPIRATORY INFECTION: Primary | ICD-10-CM

## 2019-01-30 PROCEDURE — 99213 OFFICE O/P EST LOW 20 MIN: CPT | Performed by: NURSE PRACTITIONER

## 2019-01-30 RX ORDER — ACYCLOVIR 400 MG/1
400 TABLET ORAL 3 TIMES DAILY
Qty: 270 TABLET | Refills: 1 | Status: SHIPPED | OUTPATIENT
Start: 2019-01-30 | End: 2019-02-20 | Stop reason: SDUPTHER

## 2019-01-30 RX ORDER — PREDNISOLONE ACETATE 10 MG/ML
1 SUSPENSION/ DROPS OPHTHALMIC 4 TIMES DAILY
Qty: 5 ML | Refills: 11 | Status: SHIPPED | OUTPATIENT
Start: 2019-01-30 | End: 2019-03-11

## 2019-01-30 RX ORDER — HYDROXYZINE PAMOATE 25 MG/1
25 CAPSULE ORAL 3 TIMES DAILY PRN
Qty: 60 CAPSULE | Refills: 5 | Status: SHIPPED | OUTPATIENT
Start: 2019-01-30 | End: 2019-02-18 | Stop reason: SDUPTHER

## 2019-01-30 RX ORDER — ACYCLOVIR 400 MG/1
400 TABLET ORAL 3 TIMES DAILY
COMMUNITY
End: 2019-01-30 | Stop reason: SDUPTHER

## 2019-01-30 NOTE — PROGRESS NOTES
Chief Complaint   Patient presents with   • Rash     redness around left eye, itchy   • Anxiety     really bad     Subjective   Alva Mills is a 73 y.o. female.     Cough   This is a recurrent problem. Associated symptoms include ear pain, eye redness, headaches, postnasal drip and a sore throat. Pertinent negatives include no chest pain, chills, fever, myalgias, rash, rhinorrhea, shortness of breath, sweats or wheezing. The treatment provided mild relief. Her past medical history is significant for environmental allergies. There is no history of asthma, bronchiectasis, bronchitis, COPD or pneumonia.   Sore Throat    Associated symptoms include congestion, coughing, ear pain and headaches. Pertinent negatives include no abdominal pain, diarrhea, drooling, ear discharge, neck pain, shortness of breath, stridor, swollen glands, trouble swallowing or vomiting.   Headache    Associated symptoms include coughing, ear pain, eye redness, sinus pressure, a sore throat and tinnitus. Pertinent negatives include no abdominal pain, back pain, dizziness, eye pain, fever, hearing loss, nausea, neck pain, numbness, photophobia, rhinorrhea, swollen glands, visual change, vomiting or weakness.   Anxiety   Presents for follow-up visit. Symptoms include depressed mood, excessive worry, irritability, malaise, nervous/anxious behavior, panic and restlessness. Patient reports no chest pain, compulsions, confusion, decreased concentration, dizziness, feeling of choking, hyperventilation, impotence, muscle tension, nausea, palpitations, shortness of breath or suicidal ideas. Symptoms occur most days. The quality of sleep is good. Nighttime awakenings: none.     There is no history of asthma. Compliance with medications is %.   Sinus Problem   This is a recurrent problem. The problem has been gradually worsening since onset. There has been no fever. She is experiencing no pain. Associated symptoms include congestion, coughing, ear  pain, headaches, sinus pressure and a sore throat. Pertinent negatives include no chills, diaphoresis, neck pain, shortness of breath, sneezing or swollen glands.   Hernia   Associated symptoms include congestion, coughing, fatigue, headaches and a sore throat. Pertinent negatives include no abdominal pain, arthralgias, chest pain, chills, diaphoresis, fever, joint swelling, myalgias, nausea, neck pain, numbness, rash, swollen glands, urinary symptoms, visual change, vomiting or weakness.   Hypertension   This is a recurrent problem. The current episode started more than 1 month ago. The problem has been rapidly worsening since onset. The problem is controlled. Associated symptoms include anxiety, headaches and malaise/fatigue. Pertinent negatives include no chest pain, neck pain, palpitations, peripheral edema, PND, shortness of breath or sweats. Risk factors for coronary artery disease include sedentary lifestyle. Current antihypertension treatment includes ACE inhibitors. The current treatment provides mild improvement. Compliance problems include diet.  There is no history of angina, kidney disease, CAD/MI, CVA, heart failure, left ventricular hypertrophy, PVD or retinopathy. Identifiable causes of hypertension include a thyroid problem. There is no history of chronic renal disease, coarctation of the aorta, hyperaldosteronism, hypercortisolism, hyperparathyroidism, a hypertension causing med, pheochromocytoma, renovascular disease or sleep apnea.   Hypothyroidism   This is a recurrent problem. The current episode started more than 1 year ago. The problem occurs constantly. The problem has been rapidly worsening. Associated symptoms include congestion, coughing, fatigue, headaches and a sore throat. Pertinent negatives include no abdominal pain, arthralgias, chest pain, chills, diaphoresis, fever, joint swelling, myalgias, nausea, neck pain, numbness, rash, swollen glands, urinary symptoms, visual change,  vomiting or weakness. Nothing aggravates the symptoms. She has tried acetaminophen, sleep, rest and relaxation (synthroid ) for the symptoms. The treatment provided mild relief.   Eye Problem    Both eyes are affected.The problem occurs every several days. The problem has been gradually worsening. The patient is experiencing no pain. There is no known exposure to pink eye. She does not wear contacts. Associated symptoms include eye redness and itching. Pertinent negatives include no eye discharge, fever, nausea, photophobia, vomiting or weakness.        The following portions of the patient's history were reviewed and updated as appropriate: allergies, current medications, past social history and problem list.    Review of Systems   Constitutional: Positive for fatigue, irritability and malaise/fatigue. Negative for activity change, appetite change, chills, diaphoresis, fever and unexpected weight change.   HENT: Positive for congestion, ear pain, postnasal drip, sinus pressure, sinus pain, sore throat, tinnitus and voice change. Negative for dental problem, drooling, ear discharge, facial swelling, hearing loss, mouth sores, nosebleeds, rhinorrhea, sneezing and trouble swallowing.    Eyes: Positive for redness and itching. Negative for photophobia, pain, discharge and visual disturbance.   Respiratory: Positive for cough. Negative for apnea, choking, chest tightness, shortness of breath, wheezing and stridor.    Cardiovascular: Negative.  Negative for chest pain, palpitations, leg swelling and PND.   Gastrointestinal: Negative.  Negative for abdominal distention, abdominal pain, anal bleeding, blood in stool, constipation, diarrhea, nausea and vomiting.   Endocrine: Negative.    Genitourinary: Negative.  Negative for difficulty urinating, dyspareunia, dysuria and impotence.   Musculoskeletal: Negative.  Negative for arthralgias, back pain, gait problem, joint swelling, myalgias, neck pain and neck stiffness.  "  Skin: Negative.  Negative for color change and rash.   Allergic/Immunologic: Positive for environmental allergies. Negative for food allergies and immunocompromised state.   Neurological: Positive for headaches. Negative for dizziness, tremors, syncope, facial asymmetry, speech difficulty, weakness, light-headedness and numbness.   Hematological: Negative.  Negative for adenopathy. Does not bruise/bleed easily.   Psychiatric/Behavioral: Positive for agitation and dysphoric mood. Negative for behavioral problems, confusion, decreased concentration, hallucinations, self-injury, sleep disturbance and suicidal ideas. The patient is nervous/anxious. The patient is not hyperactive.    All other systems reviewed and are negative.      Objective   /82   Temp 98.5 °F (36.9 °C) (Tympanic)   Ht 154.9 cm (61\")   Wt 55.8 kg (123 lb)   BMI 23.24 kg/m²   Physical Exam   Constitutional: She is oriented to person, place, and time. She appears well-developed and well-nourished. No distress.   Family stressors, patient is very stressed at this time    HENT:   Head: Normocephalic and atraumatic.   Right Ear: External ear normal.   Mouth/Throat: No oropharyngeal exudate.   Oral cavity dry    Eyes: Conjunctivae and EOM are normal. Pupils are equal, round, and reactive to light. Right eye exhibits no discharge. Left eye exhibits no discharge. Scleral icterus is present.   Neck: Normal range of motion. Neck supple. No JVD present. No tracheal deviation present. No thyromegaly present.   Cardiovascular: Normal rate and regular rhythm. Exam reveals no gallop and no friction rub.   No murmur heard.  Pulmonary/Chest: Effort normal. No stridor. No respiratory distress. She has wheezes. She has no rales. She exhibits no tenderness.   Abdominal: Soft. Bowel sounds are normal. She exhibits no distension and no mass. There is tenderness. There is no rebound and no guarding. No hernia.   Musculoskeletal: Normal range of motion. She " exhibits tenderness. She exhibits no edema or deformity.   Lymphadenopathy:     She has no cervical adenopathy.   Neurological: She is alert and oriented to person, place, and time. She has normal reflexes. She displays normal reflexes. No cranial nerve deficit. She exhibits normal muscle tone. Coordination normal.   Skin: Skin is warm and dry. No rash noted. She is not diaphoretic. No erythema. No pallor.   Nursing note and vitals reviewed.      Assessment/Plan   Problem List Items Addressed This Visit        Respiratory    Acute upper respiratory infection - Primary       Other    Anxiety    Eye irritation    Relevant Medications    prednisoLONE acetate (PRED FORTE) 1 % ophthalmic suspension           New Medications Ordered This Visit   Medications   • acyclovir (ZOVIRAX) 400 MG tablet     Sig: Take 1 tablet by mouth 3 (Three) Times a Day. Take no more than 5 doses a day.     Dispense:  270 tablet     Refill:  1   • hydrOXYzine pamoate (VISTARIL) 25 MG capsule     Sig: Take 1 capsule by mouth 3 (Three) Times a Day As Needed for Anxiety.     Dispense:  60 capsule     Refill:  5   • prednisoLONE acetate (PRED FORTE) 1 % ophthalmic suspension     Sig: Administer 1 drop to both eyes 4 (Four) Times a Day.     Dispense:  5 mL     Refill:  11   It's not just what you eat, but when you eat  Eat breakfast, and eat smaller meals throughout the day. A healthy breakfast can jumpstart your metabolism, while eating small, healthy meals (rather than the standard three large meals) keeps your energy up.   Avoid eating at night. Try to eat dinner earlier and fast for 14-16 hours until breakfast the next morning. Studies suggest that eating only when you’re most active and giving your digestive system a long break each day may help to regulate weight.     Add vistaril for anxiety, pred forte for eye irritation, continue with therapy as directed

## 2019-02-05 ENCOUNTER — TELEPHONE (OUTPATIENT)
Dept: FAMILY MEDICINE CLINIC | Facility: CLINIC | Age: 74
End: 2019-02-05

## 2019-02-05 NOTE — TELEPHONE ENCOUNTER
[facility] CALLED FOR SILVIA REINA..THE PRESP FOR VISTERIAL IS ON NATIONAL BACK ORDER..ASKING IF SOFIA WANTS TO CHANGED TO SOMETHING ELSE?

## 2019-02-14 ENCOUNTER — TELEPHONE (OUTPATIENT)
Dept: FAMILY MEDICINE CLINIC | Facility: CLINIC | Age: 74
End: 2019-02-14

## 2019-02-14 NOTE — TELEPHONE ENCOUNTER
[facility] CALLED FOR SILVIA REINA,,THE HYDROXIZINE 25MG IS ON NATIONAL BACK ORDER AND DON'T KNOW WHEN THEY WILL BE ABLE TO GET IT..ASKING IF THIS CAN BE CHANGED TO SOMETHING ELSE?

## 2019-02-18 RX ORDER — HYDROXYZINE PAMOATE 25 MG/1
25 CAPSULE ORAL 3 TIMES DAILY PRN
Qty: 180 CAPSULE | Refills: 3 | Status: SHIPPED | OUTPATIENT
Start: 2019-02-18 | End: 2019-11-06

## 2019-02-20 RX ORDER — ACYCLOVIR 400 MG/1
400 TABLET ORAL 3 TIMES DAILY
Qty: 90 TABLET | Refills: 3 | Status: SHIPPED | OUTPATIENT
Start: 2019-02-20 | End: 2019-03-29 | Stop reason: SDUPTHER

## 2019-03-14 ENCOUNTER — ANESTHESIA (OUTPATIENT)
Dept: GASTROENTEROLOGY | Facility: HOSPITAL | Age: 74
End: 2019-03-14

## 2019-03-14 ENCOUNTER — APPOINTMENT (OUTPATIENT)
Dept: GENERAL RADIOLOGY | Facility: HOSPITAL | Age: 74
End: 2019-03-14

## 2019-03-14 ENCOUNTER — HOSPITAL ENCOUNTER (OUTPATIENT)
Facility: HOSPITAL | Age: 74
Setting detail: HOSPITAL OUTPATIENT SURGERY
Discharge: HOME | End: 2019-03-14
Attending: INTERNAL MEDICINE | Admitting: INTERNAL MEDICINE

## 2019-03-14 ENCOUNTER — ANESTHESIA EVENT (OUTPATIENT)
Dept: GASTROENTEROLOGY | Facility: HOSPITAL | Age: 74
End: 2019-03-14

## 2019-03-14 VITALS
RESPIRATION RATE: 18 BRPM | OXYGEN SATURATION: 97 % | TEMPERATURE: 97.7 F | HEART RATE: 65 BPM | BODY MASS INDEX: 22.94 KG/M2 | WEIGHT: 121.5 LBS | DIASTOLIC BLOOD PRESSURE: 60 MMHG | HEIGHT: 61 IN | SYSTOLIC BLOOD PRESSURE: 136 MMHG

## 2019-03-14 DIAGNOSIS — K80.50: ICD-10-CM

## 2019-03-14 PROCEDURE — 88300 SURGICAL PATH GROSS: CPT | Performed by: INTERNAL MEDICINE

## 2019-03-14 PROCEDURE — 88300 SURGICAL PATH GROSS: CPT | Performed by: PATHOLOGY

## 2019-03-14 PROCEDURE — 25010000002 IOPAMIDOL 61 % SOLUTION: Performed by: INTERNAL MEDICINE

## 2019-03-14 PROCEDURE — 25010000002 PROPOFOL 10 MG/ML EMULSION: Performed by: NURSE ANESTHETIST, CERTIFIED REGISTERED

## 2019-03-14 PROCEDURE — 74328 X-RAY BILE DUCT ENDOSCOPY: CPT

## 2019-03-14 PROCEDURE — C1769 GUIDE WIRE: HCPCS | Performed by: INTERNAL MEDICINE

## 2019-03-14 RX ORDER — DEXTROSE AND SODIUM CHLORIDE 5; .45 G/100ML; G/100ML
30 INJECTION, SOLUTION INTRAVENOUS CONTINUOUS
Status: DISCONTINUED | OUTPATIENT
Start: 2019-03-14 | End: 2019-03-14 | Stop reason: HOSPADM

## 2019-03-14 RX ORDER — LIDOCAINE HYDROCHLORIDE 10 MG/ML
INJECTION, SOLUTION INFILTRATION; PERINEURAL AS NEEDED
Status: DISCONTINUED | OUTPATIENT
Start: 2019-03-14 | End: 2019-03-14 | Stop reason: SURG

## 2019-03-14 RX ORDER — PROPOFOL 10 MG/ML
VIAL (ML) INTRAVENOUS AS NEEDED
Status: DISCONTINUED | OUTPATIENT
Start: 2019-03-14 | End: 2019-03-14 | Stop reason: SURG

## 2019-03-14 RX ADMIN — PROPOFOL 100 MG: 10 INJECTION, EMULSION INTRAVENOUS at 12:16

## 2019-03-14 RX ADMIN — PROPOFOL 20 MG: 10 INJECTION, EMULSION INTRAVENOUS at 12:27

## 2019-03-14 RX ADMIN — PROPOFOL 30 MG: 10 INJECTION, EMULSION INTRAVENOUS at 12:23

## 2019-03-14 RX ADMIN — LIDOCAINE HYDROCHLORIDE 50 MG: 10 INJECTION, SOLUTION INFILTRATION; PERINEURAL at 12:16

## 2019-03-14 RX ADMIN — PROPOFOL 30 MG: 10 INJECTION, EMULSION INTRAVENOUS at 12:19

## 2019-03-14 RX ADMIN — DEXTROSE AND SODIUM CHLORIDE 30 ML/HR: 5; 450 INJECTION, SOLUTION INTRAVENOUS at 10:30

## 2019-03-14 RX ADMIN — IOPAMIDOL 11 ML: 612 INJECTION, SOLUTION INTRAVENOUS at 12:25

## 2019-03-14 NOTE — ANESTHESIA POSTPROCEDURE EVALUATION
Patient: Alva Mills    Procedure Summary     Date:  03/14/19 Room / Location:  [facility] ENDOSCOPY 2 / [facility] ENDOSCOPY    Anesthesia Start:  1207 Anesthesia Stop:  1233    Procedure:  ENDOSCOPIC RETROGRADE CHOLANGIOPANCREATOGRAPHY (Left ) Diagnosis:      Surgeon:  Homero Allen DO Provider:  Liang Henning CRNA    Anesthesia Type:  MAC ASA Status:  3          Anesthesia Type: MAC  Last vitals  BP   166/68 (03/14/19 1014)   Temp   97.7 °F (36.5 °C) (03/14/19 1014)   Pulse   70 (03/14/19 1014)   Resp   16 (03/14/19 1014)     SpO2   97 % (03/14/19 1014)     Post Anesthesia Care and Evaluation    Patient location during evaluation: bedside  Patient participation: complete - patient participated  Level of consciousness: awake and alert  Pain score: 0  Pain management: adequate  Airway patency: patent  Anesthetic complications: No anesthetic complications  PONV Status: none  Cardiovascular status: acceptable  Respiratory status: acceptable  Hydration status: acceptable

## 2019-03-14 NOTE — ANESTHESIA PREPROCEDURE EVALUATION
Anesthesia Evaluation     Patient summary reviewed and Nursing notes reviewed   NPO Solid Status: > 8 hours  NPO Liquid Status: > 2 hours           Airway   Mallampati: II  TM distance: >3 FB  Neck ROM: full  No difficulty expected  Dental    (+) lower dentures and upper dentures    Pulmonary - normal exam   (+) a smoker Former,   Cardiovascular     Rhythm: regular  Rate: normal        Neuro/Psych  GI/Hepatic/Renal/Endo      Musculoskeletal (-) negative ROS    Abdominal    Substance History      OB/GYN          Other                        Anesthesia Plan    ASA 3     MAC     intravenous induction   Anesthetic plan, all risks, benefits, and alternatives have been provided, discussed and informed consent has been obtained with: patient.    Plan discussed with CRNA.

## 2019-03-14 NOTE — H&P
Homero Kearney DO,[facility]  Gastroenterology  Hepatology  Endoscopy  Board Certified in Internal Medicine and gastroenterology  [address], [address]  [place]. [address]  T- [phone]   F - [phone]     GASTROENTEROLOGY HISTORY AND PHYSICAL  NOTE   HOMERO KEARNEY DO.         SUBJECTIVE:   3/14/2019    Name: Alva Mills  DOD: 1945        Chief Complaint:     Subjective : Removal of biliary stent.  Patient consideration for conversion to a Aramis-en-Y has been reviewed with  and Dr. Thomas.  The plans are to do every 6 months Percy lavaging of biliary system  Patient is 73 y.o. female presents with desire for elective ERCP with removal of biliary stent.      ROS/HISTORY/ CURRENT MEDICATIONS/OBJECTIVE/VS/PE:   Review of Systems:  All systems unremarkable unless specified below.  Constitutional   HENT  Eyes   Respiratory    Cardiovascular  Gastrointestinal   Endocrine  Genitourinary    Musculoskeletal   Skin  Allergic/Immunologic    Neurological    Hematological  Psychiatric/Behavioral    History:     Past Medical History:   Diagnosis Date   • Abdominal pain    • Acquired hypothyroidism    • Acute bronchitis    • Acute sinusitis    • Acute upper respiratory infection    • Benign hypertension    • Breast cyst    • Breast lump     history of, right   • Calf pain    • Common bile duct calculus    • Constipation    • Cough    • Depressive disorder    • Elevated cholesterol    • Epigastric pain    • External hemorrhoids without complication    • Fibrocystic breast    • Functional heart murmur    • Gastroesophageal reflux disease    • General medical exam    • Hemorrhoids    • Hyperlipidemia    • Incisional hernia     no evident recurrence at this juncture   • Localized, primary osteoarthritis of ankle or foot    • Muscle strain    • Skin lesion      Past Surgical History:   Procedure Laterality Date   • BACK SURGERY      x2   • BILE DUCT EXPLORATION  12/10/2013    Remove bile duct stone (1)     Common duct exploration, stone extraction, choledochoduodenostomy and choledochoscopy.    • BREAST BIOPSY Right 1991    Stereotactic breast biopsy (1)    Right breast    • CHOLECYSTECTOMY OPEN  1989   • ERCP Left 1/10/2019    Procedure: ENDOSCOPIC RETROGRADE CHOLANGIOPANCREATOGRAPHY;  Surgeon: Homero Allen DO;  Location: [facility] ENDOSCOPY;  Service: Gastroenterology   • HERNIA REPAIR  06/16/2014    Anesth, repair of hernia (1)    laparoscopic ventral hernioplasty with mesh. Ventral hernia.    • HYSTERECTOMY  10/17/2001    Anesth, hysterectomy (1)    Laparoscopic subtotal hysterectomy & BSO. Enterolysis of sigmoid colon.    • INJECTION OF MEDICATION  08/12/2013    Dexamethasone (2)      • INJECTION OF MEDICATION  01/31/2013    Kenalog (1)      • INJECTION OF MEDICATION  02/11/2015    Rocephin (1)      • OOPHORECTOMY     • OTHER SURGICAL HISTORY  08/12/2013    Small Joint Injection/Aspiration 20600 (2)      • TUBAL ABDOMINAL LIGATION       Family History   Problem Relation Age of Onset   • Heart disease Other    • Hypertension Other      Social History     Tobacco Use   • Smoking status: Former Smoker   • Smokeless tobacco: Never Used   • Tobacco comment: about 45 years ago (Dec 04 2013)   Substance Use Topics   • Alcohol use: No     Frequency: Never   • Drug use: No     Medications Prior to Admission   Medication Sig Dispense Refill Last Dose   • CALCIUM PO Take 1 tablet by mouth Daily With Breakfast. 500g   3/13/2019 at Unknown time   • carisoprodol (SOMA) 350 MG tablet Take 350 mg by mouth Every 6 (Six) Hours As Needed.   3/13/2019 at Unknown time   • desvenlafaxine (PRISTIQ) 100 MG 24 hr tablet Take 1 tablet by mouth Daily. 90 tablet 3 3/14/2019 at Unknown time   • diazePAM (VALIUM) 5 MG tablet Take 1 tablet by mouth Every 12 (Twelve) Hours As Needed for Anxiety or Sleep. 60 tablet 0 3/13/2019 at Unknown time   • fenofibrate (TRICOR) 145 MG tablet Take 145 mg by mouth Daily.   3/13/2019 at Unknown time   •  HYDROcodone-acetaminophen (NORCO)  MG per tablet Take 1 tablet by mouth Every 6 (Six) Hours As Needed.   3/14/2019 at Unknown time   • hydrOXYzine pamoate (VISTARIL) 25 MG capsule Take 1 capsule by mouth 3 (Three) Times a Day As Needed for Anxiety. 180 capsule 3 3/13/2019 at Unknown time   • Magnesium 500 MG capsule Take 1 capsule by mouth Daily.   3/13/2019 at Unknown time   • metoprolol succinate XL (TOPROL-XL) 25 MG 24 hr tablet Take 1 tablet by mouth Daily. 90 tablet 3 3/14/2019 at Unknown time   • prochlorperazine (COMPAZINE) 5 MG tablet Take 1 tablet by mouth Every 6 (Six) Hours As Needed for Nausea or Vomiting. 100 tablet 5 Past Week at Unknown time   • promethazine (PHENERGAN) 25 MG tablet TAKE 1 TABLET EVERY 6 HOURSAS NEEDED FOR NAUSEA OR    VOMITING 180 tablet 0 3/13/2019 at Unknown time   • SYNTHROID 50 MCG tablet TAKE 1 TABLET DAILY 90 tablet 3 3/14/2019 at Unknown time   • acyclovir (ZOVIRAX) 400 MG tablet Take 1 tablet by mouth 3 (Three) Times a Day. Take no more than 5 doses a day. 90 tablet 3 More than a month at Unknown time   • albuterol (PROVENTIL HFA;VENTOLIN HFA) 108 (90 Base) MCG/ACT inhaler Inhale 2 puffs Every 4 (Four) Hours As Needed for Wheezing. 1 inhaler 2 3/12/2019   • AZELASTINE HCL NA azelastine 137 mcg (0.1 %) nasal spray aerosol   3/12/2019     Allergies:  Clonazepam; Hydromorphone; Morphine; and Aripiprazole    I have reviewed the patients medical history, surgical history and family history in the available medical record system.     Current Medications:     Current Facility-Administered Medications   Medication Dose Route Frequency Provider Last Rate Last Dose   • dextrose 5 % and sodium chloride 0.45 % infusion  30 mL/hr Intravenous Continuous Homero Allen DO 30 mL/hr at 03/14/19 1030 30 mL/hr at 03/14/19 1030       Objective     Physical Exam:   Temp:  [97.7 °F (36.5 °C)] 97.7 °F (36.5 °C)  Heart Rate:  [70] 70  Resp:  [16] 16  BP: (166)/(68) 166/68    Physical  Exam:  General Appearance:    Alert, cooperative, in no acute distress   Head:    Normocephalic, without obvious abnormality, atraumatic   Eyes:            Lids and lashes normal, conjunctivae and sclerae normal, no   icterus, no pallor, corneas clear, PERRLA   Ears:    Ears appear intact with no abnormalities noted   Throat:   No oral lesions, no thrush, oral mucosa moist   Neck:   No adenopathy, supple, trachea midline, no thyromegaly, no     carotid bruit, no JVD   Back:     No kyphosis present, no scoliosis present, no skin lesions,       erythema or scars, no tenderness to percussion or                   palpation,   range of motion normal   Lungs:     Clear to auscultation,respirations regular, even and                   unlabored    Heart:    Regular rhythm and normal rate, normal S1 and S2, no            murmur, no gallop, no rub, no click   Breast Exam:    Deferred   Abdomen:     Normal bowel sounds, no masses, no organomegaly, soft        non-tender, non-distended, no guarding, no rebound                 tenderness   Genitalia:    Deferred   Extremities:   Moves all extremities well, no edema, no cyanosis, no              redness   Pulses:   Pulses palpable and equal bilaterally   Skin:   No bleeding, bruising or rash   Lymph nodes:   No palpable adenopathy   Neurologic:   Cranial nerves 2 - 12 grossly intact, sensation intact, DTR        present and equal bilaterally      Results Review:     Lab Results   Component Value Date    WBC 9.62 01/11/2019    WBC 10.90 (H) 01/10/2019    WBC 21.24 (H) 01/09/2019    HGB 11.4 (L) 01/11/2019    HGB 10.8 (L) 01/10/2019    HGB 12.8 01/09/2019    HCT 35.2 01/11/2019    HCT 32.0 (L) 01/10/2019    HCT 38.4 01/09/2019     01/11/2019     01/10/2019     01/09/2019             No results found for: LIPASE  Lab Results   Component Value Date    INR 1.0 03/27/2016    INR 1.0 02/05/2015          Radiology Review:  Imaging Results (last 72 hours)     ** No  results found for the last 72 hours. **           I reviewed the patient's new clinical results.  I reviewed the patient's new imaging results and agree with the interpretation.     ASSESSMENT/PLAN:   ASSESSMENT:  1.  Foreign body, common bile duct  2.  Choledocholithiasis, recurrent after choledochoduodenostomy    PLAN:  1.  Endoscopic retrograde cholangiogram with removal of any remaining stones as well as removal of common bile duct stent    Risk and benefits associated with the procedure are reviewed with the patient.  The patient wishes to proceed     Homero Allen DO  03/14/19  11:50 AM

## 2019-03-16 LAB
LAB AP CASE REPORT: NORMAL
PATH REPORT.FINAL DX SPEC: NORMAL
PATH REPORT.GROSS SPEC: NORMAL

## 2019-03-20 RX ORDER — METOPROLOL SUCCINATE 25 MG/1
TABLET, EXTENDED RELEASE ORAL
Qty: 90 TABLET | Refills: 3 | Status: SHIPPED | OUTPATIENT
Start: 2019-03-20 | End: 2020-03-30 | Stop reason: SDUPTHER

## 2019-03-28 ENCOUNTER — OFFICE VISIT (OUTPATIENT)
Dept: FAMILY MEDICINE CLINIC | Facility: CLINIC | Age: 74
End: 2019-03-28

## 2019-03-28 VITALS
DIASTOLIC BLOOD PRESSURE: 68 MMHG | HEIGHT: 61 IN | BODY MASS INDEX: 23.18 KG/M2 | WEIGHT: 122.8 LBS | SYSTOLIC BLOOD PRESSURE: 128 MMHG

## 2019-03-28 DIAGNOSIS — F41.9 ANXIETY: Primary | ICD-10-CM

## 2019-03-28 PROCEDURE — 99213 OFFICE O/P EST LOW 20 MIN: CPT | Performed by: NURSE PRACTITIONER

## 2019-03-28 RX ORDER — BUSPIRONE HYDROCHLORIDE 10 MG/1
TABLET ORAL
Qty: 90 TABLET | Refills: 5 | Status: SHIPPED | OUTPATIENT
Start: 2019-03-28 | End: 2019-04-29

## 2019-03-28 NOTE — PROGRESS NOTES
Chief Complaint   Patient presents with   • Anxiety     nerves    • Depression     Subjective   Alva Mills is a 73 y.o. female.     Presents with increase in anxiety and stress reports stress is worsening not feeling well       Anxiety   Presents for follow-up visit. Symptoms include depressed mood, excessive worry, insomnia, irritability, malaise, nausea, nervous/anxious behavior, panic and restlessness. Patient reports no chest pain, compulsions, confusion, decreased concentration, dizziness, dry mouth, feeling of choking, hyperventilation, impotence, muscle tension, obsessions, palpitations, shortness of breath or suicidal ideas. Symptoms occur most days. The quality of sleep is good. Nighttime awakenings: none.     Her past medical history is significant for depression. Compliance with medications is %.   Depression   Visit Type: follow-up  Patient presents with the following symptoms: depressed mood, excessive worry, fatigue, insomnia, irritability, malaise, nervousness/anxiety, panic and restlessness.  Patient is not experiencing: chest pain, choking sensation, compulsions, confusion, decreased concentration, dizziness, dry mouth, feelings of hopelessness, feelings of worthlessness, hypersomnia, hyperventilation, impotence, memory impairment, muscle tension, nausea, obsessions, palpitations, psychomotor agitation, psychomotor retardation, shortness of breath, suicidal ideas, suicidal planning, thoughts of death, weight gain and weight loss.  Frequency of symptoms: most days   Severity: severe   Sleep quality: fair  Nighttime awakenings: none  Compliance with medications:  %        Hernia   Associated symptoms include fatigue and nausea. Pertinent negatives include no abdominal pain, arthralgias, chest pain, chills, congestion, coughing, diaphoresis, fever, headaches, joint swelling, myalgias, neck pain, numbness, rash, urinary symptoms, visual change, vomiting or weakness.   Hypertension    This is a recurrent problem. The current episode started more than 1 month ago. The problem has been rapidly worsening since onset. The problem is controlled. Associated symptoms include anxiety and malaise/fatigue. Pertinent negatives include no blurred vision, chest pain, headaches, neck pain, palpitations, peripheral edema, PND, shortness of breath or sweats. Risk factors for coronary artery disease include sedentary lifestyle. Current antihypertension treatment includes ACE inhibitors. The current treatment provides mild improvement. Compliance problems include diet.  There is no history of angina, kidney disease, CAD/MI, CVA, heart failure, left ventricular hypertrophy, PVD or retinopathy. Identifiable causes of hypertension include a thyroid problem. There is no history of chronic renal disease, coarctation of the aorta, hyperaldosteronism, hypercortisolism, hyperparathyroidism, a hypertension causing med, pheochromocytoma, renovascular disease or sleep apnea.   Hypothyroidism   This is a recurrent problem. The current episode started more than 1 year ago. The problem occurs constantly. The problem has been rapidly worsening. Associated symptoms include fatigue and nausea. Pertinent negatives include no abdominal pain, arthralgias, chest pain, chills, congestion, coughing, diaphoresis, fever, headaches, joint swelling, myalgias, neck pain, numbness, rash, urinary symptoms, visual change, vomiting or weakness. Nothing aggravates the symptoms. She has tried acetaminophen, sleep, rest and relaxation (synthroid ) for the symptoms. The treatment provided mild relief.        The following portions of the patient's history were reviewed and updated as appropriate: allergies, current medications, past social history and problem list.    Review of Systems   Constitutional: Positive for fatigue, irritability and malaise/fatigue. Negative for activity change, appetite change, chills, diaphoresis, fever, unexpected  weight change, weight gain and weight loss.        Hot flashes    HENT: Positive for postnasal drip, tinnitus and voice change. Negative for congestion, dental problem, drooling, ear discharge, ear pain, facial swelling, hearing loss, mouth sores, nosebleeds, rhinorrhea and trouble swallowing.    Eyes: Negative.  Negative for blurred vision, photophobia, pain, discharge, redness, itching and visual disturbance.   Respiratory: Negative.  Negative for apnea, cough, choking, chest tightness, shortness of breath, wheezing and stridor.    Cardiovascular: Negative.  Negative for chest pain, palpitations, leg swelling and PND.   Gastrointestinal: Positive for nausea. Negative for abdominal distention, abdominal pain, anal bleeding, blood in stool, constipation, diarrhea, rectal pain and vomiting.   Endocrine: Negative.  Negative for cold intolerance, heat intolerance, polydipsia, polyphagia and polyuria.   Genitourinary: Negative.  Negative for difficulty urinating, dyspareunia, dysuria and impotence.   Musculoskeletal: Negative.  Negative for arthralgias, back pain, gait problem, joint swelling, myalgias, neck pain and neck stiffness.   Skin: Negative.  Negative for color change, pallor, rash and wound.   Allergic/Immunologic: Positive for environmental allergies. Negative for food allergies and immunocompromised state.   Neurological: Negative.  Negative for dizziness, tremors, syncope, facial asymmetry, speech difficulty, weakness, light-headedness, numbness and headaches.   Hematological: Negative.  Negative for adenopathy. Does not bruise/bleed easily.   Psychiatric/Behavioral: Positive for agitation, behavioral problems and sleep disturbance. Negative for confusion, decreased concentration, dysphoric mood, hallucinations, self-injury and suicidal ideas. The patient is nervous/anxious and has insomnia. The patient is not hyperactive.         Is going to therapy -not scheduled regularly    All other systems reviewed  "and are negative.      Objective   /68   Ht 154.9 cm (60.98\")   Wt 55.7 kg (122 lb 12.8 oz)   BMI 23.22 kg/m²   Physical Exam   Constitutional: She is oriented to person, place, and time. She appears well-developed and well-nourished.   Family stressors, patient is very stressed at this time    HENT:   Head: Normocephalic and atraumatic.   Mouth/Throat: No oropharyngeal exudate.   Oral cavity dry improving    Eyes: Conjunctivae and EOM are normal. Pupils are equal, round, and reactive to light. Right eye exhibits no discharge. Left eye exhibits no discharge. No scleral icterus.   Neck: Normal range of motion. Neck supple. No JVD present. No tracheal deviation present. No thyromegaly present.   Cardiovascular: Normal rate, regular rhythm and normal heart sounds. Exam reveals no gallop and no friction rub.   No murmur heard.  Pulmonary/Chest: Effort normal and breath sounds normal. No stridor. No respiratory distress. She has no wheezes. She has no rales. She exhibits no tenderness.   Abdominal: Soft. Bowel sounds are normal. She exhibits no distension and no mass. There is tenderness. There is no rebound and no guarding. No hernia.   Musculoskeletal: Normal range of motion. She exhibits tenderness. She exhibits no edema or deformity.   Lymphadenopathy:     She has no cervical adenopathy.   Neurological: She is alert and oriented to person, place, and time. She has normal reflexes. She displays normal reflexes. No cranial nerve deficit or sensory deficit. She exhibits normal muscle tone. Coordination normal.   Skin: Skin is warm and dry. No rash noted. No erythema. No pallor.   Nursing note and vitals reviewed.      Assessment/Plan   Problem List Items Addressed This Visit        Other    Anxiety - Primary           New Medications Ordered This Visit   Medications   • busPIRone (BUSPAR) 10 MG tablet     Sig: Tid prn     Dispense:  90 tablet     Refill:  5      keep appointment as directed with mental health " -has appointment on Monday with mental health provider keep appointment as directed  ER if worsen for mental health concerns

## 2019-03-29 RX ORDER — ACYCLOVIR 400 MG/1
TABLET ORAL
Qty: 90 TABLET | Refills: 3 | Status: SHIPPED | OUTPATIENT
Start: 2019-03-29 | End: 2022-01-24 | Stop reason: SDUPTHER

## 2019-04-15 ENCOUNTER — TELEPHONE (OUTPATIENT)
Dept: FAMILY MEDICINE CLINIC | Facility: CLINIC | Age: 74
End: 2019-04-15

## 2019-04-15 NOTE — TELEPHONE ENCOUNTER
SILVIA REINA HAS CALLED TO SPEAK WITH SOFIA ..HER LAST MED IS JUST NOT WORKING FOR HER ANXIETY AND DEPRESSION..PLEASE CALL HER BACK ON CELL  [phone]

## 2019-04-25 DIAGNOSIS — G89.29 CHRONIC BILATERAL LOW BACK PAIN WITHOUT SCIATICA: Primary | ICD-10-CM

## 2019-04-25 DIAGNOSIS — M54.50 CHRONIC BILATERAL LOW BACK PAIN WITHOUT SCIATICA: Primary | ICD-10-CM

## 2019-04-26 ENCOUNTER — TELEPHONE (OUTPATIENT)
Dept: FAMILY MEDICINE CLINIC | Facility: CLINIC | Age: 74
End: 2019-04-26

## 2019-04-29 ENCOUNTER — OFFICE VISIT (OUTPATIENT)
Dept: FAMILY MEDICINE CLINIC | Facility: CLINIC | Age: 74
End: 2019-04-29

## 2019-04-29 VITALS
SYSTOLIC BLOOD PRESSURE: 140 MMHG | WEIGHT: 114.5 LBS | HEIGHT: 61 IN | DIASTOLIC BLOOD PRESSURE: 90 MMHG | BODY MASS INDEX: 21.62 KG/M2

## 2019-04-29 DIAGNOSIS — M54.9 CVA TENDERNESS: ICD-10-CM

## 2019-04-29 DIAGNOSIS — M54.50 ACUTE BILATERAL LOW BACK PAIN WITHOUT SCIATICA: Primary | ICD-10-CM

## 2019-04-29 LAB
BILIRUB BLD-MCNC: NEGATIVE MG/DL
CLARITY, POC: CLEAR
COLOR UR: ABNORMAL
GLUCOSE UR STRIP-MCNC: NEGATIVE MG/DL
KETONES UR QL: NEGATIVE
LEUKOCYTE EST, POC: NEGATIVE
NITRITE UR-MCNC: NEGATIVE MG/ML
PH UR: 5 [PH] (ref 5–8)
PROT UR STRIP-MCNC: NEGATIVE MG/DL
RBC # UR STRIP: ABNORMAL /UL
SP GR UR: 1.03 (ref 1–1.03)
UROBILINOGEN UR QL: NORMAL

## 2019-04-29 PROCEDURE — 99213 OFFICE O/P EST LOW 20 MIN: CPT | Performed by: NURSE PRACTITIONER

## 2019-04-29 PROCEDURE — 81002 URINALYSIS NONAUTO W/O SCOPE: CPT | Performed by: NURSE PRACTITIONER

## 2019-04-29 RX ORDER — PROMETHAZINE HYDROCHLORIDE 25 MG/1
25 TABLET ORAL EVERY 6 HOURS PRN
Qty: 180 TABLET | Refills: 0 | Status: SHIPPED | OUTPATIENT
Start: 2019-04-29 | End: 2019-09-17 | Stop reason: SDUPTHER

## 2019-04-29 RX ORDER — BUSPIRONE HYDROCHLORIDE 30 MG/1
30 TABLET ORAL 2 TIMES DAILY
Qty: 60 TABLET | Refills: 11 | Status: SHIPPED | OUTPATIENT
Start: 2019-04-29 | End: 2019-05-16 | Stop reason: SINTOL

## 2019-04-29 RX ORDER — CYCLOBENZAPRINE HCL 5 MG
TABLET ORAL
Qty: 90 TABLET | Refills: 1 | Status: SHIPPED | OUTPATIENT
Start: 2019-04-29 | End: 2020-03-09

## 2019-04-29 NOTE — PROGRESS NOTES
Chief Complaint   Patient presents with   • Back Pain     poss kidney stones   • Anxiety     Subjective   Alva Mills is a 73 y.o. female.     Back Pain   This is a recurrent problem. The current episode started 1 to 4 weeks ago. The problem occurs every several days. The problem has been gradually worsening since onset. The pain is present in the lumbar spine and thoracic spine. The quality of the pain is described as cramping. The pain does not radiate. The pain is at a severity of 4/10. The pain is moderate. The pain is the same all the time. Stiffness is present all day. Associated symptoms include paresis and pelvic pain. Pertinent negatives include no abdominal pain, bladder incontinence, bowel incontinence, chest pain, dysuria, fever, headaches, leg pain, numbness, paresthesias, perianal numbness, tingling, weakness or weight loss. Risk factors include lack of exercise. She has tried analgesics, bed rest, home exercises, heat, muscle relaxant, ice, NSAIDs and walking for the symptoms. The treatment provided mild relief.   Anxiety   Symptoms include nausea and nervous/anxious behavior. Patient reports no chest pain, confusion, decreased concentration, dizziness, palpitations, shortness of breath or suicidal ideas.            The following portions of the patient's history were reviewed and updated as appropriate: allergies, current medications, past social history and problem list.    Review of Systems   Constitutional: Positive for fatigue. Negative for activity change, appetite change, chills, diaphoresis, fever, unexpected weight change and weight loss.        Hot flashes    HENT: Positive for postnasal drip, tinnitus and voice change. Negative for congestion, dental problem, drooling, ear discharge, ear pain, facial swelling, hearing loss, mouth sores, nosebleeds, rhinorrhea, sinus pressure, sinus pain and trouble swallowing.    Eyes: Negative.  Negative for photophobia, pain, discharge, redness,  "itching and visual disturbance.   Respiratory: Negative.  Negative for apnea, cough, choking, chest tightness, shortness of breath, wheezing and stridor.    Cardiovascular: Negative.  Negative for chest pain, palpitations and leg swelling.   Gastrointestinal: Positive for nausea. Negative for abdominal distention, abdominal pain, anal bleeding, blood in stool, bowel incontinence, constipation, diarrhea, rectal pain and vomiting.        Cva tenderness bilaterally    Endocrine: Negative.  Negative for cold intolerance, heat intolerance, polydipsia, polyphagia and polyuria.   Genitourinary: Positive for decreased urine volume, enuresis, flank pain, hematuria and pelvic pain. Negative for bladder incontinence, difficulty urinating, dyspareunia, dysuria, genital sores, menstrual problem, vaginal bleeding and vaginal discharge.   Musculoskeletal: Negative for arthralgias, back pain, gait problem, joint swelling, myalgias, neck pain and neck stiffness.   Skin: Negative.  Negative for color change, pallor, rash and wound.   Allergic/Immunologic: Positive for environmental allergies. Negative for food allergies and immunocompromised state.   Neurological: Negative.  Negative for dizziness, tingling, tremors, seizures, syncope, facial asymmetry, speech difficulty, weakness, light-headedness, numbness, headaches and paresthesias.   Hematological: Negative.  Negative for adenopathy. Does not bruise/bleed easily.   Psychiatric/Behavioral: Positive for agitation, behavioral problems and sleep disturbance. Negative for confusion, decreased concentration, dysphoric mood, hallucinations, self-injury and suicidal ideas. The patient is nervous/anxious. The patient is not hyperactive.         Is going to therapy -not scheduled regularly    All other systems reviewed and are negative.      Objective   /90   Ht 154.9 cm (60.98\")   Wt 51.9 kg (114 lb 8 oz)   BMI 21.65 kg/m²   Physical Exam   Constitutional: She is oriented to " person, place, and time. She appears well-developed and well-nourished.   Family stressors, patient is very stressed at this time    HENT:   Head: Normocephalic and atraumatic.   Right Ear: External ear normal.   Mouth/Throat: No oropharyngeal exudate.   Oral cavity dry improving    Eyes: Conjunctivae and EOM are normal. Pupils are equal, round, and reactive to light. Right eye exhibits no discharge. Left eye exhibits no discharge. No scleral icterus.   Neck: Normal range of motion. Neck supple. No JVD present. No tracheal deviation present. No thyromegaly present.   Cardiovascular: Normal rate, regular rhythm, normal heart sounds and intact distal pulses. Exam reveals no gallop and no friction rub.   No murmur heard.  Pulmonary/Chest: Effort normal and breath sounds normal. No stridor. No respiratory distress. She has no wheezes. She has no rales. She exhibits no tenderness.   Abdominal: Soft. Bowel sounds are normal. She exhibits no distension and no mass. There is tenderness. There is no rebound and no guarding. No hernia.   Musculoskeletal: Normal range of motion. She exhibits tenderness. She exhibits no edema or deformity.        Thoracic back: She exhibits tenderness, bony tenderness, pain and spasm.        Back:    Lymphadenopathy:     She has no cervical adenopathy.   Neurological: She is alert and oriented to person, place, and time. She has normal reflexes. She displays normal reflexes. No cranial nerve deficit or sensory deficit. She exhibits normal muscle tone. Coordination normal.   Skin: Skin is warm and dry. No rash noted. No erythema. No pallor.   Nursing note and vitals reviewed.      Assessment/Plan   Problem List Items Addressed This Visit        Nervous and Auditory    CVA tenderness    Relevant Orders    XR Abdomen KUB (Completed)    Acute bilateral low back pain without sciatica - Primary    Relevant Orders    POCT urinalysis dipstick, manual (Completed)         XR Abdomen KUB   Order:  [id number]   Status:  Final result   Visible to patient:  No (Not Released) Dx:  CVA tenderness   Details     Reading Physician Reading Date Result Priority   Manpreet Roberts MD 2019       Narrative     Procedure: Supine abdomen    Reason for exam: Pelvic pain and CVA tenderness    FINDINGS: Comparison exam dated 2015. Postsurgical  changes of the lumbar spine consistent with prior L3-L4 and L4-L5  discectomies with intervertebral fusion devices in place as well  as bilateral posterior tramaine fusion. No acute osseous abnormality.  Soft tissue structures are normal. No pathologic calcifications.  Nonobstructive bowel gas pattern.      Impression     No acute abdominal abnormality.    Electronically signed by:  Teodoro Roberts MD  2019 10:54 AM CDT  Workstation: [id number]            Specimen Collected: 19 10:21             New Medications Ordered This Visit   Medications   • busPIRone (BUSPAR) 30 MG tablet     Sig: Take 1 tablet by mouth 2 (Two) Times a Day.     Dispense:  60 tablet     Refill:  11   • cyclobenzaprine (FLEXERIL) 5 MG tablet     Si-2 every 8 hours prn spasm     Dispense:  90 tablet     Refill:  1   • promethazine (PHENERGAN) 25 MG tablet     Sig: Take 1 tablet by mouth Every 6 (Six) Hours As Needed for Nausea or Vomiting.     Dispense:  180 tablet     Refill:  0      meds as directed, diet discussed , increase fluids

## 2019-05-01 ENCOUNTER — TELEPHONE (OUTPATIENT)
Dept: FAMILY MEDICINE CLINIC | Facility: CLINIC | Age: 74
End: 2019-05-01

## 2019-05-01 NOTE — TELEPHONE ENCOUNTER
Alva called and said she was very dizzy today and wanted to know if it could be the medication changes .

## 2019-05-06 ENCOUNTER — TELEPHONE (OUTPATIENT)
Dept: FAMILY MEDICINE CLINIC | Facility: CLINIC | Age: 74
End: 2019-05-06

## 2019-05-06 NOTE — TELEPHONE ENCOUNTER
Patient called this morning and said she had an extremely dry mouth and fever blisters. She thinks it's the buspar causing this. Please advise

## 2019-05-06 NOTE — TELEPHONE ENCOUNTER
She can stop it or just use it sparingly -if she has vistaril then she can use this for her anxiety

## 2019-05-16 ENCOUNTER — OFFICE VISIT (OUTPATIENT)
Dept: FAMILY MEDICINE CLINIC | Facility: CLINIC | Age: 74
End: 2019-05-16

## 2019-05-16 VITALS
HEIGHT: 61 IN | BODY MASS INDEX: 22.37 KG/M2 | DIASTOLIC BLOOD PRESSURE: 82 MMHG | WEIGHT: 118.5 LBS | SYSTOLIC BLOOD PRESSURE: 130 MMHG

## 2019-05-16 DIAGNOSIS — F41.9 ANXIETY: Primary | ICD-10-CM

## 2019-05-16 PROCEDURE — 99214 OFFICE O/P EST MOD 30 MIN: CPT | Performed by: NURSE PRACTITIONER

## 2019-05-16 RX ORDER — GABAPENTIN 100 MG/1
CAPSULE ORAL
Qty: 90 CAPSULE | Refills: 2 | Status: SHIPPED | OUTPATIENT
Start: 2019-05-16 | End: 2020-03-09

## 2019-05-16 NOTE — PROGRESS NOTES
Chief Complaint   Patient presents with   • Anxiety     had to stop the buspar because mouth was staying to dry     Subjective   Alva Mills is a 73 y.o. female.     Presents with increase in anxiety and stress reports stress is worsening not feeling well  Patient cannot take buspar-makes mouth dry       Anxiety   Presents for follow-up visit. Symptoms include depressed mood, excessive worry, insomnia, irritability, malaise, nausea, nervous/anxious behavior, panic and restlessness. Patient reports no chest pain, compulsions, confusion, decreased concentration, dizziness, dry mouth, feeling of choking, hyperventilation, impotence, muscle tension, obsessions, palpitations, shortness of breath or suicidal ideas. Symptoms occur most days. The quality of sleep is good. Nighttime awakenings: none.     Her past medical history is significant for depression. Compliance with medications is %.   Depression   Visit Type: follow-up  Patient presents with the following symptoms: depressed mood, excessive worry, fatigue, insomnia, irritability, malaise, nervousness/anxiety, panic and restlessness.  Patient is not experiencing: chest pain, choking sensation, compulsions, confusion, decreased concentration, dizziness, dry mouth, feelings of hopelessness, feelings of worthlessness, hypersomnia, hyperventilation, impotence, memory impairment, muscle tension, nausea, obsessions, palpitations, psychomotor agitation, psychomotor retardation, shortness of breath, suicidal ideas, suicidal planning, thoughts of death, weight gain and weight loss.  Frequency of symptoms: most days   Severity: severe   Sleep quality: fair  Nighttime awakenings: none  Compliance with medications:  %        Hernia   Associated symptoms include fatigue and nausea. Pertinent negatives include no abdominal pain, arthralgias, chest pain, chills, congestion, coughing, diaphoresis, fever, headaches, joint swelling, myalgias, neck pain, numbness,  rash, sore throat, urinary symptoms, visual change, vomiting or weakness.   Hypertension   This is a recurrent problem. The current episode started more than 1 month ago. The problem has been rapidly worsening since onset. The problem is controlled. Associated symptoms include anxiety and malaise/fatigue. Pertinent negatives include no blurred vision, chest pain, headaches, neck pain, palpitations, peripheral edema, PND, shortness of breath or sweats. Risk factors for coronary artery disease include sedentary lifestyle. Current antihypertension treatment includes ACE inhibitors. The current treatment provides mild improvement. Compliance problems include diet.  There is no history of angina, kidney disease, CAD/MI, CVA, heart failure, left ventricular hypertrophy, PVD or retinopathy. Identifiable causes of hypertension include a thyroid problem. There is no history of chronic renal disease, coarctation of the aorta, hyperaldosteronism, hypercortisolism, hyperparathyroidism, a hypertension causing med, pheochromocytoma, renovascular disease or sleep apnea.   Hypothyroidism   This is a recurrent problem. The current episode started more than 1 year ago. The problem occurs constantly. The problem has been rapidly worsening. Associated symptoms include fatigue and nausea. Pertinent negatives include no abdominal pain, arthralgias, chest pain, chills, congestion, coughing, diaphoresis, fever, headaches, joint swelling, myalgias, neck pain, numbness, rash, sore throat, urinary symptoms, visual change, vomiting or weakness. Nothing aggravates the symptoms. She has tried acetaminophen, sleep, rest and relaxation (synthroid ) for the symptoms. The treatment provided mild relief.        The following portions of the patient's history were reviewed and updated as appropriate: allergies, current medications, past social history and problem list.    Review of Systems   Constitutional: Positive for fatigue, irritability and  malaise/fatigue. Negative for activity change, appetite change, chills, diaphoresis, fever, unexpected weight change, weight gain and weight loss.        Hot flashes    HENT: Positive for postnasal drip, tinnitus and voice change. Negative for congestion, dental problem, drooling, ear discharge, ear pain, facial swelling, hearing loss, mouth sores, nosebleeds, rhinorrhea, sinus pressure, sinus pain, sneezing, sore throat and trouble swallowing.    Eyes: Negative.  Negative for blurred vision, photophobia, pain, discharge, redness, itching and visual disturbance.   Respiratory: Negative.  Negative for apnea, cough, choking, chest tightness, shortness of breath, wheezing and stridor.    Cardiovascular: Negative.  Negative for chest pain, palpitations, leg swelling and PND.   Gastrointestinal: Positive for nausea. Negative for abdominal distention, abdominal pain, anal bleeding, blood in stool, constipation, diarrhea, rectal pain and vomiting.   Endocrine: Negative.  Negative for cold intolerance, heat intolerance, polydipsia, polyphagia and polyuria.   Genitourinary: Negative.  Negative for difficulty urinating, dyspareunia, dysuria and impotence.   Musculoskeletal: Negative.  Negative for arthralgias, back pain, gait problem, joint swelling, myalgias, neck pain and neck stiffness.   Skin: Negative.  Negative for color change, pallor, rash and wound.   Allergic/Immunologic: Positive for environmental allergies. Negative for food allergies and immunocompromised state.   Neurological: Negative.  Negative for dizziness, tremors, syncope, facial asymmetry, speech difficulty, weakness, light-headedness, numbness and headaches.   Hematological: Negative.  Negative for adenopathy. Does not bruise/bleed easily.   Psychiatric/Behavioral: Positive for agitation, behavioral problems and sleep disturbance. Negative for confusion, decreased concentration, dysphoric mood, hallucinations, self-injury and suicidal ideas. The patient  "is nervous/anxious and has insomnia. The patient is not hyperactive.         Is going to therapy -not scheduled regularly    All other systems reviewed and are negative.      Objective   /82   Ht 154.7 cm (60.9\")   Wt 53.8 kg (118 lb 8 oz)   BMI 22.46 kg/m²   Physical Exam   Constitutional: She is oriented to person, place, and time. She appears well-developed and well-nourished.   Family stressors, patient is very stressed at this time    HENT:   Head: Normocephalic and atraumatic.   Right Ear: External ear normal.   Left Ear: External ear normal.   Mouth/Throat: Oropharynx is clear and moist. No oropharyngeal exudate.   Oral cavity dry improving    Eyes: Conjunctivae and EOM are normal. Pupils are equal, round, and reactive to light. Right eye exhibits no discharge. Left eye exhibits no discharge. No scleral icterus.   Neck: Normal range of motion. Neck supple. No JVD present. No tracheal deviation present. No thyromegaly present.   Cardiovascular: Normal rate, regular rhythm, normal heart sounds and intact distal pulses. Exam reveals no gallop and no friction rub.   No murmur heard.  Pulmonary/Chest: Effort normal and breath sounds normal. No stridor. No respiratory distress. She has no wheezes. She has no rales. She exhibits no tenderness.   Abdominal: Soft. Bowel sounds are normal. She exhibits no distension and no mass. There is tenderness. There is no rebound and no guarding. No hernia.   Musculoskeletal: Normal range of motion. She exhibits tenderness. She exhibits no edema or deformity.        Thoracic back: She exhibits tenderness, bony tenderness, pain and spasm.        Back:    Lymphadenopathy:     She has no cervical adenopathy.   Neurological: She is alert and oriented to person, place, and time. She has normal reflexes. She displays normal reflexes. No cranial nerve deficit or sensory deficit. She exhibits normal muscle tone. Coordination normal.   Skin: Skin is warm and dry. No rash noted. " No erythema. No pallor.   Nursing note and vitals reviewed.      Assessment/Plan   Problem List Items Addressed This Visit        Other    Anxiety - Primary           New Medications Ordered This Visit   Medications   • gabapentin (NEURONTIN) 100 MG capsule     Sig: Use tid prn     Dispense:  90 capsule     Refill:  2      add neurontin tid -see if this helps-see back in 1 month   Patient is currently see Angela Sanford-therapist [facility] -continue to keep appointment     Patient understands the risks associated with this controlled medication, including tolerance and addiction.  she also agrees to only obtain this medication from me, and not from a another provider, unless that provider is covering for me in my absence.  she also agrees to be compliant in dosing, and not self adjust the dose of medication.  A signed controlled substance agreement is on file, and she has received a controlled substance education sheet at this a previous visit.  she has also signed a consent for treatment with a controlled substance as per [facility] policy. FARRAH was obtained.

## 2019-06-17 ENCOUNTER — OFFICE VISIT (OUTPATIENT)
Dept: FAMILY MEDICINE CLINIC | Facility: CLINIC | Age: 74
End: 2019-06-17

## 2019-06-17 VITALS
BODY MASS INDEX: 22.47 KG/M2 | HEIGHT: 61 IN | DIASTOLIC BLOOD PRESSURE: 82 MMHG | SYSTOLIC BLOOD PRESSURE: 118 MMHG | WEIGHT: 119 LBS

## 2019-06-17 DIAGNOSIS — F41.9 ANXIETY: Primary | ICD-10-CM

## 2019-06-17 DIAGNOSIS — F32.89 OTHER DEPRESSION: ICD-10-CM

## 2019-06-17 PROCEDURE — 99213 OFFICE O/P EST LOW 20 MIN: CPT | Performed by: NURSE PRACTITIONER

## 2019-06-17 NOTE — PROGRESS NOTES
Chief Complaint   Patient presents with   • Anxiety     1 month check up      Subjective   Alva Mills is a 73 y.o. female.     Presents with recheck of anxiety and depression-symptoms are improving with pristiq      Anxiety   Presents for follow-up visit. Symptoms include depressed mood, excessive worry, insomnia, irritability, malaise, nausea, nervous/anxious behavior, panic and restlessness. Patient reports no chest pain, compulsions, confusion, decreased concentration, dizziness, dry mouth, feeling of choking, hyperventilation, impotence, muscle tension, obsessions, palpitations, shortness of breath or suicidal ideas. Symptoms occur most days. The quality of sleep is good. Nighttime awakenings: none.     Her past medical history is significant for depression. Compliance with medications is %.   Depression   Visit Type: follow-up  Patient presents with the following symptoms: depressed mood, excessive worry, fatigue, insomnia, irritability, malaise, nervousness/anxiety, panic and restlessness.  Patient is not experiencing: chest pain, choking sensation, compulsions, confusion, decreased concentration, dizziness, dry mouth, feelings of hopelessness, feelings of worthlessness, hypersomnia, hyperventilation, impotence, memory impairment, muscle tension, nausea, obsessions, palpitations, psychomotor agitation, psychomotor retardation, shortness of breath, suicidal ideas, suicidal planning, thoughts of death, weight gain and weight loss.  Frequency of symptoms: most days   Severity: moderate   Sleep quality: fair  Nighttime awakenings: none  Compliance with medications:  %        Hernia   Associated symptoms include fatigue and nausea. Pertinent negatives include no abdominal pain, arthralgias, chest pain, chills, congestion, coughing, diaphoresis, fever, headaches, joint swelling, myalgias, neck pain, numbness, rash, sore throat, urinary symptoms, visual change, vomiting or weakness.   Hypertension    This is a recurrent problem. The current episode started more than 1 month ago. The problem has been rapidly worsening since onset. The problem is controlled. Associated symptoms include anxiety and malaise/fatigue. Pertinent negatives include no blurred vision, chest pain, headaches, neck pain, palpitations, peripheral edema, PND, shortness of breath or sweats. Risk factors for coronary artery disease include sedentary lifestyle. Current antihypertension treatment includes ACE inhibitors. The current treatment provides mild improvement. Compliance problems include diet.  There is no history of angina, kidney disease, CAD/MI, CVA, heart failure, left ventricular hypertrophy, PVD or retinopathy. Identifiable causes of hypertension include a thyroid problem. There is no history of chronic renal disease, coarctation of the aorta, hyperaldosteronism, hypercortisolism, hyperparathyroidism, a hypertension causing med, pheochromocytoma, renovascular disease or sleep apnea.   Hypothyroidism   This is a recurrent problem. The current episode started more than 1 year ago. The problem occurs constantly. The problem has been rapidly worsening. Associated symptoms include fatigue and nausea. Pertinent negatives include no abdominal pain, arthralgias, chest pain, chills, congestion, coughing, diaphoresis, fever, headaches, joint swelling, myalgias, neck pain, numbness, rash, sore throat, urinary symptoms, visual change, vomiting or weakness. Nothing aggravates the symptoms. She has tried acetaminophen, sleep, rest and relaxation (synthroid ) for the symptoms. The treatment provided mild relief.        The following portions of the patient's history were reviewed and updated as appropriate: allergies, current medications, past social history and problem list.    Review of Systems   Constitutional: Positive for fatigue, irritability and malaise/fatigue. Negative for activity change, appetite change, chills, diaphoresis, fever,  unexpected weight change, weight gain and weight loss.        Hot flashes    HENT: Positive for postnasal drip, tinnitus and voice change. Negative for congestion, dental problem, drooling, ear discharge, ear pain, facial swelling, hearing loss, mouth sores, nosebleeds, rhinorrhea, sinus pressure, sinus pain, sneezing, sore throat and trouble swallowing.    Eyes: Negative.  Negative for blurred vision, photophobia, pain, discharge, redness, itching and visual disturbance.   Respiratory: Negative.  Negative for apnea, cough, choking, chest tightness, shortness of breath, wheezing and stridor.    Cardiovascular: Negative.  Negative for chest pain, palpitations, leg swelling and PND.   Gastrointestinal: Positive for nausea. Negative for abdominal distention, abdominal pain, anal bleeding, blood in stool, constipation, diarrhea, rectal pain and vomiting.   Endocrine: Negative.  Negative for cold intolerance, heat intolerance, polydipsia, polyphagia and polyuria.   Genitourinary: Negative.  Negative for difficulty urinating, dyspareunia, dysuria and impotence.   Musculoskeletal: Negative.  Negative for arthralgias, back pain, gait problem, joint swelling, myalgias, neck pain and neck stiffness.   Skin: Negative.  Negative for color change, pallor, rash and wound.   Allergic/Immunologic: Positive for environmental allergies. Negative for food allergies and immunocompromised state.   Neurological: Negative.  Negative for dizziness, tremors, syncope, facial asymmetry, speech difficulty, weakness, light-headedness, numbness and headaches.   Hematological: Negative.  Negative for adenopathy. Does not bruise/bleed easily.   Psychiatric/Behavioral: Positive for agitation, behavioral problems and sleep disturbance. Negative for confusion, decreased concentration, dysphoric mood, hallucinations, self-injury and suicidal ideas. The patient is nervous/anxious and has insomnia. The patient is not hyperactive.         Is going to  "therapy -not scheduled regularly    All other systems reviewed and are negative.      Objective   /82   Ht 154.7 cm (60.9\")   Wt 54 kg (119 lb)   BMI 22.56 kg/m²   Physical Exam   Constitutional: She is oriented to person, place, and time. She appears well-developed and well-nourished.   Family stressors, patient is very stressed at this time    HENT:   Head: Normocephalic and atraumatic.   Right Ear: External ear normal.   Left Ear: External ear normal.   Mouth/Throat: Oropharynx is clear and moist. No oropharyngeal exudate.   Oral cavity dry improving    Eyes: Conjunctivae and EOM are normal. Pupils are equal, round, and reactive to light. Right eye exhibits no discharge. Left eye exhibits no discharge. No scleral icterus.   Neck: Normal range of motion. Neck supple. No JVD present. No tracheal deviation present. No thyromegaly present.   Cardiovascular: Normal rate, regular rhythm, normal heart sounds and intact distal pulses. Exam reveals no gallop and no friction rub.   No murmur heard.  Pulmonary/Chest: Effort normal and breath sounds normal. No stridor. No respiratory distress. She has no wheezes. She has no rales. She exhibits no tenderness.   Abdominal: Soft. Bowel sounds are normal. She exhibits no distension and no mass. There is tenderness. There is no rebound and no guarding. No hernia.   Musculoskeletal: Normal range of motion. She exhibits tenderness. She exhibits no edema or deformity.        Thoracic back: She exhibits tenderness, bony tenderness, pain and spasm.        Back:    Lymphadenopathy:     She has no cervical adenopathy.   Neurological: She is alert and oriented to person, place, and time. She has normal reflexes. She displays normal reflexes. No cranial nerve deficit or sensory deficit. She exhibits normal muscle tone. Coordination normal.   Skin: Skin is warm and dry. No rash noted. No erythema. No pallor.   Nursing note and vitals reviewed.      Assessment/Plan   Problem List " Items Addressed This Visit        Other    Anxiety - Primary    Depression         No orders of the defined types were placed in this encounter.     continue meds as directed, diet discussed    No changes for now continue meds she is taking

## 2019-06-28 ENCOUNTER — CLINICAL SUPPORT (OUTPATIENT)
Dept: FAMILY MEDICINE CLINIC | Facility: CLINIC | Age: 74
End: 2019-06-28

## 2019-06-28 DIAGNOSIS — Z23 NEED FOR VACCINATION: Primary | ICD-10-CM

## 2019-06-28 PROCEDURE — 90471 IMMUNIZATION ADMIN: CPT | Performed by: NURSE PRACTITIONER

## 2019-06-28 PROCEDURE — 90732 PPSV23 VACC 2 YRS+ SUBQ/IM: CPT | Performed by: NURSE PRACTITIONER

## 2019-06-28 RX ORDER — FENOFIBRATE 145 MG/1
145 TABLET, COATED ORAL DAILY
Qty: 90 TABLET | Refills: 3 | Status: SHIPPED | OUTPATIENT
Start: 2019-06-28 | End: 2020-06-08

## 2019-07-30 RX ORDER — LEVOTHYROXINE SODIUM 50 MCG
TABLET ORAL
Qty: 90 TABLET | Refills: 3 | Status: SHIPPED | OUTPATIENT
Start: 2019-07-30 | End: 2020-06-15

## 2019-07-30 RX ORDER — DESVENLAFAXINE 100 MG/1
TABLET, EXTENDED RELEASE ORAL
Qty: 90 TABLET | Refills: 3 | Status: SHIPPED | OUTPATIENT
Start: 2019-07-30 | End: 2020-06-15

## 2019-09-07 ENCOUNTER — HOSPITAL ENCOUNTER (EMERGENCY)
Facility: HOSPITAL | Age: 74
Discharge: HOME | End: 2019-09-07
Attending: FAMILY MEDICINE | Admitting: FAMILY MEDICINE

## 2019-09-07 ENCOUNTER — APPOINTMENT (OUTPATIENT)
Dept: GENERAL RADIOLOGY | Facility: HOSPITAL | Age: 74
End: 2019-09-07

## 2019-09-07 VITALS
WEIGHT: 120 LBS | HEART RATE: 84 BPM | TEMPERATURE: 98 F | OXYGEN SATURATION: 100 % | HEIGHT: 61 IN | BODY MASS INDEX: 22.66 KG/M2 | RESPIRATION RATE: 18 BRPM | DIASTOLIC BLOOD PRESSURE: 77 MMHG | SYSTOLIC BLOOD PRESSURE: 186 MMHG

## 2019-09-07 DIAGNOSIS — S91.311A LACERATION OF RIGHT FOOT, INITIAL ENCOUNTER: Primary | ICD-10-CM

## 2019-09-07 PROCEDURE — 99283 EMERGENCY DEPT VISIT LOW MDM: CPT

## 2019-09-07 PROCEDURE — 73630 X-RAY EXAM OF FOOT: CPT

## 2019-09-07 PROCEDURE — 25010000002 TDAP 5-2.5-18.5 LF-MCG/0.5 SUSPENSION: Performed by: NURSE PRACTITIONER

## 2019-09-07 PROCEDURE — 90715 TDAP VACCINE 7 YRS/> IM: CPT | Performed by: NURSE PRACTITIONER

## 2019-09-07 PROCEDURE — 90471 IMMUNIZATION ADMIN: CPT | Performed by: NURSE PRACTITIONER

## 2019-09-07 RX ORDER — LIDOCAINE HYDROCHLORIDE 10 MG/ML
10 INJECTION, SOLUTION EPIDURAL; INFILTRATION; INTRACAUDAL; PERINEURAL ONCE
Status: DISCONTINUED | OUTPATIENT
Start: 2019-09-07 | End: 2019-09-07

## 2019-09-07 RX ADMIN — TETANUS TOXOID, REDUCED DIPHTHERIA TOXOID AND ACELLULAR PERTUSSIS VACCINE, ADSORBED 0.5 ML: 5; 2.5; 8; 8; 2.5 SUSPENSION INTRAMUSCULAR at 15:18

## 2019-09-07 NOTE — ED NOTES
Wound not currently bleeding. Wound cleansed with chlorhexidine.       Viv Paez RN  09/07/19 [id number]

## 2019-09-07 NOTE — ED NOTES
Bleeding uncontrolled. Gauze and pressure ace wrap applied at triage      Glenna Uribe, RN  09/07/19 [id number]

## 2019-09-07 NOTE — ED PROVIDER NOTES
Subjective   74-year-old female in the emergency department complaining of a laceration on the dorsal aspect of her right foot.  Patient reports she was getting her golf cart and the new blade of a reciprocating saw was sticking out and jabbed into her foot.  Bleeding controlled prior to arrival.  Small 1 cm laceration noted.        Laceration   Location:  Foot  Foot laceration location:  Top of R foot  Length:  1  Depth:  Cutaneous  Quality: straight    Bleeding: venous    Foreign body present:  No foreign bodies  Relieved by:  Nothing  Worsened by:  Nothing  Ineffective treatments:  None tried  Tetanus status:  Out of date  Associated symptoms: no fever        Review of Systems   Constitutional: Negative for chills, fatigue and fever.   HENT: Negative for facial swelling.    Respiratory: Negative for shortness of breath.    Cardiovascular: Negative for chest pain and palpitations.   Gastrointestinal: Negative for abdominal pain, diarrhea, nausea and vomiting.   Genitourinary: Negative for flank pain.   Musculoskeletal: Negative for arthralgias.   Skin: Positive for wound.   Allergic/Immunologic: Negative for immunocompromised state.   Neurological: Negative for dizziness.   Hematological: Negative for adenopathy.   Psychiatric/Behavioral: Negative for confusion.   All other systems reviewed and are negative.      Past Medical History:   Diagnosis Date   • Abdominal pain    • Acquired hypothyroidism    • Acute bronchitis    • Acute sinusitis    • Acute upper respiratory infection    • Benign hypertension    • Breast cyst    • Breast lump     history of, right   • Calf pain    • Common bile duct calculus    • Constipation    • Cough    • Depressive disorder    • Elevated cholesterol    • Epigastric pain    • External hemorrhoids without complication    • Fibrocystic breast    • Functional heart murmur    • Gastroesophageal reflux disease    • General medical exam    • Hemorrhoids    • Hyperlipidemia    • Incisional  "hernia     no evident recurrence at this juncture   • Localized, primary osteoarthritis of ankle or foot    • Muscle strain    • Skin lesion        Allergies   Allergen Reactions   • Clonazepam Other (See Comments)     \"WENT CRAZY\"; CONFUSION   • Hydromorphone Other (See Comments)     \"WENT CRAZY\"; CONFUSION   • Morphine Other (See Comments)     HEADACHE   • Aripiprazole Nausea And Vomiting       Past Surgical History:   Procedure Laterality Date   • BACK SURGERY      x2   • BILE DUCT EXPLORATION  12/10/2013    Remove bile duct stone (1)    Common duct exploration, stone extraction, choledochoduodenostomy and choledochoscopy.    • BREAST BIOPSY Right 1991    Stereotactic breast biopsy (1)    Right breast    • CHOLECYSTECTOMY OPEN  1989   • ERCP Left 1/10/2019    Procedure: ENDOSCOPIC RETROGRADE CHOLANGIOPANCREATOGRAPHY;  Surgeon: Homero Allen DO;  Location: [facility] ENDOSCOPY;  Service: Gastroenterology   • ERCP Left 3/14/2019    Procedure: ENDOSCOPIC RETROGRADE CHOLANGIOPANCREATOGRAPHY;  Surgeon: Homero Allen DO;  Location: [facility] ENDOSCOPY;  Service: Gastroenterology   • HERNIA REPAIR  06/16/2014    Anesth, repair of hernia (1)    laparoscopic ventral hernioplasty with mesh. Ventral hernia.    • HYSTERECTOMY  10/17/2001    Anesth, hysterectomy (1)    Laparoscopic subtotal hysterectomy & BSO. Enterolysis of sigmoid colon.    • INJECTION OF MEDICATION  08/12/2013    Dexamethasone (2)      • INJECTION OF MEDICATION  01/31/2013    Kenalog (1)      • INJECTION OF MEDICATION  02/11/2015    Rocephin (1)      • OOPHORECTOMY     • OTHER SURGICAL HISTORY  08/12/2013    Small Joint Injection/Aspiration 20600 (2)      • TUBAL ABDOMINAL LIGATION         Family History   Problem Relation Age of Onset   • Heart disease Other    • Hypertension Other        Social History     Socioeconomic History   • Marital status:      Spouse name: Not on file   • Number of children: Not on file   • Years of education: Not on " file   • Highest education level: Not on file   Tobacco Use   • Smoking status: Former Smoker   • Smokeless tobacco: Never Used   • Tobacco comment: about 45 years ago (Dec 04 2013)   Substance and Sexual Activity   • Alcohol use: No     Frequency: Never   • Drug use: No           Objective   Physical Exam   Constitutional: She is oriented to person, place, and time. Vital signs are normal. She appears well-developed and well-nourished.   HENT:   Head: Normocephalic.   Nose: Nose normal.   Eyes: Conjunctivae are normal. Pupils are equal, round, and reactive to light.   Neck: Normal range of motion.   Cardiovascular: Normal rate, regular rhythm and normal heart sounds.   Pulmonary/Chest: Effort normal and breath sounds normal.   Abdominal: Soft.   Musculoskeletal: Normal range of motion.   Neurological: She is alert and oriented to person, place, and time. GCS eye subscore is 4. GCS verbal subscore is 5. GCS motor subscore is 6.   Skin: Skin is warm and dry. Laceration noted.        Psychiatric: She has a normal mood and affect.   Nursing note and vitals reviewed.      Procedures           ED Course        tdap updated today. Wound can be closed with steri strips           MDM    Final diagnoses:   Laceration of right foot, initial encounter              Tyler Keating, APRN  09/07/19 1546

## 2019-09-10 ENCOUNTER — ANESTHESIA EVENT (OUTPATIENT)
Dept: GASTROENTEROLOGY | Facility: HOSPITAL | Age: 74
End: 2019-09-10

## 2019-09-10 ENCOUNTER — HOSPITAL ENCOUNTER (OUTPATIENT)
Facility: HOSPITAL | Age: 74
Setting detail: HOSPITAL OUTPATIENT SURGERY
Discharge: HOME | End: 2019-09-10
Attending: INTERNAL MEDICINE | Admitting: INTERNAL MEDICINE

## 2019-09-10 ENCOUNTER — ANESTHESIA (OUTPATIENT)
Dept: GASTROENTEROLOGY | Facility: HOSPITAL | Age: 74
End: 2019-09-10

## 2019-09-10 ENCOUNTER — APPOINTMENT (OUTPATIENT)
Dept: GENERAL RADIOLOGY | Facility: HOSPITAL | Age: 74
End: 2019-09-10

## 2019-09-10 VITALS
OXYGEN SATURATION: 98 % | TEMPERATURE: 97.1 F | WEIGHT: 115 LBS | SYSTOLIC BLOOD PRESSURE: 128 MMHG | HEIGHT: 61 IN | RESPIRATION RATE: 18 BRPM | DIASTOLIC BLOOD PRESSURE: 60 MMHG | BODY MASS INDEX: 21.71 KG/M2 | HEART RATE: 62 BPM

## 2019-09-10 PROCEDURE — 25010000002 PHENYLEPHRINE PER 1 ML: Performed by: NURSE ANESTHETIST, CERTIFIED REGISTERED

## 2019-09-10 PROCEDURE — 25010000002 MIDAZOLAM PER 1 MG: Performed by: NURSE ANESTHETIST, CERTIFIED REGISTERED

## 2019-09-10 PROCEDURE — C1769 GUIDE WIRE: HCPCS | Performed by: INTERNAL MEDICINE

## 2019-09-10 PROCEDURE — 25010000002 IOPAMIDOL 61 % SOLUTION: Performed by: INTERNAL MEDICINE

## 2019-09-10 PROCEDURE — 25010000002 FENTANYL CITRATE (PF) 100 MCG/2ML SOLUTION: Performed by: NURSE ANESTHETIST, CERTIFIED REGISTERED

## 2019-09-10 PROCEDURE — 74328 X-RAY BILE DUCT ENDOSCOPY: CPT

## 2019-09-10 PROCEDURE — C2625 STENT, NON-COR, TEM W/DEL SY: HCPCS | Performed by: INTERNAL MEDICINE

## 2019-09-10 PROCEDURE — 25010000002 PROPOFOL 10 MG/ML EMULSION: Performed by: NURSE ANESTHETIST, CERTIFIED REGISTERED

## 2019-09-10 PROCEDURE — 25010000002 SUCCINYLCHOLINE PER 20 MG: Performed by: NURSE ANESTHETIST, CERTIFIED REGISTERED

## 2019-09-10 RX ORDER — SODIUM CHLORIDE 0.9 % (FLUSH) 0.9 %
3 SYRINGE (ML) INJECTION EVERY 12 HOURS SCHEDULED
Status: DISCONTINUED | OUTPATIENT
Start: 2019-09-10 | End: 2019-09-10 | Stop reason: HOSPADM

## 2019-09-10 RX ORDER — DEXTROSE AND SODIUM CHLORIDE 5; .45 G/100ML; G/100ML
30 INJECTION, SOLUTION INTRAVENOUS CONTINUOUS PRN
Status: DISCONTINUED | OUTPATIENT
Start: 2019-09-10 | End: 2019-09-10 | Stop reason: HOSPADM

## 2019-09-10 RX ORDER — SUCCINYLCHOLINE CHLORIDE 20 MG/ML
INJECTION INTRAMUSCULAR; INTRAVENOUS AS NEEDED
Status: DISCONTINUED | OUTPATIENT
Start: 2019-09-10 | End: 2019-09-10 | Stop reason: SURG

## 2019-09-10 RX ORDER — FENTANYL CITRATE 50 UG/ML
INJECTION, SOLUTION INTRAMUSCULAR; INTRAVENOUS AS NEEDED
Status: DISCONTINUED | OUTPATIENT
Start: 2019-09-10 | End: 2019-09-10 | Stop reason: SURG

## 2019-09-10 RX ORDER — SODIUM CHLORIDE 0.9 % (FLUSH) 0.9 %
10 SYRINGE (ML) INJECTION AS NEEDED
Status: DISCONTINUED | OUTPATIENT
Start: 2019-09-10 | End: 2019-09-10 | Stop reason: HOSPADM

## 2019-09-10 RX ORDER — PROPOFOL 10 MG/ML
VIAL (ML) INTRAVENOUS AS NEEDED
Status: DISCONTINUED | OUTPATIENT
Start: 2019-09-10 | End: 2019-09-10 | Stop reason: SURG

## 2019-09-10 RX ORDER — LIDOCAINE HYDROCHLORIDE 20 MG/ML
INJECTION, SOLUTION INFILTRATION; PERINEURAL AS NEEDED
Status: DISCONTINUED | OUTPATIENT
Start: 2019-09-10 | End: 2019-09-10 | Stop reason: SURG

## 2019-09-10 RX ORDER — MIDAZOLAM HYDROCHLORIDE 1 MG/ML
INJECTION INTRAMUSCULAR; INTRAVENOUS AS NEEDED
Status: DISCONTINUED | OUTPATIENT
Start: 2019-09-10 | End: 2019-09-10 | Stop reason: SURG

## 2019-09-10 RX ADMIN — PHENYLEPHRINE HYDROCHLORIDE 50 MCG: 10 INJECTION INTRAVENOUS at 11:26

## 2019-09-10 RX ADMIN — DEXTROSE AND SODIUM CHLORIDE 30 ML/HR: 5; 450 INJECTION, SOLUTION INTRAVENOUS at 10:44

## 2019-09-10 RX ADMIN — FENTANYL CITRATE 100 MCG: 50 INJECTION, SOLUTION INTRAMUSCULAR; INTRAVENOUS at 11:12

## 2019-09-10 RX ADMIN — IOPAMIDOL 10 ML: 612 INJECTION, SOLUTION INTRAVENOUS at 11:26

## 2019-09-10 RX ADMIN — LIDOCAINE HYDROCHLORIDE 50 MG: 20 INJECTION, SOLUTION INFILTRATION; PERINEURAL at 11:13

## 2019-09-10 RX ADMIN — MIDAZOLAM HYDROCHLORIDE 2 MG: 2 INJECTION, SOLUTION INTRAMUSCULAR; INTRAVENOUS at 11:11

## 2019-09-10 RX ADMIN — PROPOFOL 100 MG: 10 INJECTION, EMULSION INTRAVENOUS at 11:13

## 2019-09-10 RX ADMIN — SUCCINYLCHOLINE CHLORIDE 100 MG: 20 INJECTION, SOLUTION INTRAMUSCULAR; INTRAVENOUS at 11:13

## 2019-09-10 NOTE — ANESTHESIA POSTPROCEDURE EVALUATION
Patient: Alva Mills    Procedure Summary     Date:  09/10/19 Room / Location:  [facility] ENDOSCOPY 2 / [facility] ENDOSCOPY    Anesthesia Start:  1112 Anesthesia Stop:  1205    Procedure:  ENDOSCOPIC RETROGRADE CHOLANGIOPANCREATOGRAPHY (Left ) Diagnosis:       Gall stones, common bile duct      (Gall stones, common bile duct [K80.50])    Surgeon:  Homero Allen DO Provider:  Zacraias Garcia CRNA    Anesthesia Type:  general ASA Status:  3          Anesthesia Type: general  Last vitals  BP   152/72 (09/10/19 1030)   Temp   97.1 °F (36.2 °C) (09/10/19 1030)   Pulse   62 (09/10/19 1030)   Resp   18 (09/10/19 1030)     SpO2   97 % (09/10/19 1030)     Post Anesthesia Care and Evaluation    Patient location during evaluation: PACU  Patient participation: complete - patient participated  Level of consciousness: awake and alert  Pain management: adequate  Airway patency: patent  Anesthetic complications: No anesthetic complications    Cardiovascular status: acceptable  Respiratory status: acceptable  Hydration status: acceptable

## 2019-09-10 NOTE — H&P
Amanda Kearney DO,[facility]  Gastroenterology  Hepatology  Endoscopy  Board Certified in Internal Medicine and gastroenterology  [address], [address]  [place]. [address]  T- [phone]   F - [phone]     GASTROENTEROLOGY HISTORY AND PHYSICAL  NOTE   AMANDA KEARNEY DO.         SUBJECTIVE:   9/10/2019    Name: Alva Mills  DOD: 1945        Chief Complaint:     Subjective : Recurrent common bile duct stones with cholangitis with obstructive jaundice status post endoscopic retrograde cholangiogram with removal of stones, in the setting of a previous choledocho duodenostomy.  This is being done to remove stent, clean bile duct out and replaced stent.  This is our plan of therapy for the patient rather than surgical intervention as per consultants    Patient is 74 y.o. female presents with desire for elective ERCP with stent exchange and stone removal.      ROS/HISTORY/ CURRENT MEDICATIONS/OBJECTIVE/VS/PE:   Review of Systems:  All systems unremarkable unless specified below.  Constitutional   HENT  Eyes   Respiratory    Cardiovascular  Gastrointestinal   Endocrine  Genitourinary    Musculoskeletal   Skin  Allergic/Immunologic    Neurological    Hematological  Psychiatric/Behavioral    History:     Past Medical History:   Diagnosis Date   • Abdominal pain    • Acquired hypothyroidism    • Acute bronchitis    • Acute sinusitis    • Acute upper respiratory infection    • Benign hypertension    • Breast cyst    • Breast lump     history of, right   • Calf pain    • Common bile duct calculus    • Constipation    • Cough    • Depressive disorder    • Elevated cholesterol    • Epigastric pain    • External hemorrhoids without complication    • Fibrocystic breast    • Functional heart murmur    • Gastroesophageal reflux disease    • General medical exam    • Hemorrhoids    • Hyperlipidemia    • Incisional hernia     no evident recurrence at this juncture   • Localized, primary osteoarthritis  of ankle or foot    • Muscle strain    • Skin lesion      Past Surgical History:   Procedure Laterality Date   • BACK SURGERY      x2   • BILE DUCT EXPLORATION  12/10/2013    Remove bile duct stone (1)    Common duct exploration, stone extraction, choledochoduodenostomy and choledochoscopy.    • BREAST BIOPSY Right 1991    Stereotactic breast biopsy (1)    Right breast    • CHOLECYSTECTOMY OPEN  1989   • ERCP Left 1/10/2019    Procedure: ENDOSCOPIC RETROGRADE CHOLANGIOPANCREATOGRAPHY;  Surgeon: Homero Allen DO;  Location: [facility] ENDOSCOPY;  Service: Gastroenterology   • ERCP Left 3/14/2019    Procedure: ENDOSCOPIC RETROGRADE CHOLANGIOPANCREATOGRAPHY;  Surgeon: Homero Allen DO;  Location: [facility] ENDOSCOPY;  Service: Gastroenterology   • HERNIA REPAIR  06/16/2014    Anesth, repair of hernia (1)    laparoscopic ventral hernioplasty with mesh. Ventral hernia.    • HYSTERECTOMY  10/17/2001    Anesth, hysterectomy (1)    Laparoscopic subtotal hysterectomy & BSO. Enterolysis of sigmoid colon.    • INJECTION OF MEDICATION  08/12/2013    Dexamethasone (2)      • INJECTION OF MEDICATION  01/31/2013    Kenalog (1)      • INJECTION OF MEDICATION  02/11/2015    Rocephin (1)      • OOPHORECTOMY     • OTHER SURGICAL HISTORY  08/12/2013    Small Joint Injection/Aspiration 20600 (2)      • TUBAL ABDOMINAL LIGATION       Family History   Problem Relation Age of Onset   • Heart disease Other    • Hypertension Other      Social History     Tobacco Use   • Smoking status: Former Smoker   • Smokeless tobacco: Never Used   • Tobacco comment: about 45 years ago (Dec 04 2013)   Substance Use Topics   • Alcohol use: No     Frequency: Never   • Drug use: No     Medications Prior to Admission   Medication Sig Dispense Refill Last Dose   • albuterol (PROVENTIL HFA;VENTOLIN HFA) 108 (90 Base) MCG/ACT inhaler Inhale 2 puffs Every 4 (Four) Hours As Needed for Wheezing. 1 inhaler 2 9/9/2019 at Unknown time   • AZELASTINE HCL NA  azelastine 137 mcg (0.1 %) nasal spray aerosol   9/9/2019 at Unknown time   • CALCIUM PO Take 1 tablet by mouth Daily With Breakfast. 500g   9/9/2019 at Unknown time   • desvenlafaxine (PRISTIQ) 100 MG 24 hr tablet TAKE 1 TABLET DAILY 90 tablet 3 9/9/2019 at Unknown time   • fenofibrate (TRICOR) 145 MG tablet Take 1 tablet by mouth Daily. 90 tablet 3 9/10/2019 at Unknown time   • HYDROcodone-acetaminophen (NORCO)  MG per tablet Take 1 tablet by mouth Every 6 (Six) Hours As Needed.   9/10/2019 at Unknown time   • Magnesium 500 MG capsule Take 1 capsule by mouth Daily.   9/9/2019 at Unknown time   • metoprolol succinate XL (TOPROL-XL) 25 MG 24 hr tablet TAKE 1 TABLET DAILY 90 tablet 3 9/10/2019 at Unknown time   • promethazine (PHENERGAN) 25 MG tablet Take 1 tablet by mouth Every 6 (Six) Hours As Needed for Nausea or Vomiting. 180 tablet 0 9/10/2019 at Unknown time   • SYNTHROID 50 MCG tablet TAKE 1 TABLET DAILY 90 tablet 3 9/10/2019 at Unknown time   • acyclovir (ZOVIRAX) 400 MG tablet Take 1 tablet by mouth 3 times a day during break outs. 90 tablet 3 Taking   • cyclobenzaprine (FLEXERIL) 5 MG tablet 1-2 every 8 hours prn spasm 90 tablet 1 Taking   • diazePAM (VALIUM) 5 MG tablet Take 1 tablet by mouth Every 12 (Twelve) Hours As Needed for Anxiety or Sleep. 60 tablet 0 More than a month at Unknown time   • gabapentin (NEURONTIN) 100 MG capsule Use tid prn 90 capsule 2 Taking   • hydrOXYzine pamoate (VISTARIL) 25 MG capsule Take 1 capsule by mouth 3 (Three) Times a Day As Needed for Anxiety. 180 capsule 3 Taking   • prochlorperazine (COMPAZINE) 5 MG tablet Take 1 tablet by mouth Every 6 (Six) Hours As Needed for Nausea or Vomiting. 100 tablet 5 Taking     Allergies:  Clonazepam; Hydromorphone; Morphine; and Aripiprazole    I have reviewed the patients medical history, surgical history and family history in the available medical record system.     Current Medications:     Current Facility-Administered  Medications   Medication Dose Route Frequency Provider Last Rate Last Dose   • dextrose 5 % and sodium chloride 0.45 % infusion  30 mL/hr Intravenous Continuous PRN Homero Allen DO 30 mL/hr at 09/10/19 1044 30 mL/hr at 09/10/19 1044   • sodium chloride 0.9 % flush 10 mL  10 mL Intravenous PRN Homero Allen DO       • sodium chloride 0.9 % flush 3 mL  3 mL Intravenous Q12H Homero Allen DO           Objective     Physical Exam:   Temp:  [97.1 °F (36.2 °C)] 97.1 °F (36.2 °C)  Heart Rate:  [62] 62  Resp:  [18] 18  BP: (152)/(72) 152/72    Physical Exam:  General Appearance:    Alert, cooperative, in no acute distress   Head:    Normocephalic, without obvious abnormality, atraumatic   Eyes:            Lids and lashes normal, conjunctivae and sclerae normal, no   icterus, no pallor, corneas clear, PERRLA   Ears:    Ears appear intact with no abnormalities noted   Throat:   No oral lesions, no thrush, oral mucosa moist   Neck:   No adenopathy, supple, trachea midline, no thyromegaly, no     carotid bruit, no JVD   Back:     No kyphosis present, no scoliosis present, no skin lesions,       erythema or scars, no tenderness to percussion or                   palpation,   range of motion normal   Lungs:     Clear to auscultation,respirations regular, even and                   unlabored    Heart:    Regular rhythm and normal rate, normal S1 and S2, no            murmur, no gallop, no rub, no click   Breast Exam:    Deferred   Abdomen:     Normal bowel sounds, no masses, no organomegaly, soft        non-tender, non-distended, no guarding, no rebound                 tenderness   Genitalia:    Deferred   Extremities:   Moves all extremities well, no edema, no cyanosis, no              redness   Pulses:   Pulses palpable and equal bilaterally   Skin:   No bleeding, bruising or rash   Lymph nodes:   No palpable adenopathy   Neurologic:   Cranial nerves 2 - 12 grossly intact, sensation intact, DTR        present and  equal bilaterally      Results Review:     Lab Results   Component Value Date    WBC 9.62 01/11/2019    WBC 10.90 (H) 01/10/2019    WBC 21.24 (H) 01/09/2019    HGB 11.4 (L) 01/11/2019    HGB 10.8 (L) 01/10/2019    HGB 12.8 01/09/2019    HCT 35.2 01/11/2019    HCT 32.0 (L) 01/10/2019    HCT 38.4 01/09/2019     01/11/2019     01/10/2019     01/09/2019             No results found for: LIPASE  Lab Results   Component Value Date    INR 1.0 03/27/2016    INR 1.0 02/05/2015          Radiology Review:  Imaging Results (last 72 hours)     ** No results found for the last 72 hours. **           I reviewed the patient's new clinical results.  I reviewed the patient's new imaging results and agree with the interpretation.     ASSESSMENT/PLAN:   ASSESSMENT:  1.  Common bile duct stone, recurrent  2.  Common bile duct stent for removal  3.  Status post choledochoduodenostomy    PLAN:  1.  Endoscopic retrograde cholangiogram with removal/replacement of stent and removal of stones    Risk and benefits associated with the procedure are reviewed with the patient.  The patient wished to proceed     Homero Allen DO  09/10/19  10:57 AM

## 2019-09-10 NOTE — ANESTHESIA PROCEDURE NOTES
Airway  Urgency: elective    Date/Time: 9/10/2019 11:14 AM  Airway not difficult    General Information and Staff    Patient location during procedure: OR  CRNA: Zacarias Garcia CRNA    Indications and Patient Condition  Indications for airway management: airway protection    Preoxygenated: yes  MILS maintained throughout  Mask difficulty assessment: 0 - not attempted    Final Airway Details  Final airway type: endotracheal airway      Successful airway: ETT  Cuffed: yes   Successful intubation technique: video laryngoscopy  Endotracheal tube insertion site: oral  Blade: Sina  Blade size: 3  ETT size (mm): 7.0  Cormack-Lehane Classification: grade I - full view of glottis  Placement verified by: chest auscultation   Cuff volume (mL): 7  Measured from: lips  ETT/EBT  to lips (cm): 20  Number of attempts at approach: 1

## 2019-09-10 NOTE — ANESTHESIA PREPROCEDURE EVALUATION
Anesthesia Evaluation     NPO Solid Status: > 8 hours  NPO Liquid Status: > 2 hours           Airway   Mallampati: II  Small opening and No difficulty expected  Dental - normal exam     Pulmonary - normal exam   Cardiovascular - normal exam    (+) hypertension, valvular problems/murmurs, hyperlipidemia,       Neuro/Psych  (+) psychiatric history,     GI/Hepatic/Renal/Endo    (+)  GERD,  hypothyroidism,     Musculoskeletal     Abdominal    Substance History      OB/GYN          Other   (+) arthritis                     Anesthesia Plan    ASA 3     general     intravenous induction   Anesthetic plan, all risks, benefits, and alternatives have been provided, discussed and informed consent has been obtained with: patient.

## 2019-09-17 RX ORDER — PROMETHAZINE HYDROCHLORIDE 25 MG/1
TABLET ORAL
Qty: 180 TABLET | Refills: 0 | Status: SHIPPED | OUTPATIENT
Start: 2019-09-17 | End: 2019-12-18

## 2019-09-23 ENCOUNTER — OFFICE VISIT (OUTPATIENT)
Dept: PODIATRY | Facility: CLINIC | Age: 74
End: 2019-09-23

## 2019-09-23 VITALS — BODY MASS INDEX: 22.66 KG/M2 | HEART RATE: 79 BPM | WEIGHT: 120 LBS | OXYGEN SATURATION: 94 % | HEIGHT: 61 IN

## 2019-09-23 DIAGNOSIS — B35.1 ONYCHOMYCOSIS: Primary | ICD-10-CM

## 2019-09-23 PROCEDURE — 11755 BIOPSY NAIL UNIT: CPT | Performed by: PODIATRIST

## 2019-09-23 PROCEDURE — 99203 OFFICE O/P NEW LOW 30 MIN: CPT | Performed by: PODIATRIST

## 2019-09-23 NOTE — PROGRESS NOTES
Alva Mills  1945  74 y.o. female     Patient presents today with a complaint of her left great toenail being painful.    09/23/2019    Chief Complaint   Patient presents with   • Left Foot - Pain, great toenail issue       History of Present Illness    lAva Mills is a 74 y.o.female who presents to clinic with chief complaint of left great toenail pain and discoloration.  Patient states that several weeks ago she dropped something heavy on her left great toe injuring the toenail.  She states that the nail remains darkened and slightly painful.  Currently she rates her pain as a 4 out of 10.  She does relate that the toenail was slightly discolored and thickened prior to injury.  She is concerned she may have a fungal infection.  She has done nothing to treat her left great toe nail or toenail fungus.  She has no other complaints today.      Past Medical History:   Diagnosis Date   • Abdominal pain    • Acquired hypothyroidism    • Acute bronchitis    • Acute sinusitis    • Acute upper respiratory infection    • Benign hypertension    • Breast cyst    • Breast lump     history of, right   • Calf pain    • Common bile duct calculus    • Constipation    • Cough    • Depressive disorder    • Elevated cholesterol    • Epigastric pain    • External hemorrhoids without complication    • Fibrocystic breast    • Functional heart murmur    • Gastroesophageal reflux disease    • General medical exam    • Hemorrhoids    • Hyperlipidemia    • Incisional hernia     no evident recurrence at this juncture   • Ingrown toenail    • Localized, primary osteoarthritis of ankle or foot    • Muscle strain    • Skin lesion          Past Surgical History:   Procedure Laterality Date   • BACK SURGERY      x2   • BILE DUCT EXPLORATION  12/10/2013    Remove bile duct stone (1)    Common duct exploration, stone extraction, choledochoduodenostomy and choledochoscopy.    • BREAST BIOPSY Right 1991    Stereotactic breast  "biopsy (1)    Right breast    • CHOLECYSTECTOMY OPEN  1989   • ERCP Left 1/10/2019    Procedure: ENDOSCOPIC RETROGRADE CHOLANGIOPANCREATOGRAPHY;  Surgeon: Homero Allen DO;  Location: [facility] ENDOSCOPY;  Service: Gastroenterology   • ERCP Left 3/14/2019    Procedure: ENDOSCOPIC RETROGRADE CHOLANGIOPANCREATOGRAPHY;  Surgeon: Homero Allen DO;  Location: [facility] ENDOSCOPY;  Service: Gastroenterology   • ERCP Left 9/10/2019    Procedure: ENDOSCOPIC RETROGRADE CHOLANGIOPANCREATOGRAPHY;  Surgeon: Homero Allen DO;  Location: [facility] ENDOSCOPY;  Service: Gastroenterology   • HERNIA REPAIR  06/16/2014    Anesth, repair of hernia (1)    laparoscopic ventral hernioplasty with mesh. Ventral hernia.    • HYSTERECTOMY  10/17/2001    Anesth, hysterectomy (1)    Laparoscopic subtotal hysterectomy & BSO. Enterolysis of sigmoid colon.    • INJECTION OF MEDICATION  08/12/2013    Dexamethasone (2)      • INJECTION OF MEDICATION  01/31/2013    Kenalog (1)      • INJECTION OF MEDICATION  02/11/2015    Rocephin (1)      • OOPHORECTOMY     • OTHER SURGICAL HISTORY  08/12/2013    Small Joint Injection/Aspiration 20600 (2)      • TUBAL ABDOMINAL LIGATION           Family History   Problem Relation Age of Onset   • Heart disease Other    • Hypertension Other    • Heart disease Father        Allergies   Allergen Reactions   • Clonazepam Other (See Comments)     \"WENT CRAZY\"; CONFUSION   • Hydromorphone Other (See Comments)     \"WENT CRAZY\"; CONFUSION   • Morphine Other (See Comments)     HEADACHE   • Aripiprazole Nausea And Vomiting       Social History     Socioeconomic History   • Marital status:      Spouse name: Not on file   • Number of children: Not on file   • Years of education: Not on file   • Highest education level: Not on file   Tobacco Use   • Smoking status: Former Smoker   • Smokeless tobacco: Never Used   • Tobacco comment: about 45 years ago (Dec 04 2013)   Substance and Sexual Activity   • Alcohol use: No "     Frequency: Never   • Drug use: No   • Sexual activity: Defer         Current Outpatient Medications   Medication Sig Dispense Refill   • acyclovir (ZOVIRAX) 400 MG tablet Take 1 tablet by mouth 3 times a day during break outs. 90 tablet 3   • albuterol (PROVENTIL HFA;VENTOLIN HFA) 108 (90 Base) MCG/ACT inhaler Inhale 2 puffs Every 4 (Four) Hours As Needed for Wheezing. 1 inhaler 2   • AZELASTINE HCL NA azelastine 137 mcg (0.1 %) nasal spray aerosol     • CALCIUM PO Take 1 tablet by mouth Daily With Breakfast. 500g     • cyclobenzaprine (FLEXERIL) 5 MG tablet 1-2 every 8 hours prn spasm 90 tablet 1   • desvenlafaxine (PRISTIQ) 100 MG 24 hr tablet TAKE 1 TABLET DAILY 90 tablet 3   • diazePAM (VALIUM) 5 MG tablet Take 1 tablet by mouth Every 12 (Twelve) Hours As Needed for Anxiety or Sleep. 60 tablet 0   • fenofibrate (TRICOR) 145 MG tablet Take 1 tablet by mouth Daily. 90 tablet 3   • gabapentin (NEURONTIN) 100 MG capsule Use tid prn 90 capsule 2   • HYDROcodone-acetaminophen (NORCO)  MG per tablet Take 1 tablet by mouth Every 6 (Six) Hours As Needed.     • hydrOXYzine pamoate (VISTARIL) 25 MG capsule Take 1 capsule by mouth 3 (Three) Times a Day As Needed for Anxiety. 180 capsule 3   • Magnesium 500 MG capsule Take 1 capsule by mouth Daily.     • metoprolol succinate XL (TOPROL-XL) 25 MG 24 hr tablet TAKE 1 TABLET DAILY 90 tablet 3   • prochlorperazine (COMPAZINE) 5 MG tablet Take 1 tablet by mouth Every 6 (Six) Hours As Needed for Nausea or Vomiting. 100 tablet 5   • promethazine (PHENERGAN) 25 MG tablet TAKE 1 TABLET EVERY 6 HOURSAS NEEDED FOR NAUSEA OR    VOMITING 180 tablet 0   • SYNTHROID 50 MCG tablet TAKE 1 TABLET DAILY 90 tablet 3     No current facility-administered medications for this visit.        Review of Systems   Constitutional: Negative.    HENT: Negative.    Eyes: Negative.    Respiratory: Negative.    Cardiovascular: Negative.    Gastrointestinal: Negative.    Endocrine: Negative.   "  Genitourinary: Negative.    Musculoskeletal: Positive for arthralgias and back pain.        Left great toe pain     Skin: Negative.    Neurological: Negative.    Psychiatric/Behavioral: Negative.          OBJECTIVE    Pulse 79   Ht 154.9 cm (61\")   Wt 54.4 kg (120 lb)   SpO2 94%   BMI 22.67 kg/m²       Physical Exam   Constitutional: She is oriented to person, place, and time. She appears well-developed and well-nourished. No distress.   HENT:   Head: Normocephalic and atraumatic.   Nose: Nose normal.   Eyes: Conjunctivae and EOM are normal. Pupils are equal, round, and reactive to light.   Pulmonary/Chest: Effort normal. No respiratory distress.   Musculoskeletal: Normal range of motion. She exhibits no edema or deformity.   Neurological: She is alert and oriented to person, place, and time.   Skin: Skin is warm and dry. Capillary refill takes less than 2 seconds.   Psychiatric: She has a normal mood and affect. Her behavior is normal.   Vitals reviewed.      Gait: normal     Assistive Device: none     Lower Extremity    Cardiovascular:    DP/PT pulses palpable bilateral  CFT brisk  to all digits  Skin temp is warm to warm from proximal tibia to distal digits bilateral  Pedal hair growth present.   No erythema or edema noted   Musculoskeletal:  Muscle strength is 5/5 for all muscle groups tested   ROM of the 1st MTP is WNL bilateral   ROM of the ankle joint is  WNL bilateral   Dermatological:   Skin is warm, dry and intact bilateral  Webspaces 1-4 bilateral are clean, dry and intact.   No subcutaneous nodules or masses noted    Bilateral hallux nails are worse thickened and discolored with subungual debris.  Slight pain on palpation to left hallux nail plate.  Neurological:   Protective sensation intact   Sensation intact to light touch        Procedures    Left hallux nail biopsy  09/23/19  12:24 PM  Risks and benefits discussed  Verbal and written consent obtained.  Left hallux was anesthetized with 2% " lidocaine plain  Nail plate was avulsed in total.  Nail bed scrapings taken with dermal curette  Specimen will be sent to [facility]  Dressing applied.  Dispensed aftercare instruction sheet.  Patient tolerated the procedure well with no complications      ASSESSMENT AND PLAN    Alva was seen today for pain and great toenail issue.    Diagnoses and all orders for this visit:    Onychomycosis    - Comprehensive foot and ankle exam performed.  - Diagnosis, prevention and treatment of fungal toenails was discussed with the patient in detail.  Nail biopsy and treatment with oral fungal versus nail avulsions both temporary permanent versus regular debridements were discussed.  - Patient elected for nail biopsy at this time.  - All questions were answered and the patient is in agreement with the current treatment plan.  - RTC in 3 weeks          This document has been electronically signed by Ashish Zimmer DPM on September 23, 2019 12:23 PM     9/23/2019  12:23 PM

## 2019-10-09 ENCOUNTER — FLU SHOT (OUTPATIENT)
Dept: FAMILY MEDICINE CLINIC | Facility: CLINIC | Age: 74
End: 2019-10-09

## 2019-10-09 DIAGNOSIS — Z23 IMMUNIZATION, PNEUMOCOCCUS AND INFLUENZA: ICD-10-CM

## 2019-10-09 PROCEDURE — 90471 IMMUNIZATION ADMIN: CPT | Performed by: FAMILY MEDICINE

## 2019-10-09 PROCEDURE — 90653 IIV ADJUVANT VACCINE IM: CPT | Performed by: FAMILY MEDICINE

## 2019-10-14 ENCOUNTER — LAB (OUTPATIENT)
Dept: LAB | Facility: HOSPITAL | Age: 74
End: 2019-10-14

## 2019-10-14 ENCOUNTER — OFFICE VISIT (OUTPATIENT)
Dept: PODIATRY | Facility: CLINIC | Age: 74
End: 2019-10-14

## 2019-10-14 VITALS — HEART RATE: 80 BPM | OXYGEN SATURATION: 98 % | WEIGHT: 120 LBS | HEIGHT: 61 IN | BODY MASS INDEX: 22.66 KG/M2

## 2019-10-14 DIAGNOSIS — B35.1 ONYCHOMYCOSIS: Primary | ICD-10-CM

## 2019-10-14 DIAGNOSIS — B35.1 ONYCHOMYCOSIS: ICD-10-CM

## 2019-10-14 PROCEDURE — 80076 HEPATIC FUNCTION PANEL: CPT

## 2019-10-14 PROCEDURE — 99213 OFFICE O/P EST LOW 20 MIN: CPT | Performed by: PODIATRIST

## 2019-10-14 PROCEDURE — 36415 COLL VENOUS BLD VENIPUNCTURE: CPT

## 2019-10-14 NOTE — PROGRESS NOTES
Alva Mills  1945  74 y.o. female     Patient presents today for a follow up on her left great toenail.     10/14/2019    Chief Complaint   Patient presents with   • Left Foot - Follow-up, Nail Problem     BAKO results        History of Present Illness    Alva Mills is a 74 y.o.female who presents to clinic for toenail biopsy results.      Past Medical History:   Diagnosis Date   • Abdominal pain    • Acquired hypothyroidism    • Acute bronchitis    • Acute sinusitis    • Acute upper respiratory infection    • Benign hypertension    • Breast cyst    • Breast lump     history of, right   • Calf pain    • Common bile duct calculus    • Constipation    • Cough    • Depressive disorder    • Elevated cholesterol    • Epigastric pain    • External hemorrhoids without complication    • Fibrocystic breast    • Functional heart murmur    • Gastroesophageal reflux disease    • General medical exam    • Hemorrhoids    • Hyperlipidemia    • Incisional hernia     no evident recurrence at this juncture   • Ingrown toenail    • Localized, primary osteoarthritis of ankle or foot    • Muscle strain    • Skin lesion          Past Surgical History:   Procedure Laterality Date   • BACK SURGERY      x2   • BILE DUCT EXPLORATION  12/10/2013    Remove bile duct stone (1)    Common duct exploration, stone extraction, choledochoduodenostomy and choledochoscopy.    • BREAST BIOPSY Right 1991    Stereotactic breast biopsy (1)    Right breast    • CHOLECYSTECTOMY OPEN  1989   • ERCP Left 1/10/2019    Procedure: ENDOSCOPIC RETROGRADE CHOLANGIOPANCREATOGRAPHY;  Surgeon: Homero Allen DO;  Location: [facility] ENDOSCOPY;  Service: Gastroenterology   • ERCP Left 3/14/2019    Procedure: ENDOSCOPIC RETROGRADE CHOLANGIOPANCREATOGRAPHY;  Surgeon: Homero Allen DO;  Location: [facility] ENDOSCOPY;  Service: Gastroenterology   • ERCP Left 9/10/2019    Procedure: ENDOSCOPIC RETROGRADE CHOLANGIOPANCREATOGRAPHY;  Surgeon: Alejandro  "Homero TALLEY DO;  Location: [facility] ENDOSCOPY;  Service: Gastroenterology   • HERNIA REPAIR  06/16/2014    Anesth, repair of hernia (1)    laparoscopic ventral hernioplasty with mesh. Ventral hernia.    • HYSTERECTOMY  10/17/2001    Anesth, hysterectomy (1)    Laparoscopic subtotal hysterectomy & BSO. Enterolysis of sigmoid colon.    • INJECTION OF MEDICATION  08/12/2013    Dexamethasone (2)      • INJECTION OF MEDICATION  01/31/2013    Kenalog (1)      • INJECTION OF MEDICATION  02/11/2015    Rocephin (1)      • OOPHORECTOMY     • OTHER SURGICAL HISTORY  08/12/2013    Small Joint Injection/Aspiration 20600 (2)      • TUBAL ABDOMINAL LIGATION           Family History   Problem Relation Age of Onset   • Heart disease Other    • Hypertension Other    • Heart disease Father        Allergies   Allergen Reactions   • Clonazepam Other (See Comments)     \"WENT CRAZY\"; CONFUSION   • Hydromorphone Other (See Comments)     \"WENT CRAZY\"; CONFUSION   • Morphine Other (See Comments)     HEADACHE   • Aripiprazole Nausea And Vomiting       Social History     Socioeconomic History   • Marital status:      Spouse name: Not on file   • Number of children: Not on file   • Years of education: Not on file   • Highest education level: Not on file   Tobacco Use   • Smoking status: Former Smoker   • Smokeless tobacco: Never Used   • Tobacco comment: about 45 years ago (Dec 04 2013)   Substance and Sexual Activity   • Alcohol use: No     Frequency: Never   • Drug use: No   • Sexual activity: Defer         Current Outpatient Medications   Medication Sig Dispense Refill   • acyclovir (ZOVIRAX) 400 MG tablet Take 1 tablet by mouth 3 times a day during break outs. 90 tablet 3   • albuterol (PROVENTIL HFA;VENTOLIN HFA) 108 (90 Base) MCG/ACT inhaler Inhale 2 puffs Every 4 (Four) Hours As Needed for Wheezing. 1 inhaler 2   • AZELASTINE HCL NA azelastine 137 mcg (0.1 %) nasal spray aerosol     • CALCIUM PO Take 1 tablet by mouth Daily With " "Breakfast. 500g     • cyclobenzaprine (FLEXERIL) 5 MG tablet 1-2 every 8 hours prn spasm 90 tablet 1   • desvenlafaxine (PRISTIQ) 100 MG 24 hr tablet TAKE 1 TABLET DAILY 90 tablet 3   • diazePAM (VALIUM) 5 MG tablet Take 1 tablet by mouth Every 12 (Twelve) Hours As Needed for Anxiety or Sleep. 60 tablet 0   • fenofibrate (TRICOR) 145 MG tablet Take 1 tablet by mouth Daily. 90 tablet 3   • gabapentin (NEURONTIN) 100 MG capsule Use tid prn 90 capsule 2   • hydrOXYzine pamoate (VISTARIL) 25 MG capsule Take 1 capsule by mouth 3 (Three) Times a Day As Needed for Anxiety. 180 capsule 3   • Magnesium 500 MG capsule Take 1 capsule by mouth Daily.     • metoprolol succinate XL (TOPROL-XL) 25 MG 24 hr tablet TAKE 1 TABLET DAILY 90 tablet 3   • prochlorperazine (COMPAZINE) 5 MG tablet Take 1 tablet by mouth Every 6 (Six) Hours As Needed for Nausea or Vomiting. 100 tablet 5   • promethazine (PHENERGAN) 25 MG tablet TAKE 1 TABLET EVERY 6 HOURSAS NEEDED FOR NAUSEA OR    VOMITING 180 tablet 0   • SYNTHROID 50 MCG tablet TAKE 1 TABLET DAILY 90 tablet 3   • terbinafine (LAMISIL) 250 MG tablet Take 1 tablet by mouth Daily. 90 tablet 0     No current facility-administered medications for this visit.        Review of Systems   Constitutional: Negative.    HENT: Negative.    Eyes: Negative.    Respiratory: Negative.    Cardiovascular: Negative.    Gastrointestinal: Negative.    Musculoskeletal: Positive for arthralgias and back pain.   Neurological: Negative.    Psychiatric/Behavioral: Negative.          OBJECTIVE    Pulse 80   Ht 154.9 cm (61\")   Wt 54.4 kg (120 lb)   SpO2 98%   BMI 22.67 kg/m²       Physical Exam   Constitutional: She is oriented to person, place, and time. She appears well-developed and well-nourished. No distress.   HENT:   Head: Normocephalic and atraumatic.   Pulmonary/Chest: Effort normal. No respiratory distress.   Musculoskeletal: Normal range of motion. She exhibits no edema or deformity.   Neurological: " She is alert and oriented to person, place, and time.   Psychiatric: She has a normal mood and affect. Her behavior is normal.   Vitals reviewed.      Gait: normal     Assistive Device: none     Lower Extremity    Cardiovascular:    DP/PT pulses palpable bilateral  CFT brisk  to all digits  Skin temp is warm to warm from proximal tibia to distal digits bilateral  Pedal hair growth present.   No erythema or edema noted   Musculoskeletal:  Muscle strength is 5/5 for all muscle groups tested   ROM of the 1st MTP is WNL bilateral   ROM of the ankle joint is  WNL bilateral   Dermatological:   Skin is warm, dry and intact bilateral  Webspaces 1-4 bilateral are clean, dry and intact.   No subcutaneous nodules or masses noted    Left hallux nail is absent.  No signs of infection.  Right hallux nail is thickened and discolored.  Neurological:   Protective sensation intact   Sensation intact to light touch        Procedures      ASSESSMENT AND PLAN    Alva was seen today for follow-up and nail problem.    Diagnoses and all orders for this visit:    Onychomycosis  -     Hepatic Function Panel; Future  -     Hepatic Function Panel; Future    Other orders  -     terbinafine (LAMISIL) 250 MG tablet; Take 1 tablet by mouth Daily.    -Toenail biopsy results are positive for fungus.  Discussed treatment options.  Patient elected for treatment with oral antifungal.  Will order hepatic function panel.  If LFTs are within normal limits will prescribe oral Lamisil.  Plan to recheck LFTs in 4 weeks  - All questions were answered and the patient is in agreement with the current treatment plan.  - RTC in 4 weeks.    I spent 20 minutes face-to-face with this patient discussing her biopsy results and potential treatment options.          This document has been electronically signed by Ashish Zimmer DPM on October 15, 2019 7:19 AM     10/15/2019  7:19 AM

## 2019-10-15 LAB
ALBUMIN SERPL-MCNC: 4.4 G/DL (ref 3.5–5.2)
ALP SERPL-CCNC: 51 U/L (ref 39–117)
ALT SERPL W P-5'-P-CCNC: 23 U/L (ref 1–33)
AST SERPL-CCNC: 25 U/L (ref 1–32)
BILIRUB CONJ SERPL-MCNC: <0.2 MG/DL (ref 0.2–0.3)
BILIRUB INDIRECT SERPL-MCNC: ABNORMAL MG/DL
BILIRUB SERPL-MCNC: 0.2 MG/DL (ref 0.2–1.2)
PROT SERPL-MCNC: 7 G/DL (ref 6–8.5)

## 2019-10-15 RX ORDER — TERBINAFINE HYDROCHLORIDE 250 MG/1
250 TABLET ORAL DAILY
Qty: 90 TABLET | Refills: 0 | Status: SHIPPED | OUTPATIENT
Start: 2019-10-15 | End: 2020-03-09

## 2019-11-06 ENCOUNTER — OFFICE VISIT (OUTPATIENT)
Dept: FAMILY MEDICINE CLINIC | Facility: CLINIC | Age: 74
End: 2019-11-06

## 2019-11-06 VITALS
WEIGHT: 121 LBS | BODY MASS INDEX: 22.84 KG/M2 | SYSTOLIC BLOOD PRESSURE: 134 MMHG | DIASTOLIC BLOOD PRESSURE: 76 MMHG | HEIGHT: 61 IN

## 2019-11-06 DIAGNOSIS — G25.81 RESTLESS LEG SYNDROME: ICD-10-CM

## 2019-11-06 DIAGNOSIS — I10 ESSENTIAL HYPERTENSION: Primary | ICD-10-CM

## 2019-11-06 PROCEDURE — 99214 OFFICE O/P EST MOD 30 MIN: CPT | Performed by: NURSE PRACTITIONER

## 2019-11-06 RX ORDER — ROPINIROLE 0.25 MG/1
0.25 TABLET, FILM COATED ORAL 3 TIMES DAILY
Qty: 90 TABLET | Refills: 5 | Status: SHIPPED | OUTPATIENT
Start: 2019-11-06 | End: 2019-11-06 | Stop reason: SDUPTHER

## 2019-11-06 RX ORDER — ROPINIROLE 0.25 MG/1
0.25 TABLET, FILM COATED ORAL 3 TIMES DAILY
Qty: 90 TABLET | Refills: 5 | Status: SHIPPED | OUTPATIENT
Start: 2019-11-06 | End: 2019-11-07 | Stop reason: SDUPTHER

## 2019-11-06 RX ORDER — HYDROCODONE BITARTRATE AND ACETAMINOPHEN 10; 325 MG/1; MG/1
1 TABLET ORAL EVERY 6 HOURS PRN
COMMUNITY

## 2019-11-07 ENCOUNTER — LAB (OUTPATIENT)
Dept: LAB | Facility: HOSPITAL | Age: 74
End: 2019-11-07

## 2019-11-07 DIAGNOSIS — I10 ESSENTIAL HYPERTENSION: ICD-10-CM

## 2019-11-07 DIAGNOSIS — B35.1 ONYCHOMYCOSIS: ICD-10-CM

## 2019-11-07 DIAGNOSIS — G25.81 RESTLESS LEG SYNDROME: ICD-10-CM

## 2019-11-07 LAB
25(OH)D3 SERPL-MCNC: 42.1 NG/ML (ref 30–100)
ALBUMIN SERPL-MCNC: 4.2 G/DL (ref 3.5–5.2)
ALBUMIN/GLOB SERPL: 1.3 G/DL
ALP SERPL-CCNC: 59 U/L (ref 39–117)
ALT SERPL W P-5'-P-CCNC: 19 U/L (ref 1–33)
ANION GAP SERPL CALCULATED.3IONS-SCNC: 7.5 MMOL/L (ref 5–15)
AST SERPL-CCNC: 25 U/L (ref 1–32)
BASOPHILS # BLD AUTO: 0.07 10*3/MM3 (ref 0–0.2)
BASOPHILS NFR BLD AUTO: 1.2 % (ref 0–1.5)
BILIRUB CONJ SERPL-MCNC: <0.2 MG/DL (ref 0.2–0.3)
BILIRUB SERPL-MCNC: 0.3 MG/DL (ref 0.2–1.2)
BUN BLD-MCNC: 17 MG/DL (ref 8–23)
BUN/CREAT SERPL: 21 (ref 7–25)
CALCIUM SPEC-SCNC: 9.5 MG/DL (ref 8.6–10.5)
CHLORIDE SERPL-SCNC: 103 MMOL/L (ref 98–107)
CHOLEST SERPL-MCNC: 179 MG/DL (ref 0–200)
CO2 SERPL-SCNC: 30.5 MMOL/L (ref 22–29)
CREAT BLD-MCNC: 0.81 MG/DL (ref 0.57–1)
DEPRECATED RDW RBC AUTO: 40 FL (ref 37–54)
EOSINOPHIL # BLD AUTO: 0.2 10*3/MM3 (ref 0–0.4)
EOSINOPHIL NFR BLD AUTO: 3.3 % (ref 0.3–6.2)
ERYTHROCYTE [DISTWIDTH] IN BLOOD BY AUTOMATED COUNT: 11.5 % (ref 12.3–15.4)
GFR SERPL CREATININE-BSD FRML MDRD: 69 ML/MIN/1.73
GLOBULIN UR ELPH-MCNC: 3.3 GM/DL
GLUCOSE BLD-MCNC: 88 MG/DL (ref 65–99)
HCT VFR BLD AUTO: 37.4 % (ref 34–46.6)
HDLC SERPL-MCNC: 63 MG/DL (ref 40–60)
HGB BLD-MCNC: 12.3 G/DL (ref 12–15.9)
IMM GRANULOCYTES # BLD AUTO: 0.02 10*3/MM3 (ref 0–0.05)
IMM GRANULOCYTES NFR BLD AUTO: 0.3 % (ref 0–0.5)
IRON 24H UR-MRATE: 79 MCG/DL (ref 37–145)
LDLC SERPL CALC-MCNC: 101 MG/DL (ref 0–100)
LDLC/HDLC SERPL: 1.6 {RATIO}
LYMPHOCYTES # BLD AUTO: 1.68 10*3/MM3 (ref 0.7–3.1)
LYMPHOCYTES NFR BLD AUTO: 27.6 % (ref 19.6–45.3)
MAGNESIUM SERPL-MCNC: 2.1 MG/DL (ref 1.6–2.4)
MCH RBC QN AUTO: 31.1 PG (ref 26.6–33)
MCHC RBC AUTO-ENTMCNC: 32.9 G/DL (ref 31.5–35.7)
MCV RBC AUTO: 94.7 FL (ref 79–97)
MONOCYTES # BLD AUTO: 0.54 10*3/MM3 (ref 0.1–0.9)
MONOCYTES NFR BLD AUTO: 8.9 % (ref 5–12)
NEUTROPHILS # BLD AUTO: 3.57 10*3/MM3 (ref 1.7–7)
NEUTROPHILS NFR BLD AUTO: 58.7 % (ref 42.7–76)
NRBC BLD AUTO-RTO: 0 /100 WBC (ref 0–0.2)
PLATELET # BLD AUTO: 433 10*3/MM3 (ref 140–450)
PMV BLD AUTO: 10.3 FL (ref 6–12)
POTASSIUM BLD-SCNC: 4.6 MMOL/L (ref 3.5–5.2)
PROT SERPL-MCNC: 7.5 G/DL (ref 6–8.5)
RBC # BLD AUTO: 3.95 10*6/MM3 (ref 3.77–5.28)
SODIUM BLD-SCNC: 141 MMOL/L (ref 136–145)
TRIGL SERPL-MCNC: 76 MG/DL (ref 0–150)
TSH SERPL DL<=0.05 MIU/L-ACNC: 1.17 UIU/ML (ref 0.27–4.2)
VIT B12 BLD-MCNC: 495 PG/ML (ref 211–946)
VLDLC SERPL-MCNC: 15.2 MG/DL (ref 5–40)
WBC NRBC COR # BLD: 6.08 10*3/MM3 (ref 3.4–10.8)

## 2019-11-07 PROCEDURE — 80061 LIPID PANEL: CPT | Performed by: NURSE PRACTITIONER

## 2019-11-07 PROCEDURE — 36415 COLL VENOUS BLD VENIPUNCTURE: CPT

## 2019-11-07 PROCEDURE — 85025 COMPLETE CBC W/AUTO DIFF WBC: CPT | Performed by: NURSE PRACTITIONER

## 2019-11-07 PROCEDURE — 80053 COMPREHEN METABOLIC PANEL: CPT | Performed by: NURSE PRACTITIONER

## 2019-11-07 PROCEDURE — 84443 ASSAY THYROID STIM HORMONE: CPT | Performed by: NURSE PRACTITIONER

## 2019-11-07 PROCEDURE — 83540 ASSAY OF IRON: CPT | Performed by: NURSE PRACTITIONER

## 2019-11-07 PROCEDURE — 82306 VITAMIN D 25 HYDROXY: CPT | Performed by: NURSE PRACTITIONER

## 2019-11-07 PROCEDURE — 83735 ASSAY OF MAGNESIUM: CPT | Performed by: NURSE PRACTITIONER

## 2019-11-07 PROCEDURE — 82248 BILIRUBIN DIRECT: CPT

## 2019-11-07 PROCEDURE — 82607 VITAMIN B-12: CPT | Performed by: NURSE PRACTITIONER

## 2019-11-07 RX ORDER — ROPINIROLE 0.25 MG/1
0.25 TABLET, FILM COATED ORAL 3 TIMES DAILY
Qty: 90 TABLET | Refills: 5 | Status: SHIPPED | OUTPATIENT
Start: 2019-11-07 | End: 2020-06-19 | Stop reason: SDUPTHER

## 2019-11-11 ENCOUNTER — TELEPHONE (OUTPATIENT)
Dept: FAMILY MEDICINE CLINIC | Facility: CLINIC | Age: 74
End: 2019-11-11

## 2019-11-12 ENCOUNTER — TELEPHONE (OUTPATIENT)
Dept: PODIATRY | Facility: CLINIC | Age: 74
End: 2019-11-12

## 2019-11-12 NOTE — PROGRESS NOTES
Per DOMI Saucedo, Ms. Mills has been called with recent lab results & recommendations.  Continue current medications and follow-up as planned or sooner if any problems.

## 2019-11-12 NOTE — TELEPHONE ENCOUNTER
Per DOMI Saucedo, Ms. Mills has been called with recent lab results & recommendations.  Continue current medications and follow-up as planned or sooner if any problems.        ----- Message from DOMI Chisholm sent at 11/8/2019  7:57 AM CST -----  Can you let her know normal -no changes needed

## 2019-11-12 NOTE — TELEPHONE ENCOUNTER
----- Message from Ashish Zimmer DPM sent at 11/8/2019 10:14 AM CST -----  LFTs are normal. Continue oral lamisil

## 2019-11-18 ENCOUNTER — OFFICE VISIT (OUTPATIENT)
Dept: PODIATRY | Facility: CLINIC | Age: 74
End: 2019-11-18

## 2019-11-18 VITALS — WEIGHT: 121 LBS | HEART RATE: 64 BPM | HEIGHT: 61 IN | BODY MASS INDEX: 22.84 KG/M2 | OXYGEN SATURATION: 98 %

## 2019-11-18 DIAGNOSIS — B35.1 ONYCHOMYCOSIS: Primary | ICD-10-CM

## 2019-11-18 PROCEDURE — 99212 OFFICE O/P EST SF 10 MIN: CPT | Performed by: PODIATRIST

## 2019-11-18 RX ORDER — OMEPRAZOLE 40 MG/1
40 CAPSULE, DELAYED RELEASE ORAL DAILY
COMMUNITY
Start: 2019-10-21

## 2019-11-18 NOTE — PROGRESS NOTES
Alva Mills  1945  74 y.o. female     Patient presents today for a follow up     11/18/2019     Chief Complaint   Patient presents with   • Left Foot - Nail Problem, Follow-up       History of Present Illness    Alva Mills is a 74 y.o.female who presents to clinic for follow-up.  She has been taking oral Lamisil for the last 4 weeks.  Denies any new pedal complaints.      Past Medical History:   Diagnosis Date   • Abdominal pain    • Acquired hypothyroidism    • Acute bronchitis    • Acute sinusitis    • Acute upper respiratory infection    • Benign hypertension    • Breast cyst    • Breast lump     history of, right   • Calf pain    • Common bile duct calculus    • Constipation    • Cough    • Depressive disorder    • Elevated cholesterol    • Epigastric pain    • External hemorrhoids without complication    • Fibrocystic breast    • Functional heart murmur    • Gastroesophageal reflux disease    • General medical exam    • Hemorrhoids    • Hyperlipidemia    • Incisional hernia     no evident recurrence at this juncture   • Ingrown toenail    • Localized, primary osteoarthritis of ankle or foot    • Muscle strain    • Skin lesion          Past Surgical History:   Procedure Laterality Date   • BACK SURGERY      x2   • BILE DUCT EXPLORATION  12/10/2013    Remove bile duct stone (1)    Common duct exploration, stone extraction, choledochoduodenostomy and choledochoscopy.    • BREAST BIOPSY Right 1991    Stereotactic breast biopsy (1)    Right breast    • CHOLECYSTECTOMY OPEN  1989   • ERCP Left 1/10/2019    Procedure: ENDOSCOPIC RETROGRADE CHOLANGIOPANCREATOGRAPHY;  Surgeon: Homero Allen DO;  Location: [facility] ENDOSCOPY;  Service: Gastroenterology   • ERCP Left 3/14/2019    Procedure: ENDOSCOPIC RETROGRADE CHOLANGIOPANCREATOGRAPHY;  Surgeon: Homero Allen DO;  Location: [facility] ENDOSCOPY;  Service: Gastroenterology   • ERCP Left 9/10/2019    Procedure: ENDOSCOPIC RETROGRADE  "CHOLANGIOPANCREATOGRAPHY;  Surgeon: Homero Allen DO;  Location: [facility] ENDOSCOPY;  Service: Gastroenterology   • HERNIA REPAIR  06/16/2014    Anesth, repair of hernia (1)    laparoscopic ventral hernioplasty with mesh. Ventral hernia.    • HYSTERECTOMY  10/17/2001    Anesth, hysterectomy (1)    Laparoscopic subtotal hysterectomy & BSO. Enterolysis of sigmoid colon.    • INJECTION OF MEDICATION  08/12/2013    Dexamethasone (2)      • INJECTION OF MEDICATION  01/31/2013    Kenalog (1)      • INJECTION OF MEDICATION  02/11/2015    Rocephin (1)      • OOPHORECTOMY     • OTHER SURGICAL HISTORY  08/12/2013    Small Joint Injection/Aspiration 20600 (2)      • TUBAL ABDOMINAL LIGATION           Family History   Problem Relation Age of Onset   • Heart disease Other    • Hypertension Other    • Heart disease Father        Allergies   Allergen Reactions   • Clonazepam Other (See Comments)     \"WENT CRAZY\"; CONFUSION   • Hydromorphone Other (See Comments)     \"WENT CRAZY\"; CONFUSION   • Morphine Other (See Comments)     HEADACHE   • Aripiprazole Nausea And Vomiting       Social History     Socioeconomic History   • Marital status:      Spouse name: Not on file   • Number of children: Not on file   • Years of education: Not on file   • Highest education level: Not on file   Tobacco Use   • Smoking status: Former Smoker   • Smokeless tobacco: Never Used   • Tobacco comment: about 45 years ago (Dec 04 2013)   Substance and Sexual Activity   • Alcohol use: No     Frequency: Never   • Drug use: No   • Sexual activity: Defer         Current Outpatient Medications   Medication Sig Dispense Refill   • acyclovir (ZOVIRAX) 400 MG tablet Take 1 tablet by mouth 3 times a day during break outs. 90 tablet 3   • albuterol (PROVENTIL HFA;VENTOLIN HFA) 108 (90 Base) MCG/ACT inhaler Inhale 2 puffs Every 4 (Four) Hours As Needed for Wheezing. 1 inhaler 2   • AZELASTINE HCL NA azelastine 137 mcg (0.1 %) nasal spray aerosol     • " "CALCIUM PO Take 1 tablet by mouth Daily With Breakfast. 500g     • cyclobenzaprine (FLEXERIL) 5 MG tablet 1-2 every 8 hours prn spasm 90 tablet 1   • desvenlafaxine (PRISTIQ) 100 MG 24 hr tablet TAKE 1 TABLET DAILY 90 tablet 3   • fenofibrate (TRICOR) 145 MG tablet Take 1 tablet by mouth Daily. 90 tablet 3   • gabapentin (NEURONTIN) 100 MG capsule Use tid prn 90 capsule 2   • HYDROcodone-acetaminophen (NORCO)  MG per tablet Take 1 tablet by mouth Every 6 (Six) Hours As Needed for Moderate Pain .     • Magnesium 500 MG capsule Take 1 capsule by mouth Daily.     • metoprolol succinate XL (TOPROL-XL) 25 MG 24 hr tablet TAKE 1 TABLET DAILY 90 tablet 3   • omeprazole (priLOSEC) 40 MG capsule      • promethazine (PHENERGAN) 25 MG tablet TAKE 1 TABLET EVERY 6 HOURSAS NEEDED FOR NAUSEA OR    VOMITING 180 tablet 0   • rOPINIRole (REQUIP) 0.25 MG tablet Take 1 tablet by mouth 3 (Three) Times a Day. 90 tablet 5   • SYNTHROID 50 MCG tablet TAKE 1 TABLET DAILY 90 tablet 3   • terbinafine (LAMISIL) 250 MG tablet Take 1 tablet by mouth Daily. 90 tablet 0     No current facility-administered medications for this visit.        Review of Systems   Constitutional: Negative.    HENT: Negative.    Eyes: Negative.    Respiratory: Negative.    Gastrointestinal: Negative.    Musculoskeletal: Positive for arthralgias and back pain.   Psychiatric/Behavioral: Negative.          OBJECTIVE    Pulse 64   Ht 154.9 cm (61\")   Wt 54.9 kg (121 lb)   SpO2 98%   BMI 22.86 kg/m²       Physical Exam   Constitutional: She is oriented to person, place, and time. She appears well-developed and well-nourished. No distress.   HENT:   Head: Normocephalic and atraumatic.   Eyes: Pupils are equal, round, and reactive to light.   Pulmonary/Chest: Effort normal. No respiratory distress.   Musculoskeletal: Normal range of motion. She exhibits no edema or deformity.   Neurological: She is alert and oriented to person, place, and time.   Psychiatric: She " has a normal mood and affect. Her behavior is normal.   Vitals reviewed.      Gait: normal     Assistive Device: none     Lower Extremity    Cardiovascular:    DP/PT pulses palpable bilateral  CFT brisk  to all digits  Skin temp is warm to warm from proximal tibia to distal digits bilateral  Pedal hair growth present.   No erythema or edema noted   Musculoskeletal:  Muscle strength is 5/5 for all muscle groups tested   ROM of the 1st MTP is WNL bilateral   ROM of the ankle joint is  WNL bilateral   Dermatological:   Skin is warm, dry and intact bilateral  Webspaces 1-4 bilateral are clean, dry and intact.   No subcutaneous nodules or masses noted    Left hallux nail has partially regrown. hpk tissue to nail bed.  Right hallux nail is thickened and discolored.  Neurological:   Protective sensation intact   Sensation intact to light touch        Procedures      ASSESSMENT AND PLAN    Alva was seen today for nail problem and follow-up.    Diagnoses and all orders for this visit:    Onychomycosis      - Repeat LFTs are within normal limits.  Continue oral Lamisil for total of 90 days.  -Recommended oral Lamisil to nail bed  - Recheck as needed            This document has been electronically signed by Ashish Zimemr DPM on November 18, 2019 12:25 PM     11/18/2019  12:25 PM

## 2019-12-18 ENCOUNTER — TRANSCRIBE ORDERS (OUTPATIENT)
Dept: LAB | Facility: HOSPITAL | Age: 74
End: 2019-12-18

## 2019-12-18 ENCOUNTER — APPOINTMENT (OUTPATIENT)
Dept: LAB | Facility: HOSPITAL | Age: 74
End: 2019-12-18

## 2019-12-18 DIAGNOSIS — K83.1 OBSTRUCTION OF BILE DUCT: Primary | ICD-10-CM

## 2019-12-18 LAB
ALBUMIN SERPL-MCNC: 4.4 G/DL (ref 3.5–5.2)
ALBUMIN/GLOB SERPL: 1.5 G/DL
ALP SERPL-CCNC: 51 U/L (ref 39–117)
ALT SERPL W P-5'-P-CCNC: 13 U/L (ref 1–33)
ANION GAP SERPL CALCULATED.3IONS-SCNC: 9.6 MMOL/L (ref 5–15)
AST SERPL-CCNC: 21 U/L (ref 1–32)
BILIRUB SERPL-MCNC: 0.3 MG/DL (ref 0.2–1.2)
BUN BLD-MCNC: 22 MG/DL (ref 8–23)
BUN/CREAT SERPL: 23.7 (ref 7–25)
CALCIUM SPEC-SCNC: 9.7 MG/DL (ref 8.6–10.5)
CHLORIDE SERPL-SCNC: 99 MMOL/L (ref 98–107)
CO2 SERPL-SCNC: 27.4 MMOL/L (ref 22–29)
CREAT BLD-MCNC: 0.93 MG/DL (ref 0.57–1)
GFR SERPL CREATININE-BSD FRML MDRD: 59 ML/MIN/1.73
GLOBULIN UR ELPH-MCNC: 2.9 GM/DL
GLUCOSE BLD-MCNC: 85 MG/DL (ref 65–99)
POTASSIUM BLD-SCNC: 4.4 MMOL/L (ref 3.5–5.2)
PROT SERPL-MCNC: 7.3 G/DL (ref 6–8.5)
SODIUM BLD-SCNC: 136 MMOL/L (ref 136–145)

## 2019-12-18 PROCEDURE — 80053 COMPREHEN METABOLIC PANEL: CPT | Performed by: INTERNAL MEDICINE

## 2019-12-18 PROCEDURE — 36415 COLL VENOUS BLD VENIPUNCTURE: CPT | Performed by: INTERNAL MEDICINE

## 2019-12-18 RX ORDER — PROMETHAZINE HYDROCHLORIDE 25 MG/1
TABLET ORAL
Qty: 180 TABLET | Refills: 0 | Status: SHIPPED | OUTPATIENT
Start: 2019-12-18 | End: 2020-05-12

## 2019-12-20 ENCOUNTER — APPOINTMENT (OUTPATIENT)
Dept: LAB | Facility: HOSPITAL | Age: 74
End: 2019-12-20

## 2019-12-20 ENCOUNTER — OFFICE VISIT (OUTPATIENT)
Dept: FAMILY MEDICINE CLINIC | Facility: CLINIC | Age: 74
End: 2019-12-20

## 2019-12-20 VITALS
BODY MASS INDEX: 23.41 KG/M2 | HEIGHT: 61 IN | WEIGHT: 124 LBS | DIASTOLIC BLOOD PRESSURE: 62 MMHG | SYSTOLIC BLOOD PRESSURE: 120 MMHG

## 2019-12-20 DIAGNOSIS — L29.9 ITCHING: Primary | ICD-10-CM

## 2019-12-20 LAB — HCV AB SER DONR QL: NORMAL

## 2019-12-20 PROCEDURE — 86803 HEPATITIS C AB TEST: CPT | Performed by: NURSE PRACTITIONER

## 2019-12-20 PROCEDURE — 36415 COLL VENOUS BLD VENIPUNCTURE: CPT | Performed by: NURSE PRACTITIONER

## 2019-12-20 PROCEDURE — 99213 OFFICE O/P EST LOW 20 MIN: CPT | Performed by: NURSE PRACTITIONER

## 2019-12-20 RX ORDER — BETAMETHASONE DIPROPIONATE 0.5 MG/G
CREAM TOPICAL DAILY
Qty: 45 G | Refills: 1 | Status: SHIPPED | OUTPATIENT
Start: 2019-12-20 | End: 2020-03-09

## 2019-12-20 RX ORDER — HYDROXYZINE HYDROCHLORIDE 10 MG/1
10 TABLET, FILM COATED ORAL EVERY 6 HOURS PRN
Qty: 60 TABLET | Refills: 11 | Status: SHIPPED | OUTPATIENT
Start: 2019-12-20 | End: 2020-03-09

## 2019-12-20 NOTE — PROGRESS NOTES
Chief Complaint   Patient presents with   • Itching     arms and across back of neck only at night     Subjective   Alva Mills is a 74 y.o. female.     Rash   This is a new problem. The current episode started 1 to 4 weeks ago. The problem has been gradually worsening since onset. The rash is diffuse. She was exposed to an ill contact. Pertinent negatives include no anorexia, congestion, cough, diarrhea, eye pain, facial edema, fatigue, fever, joint pain, nail changes, rhinorrhea, shortness of breath, sore throat or vomiting. Past treatments include anti-itch cream, cold compress, oral steroids and topical steroids. The treatment provided mild relief. There is no history of allergies, asthma, eczema or varicella.        The following portions of the patient's history were reviewed and updated as appropriate: allergies, current medications, past social history and problem list.    Review of Systems   Constitutional: Positive for activity change and appetite change. Negative for fatigue, fever and unexpected weight change.        Hot flashes    HENT: Negative.  Negative for congestion, dental problem, drooling, ear discharge, ear pain, facial swelling, hearing loss, mouth sores, nosebleeds, rhinorrhea and sore throat.    Eyes: Negative.  Negative for photophobia, pain, discharge, redness, itching and visual disturbance.   Respiratory: Negative for apnea, cough, choking, chest tightness, shortness of breath, wheezing and stridor.    Cardiovascular: Negative.  Negative for leg swelling.   Gastrointestinal: Negative for abdominal distention, anal bleeding, anorexia, blood in stool, constipation, diarrhea, rectal pain and vomiting.   Endocrine: Negative.  Negative for cold intolerance, heat intolerance, polydipsia, polyphagia and polyuria.   Genitourinary: Negative.  Negative for difficulty urinating, dyspareunia and dysuria.   Musculoskeletal: Negative for arthralgias, back pain, gait problem, joint pain,  "joint swelling, myalgias, neck pain and neck stiffness.        Lower extremity pain and pressure -legs cramping and jumping    Skin: Positive for rash. Negative for color change, nail changes, pallor and wound.   Allergic/Immunologic: Positive for environmental allergies. Negative for food allergies and immunocompromised state.   Neurological: Negative for dizziness, tremors, syncope, facial asymmetry, speech difficulty and light-headedness.   Hematological: Negative.  Negative for adenopathy. Does not bruise/bleed easily.   Psychiatric/Behavioral: Positive for agitation, behavioral problems and sleep disturbance. Negative for confusion, decreased concentration, dysphoric mood, hallucinations, self-injury and suicidal ideas. The patient is nervous/anxious. The patient is not hyperactive.         Is going to therapy -not scheduled regularly    All other systems reviewed and are negative.      Objective   /62   Ht 154.9 cm (61\")   Wt 56.2 kg (124 lb)   BMI 23.43 kg/m²   Physical Exam   Constitutional: She is oriented to person, place, and time. She appears well-developed and well-nourished.   Family stressors, patient is very stressed at this time    HENT:   Head: Normocephalic and atraumatic.   Right Ear: External ear normal.   Left Ear: External ear normal.   Mouth/Throat: Oropharynx is clear and moist. No oropharyngeal exudate.   Oral cavity dry improving    Eyes: Pupils are equal, round, and reactive to light. Conjunctivae and EOM are normal. Right eye exhibits no discharge. Left eye exhibits no discharge. No scleral icterus.   Neck: Normal range of motion. Neck supple. No JVD present. No tracheal deviation present. No thyromegaly present.   Cardiovascular: Normal rate, regular rhythm, normal heart sounds and intact distal pulses. Exam reveals no gallop and no friction rub.   No murmur heard.  Pulmonary/Chest: Effort normal and breath sounds normal. No stridor. No respiratory distress. She has no wheezes. " She has no rales. She exhibits no tenderness.   Abdominal: Soft. Bowel sounds are normal. She exhibits no distension and no mass. There is no tenderness. There is no rebound and no guarding. No hernia.   Musculoskeletal: Normal range of motion. She exhibits no edema, tenderness or deformity.        Thoracic back: She exhibits bony tenderness, pain and spasm.        Back:    Lymphadenopathy:     She has no cervical adenopathy.   Neurological: She is alert and oriented to person, place, and time. She has normal reflexes. She displays normal reflexes. No cranial nerve deficit or sensory deficit. She exhibits normal muscle tone. Coordination normal.   Skin: Skin is warm and dry. No rash noted. No erythema. No pallor.   Diffuse dry skin -no rash today    Nursing note and vitals reviewed.      Assessment/Plan   Problem List Items Addressed This Visit        Nervous and Auditory    Itching - Primary    Relevant Orders    Ambulatory Referral to Dermatology (Completed)    Hepatitis C Antibody           New Medications Ordered This Visit   Medications   • hydrOXYzine (ATARAX) 10 MG tablet     Sig: Take 1 tablet by mouth Every 6 (Six) Hours As Needed for Itching.     Dispense:  60 tablet     Refill:  11   • betamethasone dipropionate (DIPROLENE) 0.05 % cream     Sig: Apply  topically to the appropriate area as directed Daily.     Dispense:  45 g     Refill:  1       meds as directed, diet discussed, follow up if worsen -refer to derm today and meds as directed

## 2019-12-27 ENCOUNTER — TRANSCRIBE ORDERS (OUTPATIENT)
Dept: LAB | Facility: HOSPITAL | Age: 74
End: 2019-12-27

## 2019-12-27 DIAGNOSIS — K83.1 OBSTRUCTION OF BILE DUCT: Primary | ICD-10-CM

## 2020-01-09 DIAGNOSIS — M62.838 SPASM OF MUSCLE: ICD-10-CM

## 2020-01-10 RX ORDER — TERBINAFINE HYDROCHLORIDE 250 MG/1
TABLET ORAL
Qty: 90 TABLET | Refills: 0 | OUTPATIENT
Start: 2020-01-10

## 2020-01-31 RX ORDER — TRAZODONE HYDROCHLORIDE 50 MG/1
50 TABLET ORAL NIGHTLY
Qty: 90 TABLET | Refills: 3 | Status: SHIPPED | OUTPATIENT
Start: 2020-01-31 | End: 2020-12-17 | Stop reason: SDUPTHER

## 2020-01-31 RX ORDER — AZELASTINE 1 MG/ML
2 SPRAY, METERED NASAL 2 TIMES DAILY
Qty: 30 ML | Refills: 12 | Status: SHIPPED | OUTPATIENT
Start: 2020-01-31 | End: 2021-06-01

## 2020-03-09 DIAGNOSIS — Z12.31 ENCOUNTER FOR SCREENING MAMMOGRAM FOR MALIGNANT NEOPLASM OF BREAST: Primary | ICD-10-CM

## 2020-03-12 ENCOUNTER — ANESTHESIA (OUTPATIENT)
Dept: GASTROENTEROLOGY | Facility: HOSPITAL | Age: 75
End: 2020-03-12

## 2020-03-12 ENCOUNTER — HOSPITAL ENCOUNTER (OUTPATIENT)
Facility: HOSPITAL | Age: 75
Setting detail: HOSPITAL OUTPATIENT SURGERY
Discharge: HOME | End: 2020-03-12
Attending: INTERNAL MEDICINE | Admitting: INTERNAL MEDICINE

## 2020-03-12 ENCOUNTER — APPOINTMENT (OUTPATIENT)
Dept: GENERAL RADIOLOGY | Facility: HOSPITAL | Age: 75
End: 2020-03-12

## 2020-03-12 ENCOUNTER — ANESTHESIA EVENT (OUTPATIENT)
Dept: GASTROENTEROLOGY | Facility: HOSPITAL | Age: 75
End: 2020-03-12

## 2020-03-12 VITALS
OXYGEN SATURATION: 97 % | DIASTOLIC BLOOD PRESSURE: 54 MMHG | SYSTOLIC BLOOD PRESSURE: 112 MMHG | HEIGHT: 61 IN | HEART RATE: 61 BPM | RESPIRATION RATE: 18 BRPM | WEIGHT: 127.25 LBS | TEMPERATURE: 97.2 F | BODY MASS INDEX: 24.02 KG/M2

## 2020-03-12 PROCEDURE — 74328 X-RAY BILE DUCT ENDOSCOPY: CPT

## 2020-03-12 PROCEDURE — 25010000002 FENTANYL CITRATE (PF) 100 MCG/2ML SOLUTION: Performed by: NURSE ANESTHETIST, CERTIFIED REGISTERED

## 2020-03-12 PROCEDURE — 25010000002 ONDANSETRON PER 1 MG: Performed by: NURSE ANESTHETIST, CERTIFIED REGISTERED

## 2020-03-12 PROCEDURE — 25010000002 IOPAMIDOL 61 % SOLUTION: Performed by: INTERNAL MEDICINE

## 2020-03-12 PROCEDURE — 25010000002 PROPOFOL 10 MG/ML EMULSION: Performed by: NURSE ANESTHETIST, CERTIFIED REGISTERED

## 2020-03-12 RX ORDER — PROPOFOL 10 MG/ML
VIAL (ML) INTRAVENOUS AS NEEDED
Status: DISCONTINUED | OUTPATIENT
Start: 2020-03-12 | End: 2020-03-12 | Stop reason: SURG

## 2020-03-12 RX ORDER — FENTANYL CITRATE 50 UG/ML
INJECTION, SOLUTION INTRAMUSCULAR; INTRAVENOUS AS NEEDED
Status: DISCONTINUED | OUTPATIENT
Start: 2020-03-12 | End: 2020-03-12 | Stop reason: SURG

## 2020-03-12 RX ORDER — DEXTROSE AND SODIUM CHLORIDE 5; .45 G/100ML; G/100ML
30 INJECTION, SOLUTION INTRAVENOUS CONTINUOUS PRN
Status: DISCONTINUED | OUTPATIENT
Start: 2020-03-12 | End: 2020-03-12 | Stop reason: HOSPADM

## 2020-03-12 RX ORDER — ONDANSETRON 2 MG/ML
INJECTION INTRAMUSCULAR; INTRAVENOUS AS NEEDED
Status: DISCONTINUED | OUTPATIENT
Start: 2020-03-12 | End: 2020-03-12 | Stop reason: SURG

## 2020-03-12 RX ORDER — LIDOCAINE HYDROCHLORIDE 20 MG/ML
INJECTION, SOLUTION INTRAVENOUS AS NEEDED
Status: DISCONTINUED | OUTPATIENT
Start: 2020-03-12 | End: 2020-03-12 | Stop reason: SURG

## 2020-03-12 RX ADMIN — FENTANYL CITRATE 25 MCG: 50 INJECTION, SOLUTION INTRAMUSCULAR; INTRAVENOUS at 11:36

## 2020-03-12 RX ADMIN — ONDANSETRON 4 MG: 2 INJECTION INTRAMUSCULAR; INTRAVENOUS at 11:55

## 2020-03-12 RX ADMIN — PROPOFOL 20 MG: 10 INJECTION, EMULSION INTRAVENOUS at 11:42

## 2020-03-12 RX ADMIN — PROPOFOL 50 MG: 10 INJECTION, EMULSION INTRAVENOUS at 11:37

## 2020-03-12 RX ADMIN — PROPOFOL 20 MG: 10 INJECTION, EMULSION INTRAVENOUS at 11:45

## 2020-03-12 RX ADMIN — LIDOCAINE HYDROCHLORIDE 100 MG: 20 INJECTION, SOLUTION INTRAVENOUS at 11:37

## 2020-03-12 RX ADMIN — DEXTROSE AND SODIUM CHLORIDE 30 ML/HR: 5; 450 INJECTION, SOLUTION INTRAVENOUS at 10:40

## 2020-03-12 RX ADMIN — PROPOFOL 20 MG: 10 INJECTION, EMULSION INTRAVENOUS at 11:51

## 2020-03-12 RX ADMIN — PROPOFOL 20 MG: 10 INJECTION, EMULSION INTRAVENOUS at 11:48

## 2020-03-12 RX ADMIN — IOPAMIDOL 15 ML: 612 INJECTION, SOLUTION INTRAVENOUS at 10:09

## 2020-03-12 NOTE — H&P
Amanda Kearney DO,[facility]  Gastroenterology  Hepatology  Endoscopy  Board Certified in Internal Medicine and gastroenterology  [address], [address]  [place]. [address]  - [phone]   F - [phone]     GASTROENTEROLOGY HISTORY AND PHYSICAL  NOTE   MAANDA KEARNEY DO.         SUBJECTIVE:   3/12/2020    Name: Alva Mills  DOD: 1945        Chief Complaint:     Subjective : Recurrent common bile duct stone with obstructive jaundice.  Here for evaluation for recurrent common bile duct stones and performance of biliary clearance    Patient is 74 y.o. female presents with desire for ERCP.      ROS/HISTORY/ CURRENT MEDICATIONS/OBJECTIVE/VS/PE:   Review of Systems:  All systems unremarkable unless specified below.  Constitutional   HENT  Eyes   Respiratory    Cardiovascular  Gastrointestinal   Endocrine  Genitourinary    Musculoskeletal   Skin  Allergic/Immunologic    Neurological    Hematological  Psychiatric/Behavioral    History:     Past Medical History:   Diagnosis Date   • Abdominal pain    • Acquired hypothyroidism    • Acute bronchitis    • Acute sinusitis    • Acute upper respiratory infection    • Benign hypertension    • Breast cyst    • Breast lump     history of, right   • Calf pain    • Common bile duct calculus    • Constipation    • Cough    • Depressive disorder    • Elevated cholesterol    • Epigastric pain    • External hemorrhoids without complication    • Fibrocystic breast    • Functional heart murmur    • Gastroesophageal reflux disease    • General medical exam    • Hemorrhoids    • Hyperlipidemia    • Incisional hernia     no evident recurrence at this juncture   • Ingrown toenail    • Localized, primary osteoarthritis of ankle or foot    • Muscle strain    • Skin lesion      Past Surgical History:   Procedure Laterality Date   • BACK SURGERY      x2   • BILE DUCT EXPLORATION  12/10/2013    Remove bile duct stone (1)    Common duct exploration, stone extraction,  choledochoduodenostomy and choledochoscopy.    • BREAST BIOPSY Right 1991    Stereotactic breast biopsy (1)    Right breast    • CHOLECYSTECTOMY OPEN  1989   • ERCP Left 1/10/2019    Procedure: ENDOSCOPIC RETROGRADE CHOLANGIOPANCREATOGRAPHY;  Surgeon: Homero Allen DO;  Location: [facility] ENDOSCOPY;  Service: Gastroenterology   • ERCP Left 3/14/2019    Procedure: ENDOSCOPIC RETROGRADE CHOLANGIOPANCREATOGRAPHY;  Surgeon: Homero Allen DO;  Location: [facility] ENDOSCOPY;  Service: Gastroenterology   • ERCP Left 9/10/2019    Procedure: ENDOSCOPIC RETROGRADE CHOLANGIOPANCREATOGRAPHY;  Surgeon: Homero Allen DO;  Location: [facility] ENDOSCOPY;  Service: Gastroenterology   • HERNIA REPAIR  06/16/2014    Anesth, repair of hernia (1)    laparoscopic ventral hernioplasty with mesh. Ventral hernia.    • HYSTERECTOMY  10/17/2001    Anesth, hysterectomy (1)    Laparoscopic subtotal hysterectomy & BSO. Enterolysis of sigmoid colon.    • INJECTION OF MEDICATION  08/12/2013    Dexamethasone (2)      • INJECTION OF MEDICATION  01/31/2013    Kenalog (1)      • INJECTION OF MEDICATION  02/11/2015    Rocephin (1)      • OOPHORECTOMY     • OTHER SURGICAL HISTORY  08/12/2013    Small Joint Injection/Aspiration 20600 (2)      • TUBAL ABDOMINAL LIGATION       Family History   Problem Relation Age of Onset   • Heart disease Other    • Hypertension Other    • Heart disease Father      Social History     Tobacco Use   • Smoking status: Former Smoker   • Smokeless tobacco: Never Used   • Tobacco comment: quit 50 years ago    Substance Use Topics   • Alcohol use: No     Frequency: Never   • Drug use: No     Prior to Admission medications    Medication Sig Start Date End Date Taking? Authorizing Provider   acyclovir (ZOVIRAX) 400 MG tablet Take 1 tablet by mouth 3 times a day during break outs. 3/29/19  Yes Estefania Vicente APRN   CALCIUM PO Take 1 tablet by mouth Daily With Breakfast. 500g   Yes Provider, MD Kev    desvenlafaxine (PRISTIQ) 100 MG 24 hr tablet TAKE 1 TABLET DAILY 7/30/19  Yes Estefania Vicente APRN   fenofibrate (TRICOR) 145 MG tablet Take 1 tablet by mouth Daily. 6/28/19  Yes Estefania Vicente APRN   HYDROcodone-acetaminophen (NORCO)  MG per tablet Take 1 tablet by mouth Every 6 (Six) Hours As Needed for Moderate Pain .   Yes ProviderKev MD   Magnesium 500 MG capsule Take 1 capsule by mouth Daily.   Yes Kev Gramajo MD   metoprolol succinate XL (TOPROL-XL) 25 MG 24 hr tablet TAKE 1 TABLET DAILY 3/20/19  Yes Estefania Vicente APRN   omeprazole (priLOSEC) 40 MG capsule  10/21/19  Yes ProviderKev MD   rOPINIRole (REQUIP) 0.25 MG tablet Take 1 tablet by mouth 3 (Three) Times a Day. 11/7/19  Yes Estefania Vicente APRN   SYNTHROID 50 MCG tablet TAKE 1 TABLET DAILY 7/30/19  Yes Estefania Vicente APRN   traZODone (DESYREL) 50 MG tablet Take 1 tablet by mouth Every Night. 1/31/20  Yes Estefania Vicente APRN   albuterol (PROVENTIL HFA;VENTOLIN HFA) 108 (90 Base) MCG/ACT inhaler Inhale 2 puffs Every 4 (Four) Hours As Needed for Wheezing. 5/7/18   Estefania Vicente APRN   azelastine (ASTELIN) 0.1 % nasal spray 2 sprays into the nostril(s) as directed by provider 2 (Two) Times a Day. 1/31/20   Estefania Vicente APRN   promethazine (PHENERGAN) 25 MG tablet TAKE 1 TABLET EVERY 6 HOURSAS NEEDED FOR NAUSEA OR    VOMITING 12/18/19   Estefania Vicente APRN     Allergies:  Clonazepam; Hydromorphone; Morphine; and Aripiprazole    I have reviewed the patients medical history, surgical history and family history in the available medical record system.     Current Medications:     Current Facility-Administered Medications   Medication Dose Route Frequency Provider Last Rate Last Dose   • dextrose 5 % and sodium chloride 0.45 % infusion  30 mL/hr Intravenous Continuous PRN Homero Allen, DO       • iopamidol (ISOVUE-300) 61 % injection 100 mL  100 mL  Intravenous Once in imaging Homero Allen DO           Objective     Physical Exam:   Temp:  [97.7 °F (36.5 °C)] 97.7 °F (36.5 °C)  Heart Rate:  [64] 64  Resp:  [16] 16  BP: (184)/(91) 184/91    Physical Exam:  General Appearance:    Alert, cooperative, in no acute distress   Head:    Normocephalic, without obvious abnormality, atraumatic   Eyes:            Lids and lashes normal, conjunctivae and sclerae normal, no   icterus, no pallor, corneas clear, PERRLA   Ears:    Ears appear intact with no abnormalities noted   Throat:   No oral lesions, no thrush, oral mucosa moist   Neck:   No adenopathy, supple, trachea midline, no thyromegaly, no     carotid bruit, no JVD   Back:     No kyphosis present, no scoliosis present, no skin lesions,       erythema or scars, no tenderness to percussion or                   palpation,   range of motion normal   Lungs:     Clear to auscultation,respirations regular, even and                   unlabored    Heart:    Regular rhythm and normal rate, normal S1 and S2, no            murmur, no gallop, no rub, no click   Breast Exam:    Deferred   Abdomen:     Normal bowel sounds, no masses, no organomegaly, soft        non-tender, non-distended, no guarding, no rebound                 tenderness   Genitalia:    Deferred   Extremities:   Moves all extremities well, no edema, no cyanosis, no              redness   Pulses:   Pulses palpable and equal bilaterally   Skin:   No bleeding, bruising or rash   Lymph nodes:   No palpable adenopathy   Neurologic:   Cranial nerves 2 - 12 grossly intact, sensation intact, DTR        present and equal bilaterally      Results Review:     Lab Results   Component Value Date    WBC 6.08 11/07/2019    WBC 9.62 01/11/2019    WBC 10.90 (H) 01/10/2019    HGB 12.3 11/07/2019    HGB 11.4 (L) 01/11/2019    HGB 10.8 (L) 01/10/2019    HCT 37.4 11/07/2019    HCT 35.2 01/11/2019    HCT 32.0 (L) 01/10/2019     11/07/2019     01/11/2019      01/10/2019             No results found for: LIPASE  Lab Results   Component Value Date    INR 1.0 03/27/2016    INR 1.0 02/05/2015     No results found for: CULTURE    Radiology Review:  Imaging Results (Last 72 Hours)     ** No results found for the last 72 hours. **           I reviewed the patient's new clinical results.  I reviewed the patient's new imaging results and agree with the interpretation.     ASSESSMENT/PLAN:   ASSESSMENT:  1.  Recurrent common bile duct stones    PLAN:  1.  Endoscopic retrograde cholangiogram with clearance of common bile duct stones if present    Risk and benefits associated with the procedure are reviewed with the patient.  The patient wished to proceed     Homero Allen DO  03/12/20  10:25

## 2020-03-12 NOTE — ANESTHESIA PREPROCEDURE EVALUATION
Anesthesia Evaluation     Patient summary reviewed   NPO Solid Status: > 8 hours  NPO Liquid Status: > 8 hours           Airway   Mallampati: I  TM distance: >3 FB  Neck ROM: full  No difficulty expected  Dental    (+) upper dentures    Pulmonary    (+) recent URI resolved, decreased breath sounds,   Cardiovascular - normal exam    Patient on routine beta blocker and Beta blocker given within 24 hours of surgery    (+) hypertension, valvular problems/murmurs, hyperlipidemia,       Neuro/Psych  (+) psychiatric history Depression and Anxiety,     GI/Hepatic/Renal/Endo    (+)  GERD,  thyroid problem hypothyroidism    Musculoskeletal     Abdominal    Substance History      OB/GYN          Other   arthritis,                      Anesthesia Plan    ASA 3     MAC     intravenous induction     Anesthetic plan, all risks, benefits, and alternatives have been provided, discussed and informed consent has been obtained with: patient.    Plan discussed with CRNA.

## 2020-03-12 NOTE — ANESTHESIA POSTPROCEDURE EVALUATION
Patient: Alva Mills    Procedure Summary     Date:  03/12/20 Room / Location:  [facility] ENDOSCOPY 2 / [facility] ENDOSCOPY    Anesthesia Start:  1132 Anesthesia Stop:  1156    Procedure:  ENDOSCOPIC RETROGRADE CHOLANGIOPANCREATOGRAPHY (Left ) Diagnosis:       Biliary colic      (Biliary colic [K80.50])    Surgeon:  Homero Allen DO Provider:  Maci Zimmer CRNA    Anesthesia Type:  general ASA Status:  3          Anesthesia Type: general    Vitals  No vitals data found for the desired time range.          Post Anesthesia Care and Evaluation    Patient location during evaluation: bedside  Patient participation: complete - patient participated  Level of consciousness: awake  Pain score: 0  Pain management: adequate  Airway patency: patent  Anesthetic complications: No anesthetic complications  PONV Status: none  Cardiovascular status: acceptable and hemodynamically stable  Respiratory status: acceptable  Hydration status: acceptable

## 2020-03-30 RX ORDER — METOPROLOL SUCCINATE 25 MG/1
25 TABLET, EXTENDED RELEASE ORAL DAILY
Qty: 90 TABLET | Refills: 3 | Status: SHIPPED | OUTPATIENT
Start: 2020-03-30 | End: 2020-06-15

## 2020-05-12 RX ORDER — PROMETHAZINE HYDROCHLORIDE 25 MG/1
TABLET ORAL
Qty: 180 TABLET | Refills: 0 | Status: SHIPPED | OUTPATIENT
Start: 2020-05-12 | End: 2020-06-15

## 2020-06-08 RX ORDER — FENOFIBRATE 145 MG/1
TABLET, COATED ORAL
Qty: 90 TABLET | Refills: 3 | Status: SHIPPED | OUTPATIENT
Start: 2020-06-08 | End: 2021-06-07

## 2020-06-15 RX ORDER — DESVENLAFAXINE 100 MG/1
TABLET, EXTENDED RELEASE ORAL
Qty: 90 TABLET | Refills: 3 | Status: SHIPPED | OUTPATIENT
Start: 2020-06-15 | End: 2021-06-29 | Stop reason: SDUPTHER

## 2020-06-15 RX ORDER — PROMETHAZINE HYDROCHLORIDE 25 MG/1
TABLET ORAL
Qty: 180 TABLET | Refills: 0 | Status: SHIPPED | OUTPATIENT
Start: 2020-06-15 | End: 2021-07-16 | Stop reason: SDUPTHER

## 2020-06-15 RX ORDER — LEVOTHYROXINE SODIUM 50 MCG
TABLET ORAL
Qty: 90 TABLET | Refills: 3 | Status: SHIPPED | OUTPATIENT
Start: 2020-06-15 | End: 2021-05-13 | Stop reason: SDUPTHER

## 2020-06-15 RX ORDER — METOPROLOL SUCCINATE 25 MG/1
TABLET, EXTENDED RELEASE ORAL
Qty: 90 TABLET | Refills: 3 | Status: SHIPPED | OUTPATIENT
Start: 2020-06-15 | End: 2021-09-20

## 2020-06-19 DIAGNOSIS — G25.81 RESTLESS LEG SYNDROME: ICD-10-CM

## 2020-06-19 DIAGNOSIS — I10 ESSENTIAL HYPERTENSION: ICD-10-CM

## 2020-06-19 RX ORDER — ROPINIROLE 0.25 MG/1
0.25 TABLET, FILM COATED ORAL 3 TIMES DAILY
Qty: 90 TABLET | Refills: 5 | Status: SHIPPED | OUTPATIENT
Start: 2020-06-19 | End: 2020-06-25 | Stop reason: SDUPTHER

## 2020-06-25 DIAGNOSIS — G25.81 RESTLESS LEG SYNDROME: ICD-10-CM

## 2020-06-25 DIAGNOSIS — I10 ESSENTIAL HYPERTENSION: ICD-10-CM

## 2020-06-25 RX ORDER — ROPINIROLE 0.25 MG/1
0.25 TABLET, FILM COATED ORAL 3 TIMES DAILY
Qty: 90 TABLET | Refills: 5 | Status: SHIPPED | OUTPATIENT
Start: 2020-06-25 | End: 2020-07-02 | Stop reason: SDUPTHER

## 2020-07-02 DIAGNOSIS — I10 ESSENTIAL HYPERTENSION: ICD-10-CM

## 2020-07-02 DIAGNOSIS — G25.81 RESTLESS LEG SYNDROME: ICD-10-CM

## 2020-07-02 RX ORDER — ROPINIROLE 0.25 MG/1
0.25 TABLET, FILM COATED ORAL 3 TIMES DAILY
Qty: 270 TABLET | Refills: 3 | Status: SHIPPED | OUTPATIENT
Start: 2020-07-02 | End: 2020-11-06 | Stop reason: SDUPTHER

## 2020-07-30 ENCOUNTER — TRANSCRIBE ORDERS (OUTPATIENT)
Dept: LAB | Facility: HOSPITAL | Age: 75
End: 2020-07-30

## 2020-07-30 DIAGNOSIS — K80.50 CALCULUS OF BILE DUCT W/O CHOLANGITIS OR CHOLECYST W/O OBST: Primary | ICD-10-CM

## 2020-08-07 ENCOUNTER — LAB (OUTPATIENT)
Dept: LAB | Facility: HOSPITAL | Age: 75
End: 2020-08-07

## 2020-08-07 PROCEDURE — C9803 HOPD COVID-19 SPEC COLLECT: HCPCS | Performed by: INTERNAL MEDICINE

## 2020-08-07 PROCEDURE — U0003 INFECTIOUS AGENT DETECTION BY NUCLEIC ACID (DNA OR RNA); SEVERE ACUTE RESPIRATORY SYNDROME CORONAVIRUS 2 (SARS-COV-2) (CORONAVIRUS DISEASE [COVID-19]), AMPLIFIED PROBE TECHNIQUE, MAKING USE OF HIGH THROUGHPUT TECHNOLOGIES AS DESCRIBED BY CMS-2020-01-R: HCPCS | Performed by: INTERNAL MEDICINE

## 2020-08-07 PROCEDURE — C9803 HOPD COVID-19 SPEC COLLECT: HCPCS

## 2020-08-09 LAB
COVID LABCORP PRIORITY: NORMAL
SARS-COV-2 RNA RESP QL NAA+PROBE: NOT DETECTED

## 2020-08-10 ENCOUNTER — ANESTHESIA EVENT (OUTPATIENT)
Dept: GASTROENTEROLOGY | Facility: HOSPITAL | Age: 75
End: 2020-08-10

## 2020-08-10 ENCOUNTER — ANESTHESIA (OUTPATIENT)
Dept: GASTROENTEROLOGY | Facility: HOSPITAL | Age: 75
End: 2020-08-10

## 2020-08-10 ENCOUNTER — HOSPITAL ENCOUNTER (OUTPATIENT)
Facility: HOSPITAL | Age: 75
Setting detail: HOSPITAL OUTPATIENT SURGERY
Discharge: HOME | End: 2020-08-10
Attending: INTERNAL MEDICINE | Admitting: INTERNAL MEDICINE

## 2020-08-10 ENCOUNTER — APPOINTMENT (OUTPATIENT)
Dept: GENERAL RADIOLOGY | Facility: HOSPITAL | Age: 75
End: 2020-08-10

## 2020-08-10 VITALS
BODY MASS INDEX: 21.95 KG/M2 | RESPIRATION RATE: 17 BRPM | OXYGEN SATURATION: 97 % | WEIGHT: 116.25 LBS | HEART RATE: 63 BPM | HEIGHT: 61 IN | TEMPERATURE: 97.5 F | DIASTOLIC BLOOD PRESSURE: 51 MMHG | SYSTOLIC BLOOD PRESSURE: 111 MMHG

## 2020-08-10 PROCEDURE — C1769 GUIDE WIRE: HCPCS | Performed by: INTERNAL MEDICINE

## 2020-08-10 PROCEDURE — 25010000002 PROPOFOL 10 MG/ML EMULSION: Performed by: NURSE ANESTHETIST, CERTIFIED REGISTERED

## 2020-08-10 PROCEDURE — 74328 X-RAY BILE DUCT ENDOSCOPY: CPT

## 2020-08-10 PROCEDURE — 25010000003 CEFAZOLIN PER 500 MG: Performed by: NURSE ANESTHETIST, CERTIFIED REGISTERED

## 2020-08-10 PROCEDURE — 25010000002 IOPAMIDOL 61 % SOLUTION: Performed by: INTERNAL MEDICINE

## 2020-08-10 RX ORDER — LIDOCAINE HYDROCHLORIDE 20 MG/ML
INJECTION, SOLUTION INTRAVENOUS AS NEEDED
Status: DISCONTINUED | OUTPATIENT
Start: 2020-08-10 | End: 2020-08-10 | Stop reason: SURG

## 2020-08-10 RX ORDER — DEXTROSE AND SODIUM CHLORIDE 5; .45 G/100ML; G/100ML
30 INJECTION, SOLUTION INTRAVENOUS CONTINUOUS PRN
Status: DISCONTINUED | OUTPATIENT
Start: 2020-08-10 | End: 2020-08-10 | Stop reason: HOSPADM

## 2020-08-10 RX ORDER — CEFAZOLIN SODIUM 1 G/3ML
INJECTION, POWDER, FOR SOLUTION INTRAMUSCULAR; INTRAVENOUS AS NEEDED
Status: DISCONTINUED | OUTPATIENT
Start: 2020-08-10 | End: 2020-08-10 | Stop reason: SURG

## 2020-08-10 RX ORDER — PROPOFOL 10 MG/ML
VIAL (ML) INTRAVENOUS AS NEEDED
Status: DISCONTINUED | OUTPATIENT
Start: 2020-08-10 | End: 2020-08-10 | Stop reason: SURG

## 2020-08-10 RX ADMIN — PROPOFOL 20 MG: 10 INJECTION, EMULSION INTRAVENOUS at 16:36

## 2020-08-10 RX ADMIN — PROPOFOL 30 MG: 10 INJECTION, EMULSION INTRAVENOUS at 16:20

## 2020-08-10 RX ADMIN — PROPOFOL 100 MG: 10 INJECTION, EMULSION INTRAVENOUS at 16:16

## 2020-08-10 RX ADMIN — CEFAZOLIN SODIUM 1 G: 1 INJECTION, POWDER, FOR SOLUTION INTRAMUSCULAR; INTRAVENOUS at 16:28

## 2020-08-10 RX ADMIN — IOPAMIDOL 10 ML: 612 INJECTION, SOLUTION INTRAVENOUS at 16:21

## 2020-08-10 RX ADMIN — LIDOCAINE HYDROCHLORIDE 80 MG: 20 INJECTION, SOLUTION INTRAVENOUS at 16:16

## 2020-08-10 RX ADMIN — PROPOFOL 30 MG: 10 INJECTION, EMULSION INTRAVENOUS at 16:18

## 2020-08-10 RX ADMIN — PROPOFOL 20 MG: 10 INJECTION, EMULSION INTRAVENOUS at 16:29

## 2020-08-10 RX ADMIN — DEXTROSE AND SODIUM CHLORIDE 30 ML/HR: 5; 450 INJECTION, SOLUTION INTRAVENOUS at 15:17

## 2020-08-10 RX ADMIN — PROPOFOL 30 MG: 10 INJECTION, EMULSION INTRAVENOUS at 16:25

## 2020-08-10 RX ADMIN — PROPOFOL 30 MG: 10 INJECTION, EMULSION INTRAVENOUS at 16:22

## 2020-08-10 RX ADMIN — PROPOFOL 20 MG: 10 INJECTION, EMULSION INTRAVENOUS at 16:33

## 2020-08-10 RX ADMIN — PROPOFOL 20 MG: 10 INJECTION, EMULSION INTRAVENOUS at 16:40

## 2020-08-10 NOTE — ANESTHESIA POSTPROCEDURE EVALUATION
Patient: Alva Mills    Procedure Summary     Date:  08/10/20 Room / Location:  [facility] ENDOSCOPY 2 / [facility] ENDOSCOPY    Anesthesia Start:  1614 Anesthesia Stop:  1645    Procedure:  ENDOSCOPIC RETROGRADE CHOLANGIOPANCREATOGRAPHY (Left ) Diagnosis:       Biliary colic      (Biliary colic [K80.50])    Surgeon:  Homero Allen DO Provider:  Zacarias Garcia CRNA    Anesthesia Type:  MAC ASA Status:  3          Anesthesia Type: MAC    Vitals  No vitals data found for the desired time range.          Post Anesthesia Care and Evaluation    Patient location during evaluation: bedside  Patient participation: waiting for patient participation  Level of consciousness: responsive to verbal stimuli  Pain management: adequate  Airway patency: patent  Anesthetic complications: No anesthetic complications  PONV Status: none  Cardiovascular status: acceptable  Respiratory status: acceptable  Hydration status: acceptable

## 2020-08-10 NOTE — H&P
Amanda Kearney DO,[facility]  Gastroenterology  Hepatology  Endoscopy  Board Certified in Internal Medicine and gastroenterology  [address], [address]  [place]. [address]  - [phone]   F - [phone]     GASTROENTEROLOGY HISTORY AND PHYSICAL  NOTE   AMANDA KEARNEY DO.         SUBJECTIVE:   8/10/2020    Name: Alva Mills  DOD: 1945        Chief Complaint:       Subjective : Recurrent cholangitis.  Sump syndrome after choledochoduodenostomy    Patient is 75 y.o. female presents with desire for elective ERCP with removal of any sludge or debris of the bile duct.      ROS/HISTORY/ CURRENT MEDICATIONS/OBJECTIVE/VS/PE:   Review of Systems:  All systems unremarkable unless specified below.  Constitutional   HENT  Eyes   Respiratory    Cardiovascular  Gastrointestinal   Endocrine  Genitourinary    Musculoskeletal   Skin  Allergic/Immunologic    Neurological    Hematological  Psychiatric/Behavioral    History:     Past Medical History:   Diagnosis Date   • Abdominal pain    • Acquired hypothyroidism    • Acute bronchitis    • Acute sinusitis    • Acute upper respiratory infection    • Benign hypertension    • Breast cyst    • Breast lump     history of, right   • Calf pain    • Common bile duct calculus    • Constipation    • Cough    • Depressive disorder    • Elevated cholesterol    • Epigastric pain    • External hemorrhoids without complication    • Fibrocystic breast    • Functional heart murmur    • Gastroesophageal reflux disease    • General medical exam    • Hemorrhoids    • Hyperlipidemia    • Incisional hernia     no evident recurrence at this juncture   • Ingrown toenail    • Localized, primary osteoarthritis of ankle or foot    • Muscle strain    • Skin lesion      Past Surgical History:   Procedure Laterality Date   • BACK SURGERY      x2   • BILE DUCT EXPLORATION  12/10/2013    Remove bile duct stone (1)    Common duct exploration, stone extraction, choledochoduodenostomy  and choledochoscopy.    • BREAST BIOPSY Right 1991    Stereotactic breast biopsy (1)    Right breast    • CHOLECYSTECTOMY OPEN  1989   • ERCP Left 1/10/2019    Procedure: ENDOSCOPIC RETROGRADE CHOLANGIOPANCREATOGRAPHY;  Surgeon: Homero Allen DO;  Location: [facility] ENDOSCOPY;  Service: Gastroenterology   • ERCP Left 3/14/2019    Procedure: ENDOSCOPIC RETROGRADE CHOLANGIOPANCREATOGRAPHY;  Surgeon: Homero Allen DO;  Location: [facility] ENDOSCOPY;  Service: Gastroenterology   • ERCP Left 9/10/2019    Procedure: ENDOSCOPIC RETROGRADE CHOLANGIOPANCREATOGRAPHY;  Surgeon: Homero Allen DO;  Location: [facility] ENDOSCOPY;  Service: Gastroenterology   • ERCP Left 3/12/2020    Procedure: ENDOSCOPIC RETROGRADE CHOLANGIOPANCREATOGRAPHY;  Surgeon: Homero Allen DO;  Location: [facility] ENDOSCOPY;  Service: Gastroenterology;  Laterality: Left;   • HERNIA REPAIR  06/16/2014    Anesth, repair of hernia (1)    laparoscopic ventral hernioplasty with mesh. Ventral hernia.    • HYSTERECTOMY  10/17/2001    Anesth, hysterectomy (1)    Laparoscopic subtotal hysterectomy & BSO. Enterolysis of sigmoid colon.    • INJECTION OF MEDICATION  08/12/2013    Dexamethasone (2)      • INJECTION OF MEDICATION  01/31/2013    Kenalog (1)      • INJECTION OF MEDICATION  02/11/2015    Rocephin (1)      • OOPHORECTOMY     • OTHER SURGICAL HISTORY  08/12/2013    Small Joint Injection/Aspiration 20600 (2)      • TUBAL ABDOMINAL LIGATION       Family History   Problem Relation Age of Onset   • Heart disease Other    • Hypertension Other    • Heart disease Father      Social History     Tobacco Use   • Smoking status: Former Smoker   • Smokeless tobacco: Never Used   • Tobacco comment: quit 50 years ago    Substance Use Topics   • Alcohol use: No     Frequency: Never   • Drug use: No     Prior to Admission medications    Medication Sig Start Date End Date Taking? Authorizing Provider   azelastine (ASTELIN) 0.1 % nasal spray 2 sprays into the  nostril(s) as directed by provider 2 (Two) Times a Day. 1/31/20  Yes Estefania Vicente APRN   CALCIUM PO Take 1 tablet by mouth Daily With Breakfast. 500g   Yes Kev Gramajo MD   desvenlafaxine (PRISTIQ) 100 MG 24 hr tablet TAKE 1 TABLET DAILY 6/15/20  Yes Estefania Vicente APRN   fenofibrate (TRICOR) 145 MG tablet TAKE 1 TABLET DAILY 6/8/20  Yes Estefania Vicente APRN   HYDROcodone-acetaminophen (NORCO)  MG per tablet Take 1 tablet by mouth Every 6 (Six) Hours As Needed for Moderate Pain .   Yes Kev Gramajo MD   Magnesium 500 MG capsule Take 1 capsule by mouth Daily.   Yes Kev Gramajo MD   metoprolol succinate XL (TOPROL-XL) 25 MG 24 hr tablet TAKE 1 TABLET DAILY 6/15/20  Yes Estefania Vicente APRN   omeprazole (priLOSEC) 40 MG capsule  10/21/19  Yes Kev Gramajo MD   promethazine (PHENERGAN) 25 MG tablet TAKE 1 TABLET EVERY 6 HOURSAS NEEDED FOR NAUSEA OR    VOMITING 6/15/20  Yes Estefania Vicente APRN   rOPINIRole (Requip) 0.25 MG tablet Take 1 tablet by mouth 3 (Three) Times a Day. 7/2/20  Yes Estefania Vicente APRN   SYNTHROID 50 MCG tablet TAKE 1 TABLET DAILY 6/15/20  Yes Estefania Vicente APRN   traZODone (DESYREL) 50 MG tablet Take 1 tablet by mouth Every Night. 1/31/20  Yes Estefania Vicente APRN   acyclovir (ZOVIRAX) 400 MG tablet Take 1 tablet by mouth 3 times a day during break outs. 3/29/19   Estefania Vicente APRN     Allergies:  Clonazepam; Hydromorphone; Morphine; and Aripiprazole    I have reviewed the patients medical history, surgical history and family history in the available medical record system.     Current Medications:     Current Facility-Administered Medications   Medication Dose Route Frequency Provider Last Rate Last Dose   • dextrose 5 % and sodium chloride 0.45 % infusion  30 mL/hr Intravenous Continuous PRN Homero Allen DO 30 mL/hr at 08/10/20 1517 30 mL/hr at 08/10/20 1517       Objective      Physical Exam:   Temp:  [97.5 °F (36.4 °C)] 97.5 °F (36.4 °C)  Heart Rate:  [72] 72  Resp:  [16] 16  BP: (194)/(81) 194/81    Physical Exam:  General Appearance:    Alert, cooperative, in no acute distress   Head:    Normocephalic, without obvious abnormality, atraumatic   Eyes:            Lids and lashes normal, conjunctivae and sclerae normal, no   icterus, no pallor, corneas clear, PERRLA   Ears:    Ears appear intact with no abnormalities noted   Throat:   No oral lesions, no thrush, oral mucosa moist   Neck:   No adenopathy, supple, trachea midline, no thyromegaly, no     carotid bruit, no JVD   Back:     No kyphosis present, no scoliosis present, no skin lesions,       erythema or scars, no tenderness to percussion or                   palpation,   range of motion normal   Lungs:     Clear to auscultation,respirations regular, even and                   unlabored    Heart:    Regular rhythm and normal rate, normal S1 and S2, no            murmur, no gallop, no rub, no click   Breast Exam:    Deferred   Abdomen:     Normal bowel sounds, no masses, no organomegaly, soft        non-tender, non-distended, no guarding, no rebound                 tenderness   Genitalia:    Deferred   Extremities:   Moves all extremities well, no edema, no cyanosis, no              redness   Pulses:   Pulses palpable and equal bilaterally   Skin:   No bleeding, bruising or rash   Lymph nodes:   No palpable adenopathy   Neurologic:   Cranial nerves 2 - 12 grossly intact, sensation intact, DTR        present and equal bilaterally      Results Review:     Lab Results   Component Value Date    WBC 6.08 11/07/2019    WBC 9.62 01/11/2019    WBC 10.90 (H) 01/10/2019    HGB 12.3 11/07/2019    HGB 11.4 (L) 01/11/2019    HGB 10.8 (L) 01/10/2019    HCT 37.4 11/07/2019    HCT 35.2 01/11/2019    HCT 32.0 (L) 01/10/2019     11/07/2019     01/11/2019     01/10/2019             No results found for: LIPASE  Lab Results    Component Value Date    INR 1.0 03/27/2016    INR 1.0 02/05/2015     No results found for: BODYFLDCX    Radiology Review:  Imaging Results (Last 72 Hours)     ** No results found for the last 72 hours. **           I reviewed the patient's new clinical results.  I reviewed the patient's new imaging results and agree with the interpretation.     ASSESSMENT/PLAN:   ASSESSMENT:  1.  Sump syndrome, recurrent    PLAN:  1.  Endoscopic retrograde cholangiogram with removal of any debris within the bile duct and the anastomosis of the choledochoduodenostomy    Risk and benefits associated with the procedure are reviewed with the patient.  The patient wished to proceed     Homero Allen DO  08/10/20  15:38

## 2020-08-10 NOTE — ANESTHESIA PREPROCEDURE EVALUATION
Anesthesia Evaluation     NPO Solid Status: > 8 hours  NPO Liquid Status: > 2 hours           Airway   Mallampati: II  TM distance: >3 FB  Neck ROM: full  no difficulty expected  Dental - normal exam     Pulmonary - normal exam   Cardiovascular - normal exam    (+) hypertension, valvular problems/murmurs, hyperlipidemia,       Neuro/Psych  (+) psychiatric history,     GI/Hepatic/Renal/Endo    (+)  GERD,      Musculoskeletal     Abdominal    Substance History      OB/GYN          Other   arthritis,                      Anesthesia Plan    ASA 3     MAC     intravenous induction     Anesthetic plan, all risks, benefits, and alternatives have been provided, discussed and informed consent has been obtained with: patient.

## 2020-08-18 ENCOUNTER — OFFICE VISIT (OUTPATIENT)
Dept: PODIATRY | Facility: CLINIC | Age: 75
End: 2020-08-18

## 2020-08-18 VITALS — HEART RATE: 88 BPM | BODY MASS INDEX: 21.9 KG/M2 | WEIGHT: 116 LBS | OXYGEN SATURATION: 97 % | HEIGHT: 61 IN

## 2020-08-18 DIAGNOSIS — L60.0 INGROWN TOENAIL: Primary | ICD-10-CM

## 2020-08-18 DIAGNOSIS — L60.1 ONYCHOLYSIS OF TOENAIL: ICD-10-CM

## 2020-08-18 PROCEDURE — 99213 OFFICE O/P EST LOW 20 MIN: CPT | Performed by: PODIATRIST

## 2020-08-18 PROCEDURE — 11730 AVULSION NAIL PLATE SIMPLE 1: CPT | Performed by: PODIATRIST

## 2020-08-18 PROCEDURE — 11750 EXCISION NAIL&NAIL MATRIX: CPT | Performed by: PODIATRIST

## 2020-08-18 NOTE — PROGRESS NOTES
Alva Mills  1945  75 y.o. female     Patient presents today bilateral callous and possible ingrown left hallux     08/18/2020     Chief Complaint   Patient presents with   • Left Foot - Ingrown Toenail, Callouses   • Right Foot - Callouses       History of Present Illness    Alva Mills is a 75 y.o.female who presents to clinic with chief complaint of toe pain.  Pain is located to an ingrowing toenail on her left great toe.  Patient states that the toenail is growing into the skin causing her pain with shoe gear.  She rates as a 3 out of 10.  She also complains of injury to her right great toe with loosening of the nail from the underlying toe.  Patient is requesting that the nail be removed.  It does cause her pain and lifts up when putting on socks.  She has no other complaints today.    Past Medical History:   Diagnosis Date   • Abdominal pain    • Acquired hypothyroidism    • Acute bronchitis    • Acute sinusitis    • Acute upper respiratory infection    • Benign hypertension    • Breast cyst    • Breast lump     history of, right   • Calf pain    • Common bile duct calculus    • Constipation    • Cough    • Depressive disorder    • Elevated cholesterol    • Epigastric pain    • External hemorrhoids without complication    • Fibrocystic breast    • Functional heart murmur    • Gastroesophageal reflux disease    • General medical exam    • Hemorrhoids    • Hyperlipidemia    • Incisional hernia     no evident recurrence at this juncture   • Ingrown toenail    • Localized, primary osteoarthritis of ankle or foot    • Muscle strain    • Skin lesion          Past Surgical History:   Procedure Laterality Date   • BACK SURGERY      x2   • BILE DUCT EXPLORATION  12/10/2013    Remove bile duct stone (1)    Common duct exploration, stone extraction, choledochoduodenostomy and choledochoscopy.    • BREAST BIOPSY Right 1991    Stereotactic breast biopsy (1)    Right breast    • CHOLECYSTECTOMY OPEN   "1989   • ERCP Left 1/10/2019    Procedure: ENDOSCOPIC RETROGRADE CHOLANGIOPANCREATOGRAPHY;  Surgeon: Homero Allen DO;  Location: [facility] ENDOSCOPY;  Service: Gastroenterology   • ERCP Left 3/14/2019    Procedure: ENDOSCOPIC RETROGRADE CHOLANGIOPANCREATOGRAPHY;  Surgeon: Homero Allen DO;  Location: [facility] ENDOSCOPY;  Service: Gastroenterology   • ERCP Left 9/10/2019    Procedure: ENDOSCOPIC RETROGRADE CHOLANGIOPANCREATOGRAPHY;  Surgeon: Homero Allen DO;  Location: [facility] ENDOSCOPY;  Service: Gastroenterology   • ERCP Left 3/12/2020    Procedure: ENDOSCOPIC RETROGRADE CHOLANGIOPANCREATOGRAPHY;  Surgeon: Homero Allen DO;  Location: [facility] ENDOSCOPY;  Service: Gastroenterology;  Laterality: Left;   • ERCP Left 8/10/2020    Procedure: ENDOSCOPIC RETROGRADE CHOLANGIOPANCREATOGRAPHY;  Surgeon: Homero Allen DO;  Location: [facility] ENDOSCOPY;  Service: Gastroenterology;  Laterality: Left;   • HERNIA REPAIR  06/16/2014    Anesth, repair of hernia (1)    laparoscopic ventral hernioplasty with mesh. Ventral hernia.    • HYSTERECTOMY  10/17/2001    Anesth, hysterectomy (1)    Laparoscopic subtotal hysterectomy & BSO. Enterolysis of sigmoid colon.    • INJECTION OF MEDICATION  08/12/2013    Dexamethasone (2)      • INJECTION OF MEDICATION  01/31/2013    Kenalog (1)      • INJECTION OF MEDICATION  02/11/2015    Rocephin (1)      • OOPHORECTOMY     • OTHER SURGICAL HISTORY  08/12/2013    Small Joint Injection/Aspiration 20600 (2)      • TUBAL ABDOMINAL LIGATION           Family History   Problem Relation Age of Onset   • Heart disease Other    • Hypertension Other    • Heart disease Father        Allergies   Allergen Reactions   • Clonazepam Other (See Comments)     \"WENT CRAZY\"; CONFUSION   • Hydromorphone Other (See Comments)     \"WENT CRAZY\"; CONFUSION   • Morphine Other (See Comments)     HEADACHE   • Aripiprazole Nausea And Vomiting       Social History     Socioeconomic History   • Marital status: "      Spouse name: Not on file   • Number of children: Not on file   • Years of education: Not on file   • Highest education level: Not on file   Tobacco Use   • Smoking status: Former Smoker   • Smokeless tobacco: Never Used   • Tobacco comment: quit 50 years ago    Substance and Sexual Activity   • Alcohol use: No     Frequency: Never   • Drug use: No   • Sexual activity: Defer         Current Outpatient Medications   Medication Sig Dispense Refill   • acyclovir (ZOVIRAX) 400 MG tablet Take 1 tablet by mouth 3 times a day during break outs. 90 tablet 3   • azelastine (ASTELIN) 0.1 % nasal spray 2 sprays into the nostril(s) as directed by provider 2 (Two) Times a Day. 30 mL 12   • CALCIUM PO Take 1 tablet by mouth Daily With Breakfast. 500g     • desvenlafaxine (PRISTIQ) 100 MG 24 hr tablet TAKE 1 TABLET DAILY 90 tablet 3   • fenofibrate (TRICOR) 145 MG tablet TAKE 1 TABLET DAILY 90 tablet 3   • HYDROcodone-acetaminophen (NORCO)  MG per tablet Take 1 tablet by mouth Every 6 (Six) Hours As Needed for Moderate Pain .     • Magnesium 500 MG capsule Take 1 capsule by mouth Daily.     • metoprolol succinate XL (TOPROL-XL) 25 MG 24 hr tablet TAKE 1 TABLET DAILY 90 tablet 3   • omeprazole (priLOSEC) 40 MG capsule      • promethazine (PHENERGAN) 25 MG tablet TAKE 1 TABLET EVERY 6 HOURSAS NEEDED FOR NAUSEA OR    VOMITING 180 tablet 0   • rOPINIRole (Requip) 0.25 MG tablet Take 1 tablet by mouth 3 (Three) Times a Day. 270 tablet 3   • SYNTHROID 50 MCG tablet TAKE 1 TABLET DAILY 90 tablet 3   • traZODone (DESYREL) 50 MG tablet Take 1 tablet by mouth Every Night. 90 tablet 3     No current facility-administered medications for this visit.        Review of Systems   Constitutional: Negative.    HENT: Negative.    Eyes: Negative.    Respiratory: Negative.    Gastrointestinal: Negative.    Musculoskeletal: Positive for arthralgias and back pain.   Skin: Negative.    Psychiatric/Behavioral: Negative.   "        OBJECTIVE    Pulse 88   Ht 154.9 cm (61\")   Wt 52.6 kg (116 lb)   SpO2 97%   BMI 21.92 kg/m²       Physical Exam   Constitutional: She is oriented to person, place, and time. She appears well-developed and well-nourished. No distress.   HENT:   Head: Normocephalic and atraumatic.   Eyes: Pupils are equal, round, and reactive to light.   Pulmonary/Chest: Effort normal. No respiratory distress.   Musculoskeletal: Normal range of motion. She exhibits tenderness. She exhibits no edema or deformity.   Neurological: She is alert and oriented to person, place, and time.   Psychiatric: She has a normal mood and affect. Her behavior is normal.   Vitals reviewed.      Gait: normal     Assistive Device: none     Lower Extremity    Cardiovascular:    DP/PT pulses palpable bilateral  CFT brisk  to all digits  Skin temp is warm to warm from proximal tibia to distal digits bilateral  Pedal hair growth present.   No erythema or edema noted   Musculoskeletal:  Muscle strength is 5/5 for all muscle groups tested   ROM of the 1st MTP is WNL bilateral   ROM of the ankle joint is  WNL bilateral   Dermatological:   Skin is warm, dry and intact bilateral  Webspaces 1-4 bilateral are clean, dry and intact.   No subcutaneous nodules or masses noted    Left hallux nail is incurvated and ingrowing on the medial border.  Significant tenderness to palpation.  No signs of infection.  Right hallux nail is loosened from the underlying nail plate and painful to palpation.  Neurological:   Protective sensation intact   Sensation intact to light touch        Nail Removal  Date/Time: 8/18/2020 1:04 PM  Performed by: Ashish Zimmer DPM  Authorized by: Ashish Zimmer DPM   Consent: Verbal consent obtained. Written consent obtained.  Risks and benefits: risks, benefits and alternatives were discussed  Consent given by: patient  Patient identity confirmed: verbally with patient  Location: right foot  Location details: right big " toe  Anesthesia: digital block    Anesthesia:  Local Anesthetic: lidocaine 2% without epinephrine    Sedation:  Patient sedated: no    Preparation: skin prepped with Betadine  Amount removed: complete  Nail matrix removed: none  Dressing: antibiotic ointment and dressing applied  Patient tolerance: Patient tolerated the procedure well with no immediate complications    Nail Removal  Date/Time: 8/18/2020 1:05 PM  Performed by: Ashish Zimmer DPM  Authorized by: Ashish Zimmer DPM   Consent: Verbal consent obtained.  Risks and benefits: risks, benefits and alternatives were discussed  Consent given by: patient  Patient identity confirmed: verbally with patient  Location: left foot  Location details: left big toe  Anesthesia: digital block    Sedation:  Patient sedated: no    Preparation: skin prepped with Betadine  Amount removed: partial  Side: medial  Nail matrix removed: partial  Dressing: antibiotic ointment and dressing applied  Patient tolerance: Patient tolerated the procedure well with no immediate complications            ASSESSMENT AND PLAN    Alva was seen today for ingrown toenail, callouses and callouses.    Diagnoses and all orders for this visit:    Ingrown toenail    Onycholysis of toenail    Other orders  -     Nail Removal  -     Nail Removal      - Comprehensive foot and ankle exam performed  - Diagnosis, prevention and treatment of ingrown/injured toenails discussed with patient, including risks and potential benefits of nail avulsion both temporary and permanent versus simple debridement.  - Patient elected for a total temporary nail avulsion to right hallux and partial permanent to left hallux  - Dispensed aftercare instruction sheet  - All questions were answered and the patient is in agreement with the current treatment plan.  - RTC in 2 weeks             This document has been electronically signed by Ashish Zimmer DPM on August 18, 2020 14:30     8/18/2020  14:30

## 2020-09-02 ENCOUNTER — OFFICE VISIT (OUTPATIENT)
Dept: PODIATRY | Facility: CLINIC | Age: 75
End: 2020-09-02

## 2020-09-02 VITALS — OXYGEN SATURATION: 97 % | HEART RATE: 85 BPM | WEIGHT: 116 LBS | HEIGHT: 61 IN | BODY MASS INDEX: 21.9 KG/M2

## 2020-09-02 DIAGNOSIS — L60.1 ONYCHOLYSIS OF TOENAIL: Primary | ICD-10-CM

## 2020-09-02 DIAGNOSIS — L60.0 INGROWN TOENAIL: ICD-10-CM

## 2020-09-02 PROCEDURE — 99212 OFFICE O/P EST SF 10 MIN: CPT | Performed by: PODIATRIST

## 2020-09-02 NOTE — PROGRESS NOTES
Alva Mills  1945  75 y.o. female     Patient return to clinic for a follow up appointment of a total temp nail avulsion right hallux and left hallux partial permanent nail avulsion     09/02/2020     Chief Complaint   Patient presents with   • Left Foot - Follow-up   • Right Foot - Follow-up       History of Present Illness    Alva Mills is a 75 y.o.female who presents to clinic for follow-up of her nail procedures.    Past Medical History:   Diagnosis Date   • Abdominal pain    • Acquired hypothyroidism    • Acute bronchitis    • Acute sinusitis    • Acute upper respiratory infection    • Benign hypertension    • Breast cyst    • Breast lump     history of, right   • Calf pain    • Common bile duct calculus    • Constipation    • Cough    • Depressive disorder    • Elevated cholesterol    • Epigastric pain    • External hemorrhoids without complication    • Fibrocystic breast    • Functional heart murmur    • Gastroesophageal reflux disease    • General medical exam    • Hemorrhoids    • Hyperlipidemia    • Incisional hernia     no evident recurrence at this juncture   • Ingrown toenail    • Localized, primary osteoarthritis of ankle or foot    • Muscle strain    • Skin lesion          Past Surgical History:   Procedure Laterality Date   • BACK SURGERY      x2   • BILE DUCT EXPLORATION  12/10/2013    Remove bile duct stone (1)    Common duct exploration, stone extraction, choledochoduodenostomy and choledochoscopy.    • BREAST BIOPSY Right 1991    Stereotactic breast biopsy (1)    Right breast    • CHOLECYSTECTOMY OPEN  1989   • ERCP Left 1/10/2019    Procedure: ENDOSCOPIC RETROGRADE CHOLANGIOPANCREATOGRAPHY;  Surgeon: Homero Allen DO;  Location: [facility] ENDOSCOPY;  Service: Gastroenterology   • ERCP Left 3/14/2019    Procedure: ENDOSCOPIC RETROGRADE CHOLANGIOPANCREATOGRAPHY;  Surgeon: Homero Allen DO;  Location: [facility] ENDOSCOPY;  Service: Gastroenterology   • ERCP Left 9/10/2019  "   Procedure: ENDOSCOPIC RETROGRADE CHOLANGIOPANCREATOGRAPHY;  Surgeon: Homero Allen DO;  Location: [facility] ENDOSCOPY;  Service: Gastroenterology   • ERCP Left 3/12/2020    Procedure: ENDOSCOPIC RETROGRADE CHOLANGIOPANCREATOGRAPHY;  Surgeon: Homero Allen DO;  Location: [facility] ENDOSCOPY;  Service: Gastroenterology;  Laterality: Left;   • ERCP Left 8/10/2020    Procedure: ENDOSCOPIC RETROGRADE CHOLANGIOPANCREATOGRAPHY;  Surgeon: Homero Allen DO;  Location: [facility] ENDOSCOPY;  Service: Gastroenterology;  Laterality: Left;   • HERNIA REPAIR  06/16/2014    Anesth, repair of hernia (1)    laparoscopic ventral hernioplasty with mesh. Ventral hernia.    • HYSTERECTOMY  10/17/2001    Anesth, hysterectomy (1)    Laparoscopic subtotal hysterectomy & BSO. Enterolysis of sigmoid colon.    • INJECTION OF MEDICATION  08/12/2013    Dexamethasone (2)      • INJECTION OF MEDICATION  01/31/2013    Kenalog (1)      • INJECTION OF MEDICATION  02/11/2015    Rocephin (1)      • OOPHORECTOMY     • OTHER SURGICAL HISTORY  08/12/2013    Small Joint Injection/Aspiration 20600 (2)      • TUBAL ABDOMINAL LIGATION           Family History   Problem Relation Age of Onset   • Heart disease Other    • Hypertension Other    • Heart disease Father        Allergies   Allergen Reactions   • Clonazepam Other (See Comments)     \"WENT CRAZY\"; CONFUSION   • Hydromorphone Other (See Comments)     \"WENT CRAZY\"; CONFUSION   • Morphine Other (See Comments)     HEADACHE   • Aripiprazole Nausea And Vomiting   • Hydromorphone Hcl Hives       Social History     Socioeconomic History   • Marital status:      Spouse name: Not on file   • Number of children: Not on file   • Years of education: Not on file   • Highest education level: Not on file   Tobacco Use   • Smoking status: Former Smoker   • Smokeless tobacco: Never Used   • Tobacco comment: quit 50 years ago    Substance and Sexual Activity   • Alcohol use: No     Frequency: Never   • " "Drug use: No   • Sexual activity: Defer         Current Outpatient Medications   Medication Sig Dispense Refill   • acyclovir (ZOVIRAX) 400 MG tablet Take 1 tablet by mouth 3 times a day during break outs. 90 tablet 3   • azelastine (ASTELIN) 0.1 % nasal spray 2 sprays into the nostril(s) as directed by provider 2 (Two) Times a Day. 30 mL 12   • CALCIUM PO Take 1 tablet by mouth Daily With Breakfast. 500g     • desvenlafaxine (PRISTIQ) 100 MG 24 hr tablet TAKE 1 TABLET DAILY 90 tablet 3   • fenofibrate (TRICOR) 145 MG tablet TAKE 1 TABLET DAILY 90 tablet 3   • HYDROcodone-acetaminophen (NORCO)  MG per tablet Take 1 tablet by mouth Every 6 (Six) Hours As Needed for Moderate Pain .     • Magnesium 500 MG capsule Take 1 capsule by mouth Daily.     • metoprolol succinate XL (TOPROL-XL) 25 MG 24 hr tablet TAKE 1 TABLET DAILY 90 tablet 3   • omeprazole (priLOSEC) 40 MG capsule      • promethazine (PHENERGAN) 25 MG tablet TAKE 1 TABLET EVERY 6 HOURSAS NEEDED FOR NAUSEA OR    VOMITING 180 tablet 0   • rOPINIRole (Requip) 0.25 MG tablet Take 1 tablet by mouth 3 (Three) Times a Day. 270 tablet 3   • SYNTHROID 50 MCG tablet TAKE 1 TABLET DAILY 90 tablet 3   • traZODone (DESYREL) 50 MG tablet Take 1 tablet by mouth Every Night. 90 tablet 3     No current facility-administered medications for this visit.        Review of Systems   Constitutional: Negative.    HENT: Negative.    Eyes: Negative.    Respiratory: Negative.    Gastrointestinal: Negative.    Musculoskeletal: Positive for arthralgias and back pain.   Skin: Negative.    Psychiatric/Behavioral: Negative.          OBJECTIVE    Pulse 85   Ht 154.9 cm (61\")   Wt 52.6 kg (116 lb)   SpO2 97%   BMI 21.92 kg/m²       Physical Exam   Constitutional: She is oriented to person, place, and time. She appears well-developed and well-nourished. No distress.   HENT:   Head: Normocephalic and atraumatic.   Pulmonary/Chest: Effort normal. No respiratory distress. "   Musculoskeletal: Normal range of motion. She exhibits no edema or deformity.   Neurological: She is alert and oriented to person, place, and time.   Psychiatric: She has a normal mood and affect. Her behavior is normal.   Vitals reviewed.      Gait: normal     Assistive Device: none     Lower Extremity    Cardiovascular:    DP/PT pulses palpable bilateral  CFT brisk  to all digits  Skin temp is warm to warm from proximal tibia to distal digits bilateral  Pedal hair growth present.   No erythema or edema noted   Musculoskeletal:  Muscle strength is 5/5 for all muscle groups tested   ROM of the 1st MTP is WNL bilateral   ROM of the ankle joint is  WNL bilateral   Dermatological:   Skin is warm, dry and intact bilateral  Webspaces 1-4 bilateral are clean, dry and intact.   No subcutaneous nodules or masses noted    Medial border of left hallux nail has been removed.  Right hallux nail is absent  Neurological:   Protective sensation intact   Sensation intact to light touch        Procedures      ASSESSMENT AND PLAN    Alva was seen today for follow-up and follow-up.    Diagnoses and all orders for this visit:    Onycholysis of toenail    Ingrown toenail      Patient doing well following nail procedures.  Instructed on care going forward.  All her questions were answered.  Recheck as needed            This document has been electronically signed by Ashish Zimmer DPM on September 2, 2020 11:35     9/2/2020  11:35

## 2020-11-06 DIAGNOSIS — G25.81 RESTLESS LEG SYNDROME: ICD-10-CM

## 2020-11-06 DIAGNOSIS — I10 ESSENTIAL HYPERTENSION: ICD-10-CM

## 2020-11-06 RX ORDER — ROPINIROLE 0.25 MG/1
0.25 TABLET, FILM COATED ORAL 3 TIMES DAILY
Qty: 270 TABLET | Refills: 3 | Status: SHIPPED | OUTPATIENT
Start: 2020-11-06 | End: 2021-02-19

## 2020-12-17 RX ORDER — TRAZODONE HYDROCHLORIDE 50 MG/1
TABLET ORAL
Qty: 180 TABLET | Refills: 1 | Status: SHIPPED | OUTPATIENT
Start: 2020-12-17 | End: 2021-06-07

## 2021-01-16 ENCOUNTER — LAB (OUTPATIENT)
Dept: LAB | Facility: HOSPITAL | Age: 76
End: 2021-01-16

## 2021-01-16 DIAGNOSIS — Z01.818 PREOP TESTING: Primary | ICD-10-CM

## 2021-01-16 PROCEDURE — C9803 HOPD COVID-19 SPEC COLLECT: HCPCS

## 2021-01-16 PROCEDURE — U0004 COV-19 TEST NON-CDC HGH THRU: HCPCS

## 2021-01-17 LAB — SARS-COV-2 ORF1AB RESP QL NAA+PROBE: NOT DETECTED

## 2021-01-19 ENCOUNTER — ANESTHESIA (OUTPATIENT)
Dept: GASTROENTEROLOGY | Facility: HOSPITAL | Age: 76
End: 2021-01-19

## 2021-01-19 ENCOUNTER — ANESTHESIA EVENT (OUTPATIENT)
Dept: GASTROENTEROLOGY | Facility: HOSPITAL | Age: 76
End: 2021-01-19

## 2021-01-19 ENCOUNTER — APPOINTMENT (OUTPATIENT)
Dept: GENERAL RADIOLOGY | Facility: HOSPITAL | Age: 76
End: 2021-01-19

## 2021-01-19 ENCOUNTER — HOSPITAL ENCOUNTER (OUTPATIENT)
Facility: HOSPITAL | Age: 76
Setting detail: HOSPITAL OUTPATIENT SURGERY
Discharge: HOME | End: 2021-01-19
Attending: INTERNAL MEDICINE | Admitting: INTERNAL MEDICINE

## 2021-01-19 VITALS
HEIGHT: 61 IN | BODY MASS INDEX: 21 KG/M2 | HEART RATE: 69 BPM | OXYGEN SATURATION: 100 % | SYSTOLIC BLOOD PRESSURE: 154 MMHG | RESPIRATION RATE: 14 BRPM | WEIGHT: 111.25 LBS | DIASTOLIC BLOOD PRESSURE: 66 MMHG | TEMPERATURE: 97 F

## 2021-01-19 PROCEDURE — 25010000002 IOPAMIDOL 61 % SOLUTION: Performed by: INTERNAL MEDICINE

## 2021-01-19 PROCEDURE — 25010000002 PHENYLEPHRINE PER 1 ML: Performed by: NURSE ANESTHETIST, CERTIFIED REGISTERED

## 2021-01-19 PROCEDURE — 25010000002 ONDANSETRON PER 1 MG: Performed by: NURSE ANESTHETIST, CERTIFIED REGISTERED

## 2021-01-19 PROCEDURE — C1769 GUIDE WIRE: HCPCS | Performed by: INTERNAL MEDICINE

## 2021-01-19 PROCEDURE — 74328 X-RAY BILE DUCT ENDOSCOPY: CPT

## 2021-01-19 PROCEDURE — 25010000002 SUCCINYLCHOLINE PER 20 MG: Performed by: NURSE ANESTHETIST, CERTIFIED REGISTERED

## 2021-01-19 PROCEDURE — 25010000002 FENTANYL CITRATE (PF) 100 MCG/2ML SOLUTION: Performed by: NURSE ANESTHETIST, CERTIFIED REGISTERED

## 2021-01-19 PROCEDURE — 25010000002 PROPOFOL 10 MG/ML EMULSION: Performed by: NURSE ANESTHETIST, CERTIFIED REGISTERED

## 2021-01-19 RX ORDER — ONDANSETRON 2 MG/ML
INJECTION INTRAMUSCULAR; INTRAVENOUS AS NEEDED
Status: DISCONTINUED | OUTPATIENT
Start: 2021-01-19 | End: 2021-01-19 | Stop reason: SURG

## 2021-01-19 RX ORDER — PROPOFOL 10 MG/ML
VIAL (ML) INTRAVENOUS AS NEEDED
Status: DISCONTINUED | OUTPATIENT
Start: 2021-01-19 | End: 2021-01-19 | Stop reason: SURG

## 2021-01-19 RX ORDER — SUCCINYLCHOLINE CHLORIDE 20 MG/ML
INJECTION INTRAMUSCULAR; INTRAVENOUS AS NEEDED
Status: DISCONTINUED | OUTPATIENT
Start: 2021-01-19 | End: 2021-01-19 | Stop reason: SURG

## 2021-01-19 RX ORDER — FENTANYL CITRATE 50 UG/ML
INJECTION, SOLUTION INTRAMUSCULAR; INTRAVENOUS AS NEEDED
Status: DISCONTINUED | OUTPATIENT
Start: 2021-01-19 | End: 2021-01-19 | Stop reason: SURG

## 2021-01-19 RX ORDER — EPHEDRINE SULFATE 50 MG/ML
INJECTION, SOLUTION INTRAVENOUS AS NEEDED
Status: DISCONTINUED | OUTPATIENT
Start: 2021-01-19 | End: 2021-01-19 | Stop reason: SURG

## 2021-01-19 RX ORDER — LIDOCAINE HYDROCHLORIDE 20 MG/ML
INJECTION, SOLUTION EPIDURAL; INFILTRATION; INTRACAUDAL; PERINEURAL AS NEEDED
Status: DISCONTINUED | OUTPATIENT
Start: 2021-01-19 | End: 2021-01-19 | Stop reason: SURG

## 2021-01-19 RX ORDER — DEXTROSE AND SODIUM CHLORIDE 5; .45 G/100ML; G/100ML
30 INJECTION, SOLUTION INTRAVENOUS CONTINUOUS PRN
Status: DISCONTINUED | OUTPATIENT
Start: 2021-01-19 | End: 2021-01-19 | Stop reason: HOSPADM

## 2021-01-19 RX ADMIN — PHENYLEPHRINE HYDROCHLORIDE 100 MCG: 10 INJECTION INTRAVENOUS at 11:34

## 2021-01-19 RX ADMIN — EPHEDRINE SULFATE 10 MG: 50 INJECTION INTRAVENOUS at 11:30

## 2021-01-19 RX ADMIN — IOPAMIDOL 15 ML: 612 INJECTION, SOLUTION INTRAVENOUS at 11:49

## 2021-01-19 RX ADMIN — SUCCINYLCHOLINE CHLORIDE 100 MG: 20 INJECTION, SOLUTION INTRAMUSCULAR; INTRAVENOUS at 11:24

## 2021-01-19 RX ADMIN — PHENYLEPHRINE HYDROCHLORIDE 100 MCG: 10 INJECTION INTRAVENOUS at 11:36

## 2021-01-19 RX ADMIN — EPHEDRINE SULFATE 10 MG: 50 INJECTION INTRAVENOUS at 11:35

## 2021-01-19 RX ADMIN — EPHEDRINE SULFATE 10 MG: 50 INJECTION INTRAVENOUS at 11:33

## 2021-01-19 RX ADMIN — ONDANSETRON HYDROCHLORIDE 4 MG: 2 INJECTION, SOLUTION INTRAMUSCULAR; INTRAVENOUS at 11:53

## 2021-01-19 RX ADMIN — DEXTROSE AND SODIUM CHLORIDE 30 ML/HR: 5; 450 INJECTION, SOLUTION INTRAVENOUS at 10:37

## 2021-01-19 RX ADMIN — FENTANYL CITRATE 50 MCG: 50 INJECTION, SOLUTION INTRAMUSCULAR; INTRAVENOUS at 11:24

## 2021-01-19 RX ADMIN — PROPOFOL 150 MG: 10 INJECTION, EMULSION INTRAVENOUS at 11:24

## 2021-01-19 RX ADMIN — LIDOCAINE HYDROCHLORIDE 100 MG: 20 INJECTION, SOLUTION EPIDURAL; INFILTRATION; INTRACAUDAL; PERINEURAL at 11:24

## 2021-01-19 NOTE — ANESTHESIA PREPROCEDURE EVALUATION
Anesthesia Evaluation     NPO Solid Status: > 8 hours  NPO Liquid Status: > 8 hours           Airway   Mallampati: III  TM distance: >3 FB  Neck ROM: full  No difficulty expected  Dental - normal exam     Pulmonary - normal exam   (+) recent URI,   Cardiovascular - normal exam    (+) hypertension, valvular problems/murmurs, hyperlipidemia,       Neuro/Psych  (+) psychiatric history,     GI/Hepatic/Renal/Endo    (+)  GERD,      Musculoskeletal     Abdominal    Substance History      OB/GYN          Other   arthritis,                      Anesthesia Plan    ASA 3     MAC     intravenous induction     Anesthetic plan, all risks, benefits, and alternatives have been provided, discussed and informed consent has been obtained with: patient.

## 2021-01-19 NOTE — ANESTHESIA PROCEDURE NOTES
Airway  Urgency: elective    Date/Time: 1/19/2021 11:25 AM  Airway not difficult    General Information and Staff    Patient location during procedure: OR    Indications and Patient Condition  Indications for airway management: airway protection    Preoxygenated: yes  MILS maintained throughout      Final Airway Details  Final airway type: endotracheal airway      Successful airway: ETT  Cuffed: yes   Successful intubation technique: video laryngoscopy  Blade: Marte  Blade size: 4  ETT size (mm): 7.5  Cormack-Lehane Classification: grade I - full view of glottis  Placement verified by: chest auscultation and capnometry   Measured from: teeth  ETT/EBT  to teeth (cm): 20  Number of attempts at approach: 1  Assessment: lips, teeth, and gum same as pre-op and atraumatic intubation

## 2021-01-19 NOTE — H&P
Amanda Kearney DO,[facility]  Gastroenterology  Hepatology  Endoscopy  Board Certified in Internal Medicine and gastroenterology  [address], [address]  [place]. [address]  T- [phone]   F - [phone]     GASTROENTEROLOGY HISTORY AND PHYSICAL  NOTE   AMANDA KEARNEY DO.         SUBJECTIVE:   1/19/2021    Name: Alva Mills  DOD: 1945        Chief Complaint:     Subjective : Sump syndrome with recurrent right upper quadrant pain.    Patient is 75 y.o. female presents with desire for elective ERCP with removal of any debris within the bile duct.      ROS/HISTORY/ CURRENT MEDICATIONS/OBJECTIVE/VS/PE:   Review of Systems:  All systems unremarkable unless specified below.  Constitutional   HENT  Eyes   Respiratory    Cardiovascular  Gastrointestinal   Endocrine  Genitourinary    Musculoskeletal   Skin  Allergic/Immunologic    Neurological    Hematological  Psychiatric/Behavioral    History:     Past Medical History:   Diagnosis Date   • Abdominal pain    • Acquired hypothyroidism    • Acute bronchitis    • Acute sinusitis    • Acute upper respiratory infection    • Benign hypertension    • Breast cyst    • Breast lump     history of, right   • Calf pain    • Common bile duct calculus    • Constipation    • Cough    • Depressive disorder    • Elevated cholesterol    • Epigastric pain    • External hemorrhoids without complication    • Fibrocystic breast    • Functional heart murmur    • Gastroesophageal reflux disease    • General medical exam    • Hemorrhoids    • Hyperlipidemia    • Incisional hernia     no evident recurrence at this juncture   • Ingrown toenail    • Localized, primary osteoarthritis of ankle or foot    • Muscle strain    • Skin lesion      Past Surgical History:   Procedure Laterality Date   • BACK SURGERY      x2   • BILE DUCT EXPLORATION  12/10/2013    Remove bile duct stone (1)    Common duct exploration, stone extraction, choledochoduodenostomy and choledochoscopy.     • BREAST BIOPSY Right 1991    Stereotactic breast biopsy (1)    Right breast    • CHOLECYSTECTOMY OPEN  1989   • ERCP Left 1/10/2019    Procedure: ENDOSCOPIC RETROGRADE CHOLANGIOPANCREATOGRAPHY;  Surgeon: Homero Allen DO;  Location: [facility] ENDOSCOPY;  Service: Gastroenterology   • ERCP Left 3/14/2019    Procedure: ENDOSCOPIC RETROGRADE CHOLANGIOPANCREATOGRAPHY;  Surgeon: Homero Allen DO;  Location: [facility] ENDOSCOPY;  Service: Gastroenterology   • ERCP Left 9/10/2019    Procedure: ENDOSCOPIC RETROGRADE CHOLANGIOPANCREATOGRAPHY;  Surgeon: Homero Allen DO;  Location: [facility] ENDOSCOPY;  Service: Gastroenterology   • ERCP Left 3/12/2020    Procedure: ENDOSCOPIC RETROGRADE CHOLANGIOPANCREATOGRAPHY;  Surgeon: Homero Allen DO;  Location: [facility] ENDOSCOPY;  Service: Gastroenterology;  Laterality: Left;   • ERCP Left 8/10/2020    Procedure: ENDOSCOPIC RETROGRADE CHOLANGIOPANCREATOGRAPHY;  Surgeon: Homero Allen DO;  Location: [facility] ENDOSCOPY;  Service: Gastroenterology;  Laterality: Left;   • HERNIA REPAIR  06/16/2014    Anesth, repair of hernia (1)    laparoscopic ventral hernioplasty with mesh. Ventral hernia.    • HYSTERECTOMY  10/17/2001    Anesth, hysterectomy (1)    Laparoscopic subtotal hysterectomy & BSO. Enterolysis of sigmoid colon.    • INJECTION OF MEDICATION  08/12/2013    Dexamethasone (2)      • INJECTION OF MEDICATION  01/31/2013    Kenalog (1)      • INJECTION OF MEDICATION  02/11/2015    Rocephin (1)      • OOPHORECTOMY     • OTHER SURGICAL HISTORY  08/12/2013    Small Joint Injection/Aspiration 20600 (2)      • TUBAL ABDOMINAL LIGATION       Family History   Problem Relation Age of Onset   • Heart disease Other    • Hypertension Other    • Heart disease Father      Social History     Tobacco Use   • Smoking status: Former Smoker   • Smokeless tobacco: Never Used   • Tobacco comment: quit 50 years ago    Substance Use Topics   • Alcohol use: No     Frequency: Never   • Drug  use: No     Prior to Admission medications    Medication Sig Start Date End Date Taking? Authorizing Provider   acyclovir (ZOVIRAX) 400 MG tablet Take 1 tablet by mouth 3 times a day during break outs. 3/29/19  Yes Estefania Vicente APRN   azelastine (ASTELIN) 0.1 % nasal spray 2 sprays into the nostril(s) as directed by provider 2 (Two) Times a Day. 1/31/20  Yes Estefania Vicente APRN   CALCIUM PO Take 1 tablet by mouth Daily With Breakfast. 500g   Yes Kev Gramajo MD   desvenlafaxine (PRISTIQ) 100 MG 24 hr tablet TAKE 1 TABLET DAILY 6/15/20  Yes Estefania Vicente APRN   fenofibrate (TRICOR) 145 MG tablet TAKE 1 TABLET DAILY 6/8/20  Yes Estefania Vicente APRN   HYDROcodone-acetaminophen (NORCO)  MG per tablet Take 1 tablet by mouth Every 6 (Six) Hours As Needed for Moderate Pain .   Yes Kev Gramajo MD   Magnesium 500 MG capsule Take 1 capsule by mouth Daily.   Yes Kev Gramajo MD   metoprolol succinate XL (TOPROL-XL) 25 MG 24 hr tablet TAKE 1 TABLET DAILY 6/15/20  Yes Estefania Vicente APRN   omeprazole (priLOSEC) 40 MG capsule  10/21/19  Yes Kev Gramajo MD   promethazine (PHENERGAN) 25 MG tablet TAKE 1 TABLET EVERY 6 HOURSAS NEEDED FOR NAUSEA OR    VOMITING 6/15/20  Yes Estefania Vicente APRN   rOPINIRole (Requip) 0.25 MG tablet Take 1 tablet by mouth 3 (Three) Times a Day. 11/6/20  Yes Estefania Vicente APRN   SYNTHROID 50 MCG tablet TAKE 1 TABLET DAILY 6/15/20  Yes Estefania Vicente APRN   traZODone (DESYREL) 50 MG tablet 1 - 2 tablets at bedtime 12/17/20  Yes Estefania Vicente APRN     Allergies:  Clonazepam, Hydromorphone, Morphine, Percocet [oxycodone-acetaminophen], Aripiprazole, and Hydromorphone hcl    I have reviewed the patients medical history, surgical history and family history in the available medical record system.     Current Medications:     Current Facility-Administered Medications   Medication Dose Route  Frequency Provider Last Rate Last Admin   • dextrose 5 % and sodium chloride 0.45 % infusion  30 mL/hr Intravenous Continuous PRN Homero Aleln,  30 mL/hr at 01/19/21 1037 30 mL/hr at 01/19/21 1037   • iopamidol (ISOVUE-300) 61 % injection 50 mL  50 mL Intracatheter Once in imaging Homero Allen DO           Objective     Physical Exam:   Temp:  [98.8 °F (37.1 °C)] 98.8 °F (37.1 °C)  Heart Rate:  [63] 63  Resp:  [18] 18  BP: (151)/(74) 151/74    Physical Exam:  General Appearance:    Alert, cooperative, in no acute distress   Head:    Normocephalic, without obvious abnormality, atraumatic   Eyes:            Lids and lashes normal, conjunctivae and sclerae normal, no   icterus, no pallor, corneas clear, PERRLA   Ears:    Ears appear intact with no abnormalities noted   Throat:   No oral lesions, no thrush, oral mucosa moist   Neck:   No adenopathy, supple, trachea midline, no thyromegaly, no     carotid bruit, no JVD   Back:     No kyphosis present, no scoliosis present, no skin lesions,       erythema or scars, no tenderness to percussion or                   palpation,   range of motion normal   Lungs:     Clear to auscultation,respirations regular, even and                   unlabored    Heart:    Regular rhythm and normal rate, normal S1 and S2, no            murmur, no gallop, no rub, no click   Breast Exam:    Deferred   Abdomen:     Normal bowel sounds, no masses, no organomegaly, soft        non-tender, non-distended, no guarding, no rebound                 tenderness   Genitalia:    Deferred   Extremities:   Moves all extremities well, no edema, no cyanosis, no              redness   Pulses:   Pulses palpable and equal bilaterally   Skin:   No bleeding, bruising or rash   Lymph nodes:   No palpable adenopathy   Neurologic:   Cranial nerves 2 - 12 grossly intact, sensation intact, DTR        present and equal bilaterally      Results Review:     Lab Results   Component Value Date    WBC 6.08  11/07/2019    WBC 9.62 01/11/2019    WBC 10.90 (H) 01/10/2019    HGB 12.3 11/07/2019    HGB 11.4 (L) 01/11/2019    HGB 10.8 (L) 01/10/2019    HCT 37.4 11/07/2019    HCT 35.2 01/11/2019    HCT 32.0 (L) 01/10/2019     11/07/2019     01/11/2019     01/10/2019             No results found for: LIPASE  Lab Results   Component Value Date    INR 1.0 03/27/2016    INR 1.0 02/05/2015     No results found for: CULTURE    Radiology Review:  Imaging Results (Last 72 Hours)     ** No results found for the last 72 hours. **           I reviewed the patient's new clinical results.  I reviewed the patient's new imaging results and agree with the interpretation.     ASSESSMENT/PLAN:   ASSESSMENT:  1.  Sump syndrome    PLAN:  1.  Endoscopic retrograde cholangiogram with removal of any stone disease.    Risk and benefits associated with the procedure are reviewed with the patient.  The patient wished to proceed     Homero Allen DO  01/19/21  11:11 CST

## 2021-01-19 NOTE — ANESTHESIA POSTPROCEDURE EVALUATION
Patient: Alva Mills    Procedure Summary     Date: 01/19/21 Room / Location: [facility] ENDOSCOPY 2 / [facility] ENDOSCOPY    Anesthesia Start: 1119 Anesthesia Stop: 1157    Procedure: ENDOSCOPIC RETROGRADE CHOLANGIOPANCREATOGRAPHY (Left ) Diagnosis:       Biliary colic      (Biliary colic [K80.50])    Surgeon: Homero Allen DO Provider: Jovanny Gallagher CRNA    Anesthesia Type: MAC ASA Status: 3          Anesthesia Type: MAC    Vitals  No vitals data found for the desired time range.          Post Anesthesia Care and Evaluation    Patient location during evaluation: bedside  Patient participation: complete - patient cannot participate  Level of consciousness: awake  Pain score: 0  Pain management: adequate  Airway patency: patent  Anesthetic complications: No anesthetic complications  PONV Status: none  Cardiovascular status: acceptable  Respiratory status: acceptable  Hydration status: acceptable

## 2021-01-20 ENCOUNTER — OFFICE VISIT (OUTPATIENT)
Dept: FAMILY MEDICINE CLINIC | Facility: CLINIC | Age: 76
End: 2021-01-20

## 2021-01-20 VITALS
WEIGHT: 113 LBS | SYSTOLIC BLOOD PRESSURE: 120 MMHG | DIASTOLIC BLOOD PRESSURE: 68 MMHG | HEIGHT: 61 IN | BODY MASS INDEX: 21.34 KG/M2 | TEMPERATURE: 98.4 F

## 2021-01-20 DIAGNOSIS — F41.9 ANXIETY: Primary | ICD-10-CM

## 2021-01-20 DIAGNOSIS — F32.89 OTHER DEPRESSION: ICD-10-CM

## 2021-01-20 DIAGNOSIS — I10 ESSENTIAL HYPERTENSION: ICD-10-CM

## 2021-01-20 PROCEDURE — 99213 OFFICE O/P EST LOW 20 MIN: CPT | Performed by: NURSE PRACTITIONER

## 2021-01-20 RX ORDER — BUSPIRONE HYDROCHLORIDE 10 MG/1
10 TABLET ORAL 2 TIMES DAILY PRN
Qty: 60 TABLET | Refills: 5 | Status: SHIPPED | OUTPATIENT
Start: 2021-01-20 | End: 2021-06-25

## 2021-01-20 NOTE — PROGRESS NOTES
Chief Complaint   Patient presents with   • Anxiety     Subjective   Alva Mills is a 75 y.o. female.     Presents with recheck of anxiety and depression-symptoms are improving with pristiq-still having anxiety on and off     Anxiety  Presents for follow-up visit. Symptoms include depressed mood, excessive worry, insomnia, irritability, malaise, nervous/anxious behavior, panic and restlessness. Patient reports no compulsions, confusion, decreased concentration, dizziness, dry mouth, feeling of choking, hyperventilation, impotence, muscle tension, obsessions, palpitations, shortness of breath or suicidal ideas. Symptoms occur most days. The quality of sleep is good. Nighttime awakenings: none.     Her past medical history is significant for depression. Compliance with medications is %.   Depression  Visit Type: follow-up  Patient presents with the following symptoms: depressed mood, excessive worry, fatigue, insomnia, irritability, malaise, nervousness/anxiety, panic and restlessness.  Patient is not experiencing: chest pain, choking sensation, compulsions, confusion, decreased concentration, dizziness, dry mouth, feelings of hopelessness, feelings of worthlessness, hypersomnia, hyperventilation, impotence, memory impairment, muscle tension, nausea, obsessions, palpitations, psychomotor agitation, psychomotor retardation, shortness of breath, suicidal ideas, suicidal planning, thoughts of death, weight gain and weight loss.  Frequency of symptoms: most days   Severity: moderate   Sleep quality: fair  Nighttime awakenings: none  Compliance with medications:  %        Hypertension  This is a recurrent problem. The current episode started more than 1 month ago. The problem has been rapidly worsening since onset. The problem is controlled. Associated symptoms include anxiety and malaise/fatigue. Pertinent negatives include no blurred vision, neck pain, palpitations, peripheral edema, PND, shortness  of breath or sweats. Risk factors for coronary artery disease include sedentary lifestyle. Current antihypertension treatment includes ACE inhibitors. The current treatment provides mild improvement. Compliance problems include diet.  There is no history of angina, kidney disease, CAD/MI, CVA, heart failure, left ventricular hypertrophy, PVD or retinopathy. Identifiable causes of hypertension include a thyroid problem. There is no history of chronic renal disease, coarctation of the aorta, hyperaldosteronism, hypercortisolism, hyperparathyroidism, a hypertension causing med, pheochromocytoma, renovascular disease or sleep apnea.        The following portions of the patient's history were reviewed and updated as appropriate: allergies, current medications, past social history and problem list.    Review of Systems   Constitutional: Positive for irritability and malaise/fatigue. Negative for activity change, appetite change, unexpected weight change, weight gain and weight loss.        Hot flashes    HENT: Positive for postnasal drip, tinnitus and voice change. Negative for dental problem, drooling, ear discharge, ear pain, facial swelling, hearing loss, mouth sores, nosebleeds, rhinorrhea, sinus pressure, sinus pain, sneezing and trouble swallowing.    Eyes: Negative.  Negative for blurred vision, photophobia, pain, discharge, redness, itching and visual disturbance.   Respiratory: Negative.  Negative for apnea, choking, chest tightness, shortness of breath, wheezing and stridor.    Cardiovascular: Negative.  Negative for palpitations, leg swelling and PND.   Gastrointestinal: Negative for abdominal distention, anal bleeding, blood in stool, constipation, diarrhea and rectal pain.   Endocrine: Negative.  Negative for cold intolerance, heat intolerance, polydipsia, polyphagia and polyuria.   Genitourinary: Negative.  Negative for difficulty urinating, dyspareunia, dysuria and impotence.   Musculoskeletal: Negative for  "back pain, gait problem, joint swelling, myalgias, neck pain and neck stiffness.   Skin: Negative.  Negative for color change, pallor and wound.   Allergic/Immunologic: Positive for environmental allergies. Negative for food allergies and immunocompromised state.   Neurological: Negative.  Negative for dizziness, tremors, seizures, syncope, facial asymmetry, speech difficulty and light-headedness.   Hematological: Negative.  Negative for adenopathy. Does not bruise/bleed easily.   Psychiatric/Behavioral: Positive for agitation, behavioral problems and sleep disturbance. Negative for confusion, decreased concentration, dysphoric mood, hallucinations, self-injury and suicidal ideas. The patient is nervous/anxious and has insomnia. The patient is not hyperactive.         Is going to therapy -not scheduled regularly   Patient is taking pristiq    All other systems reviewed and are negative.      Objective   /68   Temp 98.4 °F (36.9 °C) (Tympanic)   Ht 154.9 cm (61\")   Wt 51.3 kg (113 lb)   BMI 21.35 kg/m²   Physical Exam  Vitals signs and nursing note reviewed.   Constitutional:       Appearance: Normal appearance. She is normal weight.   HENT:      Head: Normocephalic and atraumatic.      Right Ear: Tympanic membrane normal.      Left Ear: Tympanic membrane normal.      Nose: Nose normal.      Mouth/Throat:      Mouth: Mucous membranes are dry.   Eyes:      Pupils: Pupils are equal, round, and reactive to light.   Neck:      Musculoskeletal: Normal range of motion.   Cardiovascular:      Rate and Rhythm: Normal rate.      Pulses: Normal pulses.      Heart sounds: Normal heart sounds. No murmur. No friction rub. No gallop.    Pulmonary:      Effort: Pulmonary effort is normal. No respiratory distress.      Breath sounds: Normal breath sounds. No stridor. No wheezing or rhonchi.   Abdominal:      General: Abdomen is flat.      Comments: Large abdominal hernia    Musculoskeletal:         General: Tenderness " present.   Skin:     General: Skin is warm and dry.      Coloration: Skin is not jaundiced or pale.      Findings: No bruising or erythema.   Neurological:      General: No focal deficit present.      Mental Status: She is alert and oriented to person, place, and time.      Cranial Nerves: No cranial nerve deficit.      Sensory: No sensory deficit.      Motor: No weakness.      Coordination: Coordination normal.         Assessment/Plan   Problems Addressed this Visit        Cardiac and Vasculature    Essential hypertension       Mental Health    Anxiety - Primary    Depression    Relevant Medications    busPIRone (BUSPAR) 10 MG tablet      Other Visit Diagnoses     Blood sugar increased          Diagnoses       Codes Comments    Anxiety    -  Primary ICD-10-CM: F41.9  ICD-9-CM: 300.00     Other depression     ICD-10-CM: F32.89  ICD-9-CM: 311     Essential hypertension     ICD-10-CM: I10  ICD-9-CM: 401.9     Blood sugar increased     ICD-10-CM: R73.09  ICD-9-CM: 790.29            New Medications Ordered This Visit   Medications   • busPIRone (BUSPAR) 10 MG tablet     Sig: Take 1 tablet by mouth 2 (Two) Times a Day As Needed (bid prn).     Dispense:  60 tablet     Refill:  5      add buspar as directed, continue pristiq as directed, consider going back to therapy -patient agrees -see back in 4 weeks as directed

## 2021-01-27 ENCOUNTER — IMMUNIZATION (OUTPATIENT)
Dept: VACCINE CLINIC | Facility: HOSPITAL | Age: 76
End: 2021-01-27

## 2021-01-27 PROCEDURE — 0001A: CPT | Performed by: THORACIC SURGERY (CARDIOTHORACIC VASCULAR SURGERY)

## 2021-01-27 PROCEDURE — 91300 HC SARSCOV02 VAC 30MCG/0.3ML IM: CPT | Performed by: THORACIC SURGERY (CARDIOTHORACIC VASCULAR SURGERY)

## 2021-02-17 ENCOUNTER — IMMUNIZATION (OUTPATIENT)
Dept: VACCINE CLINIC | Facility: HOSPITAL | Age: 76
End: 2021-02-17

## 2021-02-17 PROCEDURE — 0002A: CPT | Performed by: THORACIC SURGERY (CARDIOTHORACIC VASCULAR SURGERY)

## 2021-02-17 PROCEDURE — 91300 HC SARSCOV02 VAC 30MCG/0.3ML IM: CPT | Performed by: THORACIC SURGERY (CARDIOTHORACIC VASCULAR SURGERY)

## 2021-02-18 DIAGNOSIS — G25.81 RESTLESS LEG SYNDROME: ICD-10-CM

## 2021-02-18 DIAGNOSIS — I10 ESSENTIAL HYPERTENSION: ICD-10-CM

## 2021-02-19 RX ORDER — ROPINIROLE 0.25 MG/1
TABLET, FILM COATED ORAL
Qty: 270 TABLET | Refills: 5 | Status: SHIPPED | OUTPATIENT
Start: 2021-02-19 | End: 2022-02-02

## 2021-03-08 ENCOUNTER — OFFICE VISIT (OUTPATIENT)
Dept: FAMILY MEDICINE CLINIC | Facility: CLINIC | Age: 76
End: 2021-03-08

## 2021-03-08 VITALS
BODY MASS INDEX: 20.77 KG/M2 | WEIGHT: 110 LBS | DIASTOLIC BLOOD PRESSURE: 68 MMHG | SYSTOLIC BLOOD PRESSURE: 110 MMHG | HEIGHT: 61 IN | TEMPERATURE: 98.5 F

## 2021-03-08 DIAGNOSIS — F51.01 PRIMARY INSOMNIA: ICD-10-CM

## 2021-03-08 DIAGNOSIS — R23.2 HOT FLASHES: Primary | ICD-10-CM

## 2021-03-08 PROCEDURE — 99213 OFFICE O/P EST LOW 20 MIN: CPT | Performed by: NURSE PRACTITIONER

## 2021-03-08 RX ORDER — HYDROXYZINE PAMOATE 25 MG/1
CAPSULE ORAL
Qty: 180 CAPSULE | Refills: 3 | Status: SHIPPED | OUTPATIENT
Start: 2021-03-08 | End: 2021-06-25

## 2021-03-08 RX ORDER — HYDROXYZINE PAMOATE 25 MG/1
CAPSULE ORAL
Qty: 60 CAPSULE | Refills: 11 | Status: SHIPPED | OUTPATIENT
Start: 2021-03-08 | End: 2021-03-08 | Stop reason: SDUPTHER

## 2021-03-08 NOTE — PROGRESS NOTES
Chief Complaint   Patient presents with   • Hot Flashes     wakes her up at night     Subjective   Alva Mills is a 75 y.o. female.     Presents with recheck of anxiety and depression-symptoms are improving with pristiq-still having anxiety on and off -insomnia and hot flashes     Anxiety  Presents for follow-up visit. Symptoms include depressed mood, excessive worry, insomnia, irritability, malaise, nervous/anxious behavior, panic and restlessness. Patient reports no chest pain, compulsions, confusion, decreased concentration, dizziness, dry mouth, feeling of choking, hyperventilation, impotence, muscle tension, nausea, obsessions or suicidal ideas. Symptoms occur most days. The quality of sleep is good. Nighttime awakenings: none.     Her past medical history is significant for depression. Compliance with medications is %.   Depression  Visit Type: follow-up  Patient presents with the following symptoms: depressed mood, excessive worry, fatigue, insomnia, irritability, malaise, nervousness/anxiety, panic and restlessness.  Patient is not experiencing: chest pain, choking sensation, compulsions, confusion, decreased concentration, dizziness, dry mouth, feelings of hopelessness, feelings of worthlessness, hypersomnia, hyperventilation, impotence, memory impairment, muscle tension, nausea, obsessions, psychomotor agitation, psychomotor retardation, suicidal ideas, suicidal planning, thoughts of death, weight gain and weight loss.  Frequency of symptoms: most days   Severity: moderate   Sleep quality: fair  Nighttime awakenings: none  Compliance with medications:  %        Insomnia  This is a recurrent problem. The current episode started more than 1 month ago. The problem occurs daily. The problem has been gradually worsening. Pertinent negatives include no abdominal pain, change in bowel habit, chest pain, headaches, joint swelling, myalgias, nausea, swollen glands, vomiting or weakness.  Treatments tried: pristiq  The treatment provided mild relief.        The following portions of the patient's history were reviewed and updated as appropriate: allergies, current medications, past social history and problem list.    Review of Systems   Constitutional: Positive for irritability. Negative for activity change, appetite change, unexpected weight change, weight gain and weight loss.        Hot flashes    HENT: Positive for postnasal drip, tinnitus and voice change. Negative for dental problem, drooling, ear discharge, ear pain, facial swelling, hearing loss, mouth sores, nosebleeds, rhinorrhea, sinus pressure, sinus pain, sneezing and trouble swallowing.    Eyes: Negative.  Negative for photophobia, pain, discharge, redness, itching and visual disturbance.   Respiratory: Negative.  Negative for apnea, choking, chest tightness, wheezing and stridor.    Cardiovascular: Negative.  Negative for chest pain and leg swelling.   Gastrointestinal: Negative for abdominal distention, abdominal pain, anal bleeding, blood in stool, change in bowel habit, constipation, diarrhea, nausea, rectal pain and vomiting.   Endocrine: Negative.  Negative for cold intolerance, heat intolerance, polydipsia, polyphagia and polyuria.   Genitourinary: Negative.  Negative for difficulty urinating, dyspareunia, dysuria and impotence.   Musculoskeletal: Negative for back pain, gait problem, joint swelling, myalgias and neck stiffness.   Skin: Negative.  Negative for color change, pallor and wound.   Allergic/Immunologic: Positive for environmental allergies. Negative for food allergies and immunocompromised state.   Neurological: Negative.  Negative for dizziness, tremors, seizures, syncope, facial asymmetry, speech difficulty, weakness, light-headedness and headaches.   Hematological: Negative.  Negative for adenopathy. Does not bruise/bleed easily.   Psychiatric/Behavioral: Positive for agitation, behavioral problems and sleep  "disturbance. Negative for confusion, decreased concentration, dysphoric mood, hallucinations, self-injury and suicidal ideas. The patient is nervous/anxious and has insomnia. The patient is not hyperactive.         Is going to therapy -not scheduled regularly   Patient is taking pristiq    All other systems reviewed and are negative.      Objective   /68   Temp 98.5 °F (36.9 °C) (Tympanic)   Ht 154.9 cm (61\")   Wt 49.9 kg (110 lb)   BMI 20.78 kg/m²   Physical Exam  Vitals and nursing note reviewed.   Constitutional:       Appearance: Normal appearance. She is normal weight.   HENT:      Head: Normocephalic and atraumatic.      Right Ear: Tympanic membrane normal.      Left Ear: Tympanic membrane normal.      Nose: Nose normal.      Mouth/Throat:      Mouth: Mucous membranes are dry.   Eyes:      Pupils: Pupils are equal, round, and reactive to light.   Cardiovascular:      Rate and Rhythm: Normal rate.      Pulses: Normal pulses.      Heart sounds: Normal heart sounds. No murmur. No friction rub. No gallop.    Pulmonary:      Effort: Pulmonary effort is normal. No respiratory distress.      Breath sounds: Normal breath sounds. No stridor. No wheezing, rhonchi or rales.   Chest:      Chest wall: No tenderness.   Abdominal:      General: Abdomen is flat.      Comments: Large abdominal hernia    Musculoskeletal:         General: Tenderness present.      Cervical back: Normal range of motion.   Skin:     General: Skin is warm and dry.      Coloration: Skin is not jaundiced or pale.      Findings: No bruising or erythema.   Neurological:      General: No focal deficit present.      Mental Status: She is alert and oriented to person, place, and time.      Cranial Nerves: No cranial nerve deficit.      Sensory: No sensory deficit.      Motor: No weakness.      Coordination: Coordination normal.         Assessment/Plan    Problems Addressed this Visit     None      Visit Diagnoses     Hot flashes    -  Primary    " Primary insomnia          Diagnoses       Codes Comments    Hot flashes    -  Primary ICD-10-CM: R23.2  ICD-9-CM: 782.62     Primary insomnia     ICD-10-CM: F51.01  ICD-9-CM: 307.42            New Medications Ordered This Visit   Medications   • hydrOXYzine pamoate (Vistaril) 25 MG capsule     Si-2 at night for sleep     Dispense:  180 capsule     Refill:  3      at vistaril 25mg 1-2 at night -meds as directed, see back in 2 weeks for medicare wellness exam -diet discussed as directed, no caffeine, continue pristiq.     Medicare wellness in 2 weeks

## 2021-03-22 ENCOUNTER — OFFICE VISIT (OUTPATIENT)
Dept: FAMILY MEDICINE CLINIC | Facility: CLINIC | Age: 76
End: 2021-03-22

## 2021-03-22 VITALS
HEIGHT: 61 IN | WEIGHT: 113 LBS | BODY MASS INDEX: 21.34 KG/M2 | TEMPERATURE: 98.6 F | SYSTOLIC BLOOD PRESSURE: 120 MMHG | DIASTOLIC BLOOD PRESSURE: 78 MMHG

## 2021-03-22 DIAGNOSIS — Z00.00 MEDICARE ANNUAL WELLNESS VISIT, INITIAL: Primary | ICD-10-CM

## 2021-03-22 DIAGNOSIS — Z12.31 VISIT FOR SCREENING MAMMOGRAM: ICD-10-CM

## 2021-03-22 PROCEDURE — G0438 PPPS, INITIAL VISIT: HCPCS | Performed by: NURSE PRACTITIONER

## 2021-03-22 NOTE — PROGRESS NOTES
The ABCs of the Annual Wellness Visit  Subsequent Medicare Wellness Visit    Chief Complaint   Patient presents with   • Medicare Wellness-Initial Visit       Subjective   History of Present Illness:  Alva Mills is a 75 y.o. female who presents for a Subsequent Medicare Wellness Visit.2    HEALTH RISK ASSESSMENT    Recent Hospitalizations:  No hospitalization(s) within the last year.    Current Medical Providers:  Patient Care Team:  Estefania Vicente APRN as PCP - General (Family Medicine)    Smoking Status:  Social History     Tobacco Use   Smoking Status Former Smoker   Smokeless Tobacco Never Used   Tobacco Comment    quit 50 years ago        Alcohol Consumption:  Social History     Substance and Sexual Activity   Alcohol Use No       Depression Screen:   PHQ-2/PHQ-9 Depression Screening 3/22/2021   Little interest or pleasure in doing things 0   Feeling down, depressed, or hopeless 0   Trouble falling or staying asleep, or sleeping too much -   Feeling tired or having little energy -   Poor appetite or overeating -   Feeling bad about yourself - or that you are a failure or have let yourself or your family down -   Trouble concentrating on things, such as reading the newspaper or watching television -   Moving or speaking so slowly that other people could have noticed. Or the opposite - being so fidgety or restless that you have been moving around a lot more than usual -   Thoughts that you would be better off dead, or of hurting yourself in some way -   Total Score 0       Fall Risk Screen:  STEADI Fall Risk Assessment was completed, and patient is at LOW risk for falls.Assessment completed on:3/22/2021    Health Habits and Functional and Cognitive Screening:  Functional & Cognitive Status 3/22/2021   Do you have difficulty preparing food and eating? No   Do you have difficulty bathing yourself, getting dressed or grooming yourself? No   Do you have difficulty using the toilet? No   Do you have  difficulty moving around from place to place? No   Do you have trouble with steps or getting out of a bed or a chair? No   Current Diet Well Balanced Diet   Dental Exam Up to date   Eye Exam Up to date   Exercise (times per week) 3 times per week   Current Exercise Activities Include Walking   Do you need help using the phone?  No   Are you deaf or do you have serious difficulty hearing?  No   Do you need help with transportation? No   Do you need help shopping? No   Do you need help preparing meals?  No   Do you need help with housework?  No   Do you need help with laundry? No   Do you need help taking your medications? No   Do you need help managing money? No   Do you ever drive or ride in a car without wearing a seat belt? No   Have you felt unusual stress, anger or loneliness in the last month? No   Who do you live with? Spouse   If you need help, do you have trouble finding someone available to you? No   Have you been bothered in the last four weeks by sexual problems? No   Do you have difficulty concentrating, remembering or making decisions? No         Does the patient have evidence of cognitive impairment? No    Asprin use counseling:Contraindicated from taking ASA    Age-appropriate Screening Schedule:  Refer to the list below for future screening recommendations based on patient's age, sex and/or medical conditions. Orders for these recommended tests are listed in the plan section. The patient has been provided with a written plan.    Health Maintenance   Topic Date Due   • ZOSTER VACCINE (1 of 2) Never done   • DXA SCAN  03/23/2020   • LIPID PANEL  11/07/2020   • MAMMOGRAM  05/11/2022   • COLONOSCOPY  03/16/2025   • TDAP/TD VACCINES (4 - Td) 09/11/2030   • INFLUENZA VACCINE  Completed          The following portions of the patient's history were reviewed and updated as appropriate: allergies, current medications, past family history, past medical history, past social history, past surgical history and  problem list.    Outpatient Medications Prior to Visit   Medication Sig Dispense Refill   • acyclovir (ZOVIRAX) 400 MG tablet Take 1 tablet by mouth 3 times a day during break outs. 90 tablet 3   • azelastine (ASTELIN) 0.1 % nasal spray 2 sprays into the nostril(s) as directed by provider 2 (Two) Times a Day. 30 mL 12   • busPIRone (BUSPAR) 10 MG tablet Take 1 tablet by mouth 2 (Two) Times a Day As Needed (bid prn). 60 tablet 5   • CALCIUM PO Take 1 tablet by mouth Daily With Breakfast. 500g     • desvenlafaxine (PRISTIQ) 100 MG 24 hr tablet TAKE 1 TABLET DAILY 90 tablet 3   • fenofibrate (TRICOR) 145 MG tablet TAKE 1 TABLET DAILY 90 tablet 3   • HYDROcodone-acetaminophen (NORCO)  MG per tablet Take 1 tablet by mouth Every 6 (Six) Hours As Needed for Moderate Pain .     • hydrOXYzine pamoate (Vistaril) 25 MG capsule 1-2 at night for sleep 180 capsule 3   • Magnesium 500 MG capsule Take 1 capsule by mouth Daily.     • metoprolol succinate XL (TOPROL-XL) 25 MG 24 hr tablet TAKE 1 TABLET DAILY 90 tablet 3   • omeprazole (priLOSEC) 40 MG capsule      • promethazine (PHENERGAN) 25 MG tablet TAKE 1 TABLET EVERY 6 HOURSAS NEEDED FOR NAUSEA OR    VOMITING 180 tablet 0   • rOPINIRole (REQUIP) 0.25 MG tablet TAKE 1 TABLET 3 TIMES A  tablet 5   • SYNTHROID 50 MCG tablet TAKE 1 TABLET DAILY 90 tablet 3   • traZODone (DESYREL) 50 MG tablet 1 - 2 tablets at bedtime 180 tablet 1     No facility-administered medications prior to visit.       Patient Active Problem List   Diagnosis   • Anxiety   • Depression   • Malaise and fatigue   • Acute upper respiratory infection   • Essential hypertension   • Soft tissue swelling   • Rash   • Cough   • Itching   • Toenail fungus   • Ventral hernia without obstruction or gangrene   • Nausea   • Pneumobilia   • Absolute anemia   • Eye irritation   • CVA tenderness   • Acute bilateral low back pain without sciatica   • Restless leg syndrome       Advanced Care Planning:  ACP  "discussion was held with the patient during this visit. Patient does not have an advance directive, declines further assistance.    Review of Systems    Compared to one year ago, the patient feels her physical health is the same.  Compared to one year ago, the patient feels her mental health is the same.    Reviewed chart for potential of high risk medication in the elderly: yes  Reviewed chart for potential of harmful drug interactions in the elderly:yes    Objective         Vitals:    03/22/21 0931   BP: 120/78   Temp: 98.6 °F (37 °C)   TempSrc: Tympanic   Weight: 51.3 kg (113 lb)   Height: 154.9 cm (61\")   PainSc: 0-No pain       Body mass index is 21.35 kg/m².  Discussed the patient's BMI with her. The BMI is in the acceptable range.    Physical Exam  Vitals and nursing note reviewed.               Assessment/Plan   Medicare Risks and Personalized Health Plan  CMS Preventative Services Quick Reference  Advance Directive Discussion    The above risks/problems have been discussed with the patient.  Pertinent information has been shared with the patient in the After Visit Summary.  Follow up plans and orders are seen below in the Assessment/Plan Section.    Diagnoses and all orders for this visit:    1. Medicare annual wellness visit, initial (Primary)    2. Visit for screening mammogram  -     Mammo Screening Digital Tomosynthesis Bilateral With CAD  -     DEXA Bone Density Axial      Follow Up:  Return in about 3 months (around 6/22/2021).     An After Visit Summary and PPPS were given to the patient.         No changes for now     "

## 2021-05-13 RX ORDER — LEVOTHYROXINE SODIUM 0.05 MG/1
50 TABLET ORAL DAILY
Qty: 90 TABLET | Refills: 3 | Status: SHIPPED | OUTPATIENT
Start: 2021-05-13 | End: 2022-07-18

## 2021-05-21 ENCOUNTER — OFFICE VISIT (OUTPATIENT)
Dept: SURGERY | Facility: CLINIC | Age: 76
End: 2021-05-21

## 2021-05-21 VITALS
DIASTOLIC BLOOD PRESSURE: 62 MMHG | HEART RATE: 58 BPM | OXYGEN SATURATION: 98 % | BODY MASS INDEX: 21.14 KG/M2 | SYSTOLIC BLOOD PRESSURE: 148 MMHG | WEIGHT: 112 LBS | HEIGHT: 61 IN

## 2021-05-21 DIAGNOSIS — K43.9 VENTRAL HERNIA WITHOUT OBSTRUCTION OR GANGRENE: Primary | ICD-10-CM

## 2021-05-21 PROCEDURE — 99212 OFFICE O/P EST SF 10 MIN: CPT | Performed by: SURGERY

## 2021-06-02 RX ORDER — AZELASTINE 1 MG/ML
SPRAY, METERED NASAL
Qty: 30 ML | Refills: 12 | Status: SHIPPED | OUTPATIENT
Start: 2021-06-02 | End: 2022-02-02

## 2021-06-03 ENCOUNTER — TELEPHONE (OUTPATIENT)
Dept: FAMILY MEDICINE CLINIC | Facility: CLINIC | Age: 76
End: 2021-06-03

## 2021-06-03 NOTE — TELEPHONE ENCOUNTER
Per DOMI Mac, Ms. Mills has been called with recent DEXA Bone Density Scan results & recommendations.  Continue current medications and follow-up as planned or sooner if any problems.         ----- Message from DOMI Chisholm sent at 6/1/2021 12:27 PM CDT -----  Regarding: FW:  Can you let her know has osteopenia . Needs to taking vitamin d and calcium daily  ----- Message -----  From: Abdullahi, Rad Results [place] In  Sent: 6/1/2021  12:23 PM EDT  To: DOMI Chisholm

## 2021-06-03 NOTE — TELEPHONE ENCOUNTER
Per DOMI Mac, Ms. Mills has been called with recent Bilateral Screening 3-D Mammogram results & recommendations.  Continue current medications and follow-up as planned or sooner if any problems.         ----- Message from DOMI Chisholm sent at 6/2/2021 10:15 AM CDT -----  Regarding: FW:  Can you let her know normal  ----- Message -----  From: [employer], Rad Results [place] In  Sent: 6/2/2021  11:05 AM EDT  To: DOMI Chisholm

## 2021-06-07 RX ORDER — TRAZODONE HYDROCHLORIDE 50 MG/1
TABLET ORAL
Qty: 180 TABLET | Refills: 1 | Status: SHIPPED | OUTPATIENT
Start: 2021-06-07 | End: 2021-11-29

## 2021-06-07 RX ORDER — FENOFIBRATE 145 MG/1
TABLET, COATED ORAL
Qty: 90 TABLET | Refills: 3 | Status: SHIPPED | OUTPATIENT
Start: 2021-06-07 | End: 2022-02-02

## 2021-06-15 ENCOUNTER — LAB (OUTPATIENT)
Dept: LAB | Facility: HOSPITAL | Age: 76
End: 2021-06-15

## 2021-06-15 DIAGNOSIS — Z01.818 PREOP TESTING: ICD-10-CM

## 2021-06-15 LAB — SARS-COV-2 N GENE RESP QL NAA+PROBE: NOT DETECTED

## 2021-06-15 PROCEDURE — C9803 HOPD COVID-19 SPEC COLLECT: HCPCS

## 2021-06-15 PROCEDURE — 87635 SARS-COV-2 COVID-19 AMP PRB: CPT

## 2021-06-17 ENCOUNTER — HOSPITAL ENCOUNTER (OUTPATIENT)
Facility: HOSPITAL | Age: 76
Setting detail: HOSPITAL OUTPATIENT SURGERY
Discharge: HOME | End: 2021-06-17
Attending: INTERNAL MEDICINE | Admitting: INTERNAL MEDICINE

## 2021-06-17 ENCOUNTER — APPOINTMENT (OUTPATIENT)
Dept: GENERAL RADIOLOGY | Facility: HOSPITAL | Age: 76
End: 2021-06-17

## 2021-06-17 ENCOUNTER — ANESTHESIA (OUTPATIENT)
Dept: GASTROENTEROLOGY | Facility: HOSPITAL | Age: 76
End: 2021-06-17

## 2021-06-17 ENCOUNTER — ANESTHESIA EVENT (OUTPATIENT)
Dept: GASTROENTEROLOGY | Facility: HOSPITAL | Age: 76
End: 2021-06-17

## 2021-06-17 VITALS
RESPIRATION RATE: 18 BRPM | OXYGEN SATURATION: 97 % | BODY MASS INDEX: 20.6 KG/M2 | WEIGHT: 109.13 LBS | SYSTOLIC BLOOD PRESSURE: 128 MMHG | TEMPERATURE: 97.1 F | HEART RATE: 56 BPM | HEIGHT: 61 IN | DIASTOLIC BLOOD PRESSURE: 53 MMHG

## 2021-06-17 DIAGNOSIS — K83.1 STRICTURE OF BILE DUCT: ICD-10-CM

## 2021-06-17 PROCEDURE — 25010000002 PROPOFOL 10 MG/ML EMULSION: Performed by: NURSE ANESTHETIST, CERTIFIED REGISTERED

## 2021-06-17 PROCEDURE — 25010000002 IOPAMIDOL 61 % SOLUTION: Performed by: INTERNAL MEDICINE

## 2021-06-17 PROCEDURE — 74328 X-RAY BILE DUCT ENDOSCOPY: CPT

## 2021-06-17 PROCEDURE — 25010000002 SUCCINYLCHOLINE PER 20 MG: Performed by: NURSE ANESTHETIST, CERTIFIED REGISTERED

## 2021-06-17 PROCEDURE — 25010000002 FENTANYL CITRATE (PF) 50 MCG/ML SOLUTION: Performed by: NURSE ANESTHETIST, CERTIFIED REGISTERED

## 2021-06-17 PROCEDURE — 25010000002 ONDANSETRON PER 1 MG: Performed by: NURSE ANESTHETIST, CERTIFIED REGISTERED

## 2021-06-17 PROCEDURE — C1769 GUIDE WIRE: HCPCS | Performed by: INTERNAL MEDICINE

## 2021-06-17 PROCEDURE — 88305 TISSUE EXAM BY PATHOLOGIST: CPT

## 2021-06-17 RX ORDER — PROPOFOL 10 MG/ML
VIAL (ML) INTRAVENOUS AS NEEDED
Status: DISCONTINUED | OUTPATIENT
Start: 2021-06-17 | End: 2021-06-17 | Stop reason: SURG

## 2021-06-17 RX ORDER — FENTANYL CITRATE 50 UG/ML
INJECTION, SOLUTION INTRAMUSCULAR; INTRAVENOUS AS NEEDED
Status: DISCONTINUED | OUTPATIENT
Start: 2021-06-17 | End: 2021-06-17 | Stop reason: SURG

## 2021-06-17 RX ORDER — SUCCINYLCHOLINE CHLORIDE 20 MG/ML
INJECTION INTRAMUSCULAR; INTRAVENOUS AS NEEDED
Status: DISCONTINUED | OUTPATIENT
Start: 2021-06-17 | End: 2021-06-17 | Stop reason: SURG

## 2021-06-17 RX ORDER — ONDANSETRON 2 MG/ML
INJECTION INTRAMUSCULAR; INTRAVENOUS AS NEEDED
Status: DISCONTINUED | OUTPATIENT
Start: 2021-06-17 | End: 2021-06-17 | Stop reason: SURG

## 2021-06-17 RX ORDER — DEXTROSE AND SODIUM CHLORIDE 5; .45 G/100ML; G/100ML
30 INJECTION, SOLUTION INTRAVENOUS CONTINUOUS PRN
Status: DISCONTINUED | OUTPATIENT
Start: 2021-06-17 | End: 2021-06-17 | Stop reason: HOSPADM

## 2021-06-17 RX ORDER — LIDOCAINE HYDROCHLORIDE 20 MG/ML
INJECTION, SOLUTION EPIDURAL; INFILTRATION; INTRACAUDAL; PERINEURAL AS NEEDED
Status: DISCONTINUED | OUTPATIENT
Start: 2021-06-17 | End: 2021-06-17 | Stop reason: SURG

## 2021-06-17 RX ADMIN — LIDOCAINE HYDROCHLORIDE 80 MG: 20 INJECTION, SOLUTION EPIDURAL; INFILTRATION; INTRACAUDAL; PERINEURAL at 10:57

## 2021-06-17 RX ADMIN — SUCCINYLCHOLINE CHLORIDE 110 MG: 20 INJECTION, SOLUTION INTRAMUSCULAR; INTRAVENOUS at 10:57

## 2021-06-17 RX ADMIN — DEXTROSE AND SODIUM CHLORIDE 30 ML/HR: 5; 450 INJECTION, SOLUTION INTRAVENOUS at 10:27

## 2021-06-17 RX ADMIN — FENTANYL CITRATE 50 MCG: 50 INJECTION INTRAMUSCULAR; INTRAVENOUS at 10:56

## 2021-06-17 RX ADMIN — ONDANSETRON 4 MG: 2 INJECTION INTRAMUSCULAR; INTRAVENOUS at 11:13

## 2021-06-17 RX ADMIN — PROPOFOL 120 MG: 10 INJECTION, EMULSION INTRAVENOUS at 10:57

## 2021-06-17 RX ADMIN — IOPAMIDOL 7.5 ML: 612 INJECTION, SOLUTION INTRATHECAL at 11:18

## 2021-06-17 NOTE — ANESTHESIA POSTPROCEDURE EVALUATION
Patient: Alva Mills    Procedure Summary     Date: 06/17/21 Room / Location: [facility] ENDOSCOPY 2 / [facility] ENDOSCOPY    Anesthesia Start: 1048 Anesthesia Stop: 1130    Procedure: ENDOSCOPIC RETROGRADE CHOLANGIOPANCREATOGRAPHY WITH STENT PLACEMENT (N/A ) Diagnosis:       Stricture of bile duct      (Stricture of bile duct [K83.1])    Surgeons: Homero Allen DO Provider: Memo Louise CRNA    Anesthesia Type: MAC ASA Status: 2          Anesthesia Type: MAC    Vitals  No vitals data found for the desired time range.          Post Anesthesia Care and Evaluation    Patient location during evaluation: PACU  Patient participation: complete - patient participated  Level of consciousness: awake and alert  Pain score: 1  Pain management: adequate  Airway patency: patent  Anesthetic complications: No anesthetic complications  PONV Status: none  Cardiovascular status: acceptable  Respiratory status: acceptable  Hydration status: acceptable  Post Neuraxial Block status: Motor and sensory function returned to baseline

## 2021-06-17 NOTE — H&P
Amanda Kearney DO,[facility]  Gastroenterology  Hepatology  Endoscopy  Board Certified in Internal Medicine and gastroenterology  [address]  [place]. [address]  - [phone]   F - [phone]     GASTROENTEROLOGY HISTORY AND PHYSICAL  NOTE   AMANDA KEARNEY DO.         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  With history of jaundice in the distant past.  This is being done to clear any remaining common bile duct stones out.  Done on a 6-month basis rather than reoperation    Patient is 75 y.o. female presents with desire for elective ERCP with removal of any remaining sump syndrome calculi.      ROS/HISTORY/ CURRENT MEDICATIONS/OBJECTIVE/VS/PE:   Review of Systems:  All systems unremarkable unless specified below.  Constitutional   HENT  Eyes   Respiratory    Cardiovascular  Gastrointestinal   Endocrine  Genitourinary    Musculoskeletal   Skin  Allergic/Immunologic    Neurological    Hematological  Psychiatric/Behavioral    History:     Past Medical History:   Diagnosis Date   • Abdominal pain    • Acquired hypothyroidism    • Acute bronchitis    • Acute sinusitis    • Acute upper respiratory infection    • Benign hypertension    • Breast cyst    • Breast lump     history of, right   • Calf pain    • Common bile duct calculus    • Constipation    • Cough    • Depressive disorder    • Elevated cholesterol    • Epigastric pain    • External hemorrhoids without complication    • Fibrocystic breast    • Functional heart murmur    • Gastroesophageal reflux disease    • General medical exam    • Hemorrhoids    • Hyperlipidemia    • Incisional hernia     no evident recurrence at this juncture   • Ingrown toenail    • Localized, primary osteoarthritis of ankle or foot    • Muscle strain    • Skin lesion      Past Surgical History:   Procedure Laterality Date   • BACK  SURGERY      x2   • BILE DUCT EXPLORATION  12/10/2013    Remove bile duct stone (1)    Common duct exploration, stone extraction, choledochoduodenostomy and choledochoscopy.    • BREAST BIOPSY Right 1991    Stereotactic breast biopsy (1)    Right breast    • CHOLECYSTECTOMY OPEN  1989   • ERCP Left 1/10/2019    Procedure: ENDOSCOPIC RETROGRADE CHOLANGIOPANCREATOGRAPHY;  Surgeon: Homero Allen DO;  Location: [facility] ENDOSCOPY;  Service: Gastroenterology   • ERCP Left 3/14/2019    Procedure: ENDOSCOPIC RETROGRADE CHOLANGIOPANCREATOGRAPHY;  Surgeon: Homero Allen DO;  Location: [facility] ENDOSCOPY;  Service: Gastroenterology   • ERCP Left 9/10/2019    Procedure: ENDOSCOPIC RETROGRADE CHOLANGIOPANCREATOGRAPHY;  Surgeon: Homero Allen DO;  Location: [facility] ENDOSCOPY;  Service: Gastroenterology   • ERCP Left 3/12/2020    Procedure: ENDOSCOPIC RETROGRADE CHOLANGIOPANCREATOGRAPHY;  Surgeon: Homero Allen DO;  Location: [facility] ENDOSCOPY;  Service: Gastroenterology;  Laterality: Left;   • ERCP Left 8/10/2020    Procedure: ENDOSCOPIC RETROGRADE CHOLANGIOPANCREATOGRAPHY;  Surgeon: Homero Allen DO;  Location: [facility] ENDOSCOPY;  Service: Gastroenterology;  Laterality: Left;   • ERCP Left 1/19/2021    Procedure: ENDOSCOPIC RETROGRADE CHOLANGIOPANCREATOGRAPHY;  Surgeon: Homero Allen DO;  Location: [facility] ENDOSCOPY;  Service: Gastroenterology;  Laterality: Left;   • HERNIA REPAIR  06/16/2014    Anesth, repair of hernia (1)    laparoscopic ventral hernioplasty with mesh. Ventral hernia.    • HYSTERECTOMY  10/17/2001    Anesth, hysterectomy (1)    Laparoscopic subtotal hysterectomy & BSO. Enterolysis of sigmoid colon.    • INJECTION OF MEDICATION  08/12/2013    Dexamethasone (2)      • INJECTION OF MEDICATION  01/31/2013    Kenalog (1)      • INJECTION OF MEDICATION  02/11/2015    Rocephin (1)      • OOPHORECTOMY     • OTHER SURGICAL HISTORY  08/12/2013    Small Joint Injection/Aspiration 20600 (2)      •  TUBAL ABDOMINAL LIGATION       Family History   Problem Relation Age of Onset   • Heart disease Other    • Hypertension Other    • Heart disease Father      Social History     Tobacco Use   • Smoking status: Former Smoker   • Smokeless tobacco: Never Used   • Tobacco comment: quit 50 years ago    Vaping Use   • Vaping Use: Never used   Substance Use Topics   • Alcohol use: No   • Drug use: No     Prior to Admission medications    Medication Sig Start Date End Date Taking? Authorizing Provider   acyclovir (ZOVIRAX) 400 MG tablet Take 1 tablet by mouth 3 times a day during break outs. 3/29/19  Yes Estefania Vicente APRN   azelastine (ASTELIN) 0.1 % nasal spray USE 2 SPRAYS NASALLY TWICE DAILY AS DIRECTED BY       PROVIDER 6/2/21  Yes Pamella Nava APRN   CALCIUM PO Take 1 tablet by mouth Daily With Breakfast. 500g   Yes Kev Gramajo MD   desvenlafaxine (PRISTIQ) 100 MG 24 hr tablet TAKE 1 TABLET DAILY 6/15/20  Yes Estefania Vicente APRN   fenofibrate (TRICOR) 145 MG tablet TAKE 1 TABLET DAILY 6/7/21  Yes Estefania Vicente APRN   HYDROcodone-acetaminophen (NORCO)  MG per tablet Take 1 tablet by mouth Every 6 (Six) Hours As Needed for Moderate Pain .   Yes Kev Gramajo MD   levothyroxine (Synthroid) 50 MCG tablet Take 1 tablet by mouth Daily. 5/13/21  Yes Estefania Vicente APRN   Magnesium 500 MG capsule Take 1 capsule by mouth Daily.   Yes Kev Gramajo MD   metoprolol succinate XL (TOPROL-XL) 25 MG 24 hr tablet TAKE 1 TABLET DAILY 6/15/20  Yes Estefania Vicente APRN   omeprazole (priLOSEC) 40 MG capsule Take 40 mg by mouth Daily. 10/21/19  Yes Kev Gramajo MD   promethazine (PHENERGAN) 25 MG tablet TAKE 1 TABLET EVERY 6 HOURSAS NEEDED FOR NAUSEA OR    VOMITING 6/15/20  Yes Estefania Vicente APRN   rOPINIRole (REQUIP) 0.25 MG tablet TAKE 1 TABLET 3 TIMES A DAY  Patient taking differently: 3 (Three) Times a Day As Needed. 2/19/21  Yes Kenneth Wylie  DOMI Banda   traZODone (DESYREL) 50 MG tablet TAKE 1 TO 2 TABLETS AT     BEDTIME 6/7/21  Yes Estefania Vicente APRN   busPIRone (BUSPAR) 10 MG tablet Take 1 tablet by mouth 2 (Two) Times a Day As Needed (bid prn). 1/20/21   Estefania Vicente APRN   hydrOXYzine pamoate (Vistaril) 25 MG capsule 1-2 at night for sleep 3/8/21   Estefania Vicente APRN     Allergies:  Clonazepam, Hydromorphone, Morphine, Percocet [oxycodone-acetaminophen], Aripiprazole, and Hydromorphone hcl    I have reviewed the patients medical history, surgical history and family history in the available medical record system.     Current Medications:     Current Facility-Administered Medications   Medication Dose Route Frequency Provider Last Rate Last Admin   • dextrose 5 % and sodium chloride 0.45 % infusion  30 mL/hr Intravenous Continuous PRN Homero Allne DO 30 mL/hr at 06/17/21 1027 30 mL/hr at 06/17/21 1027       Objective     Physical Exam:   Temp:  [98.2 °F (36.8 °C)] 98.2 °F (36.8 °C)  Heart Rate:  [64] 64  Resp:  [16] 16  BP: (155)/(68) 155/68    Physical Exam:  General Appearance:    Alert, cooperative, in no acute distress   Head:    Normocephalic, without obvious abnormality, atraumatic   Eyes:            Lids and lashes normal, conjunctivae and sclerae normal, no icterus, no pallor, corneas clear, PERRLA   Ears:    Ears appear intact with no abnormalities noted   Throat:   No oral lesions, no thrush, oral mucosa moist   Neck:   No adenopathy, supple, trachea midline, no thyromegaly, no  carotid bruit, no JVD   Back:     No kyphosis present, no scoliosis present, no skin lesions,   erythema or scars, no tenderness to percussion or                 palpation,  range of motion normal   Lungs:     Clear to auscultation,respirations regular, even and         unlabored    Heart:    Regular rhythm and normal rate, normal S1 and S2, no  murmur, no gallop, no rub, no click   Breast Exam:    Deferred   Abdomen:     Normal  bowel sounds, no masses, no organomegaly, soft  nontender, nondistended, no guarding, no rebound                 tenderness   Genitalia:    Deferred   Extremities:   Moves all extremities well, no edema, no cyanosis, no          redness   Pulses:   Pulses palpable and equal bilaterally   Skin:   No bleeding, bruising or rash   Lymph nodes:   No palpable adenopathy   Neurologic:   Cranial nerves 2 - 12 grossly intact, sensation intact, DTR     present and equal bilaterally      Results Review:     Lab Results   Component Value Date    WBC 6.08 11/07/2019    WBC 9.62 01/11/2019    WBC 10.90 (H) 01/10/2019    HGB 12.3 11/07/2019    HGB 11.4 (L) 01/11/2019    HGB 10.8 (L) 01/10/2019    HCT 37.4 11/07/2019    HCT 35.2 01/11/2019    HCT 32.0 (L) 01/10/2019     11/07/2019     01/11/2019     01/10/2019             No results found for: LIPASE  Lab Results   Component Value Date    INR 1.0 03/27/2016    INR 1.0 02/05/2015     No results found for: THROATCX    Radiology Review:  Imaging Results (Last 72 Hours)     ** No results found for the last 72 hours. **           I reviewed the patient's new clinical results.  I reviewed the patient's new imaging results and agree with the interpretation.     ASSESSMENT/PLAN:   ASSESSMENT:  1.  Sump syndrome    PLAN:  1.  Endoscopic retrograde cholangiogram through the choledochoduodenostomy as well as through the native ampulla    Risk and benefits associated with the procedure are reviewed with the patient.  The patient wished to proceed     Homero Allen DO  06/17/21  10:46 CDT

## 2021-06-17 NOTE — ANESTHESIA PREPROCEDURE EVALUATION
Anesthesia Evaluation     Patient summary reviewed and Nursing notes reviewed                Airway   No difficulty expected  Dental      Pulmonary - normal exam   (+) recent URI,   Cardiovascular - normal exam    (+) hypertension, valvular problems/murmurs, hyperlipidemia,       Neuro/Psych- negative ROS  GI/Hepatic/Renal/Endo    (+)  GERD,      Musculoskeletal     Abdominal  - normal exam   Substance History - negative use     OB/GYN negative ob/gyn ROS         Other   arthritis,                      Anesthesia Plan    ASA 2     MAC     intravenous induction     Anesthetic plan, all risks, benefits, and alternatives have been provided, discussed and informed consent has been obtained with: patient.    Plan discussed with CRNA.

## 2021-06-18 LAB
LAB AP CASE REPORT: NORMAL
PATH REPORT.FINAL DX SPEC: NORMAL

## 2021-06-25 ENCOUNTER — OFFICE VISIT (OUTPATIENT)
Dept: FAMILY MEDICINE CLINIC | Facility: CLINIC | Age: 76
End: 2021-06-25

## 2021-06-25 VITALS
OXYGEN SATURATION: 98 % | DIASTOLIC BLOOD PRESSURE: 88 MMHG | BODY MASS INDEX: 20.58 KG/M2 | HEIGHT: 61 IN | SYSTOLIC BLOOD PRESSURE: 138 MMHG | TEMPERATURE: 97.8 F | HEART RATE: 65 BPM | WEIGHT: 109 LBS

## 2021-06-25 DIAGNOSIS — F32.A ANXIETY AND DEPRESSION: Primary | ICD-10-CM

## 2021-06-25 DIAGNOSIS — I10 ESSENTIAL HYPERTENSION: ICD-10-CM

## 2021-06-25 DIAGNOSIS — F41.9 ANXIETY AND DEPRESSION: Primary | ICD-10-CM

## 2021-06-25 PROCEDURE — 99214 OFFICE O/P EST MOD 30 MIN: CPT | Performed by: NURSE PRACTITIONER

## 2021-06-25 NOTE — PROGRESS NOTES
Chief Complaint   Patient presents with   • Hot Flashes   • Anxiety     Subjective   Alva Mills is a 75 y.o. female.     Presents with recheck of anxiety and depression-symptoms are improving with pristiq-still having anxiety on and off -but symptoms improving     Anxiety  Presents for follow-up visit. Symptoms include depressed mood, excessive worry, insomnia, irritability, malaise, nervous/anxious behavior, panic and restlessness. Patient reports no chest pain, compulsions, confusion, decreased concentration, dizziness, dry mouth, feeling of choking, hyperventilation, impotence, muscle tension, nausea, obsessions, palpitations, shortness of breath or suicidal ideas. Symptoms occur most days. The quality of sleep is good. Nighttime awakenings: none.     Her past medical history is significant for depression. Compliance with medications is %.   Depression  Visit Type: follow-up  Patient presents with the following symptoms: depressed mood, excessive worry, fatigue, insomnia, irritability, malaise, nervousness/anxiety, panic and restlessness.  Patient is not experiencing: chest pain, choking sensation, compulsions, confusion, decreased concentration, dizziness, dry mouth, feelings of hopelessness, feelings of worthlessness, hypersomnia, hyperventilation, impotence, memory impairment, muscle tension, nausea, obsessions, palpitations, psychomotor agitation, psychomotor retardation, shortness of breath, suicidal ideas, suicidal planning, thoughts of death, weight gain and weight loss.  Frequency of symptoms: most days   Severity: moderate   Sleep quality: fair  Nighttime awakenings: none  Compliance with medications:  %        Hypertension  This is a recurrent problem. The current episode started more than 1 month ago. The problem has been gradually improving since onset. The problem is controlled. Associated symptoms include anxiety and malaise/fatigue. Pertinent negatives include no blurred  vision, chest pain, headaches, neck pain, palpitations, peripheral edema, PND, shortness of breath or sweats. Risk factors for coronary artery disease include sedentary lifestyle. Current antihypertension treatment includes ACE inhibitors. The current treatment provides mild improvement. Compliance problems include diet.  There is no history of angina, kidney disease, CAD/MI, CVA, heart failure, left ventricular hypertrophy, PVD or retinopathy. Identifiable causes of hypertension include a thyroid problem. There is no history of chronic renal disease, coarctation of the aorta, hyperaldosteronism, hypercortisolism, hyperparathyroidism, a hypertension causing med, pheochromocytoma, renovascular disease or sleep apnea.        The following portions of the patient's history were reviewed and updated as appropriate: allergies, current medications, past social history and problem list.    Review of Systems   Constitutional: Positive for irritability and malaise/fatigue. Negative for activity change, appetite change, chills, diaphoresis, fatigue, fever, unexpected weight change, weight gain and weight loss.        Hot flashes    HENT: Positive for postnasal drip, tinnitus and voice change. Negative for dental problem, drooling, ear discharge, ear pain, facial swelling, hearing loss, mouth sores, nosebleeds, rhinorrhea, sinus pressure, sinus pain, sneezing, sore throat and trouble swallowing.    Eyes: Negative.  Negative for blurred vision, photophobia, pain, discharge, redness, itching and visual disturbance.   Respiratory: Negative.  Negative for apnea, choking, chest tightness, shortness of breath, wheezing and stridor.    Cardiovascular: Negative.  Negative for chest pain, palpitations, leg swelling and PND.   Gastrointestinal: Negative for abdominal distention, abdominal pain, anal bleeding, blood in stool, constipation, diarrhea, nausea, rectal pain and vomiting.   Endocrine: Negative.  Negative for cold intolerance,  "heat intolerance, polydipsia, polyphagia and polyuria.   Genitourinary: Negative.  Negative for difficulty urinating, dyspareunia, dysuria and impotence.   Musculoskeletal: Negative for back pain, gait problem, joint swelling, myalgias, neck pain and neck stiffness.   Skin: Negative.  Negative for color change, pallor and wound.   Allergic/Immunologic: Positive for environmental allergies. Negative for food allergies and immunocompromised state.   Neurological: Negative.  Negative for dizziness, tremors, seizures, syncope, facial asymmetry, speech difficulty, weakness, light-headedness and headaches.   Hematological: Negative.  Negative for adenopathy. Does not bruise/bleed easily.   Psychiatric/Behavioral: Positive for agitation, behavioral problems and sleep disturbance. Negative for confusion, decreased concentration, dysphoric mood, hallucinations, self-injury and suicidal ideas. The patient is nervous/anxious and has insomnia. The patient is not hyperactive.         Is going to therapy -not scheduled regularly   Patient is taking pristiq     Symptoms improving    All other systems reviewed and are negative.      Objective   /88   Pulse 65   Temp 97.8 °F (36.6 °C)   Ht 154.9 cm (61\")   Wt 49.4 kg (109 lb)   SpO2 98%   BMI 20.60 kg/m²   Physical Exam  Vitals and nursing note reviewed.   Constitutional:       Appearance: Normal appearance. She is normal weight.   HENT:      Head: Normocephalic and atraumatic.      Right Ear: Tympanic membrane normal.      Left Ear: Tympanic membrane normal.      Nose: Nose normal.      Mouth/Throat:      Mouth: Mucous membranes are dry.   Eyes:      Pupils: Pupils are equal, round, and reactive to light.   Cardiovascular:      Rate and Rhythm: Normal rate.      Pulses: Normal pulses.      Heart sounds: Normal heart sounds. No murmur heard.   No friction rub. No gallop.    Pulmonary:      Effort: Pulmonary effort is normal. No respiratory distress.      Breath sounds: " Normal breath sounds. No stridor. No wheezing, rhonchi or rales.   Chest:      Chest wall: No tenderness.   Abdominal:      General: Abdomen is flat. There is no distension.      Palpations: There is no mass.      Tenderness: There is no abdominal tenderness. There is no guarding or rebound.      Hernia: No hernia is present.      Comments: Large abdominal hernia    Musculoskeletal:         General: Tenderness present.      Cervical back: Normal range of motion.   Skin:     General: Skin is warm and dry.      Coloration: Skin is not jaundiced or pale.      Findings: No bruising or erythema.   Neurological:      General: No focal deficit present.      Mental Status: She is alert and oriented to person, place, and time.      Cranial Nerves: No cranial nerve deficit.      Sensory: No sensory deficit.      Motor: No weakness.      Coordination: Coordination normal.   Psychiatric:         Mood and Affect: Mood normal.         Behavior: Behavior normal.         Assessment/Plan   Problems Addressed this Visit        Cardiac and Vasculature    Essential hypertension    Relevant Orders    CBC & Differential    Comprehensive Metabolic Panel    Hemoglobin A1c    Iron    Lipid Panel    Vitamin D 25 Hydroxy    Vitamin B12    Magnesium    TSH      Other Visit Diagnoses     Anxiety and depression    -  Primary    Relevant Orders    CBC & Differential    Comprehensive Metabolic Panel    Hemoglobin A1c    Iron    Lipid Panel    Vitamin D 25 Hydroxy    Vitamin B12    Magnesium    TSH      Diagnoses       Codes Comments    Anxiety and depression    -  Primary ICD-10-CM: F41.9, F32.9  ICD-9-CM: 300.00, 311     Essential hypertension     ICD-10-CM: I10  ICD-9-CM: 401.9            New Medications Ordered This Visit   Medications   • Zoster Vac Recomb Adjuvanted 50 MCG/0.5ML reconstituted suspension     Sig: Inject 0.5 mL into the appropriate muscle as directed by prescriber 1 (One) Time for 1 dose. Repeat as directed     Dispense:  1  each     Refill:  1     Repeat as directed       It's not just what you eat, but when you eat  Eat breakfast, and eat smaller meals throughout the day. A healthy breakfast can jumpstart your metabolism, while eating small, healthy meals (rather than the standard three large meals) keeps your energy up.   Avoid eating at night. Try to eat dinner earlier and fast for 14-16 hours until breakfast the next morning. Studies suggest that eating only when you’re most active and giving your digestive system a long break each day may help to regulate weight.     Monitor blood pressure at home and let us know if blood pressure is 140/90 or above then start losartan-shingles vaccine

## 2021-06-28 ENCOUNTER — LAB (OUTPATIENT)
Dept: LAB | Facility: HOSPITAL | Age: 76
End: 2021-06-28

## 2021-06-28 PROCEDURE — 83540 ASSAY OF IRON: CPT | Performed by: NURSE PRACTITIONER

## 2021-06-28 PROCEDURE — 82607 VITAMIN B-12: CPT | Performed by: NURSE PRACTITIONER

## 2021-06-28 PROCEDURE — 80053 COMPREHEN METABOLIC PANEL: CPT | Performed by: NURSE PRACTITIONER

## 2021-06-28 PROCEDURE — 36415 COLL VENOUS BLD VENIPUNCTURE: CPT | Performed by: NURSE PRACTITIONER

## 2021-06-28 PROCEDURE — 84443 ASSAY THYROID STIM HORMONE: CPT | Performed by: NURSE PRACTITIONER

## 2021-06-28 PROCEDURE — 85025 COMPLETE CBC W/AUTO DIFF WBC: CPT | Performed by: NURSE PRACTITIONER

## 2021-06-28 PROCEDURE — 83735 ASSAY OF MAGNESIUM: CPT | Performed by: NURSE PRACTITIONER

## 2021-06-28 PROCEDURE — 82306 VITAMIN D 25 HYDROXY: CPT | Performed by: NURSE PRACTITIONER

## 2021-06-28 PROCEDURE — 80061 LIPID PANEL: CPT | Performed by: NURSE PRACTITIONER

## 2021-06-28 PROCEDURE — 83036 HEMOGLOBIN GLYCOSYLATED A1C: CPT | Performed by: NURSE PRACTITIONER

## 2021-06-28 RX ORDER — LOSARTAN POTASSIUM 25 MG/1
25 TABLET ORAL DAILY
Qty: 90 TABLET | Refills: 3 | Status: SHIPPED | OUTPATIENT
Start: 2021-06-28 | End: 2022-06-16 | Stop reason: SDUPTHER

## 2021-06-29 ENCOUNTER — TELEPHONE (OUTPATIENT)
Dept: FAMILY MEDICINE CLINIC | Facility: CLINIC | Age: 76
End: 2021-06-29

## 2021-06-29 LAB
25(OH)D3 SERPL-MCNC: 47.9 NG/ML
ALBUMIN SERPL-MCNC: 4 G/DL (ref 3.5–5.2)
ALBUMIN/GLOB SERPL: 2.1 G/DL
ALP SERPL-CCNC: 40 U/L (ref 39–117)
ALT SERPL W P-5'-P-CCNC: 14 U/L (ref 1–33)
ANION GAP SERPL CALCULATED.3IONS-SCNC: 6.8 MMOL/L (ref 5–15)
AST SERPL-CCNC: 24 U/L (ref 1–32)
BASOPHILS # BLD AUTO: 0.06 10*3/MM3 (ref 0–0.2)
BASOPHILS NFR BLD AUTO: 1.4 % (ref 0–1.5)
BILIRUB SERPL-MCNC: 0.2 MG/DL (ref 0–1.2)
BUN SERPL-MCNC: 22 MG/DL (ref 8–23)
BUN/CREAT SERPL: 39.3 (ref 7–25)
CALCIUM SPEC-SCNC: 9.5 MG/DL (ref 8.6–10.5)
CHLORIDE SERPL-SCNC: 104 MMOL/L (ref 98–107)
CHOLEST SERPL-MCNC: 146 MG/DL (ref 0–200)
CO2 SERPL-SCNC: 28.2 MMOL/L (ref 22–29)
CREAT SERPL-MCNC: 0.56 MG/DL (ref 0.57–1)
DEPRECATED RDW RBC AUTO: 41.1 FL (ref 37–54)
EOSINOPHIL # BLD AUTO: 0.1 10*3/MM3 (ref 0–0.4)
EOSINOPHIL NFR BLD AUTO: 2.3 % (ref 0.3–6.2)
ERYTHROCYTE [DISTWIDTH] IN BLOOD BY AUTOMATED COUNT: 12 % (ref 12.3–15.4)
GFR SERPL CREATININE-BSD FRML MDRD: 106 ML/MIN/1.73
GLOBULIN UR ELPH-MCNC: 1.9 GM/DL
GLUCOSE SERPL-MCNC: 90 MG/DL (ref 65–99)
HBA1C MFR BLD: 4.93 % (ref 4.8–5.6)
HCT VFR BLD AUTO: 35.8 % (ref 34–46.6)
HDLC SERPL-MCNC: 48 MG/DL (ref 40–60)
HGB BLD-MCNC: 11.7 G/DL (ref 12–15.9)
IMM GRANULOCYTES # BLD AUTO: 0.01 10*3/MM3 (ref 0–0.05)
IMM GRANULOCYTES NFR BLD AUTO: 0.2 % (ref 0–0.5)
IRON 24H UR-MRATE: 76 MCG/DL (ref 37–145)
LDLC SERPL CALC-MCNC: 81 MG/DL (ref 0–100)
LDLC/HDLC SERPL: 1.67 {RATIO}
LYMPHOCYTES # BLD AUTO: 1.34 10*3/MM3 (ref 0.7–3.1)
LYMPHOCYTES NFR BLD AUTO: 31.1 % (ref 19.6–45.3)
MAGNESIUM SERPL-MCNC: 1.8 MG/DL (ref 1.6–2.4)
MCH RBC QN AUTO: 30.6 PG (ref 26.6–33)
MCHC RBC AUTO-ENTMCNC: 32.7 G/DL (ref 31.5–35.7)
MCV RBC AUTO: 93.7 FL (ref 79–97)
MONOCYTES # BLD AUTO: 0.52 10*3/MM3 (ref 0.1–0.9)
MONOCYTES NFR BLD AUTO: 12.1 % (ref 5–12)
NEUTROPHILS NFR BLD AUTO: 2.28 10*3/MM3 (ref 1.7–7)
NEUTROPHILS NFR BLD AUTO: 52.9 % (ref 42.7–76)
NRBC BLD AUTO-RTO: 0 /100 WBC (ref 0–0.2)
PLATELET # BLD AUTO: 399 10*3/MM3 (ref 140–450)
PMV BLD AUTO: 10.4 FL (ref 6–12)
POTASSIUM SERPL-SCNC: 4.5 MMOL/L (ref 3.5–5.2)
PROT SERPL-MCNC: 5.9 G/DL (ref 6–8.5)
RBC # BLD AUTO: 3.82 10*6/MM3 (ref 3.77–5.28)
SODIUM SERPL-SCNC: 139 MMOL/L (ref 136–145)
TRIGL SERPL-MCNC: 90 MG/DL (ref 0–150)
TSH SERPL DL<=0.05 MIU/L-ACNC: 1.36 UIU/ML (ref 0.27–4.2)
VIT B12 BLD-MCNC: 551 PG/ML (ref 211–946)
VLDLC SERPL-MCNC: 17 MG/DL (ref 5–40)
WBC # BLD AUTO: 4.31 10*3/MM3 (ref 3.4–10.8)

## 2021-06-29 RX ORDER — DESVENLAFAXINE 100 MG/1
TABLET, EXTENDED RELEASE ORAL
Qty: 90 TABLET | Refills: 3 | Status: SHIPPED | OUTPATIENT
Start: 2021-06-29 | End: 2022-02-02

## 2021-06-29 NOTE — PROGRESS NOTES
Per DOMI Mac, Ms. Mills has been called with recent lab results & recommendations.  Continue current medications and follow-up as planned or sooner if any problems.

## 2021-06-29 NOTE — TELEPHONE ENCOUNTER
Per DOMI Mac, Ms. Mills has been called with recent lab results & recommendations.  Continue current medications and follow-up as planned or sooner if any problems.     ----- Message from DOMI Chisholm sent at 6/29/2021 10:27 AM CDT -----  Regarding: FW:  Can you let her know labs are good. No changes needed  ----- Message -----  From: Lab, Background User  Sent: 6/29/2021   1:16 AM CDT  To: DOMI Chisholm

## 2021-07-16 RX ORDER — PROMETHAZINE HYDROCHLORIDE 25 MG/1
TABLET ORAL
Qty: 180 TABLET | Refills: 0 | Status: SHIPPED | OUTPATIENT
Start: 2021-07-16 | End: 2022-02-02

## 2021-09-16 ENCOUNTER — TELEMEDICINE (OUTPATIENT)
Dept: FAMILY MEDICINE CLINIC | Facility: CLINIC | Age: 76
End: 2021-09-16

## 2021-09-16 VITALS — OXYGEN SATURATION: 99 % | TEMPERATURE: 98.4 F

## 2021-09-16 DIAGNOSIS — J32.9 RECURRENT SINUS INFECTIONS: Primary | ICD-10-CM

## 2021-09-16 PROCEDURE — 99212 OFFICE O/P EST SF 10 MIN: CPT | Performed by: NURSE PRACTITIONER

## 2021-09-16 RX ORDER — CLARITHROMYCIN 500 MG/1
500 TABLET, COATED ORAL 2 TIMES DAILY
Qty: 20 TABLET | Refills: 0 | Status: SHIPPED | OUTPATIENT
Start: 2021-09-16 | End: 2021-11-30

## 2021-09-16 RX ORDER — GUAIFENESIN 600 MG/1
600 TABLET, EXTENDED RELEASE ORAL 2 TIMES DAILY
Qty: 20 TABLET | Refills: 0 | Status: SHIPPED | OUTPATIENT
Start: 2021-09-16 | End: 2021-11-30

## 2021-09-16 NOTE — PROGRESS NOTES
No chief complaint on file.    Subjective   Alva Mills is a 76 y.o. female.           Presents with uri symptoms-tested neg on rapid on monday awaiting pcr-reports sinus pressure, producing thick sputum-[place] occurs yearly around this [place]     URI   This is a new problem. The current episode started in the past 7 days. The problem has been gradually worsening. Associated symptoms include congestion, rhinorrhea and sinus pain. Pertinent negatives include no abdominal pain, chest pain, coughing, diarrhea, dysuria, ear pain, headaches, joint pain, joint swelling, nausea, neck pain, plugged ear sensation, rash, sneezing, sore throat, swollen glands, vomiting or wheezing. The treatment provided mild relief.        The following portions of the patient's history were reviewed and updated as appropriate: allergies, current medications, past social history and problem list.    Review of Systems   Constitutional: Negative for activity change, appetite change, chills, diaphoresis, fatigue, fever and unexpected weight change.        Hot flashes    HENT: Positive for congestion, postnasal drip, rhinorrhea, sinus pressure, sinus pain, tinnitus and voice change. Negative for dental problem, drooling, ear discharge, ear pain, facial swelling, hearing loss, mouth sores, nosebleeds, sneezing, sore throat and trouble swallowing.    Eyes: Negative.  Negative for photophobia, pain, discharge, redness, itching and visual disturbance.   Respiratory: Negative.  Negative for apnea, cough, choking, chest tightness, shortness of breath, wheezing and stridor.    Cardiovascular: Negative.  Negative for chest pain, palpitations and leg swelling.   Gastrointestinal: Negative for abdominal distention, abdominal pain, anal bleeding, blood in stool, constipation, diarrhea, nausea, rectal pain and vomiting.   Endocrine: Negative.  Negative for cold intolerance, heat intolerance, polydipsia,  polyphagia and polyuria.   Genitourinary: Negative.  Negative for difficulty urinating, dyspareunia and dysuria.   Musculoskeletal: Negative for arthralgias, back pain, gait problem, joint pain, joint swelling, myalgias, neck pain and neck stiffness.   Skin: Negative.  Negative for color change, pallor, rash and wound.   Allergic/Immunologic: Positive for environmental allergies. Negative for food allergies and immunocompromised state.   Neurological: Negative.  Negative for dizziness, tremors, seizures, syncope, facial asymmetry, speech difficulty, weakness, light-headedness and headaches.   Hematological: Negative.  Negative for adenopathy. Does not bruise/bleed easily.   Psychiatric/Behavioral: Positive for agitation, behavioral problems and sleep disturbance. Negative for confusion, decreased concentration, dysphoric mood, hallucinations, self-injury and suicidal ideas. The patient is nervous/anxious. The patient is not hyperactive.    All other systems reviewed and are negative.      Objective   Temp 98.4 °F (36.9 °C)   SpO2 99%   Physical Exam  Vitals and nursing note reviewed.   Constitutional:       Appearance: Normal appearance.      Comments: Limited exam due to telemedicine visit    Neurological:      Mental Status: She is alert.              Assessment/Plan     Problems Addressed this Visit     None      Visit Diagnoses     Recurrent sinus infections    -  Primary      Diagnoses       Codes Comments    Recurrent sinus infections    -  Primary ICD-10-CM: J32.9  ICD-9-CM: 473.9            New Medications Ordered This Visit   Medications   • clarithromycin (Biaxin) 500 MG tablet     Sig: Take 1 tablet by mouth 2 (Two) Times a Day.     Dispense:  20 tablet     Refill:  0   • guaiFENesin (Mucinex) 600 MG 12 hr tablet     Sig: Take 1 tablet by mouth 2 (Two) Times a Day.     Dispense:  20 tablet     Refill:  0     Current Outpatient Medications on File Prior to Visit   Medication Sig Dispense Refill   •  acyclovir (ZOVIRAX) 400 MG tablet Take 1 tablet by mouth 3 times a day during break outs. 90 tablet 3   • azelastine (ASTELIN) 0.1 % nasal spray USE 2 SPRAYS NASALLY TWICE DAILY AS DIRECTED BY       PROVIDER 30 mL 12   • CALCIUM PO Take 1 tablet by mouth Daily With Breakfast. 500g     • desvenlafaxine (PRISTIQ) 100 MG 24 hr tablet TAKE 1 TABLET DAILY 90 tablet 3   • fenofibrate (TRICOR) 145 MG tablet TAKE 1 TABLET DAILY 90 tablet 3   • HYDROcodone-acetaminophen (NORCO)  MG per tablet Take 1 tablet by mouth Every 6 (Six) Hours As Needed for Moderate Pain .     • levothyroxine (Synthroid) 50 MCG tablet Take 1 tablet by mouth Daily. 90 tablet 3   • losartan (Cozaar) 25 MG tablet Take 1 tablet by mouth Daily. 90 tablet 3   • Magnesium 500 MG capsule Take 1 capsule by mouth Daily.     • metoprolol succinate XL (TOPROL-XL) 25 MG 24 hr tablet TAKE 1 TABLET DAILY 90 tablet 3   • omeprazole (priLOSEC) 40 MG capsule Take 40 mg by mouth Daily.     • promethazine (PHENERGAN) 25 MG tablet TAKE 1 TABLET EVERY 6 HOURSAS NEEDED FOR NAUSEA OR    VOMITING 180 tablet 0   • rOPINIRole (REQUIP) 0.25 MG tablet TAKE 1 TABLET 3 TIMES A DAY (Patient taking differently: 3 (Three) Times a Day As Needed.) 270 tablet 5   • traZODone (DESYREL) 50 MG tablet TAKE 1 TO 2 TABLETS AT     BEDTIME 180 tablet 1   • [DISCONTINUED] desvenlafaxine (PRISTIQ) 100 MG 24 hr tablet TAKE 1 TABLET DAILY 90 tablet 3   • [DISCONTINUED] promethazine (PHENERGAN) 25 MG tablet TAKE 1 TABLET EVERY 6 HOURSAS NEEDED FOR NAUSEA OR    VOMITING 180 tablet 0     No current facility-administered medications on file prior to visit.       15 minutes   Follow Up   No follow-ups on file.        You have chosen to receive care through a telehealth visit.  Do you consent to use a video/audio connection for your medical care today? Yes  The use of a video visit has been reviewed with the patient and verbal informed consent has been obtained.    Follow up if worsen, continue  allergy meds as directed-obtain pcr test from [facility] as directed

## 2021-09-20 RX ORDER — METOPROLOL SUCCINATE 25 MG/1
TABLET, EXTENDED RELEASE ORAL
Qty: 90 TABLET | Refills: 3 | Status: SHIPPED | OUTPATIENT
Start: 2021-09-20 | End: 2022-02-02

## 2021-10-04 ENCOUNTER — TELEPHONE (OUTPATIENT)
Dept: FAMILY MEDICINE CLINIC | Facility: CLINIC | Age: 76
End: 2021-10-04

## 2021-11-29 RX ORDER — TRAZODONE HYDROCHLORIDE 50 MG/1
TABLET ORAL
Qty: 180 TABLET | Refills: 1 | Status: SHIPPED | OUTPATIENT
Start: 2021-11-29 | End: 2022-02-02

## 2021-11-30 ENCOUNTER — LAB (OUTPATIENT)
Dept: LAB | Facility: HOSPITAL | Age: 76
End: 2021-11-30

## 2021-11-30 DIAGNOSIS — Z01.818 PREOP TESTING: Primary | ICD-10-CM

## 2021-11-30 LAB — SARS-COV-2 N GENE RESP QL NAA+PROBE: NOT DETECTED

## 2021-11-30 PROCEDURE — C9803 HOPD COVID-19 SPEC COLLECT: HCPCS

## 2021-11-30 PROCEDURE — 87635 SARS-COV-2 COVID-19 AMP PRB: CPT

## 2021-12-01 ENCOUNTER — ANESTHESIA EVENT (OUTPATIENT)
Dept: GASTROENTEROLOGY | Facility: HOSPITAL | Age: 76
End: 2021-12-01

## 2021-12-01 RX ORDER — HYDROXYZINE HYDROCHLORIDE 10 MG/1
10 TABLET, FILM COATED ORAL 3 TIMES DAILY PRN
Qty: 60 TABLET | Refills: 1 | Status: SHIPPED | OUTPATIENT
Start: 2021-12-01 | End: 2022-01-10

## 2021-12-02 ENCOUNTER — APPOINTMENT (OUTPATIENT)
Dept: GENERAL RADIOLOGY | Facility: HOSPITAL | Age: 76
End: 2021-12-02

## 2021-12-02 ENCOUNTER — ANESTHESIA (OUTPATIENT)
Dept: GASTROENTEROLOGY | Facility: HOSPITAL | Age: 76
End: 2021-12-02

## 2021-12-02 ENCOUNTER — HOSPITAL ENCOUNTER (OUTPATIENT)
Facility: HOSPITAL | Age: 76
Setting detail: HOSPITAL OUTPATIENT SURGERY
Discharge: HOME | End: 2021-12-02
Attending: INTERNAL MEDICINE | Admitting: INTERNAL MEDICINE

## 2021-12-02 VITALS
OXYGEN SATURATION: 97 % | SYSTOLIC BLOOD PRESSURE: 151 MMHG | RESPIRATION RATE: 18 BRPM | BODY MASS INDEX: 20.2 KG/M2 | HEART RATE: 67 BPM | TEMPERATURE: 97.1 F | WEIGHT: 107 LBS | DIASTOLIC BLOOD PRESSURE: 67 MMHG | HEIGHT: 61 IN

## 2021-12-02 DIAGNOSIS — K83.1 STRICTURE OF BILE DUCT: ICD-10-CM

## 2021-12-02 PROCEDURE — 25010000002 PROPOFOL 10 MG/ML EMULSION

## 2021-12-02 PROCEDURE — 25010000002 IOPAMIDOL 61 % SOLUTION: Performed by: INTERNAL MEDICINE

## 2021-12-02 PROCEDURE — 25010000002 ONDANSETRON PER 1 MG

## 2021-12-02 PROCEDURE — 25010000002 SUCCINYLCHOLINE PER 20 MG

## 2021-12-02 PROCEDURE — 25010000002 MIDAZOLAM PER 1 MG

## 2021-12-02 PROCEDURE — 74328 X-RAY BILE DUCT ENDOSCOPY: CPT

## 2021-12-02 PROCEDURE — 25010000002 FENTANYL CITRATE (PF) 50 MCG/ML SOLUTION

## 2021-12-02 PROCEDURE — 25010000002 DEXAMETHASONE PER 1 MG

## 2021-12-02 PROCEDURE — 88305 TISSUE EXAM BY PATHOLOGIST: CPT

## 2021-12-02 RX ORDER — DIPHENHYDRAMINE HYDROCHLORIDE 50 MG/ML
12.5 INJECTION INTRAMUSCULAR; INTRAVENOUS
Status: DISCONTINUED | OUTPATIENT
Start: 2021-12-02 | End: 2021-12-02 | Stop reason: HOSPADM

## 2021-12-02 RX ORDER — SUCCINYLCHOLINE CHLORIDE 20 MG/ML
INJECTION INTRAMUSCULAR; INTRAVENOUS AS NEEDED
Status: DISCONTINUED | OUTPATIENT
Start: 2021-12-02 | End: 2021-12-02 | Stop reason: SURG

## 2021-12-02 RX ORDER — FLUMAZENIL 0.1 MG/ML
0.2 INJECTION INTRAVENOUS AS NEEDED
Status: DISCONTINUED | OUTPATIENT
Start: 2021-12-02 | End: 2021-12-02 | Stop reason: HOSPADM

## 2021-12-02 RX ORDER — ACETAMINOPHEN 650 MG/1
650 SUPPOSITORY RECTAL ONCE AS NEEDED
Status: DISCONTINUED | OUTPATIENT
Start: 2021-12-02 | End: 2021-12-02 | Stop reason: HOSPADM

## 2021-12-02 RX ORDER — FENTANYL CITRATE 50 UG/ML
INJECTION, SOLUTION INTRAMUSCULAR; INTRAVENOUS AS NEEDED
Status: DISCONTINUED | OUTPATIENT
Start: 2021-12-02 | End: 2021-12-02 | Stop reason: SURG

## 2021-12-02 RX ORDER — MIDAZOLAM HYDROCHLORIDE 1 MG/ML
INJECTION INTRAMUSCULAR; INTRAVENOUS AS NEEDED
Status: DISCONTINUED | OUTPATIENT
Start: 2021-12-02 | End: 2021-12-02 | Stop reason: SURG

## 2021-12-02 RX ORDER — PROPOFOL 10 MG/ML
VIAL (ML) INTRAVENOUS AS NEEDED
Status: DISCONTINUED | OUTPATIENT
Start: 2021-12-02 | End: 2021-12-02 | Stop reason: SURG

## 2021-12-02 RX ORDER — ROCURONIUM BROMIDE 10 MG/ML
INJECTION, SOLUTION INTRAVENOUS AS NEEDED
Status: DISCONTINUED | OUTPATIENT
Start: 2021-12-02 | End: 2021-12-02 | Stop reason: SURG

## 2021-12-02 RX ORDER — ONDANSETRON 2 MG/ML
4 INJECTION INTRAMUSCULAR; INTRAVENOUS ONCE AS NEEDED
Status: DISCONTINUED | OUTPATIENT
Start: 2021-12-02 | End: 2021-12-02 | Stop reason: HOSPADM

## 2021-12-02 RX ORDER — NALOXONE HCL 0.4 MG/ML
0.4 VIAL (ML) INJECTION AS NEEDED
Status: DISCONTINUED | OUTPATIENT
Start: 2021-12-02 | End: 2021-12-02 | Stop reason: HOSPADM

## 2021-12-02 RX ORDER — DEXAMETHASONE SODIUM PHOSPHATE 4 MG/ML
INJECTION, SOLUTION INTRA-ARTICULAR; INTRALESIONAL; INTRAMUSCULAR; INTRAVENOUS; SOFT TISSUE AS NEEDED
Status: DISCONTINUED | OUTPATIENT
Start: 2021-12-02 | End: 2021-12-02 | Stop reason: SURG

## 2021-12-02 RX ORDER — PROMETHAZINE HYDROCHLORIDE 25 MG/1
25 SUPPOSITORY RECTAL ONCE AS NEEDED
Status: DISCONTINUED | OUTPATIENT
Start: 2021-12-02 | End: 2021-12-02 | Stop reason: HOSPADM

## 2021-12-02 RX ORDER — ACETAMINOPHEN 325 MG/1
650 TABLET ORAL ONCE AS NEEDED
Status: DISCONTINUED | OUTPATIENT
Start: 2021-12-02 | End: 2021-12-02 | Stop reason: HOSPADM

## 2021-12-02 RX ORDER — ONDANSETRON 2 MG/ML
INJECTION INTRAMUSCULAR; INTRAVENOUS AS NEEDED
Status: DISCONTINUED | OUTPATIENT
Start: 2021-12-02 | End: 2021-12-02 | Stop reason: SURG

## 2021-12-02 RX ORDER — EPHEDRINE SULFATE 50 MG/ML
5 INJECTION, SOLUTION INTRAVENOUS ONCE AS NEEDED
Status: DISCONTINUED | OUTPATIENT
Start: 2021-12-02 | End: 2021-12-02 | Stop reason: HOSPADM

## 2021-12-02 RX ORDER — DEXTROSE AND SODIUM CHLORIDE 5; .45 G/100ML; G/100ML
30 INJECTION, SOLUTION INTRAVENOUS CONTINUOUS PRN
Status: DISCONTINUED | OUTPATIENT
Start: 2021-12-02 | End: 2021-12-02 | Stop reason: HOSPADM

## 2021-12-02 RX ORDER — LIDOCAINE HYDROCHLORIDE 20 MG/ML
INJECTION, SOLUTION INFILTRATION; PERINEURAL AS NEEDED
Status: DISCONTINUED | OUTPATIENT
Start: 2021-12-02 | End: 2021-12-02 | Stop reason: SURG

## 2021-12-02 RX ORDER — PROMETHAZINE HYDROCHLORIDE 25 MG/1
25 TABLET ORAL ONCE AS NEEDED
Status: DISCONTINUED | OUTPATIENT
Start: 2021-12-02 | End: 2021-12-02 | Stop reason: HOSPADM

## 2021-12-02 RX ADMIN — DEXTROSE AND SODIUM CHLORIDE: 5; 450 INJECTION, SOLUTION INTRAVENOUS at 10:01

## 2021-12-02 RX ADMIN — GLYCOPYRROLATE 0.2 MG: 0.2 INJECTION, SOLUTION INTRAMUSCULAR; INTRAVITREAL at 10:27

## 2021-12-02 RX ADMIN — MIDAZOLAM HYDROCHLORIDE 1 MG: 1 INJECTION, SOLUTION INTRAMUSCULAR; INTRAVENOUS at 10:00

## 2021-12-02 RX ADMIN — SUCCINYLCHOLINE CHLORIDE 120 MG: 20 INJECTION, SOLUTION INTRAMUSCULAR; INTRAVENOUS at 10:06

## 2021-12-02 RX ADMIN — ONDANSETRON 4 MG: 2 INJECTION INTRAMUSCULAR; INTRAVENOUS at 10:24

## 2021-12-02 RX ADMIN — DEXAMETHASONE SODIUM PHOSPHATE 4 MG: 4 INJECTION, SOLUTION INTRAMUSCULAR; INTRAVENOUS at 10:21

## 2021-12-02 RX ADMIN — ROCURONIUM BROMIDE 5 MG: 10 INJECTION INTRAVENOUS at 10:05

## 2021-12-02 RX ADMIN — PROPOFOL 100 MG: 10 INJECTION, EMULSION INTRAVENOUS at 10:05

## 2021-12-02 RX ADMIN — FENTANYL CITRATE 100 MCG: 50 INJECTION INTRAMUSCULAR; INTRAVENOUS at 10:01

## 2021-12-02 RX ADMIN — LIDOCAINE HYDROCHLORIDE 60 MG: 20 INJECTION, SOLUTION INFILTRATION; PERINEURAL at 10:05

## 2021-12-02 RX ADMIN — IOPAMIDOL 5 ML: 612 INJECTION, SOLUTION INTRAVENOUS at 10:26

## 2021-12-02 NOTE — ANESTHESIA PROCEDURE NOTES
Airway  Urgency: elective    Date/Time: 12/2/2021 10:08 AM  Airway not difficult    General Information and Staff    Patient location during procedure: radiology  CRNA: Lynette Fournier CRNA    Indications and Patient Condition  Indications for airway management: airway protection    Preoxygenated: yes  MILS maintained throughout  Mask difficulty assessment: 1 - vent by mask    Final Airway Details  Final airway type: endotracheal airway      Successful airway: ETT  Cuffed: yes   Successful intubation technique: direct laryngoscopy  Facilitating devices/methods: intubating stylet  Endotracheal tube insertion site: oral  Blade: Sina  Blade size: 3  ETT size (mm): 7.0  Cormack-Lehane Classification: grade I - full view of glottis  Placement verified by: chest auscultation and capnometry   Measured from: teeth  ETT/EBT  to teeth (cm): 22  Number of attempts at approach: 1  Assessment: lips, teeth, and gum same as pre-op and atraumatic intubation

## 2021-12-02 NOTE — H&P
Amanda Kearney DO,[facility]  Gastroenterology  Hepatology  Endoscopy  Board Certified in Internal Medicine and gastroenterology  [address], [address]  [place]. [address]  - [phone]   F [phone]     GASTROENTEROLOGY HISTORY AND PHYSICAL  NOTE   AMANDA KEARNEY DO.         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 female presents with desire for elective ERCP with removal of any stones and biopsy of the extrahepatic bile duct neoplasm.      ROS/HISTORY/ CURRENT MEDICATIONS/OBJECTIVE/VS/PE:   Review of Systems:  All systems unremarkable unless specified below.  Constitutional   HENT  Eyes   Respiratory    Cardiovascular  Gastrointestinal   Endocrine  Genitourinary    Musculoskeletal   Skin  Allergic/Immunologic    Neurological    Hematological  Psychiatric/Behavioral    History:     Past Medical History:   Diagnosis Date   • Abdominal pain    • Acquired hypothyroidism    • Acute bronchitis    • Acute sinusitis    • Acute upper respiratory infection    • Benign hypertension    • Breast cyst    • Breast lump     history of, right   • Calf pain    • Common bile duct calculus    • Constipation    • Cough    • Depressive disorder    • Elevated cholesterol    • Epigastric pain    • External hemorrhoids without complication    • Fibrocystic breast    • Functional heart murmur    • Gastroesophageal reflux disease    • General medical exam    • Hemorrhoids    • Hyperlipidemia    • Incisional hernia     no evident recurrence at this juncture   • Ingrown toenail    • Localized, primary osteoarthritis of ankle or foot    • Muscle strain    • Skin lesion      Past Surgical History:   Procedure Laterality Date   • BACK SURGERY      x2   • BILE DUCT EXPLORATION  12/10/2013     Remove bile duct stone (1)    Common duct exploration, stone extraction, choledochoduodenostomy and choledochoscopy.    • BREAST BIOPSY Right 1991    Stereotactic breast biopsy (1)    Right breast    • CHOLECYSTECTOMY OPEN  1989   • ERCP Left 1/10/2019    Procedure: ENDOSCOPIC RETROGRADE CHOLANGIOPANCREATOGRAPHY;  Surgeon: Homero Allen DO;  Location: [facility] ENDOSCOPY;  Service: Gastroenterology   • ERCP Left 3/14/2019    Procedure: ENDOSCOPIC RETROGRADE CHOLANGIOPANCREATOGRAPHY;  Surgeon: Homero Allen DO;  Location: [facility] ENDOSCOPY;  Service: Gastroenterology   • ERCP Left 9/10/2019    Procedure: ENDOSCOPIC RETROGRADE CHOLANGIOPANCREATOGRAPHY;  Surgeon: Homero Allen DO;  Location: [facility] ENDOSCOPY;  Service: Gastroenterology   • ERCP Left 3/12/2020    Procedure: ENDOSCOPIC RETROGRADE CHOLANGIOPANCREATOGRAPHY;  Surgeon: Homero Allen DO;  Location: [facility] ENDOSCOPY;  Service: Gastroenterology;  Laterality: Left;   • ERCP Left 8/10/2020    Procedure: ENDOSCOPIC RETROGRADE CHOLANGIOPANCREATOGRAPHY;  Surgeon: Homero Allen DO;  Location: [facility] ENDOSCOPY;  Service: Gastroenterology;  Laterality: Left;   • ERCP Left 1/19/2021    Procedure: ENDOSCOPIC RETROGRADE CHOLANGIOPANCREATOGRAPHY;  Surgeon: Homero Allen DO;  Location: [facility] ENDOSCOPY;  Service: Gastroenterology;  Laterality: Left;   • ERCP WITH STENT PLACEMENT N/A 6/17/2021    Procedure: ENDOSCOPIC RETROGRADE CHOLANGIOPANCREATOGRAPHY WITH STENT PLACEMENT;  Surgeon: Homero Allen DO;  Location: [facility] ENDOSCOPY;  Service: Gastroenterology;  Laterality: N/A;   • HERNIA REPAIR  06/16/2014    Anesth, repair of hernia (1)    laparoscopic ventral hernioplasty with mesh. Ventral hernia.    • HYSTERECTOMY  10/17/2001    Anesth, hysterectomy (1)    Laparoscopic subtotal hysterectomy & BSO. Enterolysis of sigmoid colon.    • INJECTION OF MEDICATION  08/12/2013    Dexamethasone (2)      • INJECTION OF MEDICATION  01/31/2013    Kenalog  (1)      • INJECTION OF MEDICATION  02/11/2015    Rocephin (1)      • OOPHORECTOMY     • OTHER SURGICAL HISTORY  08/12/2013    Small Joint Injection/Aspiration 20600 (2)      • TUBAL ABDOMINAL LIGATION       Family History   Problem Relation Age of Onset   • Heart disease Other    • Hypertension Other    • Heart disease Father      Social History     Tobacco Use   • Smoking status: Former Smoker   • Smokeless tobacco: Never Used   • Tobacco comment: quit 50 years ago    Vaping Use   • Vaping Use: Never used   Substance Use Topics   • Alcohol use: No   • Drug use: No     Prior to Admission medications    Medication Sig Start Date End Date Taking? Authorizing Provider   acyclovir (ZOVIRAX) 400 MG tablet Take 1 tablet by mouth 3 times a day during break outs. 3/29/19  Yes Estefania Vicente APRN   azelastine (ASTELIN) 0.1 % nasal spray USE 2 SPRAYS NASALLY TWICE DAILY AS DIRECTED BY       PROVIDER 6/2/21  Yes Pamella Nava APRN   CALCIUM PO Take 1 tablet by mouth Daily With Breakfast. 500g   Yes ProviderKev MD   desvenlafaxine (PRISTIQ) 100 MG 24 hr tablet TAKE 1 TABLET DAILY 6/29/21  Yes Estefania Vicente APRN   fenofibrate (TRICOR) 145 MG tablet TAKE 1 TABLET DAILY 6/7/21  Yes Estefania Vicente APRN   HYDROcodone-acetaminophen (NORCO)  MG per tablet Take 1 tablet by mouth Every 6 (Six) Hours As Needed for Moderate Pain .   Yes ProviderKev MD   hydrOXYzine (ATARAX) 10 MG tablet Take 1 tablet by mouth 3 (Three) Times a Day As Needed for Anxiety. 12/1/21  Yes Estefania Vicente APRN   levothyroxine (Synthroid) 50 MCG tablet Take 1 tablet by mouth Daily. 5/13/21  Yes Estefania Vicente APRN   losartan (Cozaar) 25 MG tablet Take 1 tablet by mouth Daily. 6/28/21  Yes Estefania Vicente APRN   Magnesium 500 MG capsule Take 1 capsule by mouth Daily.   Yes Kev Gramajo MD   metoprolol succinate XL (TOPROL-XL) 25 MG 24 hr tablet TAKE 1 TABLET DAILY 9/20/21  Yes  Estefania Vicente APRN   omeprazole (priLOSEC) 40 MG capsule Take 40 mg by mouth Daily. 10/21/19  Yes Provider, MD Kev   promethazine (PHENERGAN) 25 MG tablet TAKE 1 TABLET EVERY 6 HOURSAS NEEDED FOR NAUSEA OR    VOMITING 7/16/21  Yes Estefania Vicente APRN   rOPINIRole (REQUIP) 0.25 MG tablet TAKE 1 TABLET 3 TIMES A DAY  Patient taking differently: 3 (Three) Times a Day As Needed. 2/19/21  Yes Estefania Vicente APRN   traZODone (DESYREL) 50 MG tablet TAKE 1 TO 2 TABLETS AT     BEDTIME 11/29/21  Yes Estefania Vicente APRN     Allergies:  Clonazepam, Hydromorphone, Morphine, Percocet [oxycodone-acetaminophen], and Aripiprazole    I have reviewed the patients medical history, surgical history and family history in the available medical record system.     Current Medications:     No current facility-administered medications for this encounter.       Objective     Physical Exam:   Temp:  [98 °F (36.7 °C)] 98 °F (36.7 °C)  Heart Rate:  [76] 76  Resp:  [18] 18  BP: (168)/(71) 168/71    Physical Exam:  General Appearance:    Alert, cooperative, in no acute distress   Head:    Normocephalic, without obvious abnormality, atraumatic   Eyes:            Lids and lashes normal, conjunctivae and sclerae normal, no icterus, no pallor, corneas clear, PERRLA   Ears:    Ears appear intact with no abnormalities noted   Throat:   No oral lesions, no thrush, oral mucosa moist   Neck:   No adenopathy, supple, trachea midline, no thyromegaly, no  carotid bruit, no JVD   Back:     No kyphosis present, no scoliosis present, no skin lesions,   erythema or scars, no tenderness to percussion or                 palpation,  range of motion normal   Lungs:     Clear to auscultation,respirations regular, even and         unlabored    Heart:    Regular rhythm and normal rate, normal S1 and S2, no  murmur, no gallop, no rub, no click   Breast Exam:    Deferred   Abdomen:     Normal bowel sounds, no masses, no  organomegaly, soft  nontender, nondistended, no guarding, no rebound                 tenderness   Genitalia:    Deferred   Extremities:   Moves all extremities well, no edema, no cyanosis, no          redness   Pulses:   Pulses palpable and equal bilaterally   Skin:   No bleeding, bruising or rash   Lymph nodes:   No palpable adenopathy   Neurologic:   Cranial nerves 2 - 12 grossly intact, sensation intact, DTR     present and equal bilaterally      Results Review:     Lab Results   Component Value Date    WBC 4.31 06/28/2021    WBC 6.08 11/07/2019    WBC 9.62 01/11/2019    HGB 11.7 (L) 06/28/2021    HGB 12.3 11/07/2019    HGB 11.4 (L) 01/11/2019    HCT 35.8 06/28/2021    HCT 37.4 11/07/2019    HCT 35.2 01/11/2019     06/28/2021     11/07/2019     01/11/2019             No results found for: LIPASE  Lab Results   Component Value Date    INR 1.0 03/27/2016    INR 1.0 02/05/2015     No results found for: THROATCX    Radiology Review:  Imaging Results (Last 72 Hours)     ** No results found for the last 72 hours. **           I reviewed the patient's new clinical results.  I reviewed the patient's new imaging results and agree with the interpretation.     ASSESSMENT/PLAN:   ASSESSMENT:  1.  Recurrent common bile duct stones, sump syndrome  2.  Neoplasm, benign on previous biopsy, distal bile duct    PLAN:  1.  Endoscopic retrograde cholangiogram with removal of stones and biopsy of neoplasm of the extrahepatic biliary duct    Risk and benefits associated with the procedure are reviewed with the patient.  The patient wished to proceed     Homero Allen DO  12/02/21  09:39 CST

## 2021-12-02 NOTE — ANESTHESIA POSTPROCEDURE EVALUATION
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          Post Anesthesia Care and Evaluation    Patient location during evaluation: PACU  Patient participation: complete - patient cannot participate  Level of consciousness: awake  Pain score: 0  Pain management: adequate  Airway patency: patent  Anesthetic complications: No anesthetic complications  PONV Status: none  Cardiovascular status: acceptable  Respiratory status: acceptable and room air  Hydration status: acceptable    Comments: Vital signs:    HR: 101  BP: 142/70  SpO2: 97  RR: 18  Temp: 98  No anesthesia care post op

## 2021-12-02 NOTE — DISCHARGE INSTR - APPOINTMENTS
Dr. Homero Allen, DO  [address], [address]  [place] [address]  [phone]    Appointment date and time:     December 10, 2021  @  10:00 am

## 2021-12-02 NOTE — ANESTHESIA PREPROCEDURE EVALUATION
Anesthesia Evaluation     Patient summary reviewed   NPO Solid Status: > 8 hours  NPO Liquid Status: > 4 hours           Airway   Mallampati: II  TM distance: >3 FB  Neck ROM: full  No difficulty expected  Dental - normal exam   (+) upper dentures        Pulmonary - normal exam   Cardiovascular - normal exam  Exercise tolerance: good (4-7 METS)    (+) hypertension well controlled less than 2 medications, valvular problems/murmurs, hyperlipidemia,       Neuro/Psych  (+) psychiatric history Anxiety,     GI/Hepatic/Renal/Endo    (+)  GERD well controlled,      Musculoskeletal     Abdominal  - normal exam   Substance History      OB/GYN          Other   arthritis,                    Anesthesia Plan    ASA 3     general     intravenous induction     Anesthetic plan, all risks, benefits, and alternatives have been provided, discussed and informed consent has been obtained with: patient.

## 2021-12-03 LAB
LAB AP CASE REPORT: NORMAL
PATH REPORT.FINAL DX SPEC: NORMAL

## 2021-12-06 ENCOUNTER — PREP FOR SURGERY (OUTPATIENT)
Dept: OTHER | Facility: HOSPITAL | Age: 76
End: 2021-12-06

## 2021-12-06 DIAGNOSIS — K83.1 OBSTRUCTION OF BILE DUCT: Primary | ICD-10-CM

## 2021-12-09 ENCOUNTER — TELEPHONE (OUTPATIENT)
Dept: FAMILY MEDICINE CLINIC | Facility: CLINIC | Age: 76
End: 2021-12-09

## 2021-12-09 NOTE — TELEPHONE ENCOUNTER
She talked to her pain management about the anxiety medicine and Dr Portillo said to call her and she would discuss it with you. Call her at [phone].

## 2021-12-13 ENCOUNTER — TRANSCRIBE ORDERS (OUTPATIENT)
Dept: MRI IMAGING | Facility: HOSPITAL | Age: 76
End: 2021-12-13

## 2021-12-13 ENCOUNTER — LAB (OUTPATIENT)
Dept: LAB | Facility: HOSPITAL | Age: 76
End: 2021-12-13

## 2021-12-13 ENCOUNTER — HOSPITAL ENCOUNTER (OUTPATIENT)
Dept: MRI IMAGING | Facility: HOSPITAL | Age: 76
Discharge: HOME | End: 2021-12-13

## 2021-12-13 DIAGNOSIS — D13.5 BENIGN NEOPLASM OF LIVER AND BILIARY PASSAGES: ICD-10-CM

## 2021-12-13 DIAGNOSIS — D13.4 BENIGN NEOPLASM OF LIVER AND BILIARY PASSAGES: ICD-10-CM

## 2021-12-13 DIAGNOSIS — D49.0 NEOPLASM OF LIVER: Primary | ICD-10-CM

## 2021-12-13 DIAGNOSIS — D49.0 NEOPLASM OF LIVER: ICD-10-CM

## 2021-12-13 LAB
BUN SERPL-MCNC: 22 MG/DL (ref 8–23)
CREAT SERPL-MCNC: 0.8 MG/DL (ref 0.57–1)
GFR SERPL CREATININE-BSD FRML MDRD: 70 ML/MIN/1.73

## 2021-12-13 PROCEDURE — 36415 COLL VENOUS BLD VENIPUNCTURE: CPT

## 2021-12-13 PROCEDURE — 84520 ASSAY OF UREA NITROGEN: CPT

## 2021-12-13 PROCEDURE — 82565 ASSAY OF CREATININE: CPT

## 2021-12-13 PROCEDURE — A9579 GAD-BASE MR CONTRAST NOS,1ML: HCPCS | Performed by: INTERNAL MEDICINE

## 2021-12-13 PROCEDURE — 74183 MRI ABD W/O CNTR FLWD CNTR: CPT

## 2021-12-13 PROCEDURE — 25010000002 GADOTERIDOL PER 1 ML: Performed by: INTERNAL MEDICINE

## 2021-12-13 RX ADMIN — GADOTERIDOL 11 ML: 279.3 INJECTION, SOLUTION INTRAVENOUS at 14:21

## 2021-12-16 ENCOUNTER — HOSPITAL ENCOUNTER (OUTPATIENT)
Dept: CT IMAGING | Facility: HOSPITAL | Age: 76
Discharge: HOME | End: 2021-12-16
Admitting: INTERNAL MEDICINE

## 2021-12-16 DIAGNOSIS — D13.5 BENIGN NEOPLASM OF LIVER AND BILIARY PASSAGES: ICD-10-CM

## 2021-12-16 DIAGNOSIS — D13.4 BENIGN NEOPLASM OF LIVER AND BILIARY PASSAGES: ICD-10-CM

## 2021-12-16 PROCEDURE — 74177 CT ABD & PELVIS W/CONTRAST: CPT

## 2021-12-16 PROCEDURE — 25010000002 IOPAMIDOL 61 % SOLUTION: Performed by: INTERNAL MEDICINE

## 2021-12-16 PROCEDURE — 0 DIATRIZOATE MEGLUMINE & SODIUM PER 1 ML: Performed by: INTERNAL MEDICINE

## 2021-12-16 RX ADMIN — DIATRIZOATE MEGLUMINE AND DIATRIZOATE SODIUM 30 ML: 660; 100 LIQUID ORAL; RECTAL at 17:45

## 2021-12-16 RX ADMIN — IOPAMIDOL 75 ML: 612 INJECTION, SOLUTION INTRAVENOUS at 17:45

## 2022-01-05 ENCOUNTER — PREP FOR SURGERY (OUTPATIENT)
Dept: OTHER | Facility: HOSPITAL | Age: 77
End: 2022-01-05

## 2022-01-05 ENCOUNTER — HOSPITAL ENCOUNTER (OUTPATIENT)
Facility: HOSPITAL | Age: 77
Setting detail: HOSPITAL OUTPATIENT SURGERY
End: 2022-01-05
Attending: INTERNAL MEDICINE | Admitting: INTERNAL MEDICINE

## 2022-01-05 DIAGNOSIS — K83.1 OBSTRUCTION OF BILE DUCT: Primary | ICD-10-CM

## 2022-01-05 DIAGNOSIS — Z11.52 ENCOUNTER FOR SCREENING FOR COVID-19: ICD-10-CM

## 2022-01-05 RX ORDER — DEXTROSE AND SODIUM CHLORIDE 5; .45 G/100ML; G/100ML
30 INJECTION, SOLUTION INTRAVENOUS CONTINUOUS PRN
Status: CANCELLED | OUTPATIENT
Start: 2022-01-05

## 2022-01-06 ENCOUNTER — TELEPHONE (OUTPATIENT)
Dept: FAMILY MEDICINE CLINIC | Facility: CLINIC | Age: 77
End: 2022-01-06

## 2022-01-06 RX ORDER — GUAIFENESIN 600 MG/1
600 TABLET, EXTENDED RELEASE ORAL 2 TIMES DAILY
Qty: 20 TABLET | Refills: 0 | Status: SHIPPED | OUTPATIENT
Start: 2022-01-06 | End: 2022-02-02

## 2022-01-06 RX ORDER — AZITHROMYCIN 250 MG/1
TABLET, FILM COATED ORAL
Qty: 6 TABLET | Refills: 0 | Status: SHIPPED | OUTPATIENT
Start: 2022-01-06 | End: 2022-01-10

## 2022-01-10 ENCOUNTER — OFFICE VISIT (OUTPATIENT)
Dept: FAMILY MEDICINE CLINIC | Facility: CLINIC | Age: 77
End: 2022-01-10

## 2022-01-10 VITALS
BODY MASS INDEX: 20.96 KG/M2 | TEMPERATURE: 98 F | DIASTOLIC BLOOD PRESSURE: 78 MMHG | OXYGEN SATURATION: 98 % | WEIGHT: 111 LBS | HEIGHT: 61 IN | HEART RATE: 91 BPM | SYSTOLIC BLOOD PRESSURE: 120 MMHG

## 2022-01-10 DIAGNOSIS — J06.9 UPPER RESPIRATORY TRACT INFECTION, UNSPECIFIED TYPE: Primary | ICD-10-CM

## 2022-01-10 PROCEDURE — 99213 OFFICE O/P EST LOW 20 MIN: CPT | Performed by: NURSE PRACTITIONER

## 2022-01-10 RX ORDER — FLUTICASONE PROPIONATE 50 MCG
2 SPRAY, SUSPENSION (ML) NASAL DAILY
Qty: 16 G | Refills: 1 | Status: SHIPPED | OUTPATIENT
Start: 2022-01-10 | End: 2022-02-02

## 2022-01-10 RX ORDER — LEVOFLOXACIN 500 MG/1
500 TABLET, FILM COATED ORAL DAILY
Qty: 7 TABLET | Refills: 0 | Status: SHIPPED | OUTPATIENT
Start: 2022-01-10 | End: 2022-02-02

## 2022-01-10 RX ORDER — LORATADINE 10 MG/1
10 TABLET ORAL DAILY
Qty: 30 TABLET | Refills: 2 | Status: SHIPPED | OUTPATIENT
Start: 2022-01-10 | End: 2022-06-03

## 2022-01-10 RX ORDER — HYDROXYZINE HYDROCHLORIDE 10 MG/1
TABLET, FILM COATED ORAL
Qty: 60 TABLET | Refills: 5 | Status: SHIPPED | OUTPATIENT
Start: 2022-01-10 | End: 2022-02-02

## 2022-01-10 NOTE — PROGRESS NOTES
"  Chief Complaint   Patient presents with   • Sinusitis     head congested , eyes watery     Subjective   Alva Mills is a 76 y.o. female.           URI   This is a recurrent problem. The current episode started in the past 7 days. There has been no fever. Associated symptoms include congestion, coughing, rhinorrhea, sinus pain, sneezing and a sore throat. Pertinent negatives include no abdominal pain, chest pain, diarrhea, joint pain, plugged ear sensation, rash, swollen glands or vomiting. She has tried acetaminophen (zpack and mucinex) for the symptoms. The treatment provided mild relief.        The following portions of the patient's history were reviewed and updated as appropriate: allergies, current medications, past social history and problem list.    Review of Systems   Constitutional: Positive for activity change and fatigue.   HENT: Positive for congestion, rhinorrhea, sinus pain, sneezing and sore throat.    Respiratory: Positive for cough.    Cardiovascular: Negative.  Negative for chest pain.   Gastrointestinal: Negative.  Negative for abdominal pain, diarrhea and vomiting.   Musculoskeletal: Negative.  Negative for joint pain.   Skin: Negative for rash.       Objective   /78   Pulse 91   Temp 98 °F (36.7 °C) (Tympanic)   Ht 154.9 cm (61\")   Wt 50.3 kg (111 lb)   SpO2 98%   BMI 20.97 kg/m²   Physical Exam  Vitals and nursing note reviewed.   HENT:      Head: Normocephalic.      Comments: Thick pnd present -mid facial pain with pressure      Nose: Congestion and rhinorrhea present.      Mouth/Throat:      Pharynx: No oropharyngeal exudate.   Eyes:      Pupils: Pupils are equal, round, and reactive to light.   Cardiovascular:      Rate and Rhythm: Normal rate.   Abdominal:      General: Abdomen is flat.   Musculoskeletal:         General: Normal range of motion.      Cervical back: Normal range of motion.   Skin:     General: Skin is warm.   Neurological:      General: No focal deficit " present.      Mental Status: She is oriented to person, place, and time.   Psychiatric:         Mood and Affect: Mood normal.              Assessment/Plan     Problems Addressed this Visit     None      Visit Diagnoses     Upper respiratory tract infection, unspecified type    -  Primary    Relevant Medications    levoFLOXacin (Levaquin) 500 MG tablet      Diagnoses       Codes Comments    Upper respiratory tract infection, unspecified type    -  Primary ICD-10-CM: J06.9  ICD-9-CM: 465.9            New Medications Ordered This Visit   Medications   • loratadine (Claritin) 10 MG tablet     Sig: Take 1 tablet by mouth Daily.     Dispense:  30 tablet     Refill:  2   • levoFLOXacin (Levaquin) 500 MG tablet     Sig: Take 1 tablet by mouth Daily.     Dispense:  7 tablet     Refill:  0   • fluticasone (Flonase) 50 MCG/ACT nasal spray     Si sprays into the nostril(s) as directed by provider Daily.     Dispense:  16 g     Refill:  1     Current Outpatient Medications on File Prior to Visit   Medication Sig Dispense Refill   • acyclovir (ZOVIRAX) 400 MG tablet Take 1 tablet by mouth 3 times a day during break outs. 90 tablet 3   • azelastine (ASTELIN) 0.1 % nasal spray USE 2 SPRAYS NASALLY TWICE DAILY AS DIRECTED BY       PROVIDER 30 mL 12   • CALCIUM PO Take 1 tablet by mouth Daily With Breakfast. 500g     • desvenlafaxine (PRISTIQ) 100 MG 24 hr tablet TAKE 1 TABLET DAILY 90 tablet 3   • fenofibrate (TRICOR) 145 MG tablet TAKE 1 TABLET DAILY 90 tablet 3   • guaiFENesin (Mucinex) 600 MG 12 hr tablet Take 1 tablet by mouth 2 (Two) Times a Day. 20 tablet 0   • HYDROcodone-acetaminophen (NORCO)  MG per tablet Take 1 tablet by mouth Every 6 (Six) Hours As Needed for Moderate Pain .     • levothyroxine (Synthroid) 50 MCG tablet Take 1 tablet by mouth Daily. 90 tablet 3   • losartan (Cozaar) 25 MG tablet Take 1 tablet by mouth Daily. 90 tablet 3   • Magnesium 500 MG capsule Take 1 capsule by mouth Daily.     •  metoprolol succinate XL (TOPROL-XL) 25 MG 24 hr tablet TAKE 1 TABLET DAILY 90 tablet 3   • omeprazole (priLOSEC) 40 MG capsule Take 40 mg by mouth Daily.     • promethazine (PHENERGAN) 25 MG tablet TAKE 1 TABLET EVERY 6 HOURSAS NEEDED FOR NAUSEA OR    VOMITING 180 tablet 0   • rOPINIRole (REQUIP) 0.25 MG tablet TAKE 1 TABLET 3 TIMES A DAY (Patient taking differently: 3 (Three) Times a Day As Needed.) 270 tablet 5   • traZODone (DESYREL) 50 MG tablet TAKE 1 TO 2 TABLETS AT     BEDTIME 180 tablet 1   • [DISCONTINUED] azithromycin (Zithromax Z-Vasile) 250 MG tablet Take 2 tablets by mouth on day 1, then 1 tablet daily on days 2-5 6 tablet 0   • [DISCONTINUED] hydrOXYzine (ATARAX) 10 MG tablet Take 1 tablet by mouth 3 (Three) Times a Day As Needed for Anxiety. 60 tablet 1     No current facility-administered medications on file prior to visit.       15 minutes  Follow Up   No follow-ups on file.        meds as directed, follow up if worsen, patient agrees with plan of action -continue mucinex as directed, add flonase and levaquin, follow up sooner if needed

## 2022-01-24 RX ORDER — ACYCLOVIR 400 MG/1
TABLET ORAL
Qty: 90 TABLET | Refills: 3 | Status: SHIPPED | OUTPATIENT
Start: 2022-01-24 | End: 2022-02-02

## 2022-01-24 NOTE — TELEPHONE ENCOUNTER
Incoming Refill Request      Medication requested (name and dose): ACYCLOVIR     Pharmacy where request should be sent: [facility]     Additional details provided by patient:     Best call back number:     Does the patient have less than a 3 day supply:  [] Yes  [] No    Esperanza Rosen Rep  01/24/22, 10:30 CST

## 2022-02-02 RX ORDER — DESVENLAFAXINE 100 MG/1
100 TABLET, EXTENDED RELEASE ORAL DAILY
COMMUNITY
End: 2022-07-05

## 2022-02-02 RX ORDER — AZELASTINE 1 MG/ML
2 SPRAY, METERED NASAL 2 TIMES DAILY PRN
COMMUNITY
End: 2022-06-03

## 2022-02-02 RX ORDER — FLUTICASONE PROPIONATE 50 MCG
2 SPRAY, SUSPENSION (ML) NASAL DAILY PRN
COMMUNITY
End: 2022-03-15

## 2022-02-02 RX ORDER — METOPROLOL SUCCINATE 25 MG/1
25 TABLET, EXTENDED RELEASE ORAL DAILY
COMMUNITY
End: 2022-08-23

## 2022-02-02 RX ORDER — ROPINIROLE 0.25 MG/1
0.25 TABLET, FILM COATED ORAL 3 TIMES DAILY PRN
COMMUNITY
End: 2022-06-06

## 2022-02-02 RX ORDER — TRAZODONE HYDROCHLORIDE 50 MG/1
50 TABLET ORAL NIGHTLY
COMMUNITY
End: 2022-04-12

## 2022-02-02 RX ORDER — HYDROXYZINE HYDROCHLORIDE 10 MG/1
10 TABLET, FILM COATED ORAL 3 TIMES DAILY PRN
COMMUNITY
End: 2022-06-03

## 2022-02-02 RX ORDER — GUAIFENESIN 600 MG/1
1200 TABLET, EXTENDED RELEASE ORAL 2 TIMES DAILY PRN
COMMUNITY
End: 2022-06-03

## 2022-02-02 RX ORDER — FENOFIBRATE 145 MG/1
145 TABLET, COATED ORAL DAILY
COMMUNITY
End: 2022-06-08

## 2022-02-02 RX ORDER — ACYCLOVIR 400 MG/1
400 TABLET ORAL 3 TIMES DAILY PRN
COMMUNITY

## 2022-02-02 RX ORDER — PROMETHAZINE HYDROCHLORIDE 25 MG/1
25 TABLET ORAL EVERY 6 HOURS PRN
COMMUNITY
End: 2022-04-12

## 2022-02-07 PROCEDURE — 87635 SARS-COV-2 COVID-19 AMP PRB: CPT | Performed by: FAMILY MEDICINE

## 2022-02-18 ENCOUNTER — ANESTHESIA (OUTPATIENT)
Dept: PERIOP | Facility: HOSPITAL | Age: 77
End: 2022-02-18

## 2022-02-18 ENCOUNTER — ANESTHESIA EVENT (OUTPATIENT)
Dept: PERIOP | Facility: HOSPITAL | Age: 77
End: 2022-02-18

## 2022-02-18 ENCOUNTER — HOSPITAL ENCOUNTER (OUTPATIENT)
Facility: HOSPITAL | Age: 77
Setting detail: HOSPITAL OUTPATIENT SURGERY
Discharge: HOME | End: 2022-02-18
Attending: OPHTHALMOLOGY | Admitting: OPHTHALMOLOGY

## 2022-02-18 VITALS
WEIGHT: 111.11 LBS | TEMPERATURE: 98.5 F | BODY MASS INDEX: 20.98 KG/M2 | DIASTOLIC BLOOD PRESSURE: 63 MMHG | RESPIRATION RATE: 18 BRPM | HEART RATE: 64 BPM | SYSTOLIC BLOOD PRESSURE: 142 MMHG | OXYGEN SATURATION: 94 % | HEIGHT: 61 IN

## 2022-02-18 PROCEDURE — 25010000002 EPINEPHRINE PER 0.1 MG: Performed by: OPHTHALMOLOGY

## 2022-02-18 PROCEDURE — 25010000002 FENTANYL CITRATE (PF) 50 MCG/ML SOLUTION: Performed by: NURSE ANESTHETIST, CERTIFIED REGISTERED

## 2022-02-18 PROCEDURE — 25010000002 MIDAZOLAM PER 1 MG: Performed by: NURSE ANESTHETIST, CERTIFIED REGISTERED

## 2022-02-18 PROCEDURE — 25010000002 VANCOMYCIN 1 G RECONSTITUTED SOLUTION 1 EACH VIAL: Performed by: OPHTHALMOLOGY

## 2022-02-18 PROCEDURE — V2632 POST CHMBR INTRAOCULAR LENS: HCPCS | Performed by: OPHTHALMOLOGY

## 2022-02-18 DEVICE — LENS ACRYSOF IQ 6X13MM SN60WF 19.5: Type: IMPLANTABLE DEVICE | Site: EYE | Status: FUNCTIONAL

## 2022-02-18 RX ORDER — BRIMONIDINE TARTRATE 0.15 %
DROPS OPHTHALMIC (EYE) AS NEEDED
Status: DISCONTINUED | OUTPATIENT
Start: 2022-02-18 | End: 2022-02-18 | Stop reason: HOSPADM

## 2022-02-18 RX ORDER — PREDNISOLONE ACETATE 10 MG/ML
SUSPENSION/ DROPS OPHTHALMIC AS NEEDED
Status: DISCONTINUED | OUTPATIENT
Start: 2022-02-18 | End: 2022-02-18 | Stop reason: HOSPADM

## 2022-02-18 RX ORDER — MIDAZOLAM HYDROCHLORIDE 1 MG/ML
INJECTION INTRAMUSCULAR; INTRAVENOUS AS NEEDED
Status: DISCONTINUED | OUTPATIENT
Start: 2022-02-18 | End: 2022-02-18 | Stop reason: SURG

## 2022-02-18 RX ORDER — PHENYLEPHRINE HCL 2.5 %
1 DROPS OPHTHALMIC (EYE)
Status: COMPLETED | OUTPATIENT
Start: 2022-02-18 | End: 2022-02-18

## 2022-02-18 RX ORDER — SODIUM CHLORIDE 0.9 % (FLUSH) 0.9 %
10 SYRINGE (ML) INJECTION AS NEEDED
Status: DISCONTINUED | OUTPATIENT
Start: 2022-02-18 | End: 2022-02-18 | Stop reason: HOSPADM

## 2022-02-18 RX ORDER — FENTANYL CITRATE 50 UG/ML
INJECTION, SOLUTION INTRAMUSCULAR; INTRAVENOUS AS NEEDED
Status: DISCONTINUED | OUTPATIENT
Start: 2022-02-18 | End: 2022-02-18 | Stop reason: SURG

## 2022-02-18 RX ORDER — TETRACAINE HYDROCHLORIDE 5 MG/ML
SOLUTION OPHTHALMIC AS NEEDED
Status: DISCONTINUED | OUTPATIENT
Start: 2022-02-18 | End: 2022-02-18 | Stop reason: HOSPADM

## 2022-02-18 RX ORDER — MOXIFLOXACIN 5 MG/ML
SOLUTION/ DROPS OPHTHALMIC AS NEEDED
Status: DISCONTINUED | OUTPATIENT
Start: 2022-02-18 | End: 2022-02-18 | Stop reason: HOSPADM

## 2022-02-18 RX ORDER — TETRACAINE HYDROCHLORIDE 5 MG/ML
1 SOLUTION OPHTHALMIC
Status: COMPLETED | OUTPATIENT
Start: 2022-02-18 | End: 2022-02-18

## 2022-02-18 RX ORDER — CYCLOPENTOLATE HYDROCHLORIDE 20 MG/ML
1 SOLUTION/ DROPS OPHTHALMIC
Status: COMPLETED | OUTPATIENT
Start: 2022-02-18 | End: 2022-02-18

## 2022-02-18 RX ADMIN — CYCLOPENTOLATE HYDROCHLORIDE 1 DROP: 20 SOLUTION/ DROPS OPHTHALMIC at 11:32

## 2022-02-18 RX ADMIN — CYCLOPENTOLATE HYDROCHLORIDE 1 DROP: 20 SOLUTION/ DROPS OPHTHALMIC at 11:17

## 2022-02-18 RX ADMIN — FENTANYL CITRATE 50 MCG: 50 INJECTION INTRAMUSCULAR; INTRAVENOUS at 12:32

## 2022-02-18 RX ADMIN — TETRACAINE HYDROCHLORIDE 1 DROP: 5 SOLUTION OPHTHALMIC at 11:32

## 2022-02-18 RX ADMIN — TETRACAINE HYDROCHLORIDE 1 DROP: 5 SOLUTION OPHTHALMIC at 11:07

## 2022-02-18 RX ADMIN — PHENYLEPHRINE HYDROCHLORIDE 1 DROP: 25 SOLUTION/ DROPS OPHTHALMIC at 11:07

## 2022-02-18 RX ADMIN — CYCLOPENTOLATE HYDROCHLORIDE 1 DROP: 20 SOLUTION/ DROPS OPHTHALMIC at 11:07

## 2022-02-18 RX ADMIN — PHENYLEPHRINE HYDROCHLORIDE 1 DROP: 25 SOLUTION/ DROPS OPHTHALMIC at 11:17

## 2022-02-18 RX ADMIN — SODIUM CHLORIDE, PRESERVATIVE FREE 10 ML: 5 INJECTION INTRAVENOUS at 11:12

## 2022-02-18 RX ADMIN — MIDAZOLAM HYDROCHLORIDE 1 MG: 1 INJECTION, SOLUTION INTRAMUSCULAR; INTRAVENOUS at 12:30

## 2022-02-18 RX ADMIN — PHENYLEPHRINE HYDROCHLORIDE 1 DROP: 25 SOLUTION/ DROPS OPHTHALMIC at 11:32

## 2022-02-18 RX ADMIN — FENTANYL CITRATE 50 MCG: 50 INJECTION INTRAMUSCULAR; INTRAVENOUS at 12:30

## 2022-02-18 NOTE — ANESTHESIA POSTPROCEDURE EVALUATION
Patient: Alva Mills    Procedure Summary     Date: 02/18/22 Room / Location: [facility] OR 06 / [facility] OR    Anesthesia Start: 1229 Anesthesia Stop: 1241    Procedure: REMOVE CATARACT AND IMPLANT   INTRAOCULAR LENS (Right Eye) Diagnosis:       Age-related nuclear cataract of right eye      (Age-related nuclear cataract of right eye [H25.11])    Surgeons: Dani Bonner MD Provider: Lesvia Montgomery CRNA    Anesthesia Type: MAC ASA Status: 3          Anesthesia Type: MAC    Vitals  No vitals data found for the desired time range.          Post Anesthesia Care and Evaluation    Patient location during evaluation: PACU  Patient participation: complete - patient participated  Level of consciousness: awake and alert  Pain score: 0  Pain management: adequate  Airway patency: patent  Anesthetic complications: No anesthetic complications  PONV Status: none  Cardiovascular status: acceptable and hemodynamically stable  Respiratory status: acceptable  Hydration status: acceptable  No anesthesia care post op

## 2022-02-18 NOTE — ANESTHESIA PREPROCEDURE EVALUATION
Anesthesia Evaluation     Patient summary reviewed and Nursing notes reviewed   no history of anesthetic complications:  NPO Solid Status: > 8 hours  NPO Liquid Status: > 4 hours           Airway   Mallampati: II  TM distance: >3 FB  Neck ROM: full  No difficulty expected  Dental - normal exam   (+) upper dentures        Pulmonary - normal exam    breath sounds clear to auscultation  (+) a smoker Former,   (-) asthma, shortness of breath, recent URI, rhonchi, decreased breath sounds, wheezes  Cardiovascular - normal exam  Exercise tolerance: good (4-7 METS)    Patient on routine beta blocker and Beta blocker given within 24 hours of surgery  Rhythm: regular  Rate: normal    (+) hypertension well controlled less than 2 medications, hyperlipidemia,   (-) valvular problems/murmurs, past MI, dysrhythmias, murmur, cardiac stents, DVT      Neuro/Psych  (+) psychiatric history Anxiety and Depression,    (-) seizures, TIA, CVA  GI/Hepatic/Renal/Endo    (+)  GERD well controlled,  thyroid problem hypothyroidism  (-)  obesity, diabetes    Musculoskeletal     Abdominal  - normal exam   Substance History - negative use     OB/GYN negative ob/gyn ROS         Other   arthritis,      ROS/Med Hx Other: Hx of GERD controlled on daily omeprazole    Multiple ERCP's due to continual stone production      Phys Exam Other: Previous ETT:  Successful airway: ETT  Cuffed: yes   Successful intubation technique: direct laryngoscopy  Facilitating devices/methods: intubating stylet  Endotracheal tube insertion site: oral  Blade: Sina  Blade size: 3  ETT size (mm): 7.0  Cormack-Lehane Classification: grade I - full view of glottis  Placement verified by: chest auscultation and capnometry   Measured from: teeth  ETT/EBT  to teeth (cm): 22  Number of attempts at approach: 1  Assessment: lips, teeth, and gum same as pre-op and atraumatic intubation                  Anesthesia Plan    ASA 3     MAC   (Allergic to morphine & hydromorphone  (disoriented per pt/headaches)  1st eye)  intravenous induction     Anesthetic plan, all risks, benefits, and alternatives have been provided, discussed and informed consent has been obtained with: patient.    Plan discussed with CRNA.

## 2022-02-28 ENCOUNTER — TELEPHONE (OUTPATIENT)
Dept: FAMILY MEDICINE CLINIC | Facility: CLINIC | Age: 77
End: 2022-02-28

## 2022-02-28 DIAGNOSIS — R30.0 DYSURIA: Primary | ICD-10-CM

## 2022-03-01 ENCOUNTER — TELEPHONE (OUTPATIENT)
Dept: FAMILY MEDICINE CLINIC | Facility: CLINIC | Age: 77
End: 2022-03-01

## 2022-03-01 ENCOUNTER — LAB (OUTPATIENT)
Dept: LAB | Facility: HOSPITAL | Age: 77
End: 2022-03-01

## 2022-03-01 LAB
BACTERIA UR QL AUTO: ABNORMAL /HPF
BILIRUB UR QL STRIP: NEGATIVE
CLARITY UR: CLEAR
COLOR UR: YELLOW
GLUCOSE UR STRIP-MCNC: NEGATIVE MG/DL
HGB UR QL STRIP.AUTO: NEGATIVE
HYALINE CASTS UR QL AUTO: ABNORMAL /LPF
KETONES UR QL STRIP: NEGATIVE
LEUKOCYTE ESTERASE UR QL STRIP.AUTO: ABNORMAL
NITRITE UR QL STRIP: NEGATIVE
PH UR STRIP.AUTO: 5.5 [PH] (ref 5–9)
PROT UR QL STRIP: NEGATIVE
RBC # UR STRIP: ABNORMAL /HPF
REF LAB TEST METHOD: ABNORMAL
SP GR UR STRIP: 1.02 (ref 1–1.03)
SQUAMOUS #/AREA URNS HPF: ABNORMAL /HPF
UROBILINOGEN UR QL STRIP: ABNORMAL
WBC # UR STRIP: ABNORMAL /HPF

## 2022-03-01 PROCEDURE — 81001 URINALYSIS AUTO W/SCOPE: CPT | Performed by: NURSE PRACTITIONER

## 2022-03-01 RX ORDER — CIPROFLOXACIN 500 MG/1
500 TABLET, FILM COATED ORAL 2 TIMES DAILY
Qty: 10 TABLET | Refills: 0 | Status: SHIPPED | OUTPATIENT
Start: 2022-03-01 | End: 2022-04-27

## 2022-03-02 NOTE — TELEPHONE ENCOUNTER
Per DOMI Mac, Ms. Mills has been called with recent lab results & recommendations.  Continue current medications and follow-up as planned or sooner if any problems.   Script sent to [place] Estefania Chong APRN Dickerson, Lori A, LPN  Can you let her know only a slight uti and send cipro 500 mg bid x5 days       ----- Message from DOMI Chisholm sent at 3/1/2022  2:06 PM CST -----  Regarding: FW:  Can you let her know only a slight uti and send cipro 500 mg bid x5 days  ----- Message -----  From: Lab, Background User  Sent: 3/1/2022  11:35 AM CST  To: DOMI Chisholm

## 2022-03-07 ENCOUNTER — LAB (OUTPATIENT)
Dept: LAB | Facility: HOSPITAL | Age: 77
End: 2022-03-07

## 2022-03-07 ENCOUNTER — OFFICE VISIT (OUTPATIENT)
Dept: FAMILY MEDICINE CLINIC | Facility: CLINIC | Age: 77
End: 2022-03-07

## 2022-03-07 VITALS
HEIGHT: 61 IN | HEART RATE: 79 BPM | WEIGHT: 113 LBS | BODY MASS INDEX: 21.34 KG/M2 | DIASTOLIC BLOOD PRESSURE: 80 MMHG | OXYGEN SATURATION: 98 % | SYSTOLIC BLOOD PRESSURE: 120 MMHG

## 2022-03-07 DIAGNOSIS — M25.561 ACUTE PAIN OF RIGHT KNEE: ICD-10-CM

## 2022-03-07 DIAGNOSIS — Z01.818 PRE-OPERATIVE CLEARANCE: Primary | ICD-10-CM

## 2022-03-07 LAB
ALBUMIN SERPL-MCNC: 4 G/DL (ref 3.5–5.2)
ALBUMIN/GLOB SERPL: 1.5 G/DL
ALP SERPL-CCNC: 42 U/L (ref 39–117)
ALT SERPL W P-5'-P-CCNC: 19 U/L (ref 1–33)
ANION GAP SERPL CALCULATED.3IONS-SCNC: 9.6 MMOL/L (ref 5–15)
AST SERPL-CCNC: 24 U/L (ref 1–32)
BASOPHILS # BLD AUTO: 0.05 10*3/MM3 (ref 0–0.2)
BASOPHILS NFR BLD AUTO: 0.7 % (ref 0–1.5)
BILIRUB SERPL-MCNC: 0.2 MG/DL (ref 0–1.2)
BUN SERPL-MCNC: 21 MG/DL (ref 8–23)
BUN/CREAT SERPL: 30.9 (ref 7–25)
CALCIUM SPEC-SCNC: 9.4 MG/DL (ref 8.6–10.5)
CHLORIDE SERPL-SCNC: 101 MMOL/L (ref 98–107)
CO2 SERPL-SCNC: 25.4 MMOL/L (ref 22–29)
CREAT SERPL-MCNC: 0.68 MG/DL (ref 0.57–1)
DEPRECATED RDW RBC AUTO: 40.2 FL (ref 37–54)
EGFRCR SERPLBLD CKD-EPI 2021: 91 ML/MIN/1.73
EOSINOPHIL # BLD AUTO: 0.1 10*3/MM3 (ref 0–0.4)
EOSINOPHIL NFR BLD AUTO: 1.3 % (ref 0.3–6.2)
ERYTHROCYTE [DISTWIDTH] IN BLOOD BY AUTOMATED COUNT: 11.9 % (ref 12.3–15.4)
GLOBULIN UR ELPH-MCNC: 2.6 GM/DL
GLUCOSE SERPL-MCNC: 89 MG/DL (ref 65–99)
HCT VFR BLD AUTO: 36.4 % (ref 34–46.6)
HGB BLD-MCNC: 12 G/DL (ref 12–15.9)
IMM GRANULOCYTES # BLD AUTO: 0.03 10*3/MM3 (ref 0–0.05)
IMM GRANULOCYTES NFR BLD AUTO: 0.4 % (ref 0–0.5)
IRON 24H UR-MRATE: 59 MCG/DL (ref 37–145)
LYMPHOCYTES # BLD AUTO: 1.18 10*3/MM3 (ref 0.7–3.1)
LYMPHOCYTES NFR BLD AUTO: 15.5 % (ref 19.6–45.3)
MCH RBC QN AUTO: 30.7 PG (ref 26.6–33)
MCHC RBC AUTO-ENTMCNC: 33 G/DL (ref 31.5–35.7)
MCV RBC AUTO: 93.1 FL (ref 79–97)
MONOCYTES # BLD AUTO: 0.74 10*3/MM3 (ref 0.1–0.9)
MONOCYTES NFR BLD AUTO: 9.7 % (ref 5–12)
NEUTROPHILS NFR BLD AUTO: 5.51 10*3/MM3 (ref 1.7–7)
NEUTROPHILS NFR BLD AUTO: 72.4 % (ref 42.7–76)
NRBC BLD AUTO-RTO: 0 /100 WBC (ref 0–0.2)
PLATELET # BLD AUTO: 376 10*3/MM3 (ref 140–450)
PMV BLD AUTO: 9.6 FL (ref 6–12)
POTASSIUM SERPL-SCNC: 4.3 MMOL/L (ref 3.5–5.2)
PROT SERPL-MCNC: 6.6 G/DL (ref 6–8.5)
RBC # BLD AUTO: 3.91 10*6/MM3 (ref 3.77–5.28)
SODIUM SERPL-SCNC: 136 MMOL/L (ref 136–145)
TSH SERPL DL<=0.05 MIU/L-ACNC: 1.94 UIU/ML (ref 0.27–4.2)
WBC NRBC COR # BLD: 7.61 10*3/MM3 (ref 3.4–10.8)

## 2022-03-07 PROCEDURE — 80053 COMPREHEN METABOLIC PANEL: CPT | Performed by: NURSE PRACTITIONER

## 2022-03-07 PROCEDURE — 93005 ELECTROCARDIOGRAM TRACING: CPT | Performed by: NURSE PRACTITIONER

## 2022-03-07 PROCEDURE — 36415 COLL VENOUS BLD VENIPUNCTURE: CPT | Performed by: NURSE PRACTITIONER

## 2022-03-07 PROCEDURE — 99214 OFFICE O/P EST MOD 30 MIN: CPT | Performed by: NURSE PRACTITIONER

## 2022-03-07 PROCEDURE — 84443 ASSAY THYROID STIM HORMONE: CPT | Performed by: NURSE PRACTITIONER

## 2022-03-07 PROCEDURE — 83540 ASSAY OF IRON: CPT | Performed by: NURSE PRACTITIONER

## 2022-03-07 PROCEDURE — 93010 ELECTROCARDIOGRAM REPORT: CPT | Performed by: INTERNAL MEDICINE

## 2022-03-07 PROCEDURE — 85025 COMPLETE CBC W/AUTO DIFF WBC: CPT | Performed by: NURSE PRACTITIONER

## 2022-03-07 NOTE — PROGRESS NOTES
"  Chief Complaint   Patient presents with   • Right total knee surgery     Dr Obregon in [facility]   Alva Donna Mills is a 76 y.o. female.           Presents with needing clearance for right total knee replacement - pre op clearance visit        The following portions of the patient's history were reviewed and updated as appropriate: allergies, current medications, past social history and problem list.    Review of Systems   Constitutional: Positive for activity change and fatigue.   HENT: Negative for congestion, rhinorrhea, sinus pain, sneezing and sore throat.    Eyes: Negative.  Negative for redness and visual disturbance.   Respiratory: Negative for apnea, cough, choking and chest tightness.    Cardiovascular: Negative.  Negative for chest pain, palpitations and leg swelling.   Gastrointestinal: Negative.  Negative for abdominal pain, diarrhea and vomiting.   Endocrine: Negative.    Genitourinary: Negative.    Musculoskeletal: Positive for arthralgias, back pain, gait problem and joint swelling.        Right knee pain and pressure-chronic-recent steroid injections and it did not help    Skin: Negative for rash.   Allergic/Immunologic: Negative.    Hematological: Negative.    Psychiatric/Behavioral: Negative.        Objective   /80   Pulse 79   Ht 154.9 cm (61\")   Wt 51.3 kg (113 lb)   SpO2 98%   BMI 21.35 kg/m²   Physical Exam  Vitals and nursing note reviewed.   Constitutional:       Appearance: Normal appearance.   HENT:      Head: Normocephalic.      Nose: No congestion or rhinorrhea.      Mouth/Throat:      Pharynx: No oropharyngeal exudate.   Eyes:      General: No scleral icterus.        Right eye: No discharge.         Left eye: No discharge.      Pupils: Pupils are equal, round, and reactive to light.   Neck:      Vascular: No carotid bruit.   Cardiovascular:      Rate and Rhythm: Normal rate.      Pulses: Normal pulses.      Heart sounds: No murmur heard.    No friction " rub. No gallop.   Pulmonary:      Effort: Pulmonary effort is normal. No respiratory distress.      Breath sounds: No stridor. No wheezing, rhonchi or rales.   Chest:      Chest wall: No tenderness.   Abdominal:      General: Abdomen is flat. There is no distension.      Palpations: There is no mass.      Tenderness: There is no abdominal tenderness. There is no guarding or rebound.      Hernia: No hernia is present.   Musculoskeletal:         General: Tenderness present. Normal range of motion.      Cervical back: Normal range of motion. No rigidity or tenderness.        Legs:       Comments: Right knee pain with flexion anterior pain with movement    Lymphadenopathy:      Cervical: No cervical adenopathy.   Skin:     General: Skin is warm and dry.   Neurological:      General: No focal deficit present.      Mental Status: She is alert and oriented to person, place, and time.   Psychiatric:         Mood and Affect: Mood normal.         Behavior: Behavior normal.              Assessment/Plan     Problems Addressed this Visit    None     Visit Diagnoses     Acute pain of right knee    -  Primary    Relevant Orders    ECG 12 Lead (Completed)    CBC & Differential    Comprehensive Metabolic Panel    Iron    TSH    Pre-operative clearance        Relevant Orders    XR Chest PA & Lateral    CBC & Differential    Comprehensive Metabolic Panel    Iron    TSH      Diagnoses       Codes Comments    Acute pain of right knee    -  Primary ICD-10-CM: M25.561  ICD-9-CM: 719.46     Pre-operative clearance     ICD-10-CM: Z01.818  ICD-9-CM: V72.84          No orders of the defined types were placed in this encounter.    Current Outpatient Medications on File Prior to Visit   Medication Sig Dispense Refill   • acyclovir (ZOVIRAX) 400 MG tablet Take 400 mg by mouth 3 (Three) Times a Day As Needed (for outbreak). Take no more than 5 doses a day.     • azelastine (ASTELIN) 0.1 % nasal spray 2 sprays into the nostril(s) as directed by  provider 2 (Two) Times a Day As Needed for Rhinitis. Use in each nostril as directed     • CALCIUM PO Take 1 tablet by mouth Daily With Breakfast. 500g     • desvenlafaxine (PRISTIQ) 100 MG 24 hr tablet Take 100 mg by mouth Daily.     • fenofibrate (TRICOR) 145 MG tablet Take 145 mg by mouth Daily.     • fluticasone (FLONASE) 50 MCG/ACT nasal spray 2 sprays into the nostril(s) as directed by provider Daily As Needed for Rhinitis.     • guaiFENesin (MUCINEX) 600 MG 12 hr tablet Take 1,200 mg by mouth 2 (Two) Times a Day As Needed for Cough.     • HYDROcodone-acetaminophen (NORCO)  MG per tablet Take 1 tablet by mouth Every 6 (Six) Hours As Needed for Moderate Pain .     • hydrOXYzine (ATARAX) 10 MG tablet Take 10 mg by mouth 3 (Three) Times a Day As Needed for Anxiety.     • levothyroxine (Synthroid) 50 MCG tablet Take 1 tablet by mouth Daily. 90 tablet 3   • loratadine (Claritin) 10 MG tablet Take 1 tablet by mouth Daily. 30 tablet 2   • losartan (Cozaar) 25 MG tablet Take 1 tablet by mouth Daily. 90 tablet 3   • Magnesium 500 MG capsule Take 1 capsule by mouth Daily.     • metoprolol succinate XL (TOPROL-XL) 25 MG 24 hr tablet Take 25 mg by mouth Daily.     • omeprazole (priLOSEC) 40 MG capsule Take 40 mg by mouth Daily.     • promethazine (PHENERGAN) 25 MG tablet Take 25 mg by mouth Every 6 (Six) Hours As Needed for Nausea or Vomiting.     • rOPINIRole (REQUIP) 0.25 MG tablet Take 0.25 mg by mouth 3 (Three) Times a Day As Needed. Take 1 hour before bedtime.     • traZODone (DESYREL) 50 MG tablet Take 50 mg by mouth Every Night. 1-2 tabs as needed     • ciprofloxacin (Cipro) 500 MG tablet Take 1 tablet by mouth 2 (Two) Times a Day. 10 tablet 0     No current facility-administered medications on file prior to visit.        Follow Up   No follow-ups on file.     Ekg, chest xray, labs as directed   will notify once everything back=optimal health at this time for surgery =patient agrees -clearance given

## 2022-03-08 ENCOUNTER — TELEPHONE (OUTPATIENT)
Dept: FAMILY MEDICINE CLINIC | Facility: CLINIC | Age: 77
End: 2022-03-08

## 2022-03-08 NOTE — TELEPHONE ENCOUNTER
Per DOMI Mac, Ms. Mills has been called with recent lab results & recommendations.  Continue current medications and follow-up as planned or sooner if any problems.       ----- Message from DOMI Chisholm sent at 3/8/2022  7:56 AM CST -----  Regarding: FW:  Can you let her know her labs are good and I will send her clearance for surgery tomorrow  ----- Message -----  From: Lab, Background User  Sent: 3/7/2022   6:51 PM CST  To: DOMI Chisholm

## 2022-03-14 LAB
QT INTERVAL: 382 MS
QTC INTERVAL: 412 MS

## 2022-03-15 RX ORDER — FLUTICASONE PROPIONATE 50 MCG
SPRAY, SUSPENSION (ML) NASAL
Qty: 16 G | Refills: 11 | Status: SHIPPED | OUTPATIENT
Start: 2022-03-15 | End: 2022-06-03

## 2022-03-24 ENCOUNTER — TELEPHONE (OUTPATIENT)
Dept: FAMILY MEDICINE CLINIC | Facility: CLINIC | Age: 77
End: 2022-03-24

## 2022-03-24 NOTE — TELEPHONE ENCOUNTER
Radiology is needing a copy of Alva's most recent EKG faxed to [phone].  Said, Anesthesia is waiting for this.

## 2022-03-25 ENCOUNTER — HOME HEALTH ADMISSION (OUTPATIENT)
Dept: HOME HEALTH SERVICES | Facility: HOME HEALTHCARE | Age: 77
End: 2022-03-25

## 2022-04-11 ENCOUNTER — TRANSCRIBE ORDERS (OUTPATIENT)
Dept: PHYSICAL THERAPY | Facility: HOSPITAL | Age: 77
End: 2022-04-11

## 2022-04-11 DIAGNOSIS — Z96.651 HISTORY OF TOTAL KNEE ARTHROPLASTY, RIGHT: Primary | ICD-10-CM

## 2022-04-12 RX ORDER — TRAZODONE HYDROCHLORIDE 50 MG/1
TABLET ORAL
Qty: 180 TABLET | Refills: 1 | Status: SHIPPED | OUTPATIENT
Start: 2022-04-12 | End: 2022-04-18 | Stop reason: SDUPTHER

## 2022-04-12 RX ORDER — PROMETHAZINE HYDROCHLORIDE 25 MG/1
TABLET ORAL
Qty: 180 TABLET | Refills: 0 | Status: SHIPPED | OUTPATIENT
Start: 2022-04-12 | End: 2022-11-28

## 2022-04-18 ENCOUNTER — HOSPITAL ENCOUNTER (OUTPATIENT)
Dept: PHYSICAL THERAPY | Facility: HOSPITAL | Age: 77
Setting detail: THERAPIES SERIES
Discharge: HOME | End: 2022-04-18

## 2022-04-18 DIAGNOSIS — Z96.651 HISTORY OF TOTAL KNEE ARTHROPLASTY, RIGHT: Primary | ICD-10-CM

## 2022-04-18 PROCEDURE — 97162 PT EVAL MOD COMPLEX 30 MIN: CPT | Performed by: PHYSICAL THERAPIST

## 2022-04-18 RX ORDER — TRAZODONE HYDROCHLORIDE 50 MG/1
50-100 TABLET ORAL NIGHTLY
Qty: 180 TABLET | Refills: 1 | Status: SHIPPED | OUTPATIENT
Start: 2022-04-18 | End: 2022-09-06 | Stop reason: SDUPTHER

## 2022-04-18 NOTE — THERAPY EVALUATION
Outpatient Physical Therapy Ortho Initial Evaluation  [facility]     Patient Name: Alva Mills  : 1945  MRN: [id number]  Today's Date: 2022      Visit Date: 2022  Visit 11  Return to MD: AGGIE  Re-cert date: 22  Patient Active Problem List   Diagnosis   • Anxiety   • Depression   • Malaise and fatigue   • Acute upper respiratory infection   • Essential hypertension   • Soft tissue swelling   • Rash   • Cough   • Itching   • Toenail fungus   • Ventral hernia without obstruction or gangrene   • Nausea   • Pneumobilia   • Absolute anemia   • Eye irritation   • CVA tenderness   • Acute bilateral low back pain without sciatica   • Restless leg syndrome   • Obstruction of bile duct        Past Medical History:   Diagnosis Date   • Abdominal pain    • Acquired hypothyroidism    • Acute bronchitis    • Acute sinusitis    • Acute upper respiratory infection    • Benign hypertension    • Breast cyst    • Breast lump     history of, right   • Calf pain    • Common bile duct calculus    • Constipation    • Cough    • Depressive disorder    • Elevated cholesterol    • Epigastric pain    • External hemorrhoids without complication    • Fibrocystic breast    • Functional heart murmur    • Gastroesophageal reflux disease    • General medical exam    • Hemorrhoids    • Hyperlipidemia    • Incisional hernia     no evident recurrence at this juncture   • Ingrown toenail    • Localized, primary osteoarthritis of ankle or foot    • Muscle strain    • Skin cancer     basal cell on face   • Skin lesion         Past Surgical History:   Procedure Laterality Date   • APPENDECTOMY      done with hysterectomy   • BACK SURGERY      x 3   • BILE DUCT EXPLORATION  12/10/2013    Remove bile duct stone (1)    Common duct exploration, stone extraction, choledochoduodenostomy and choledochoscopy.    • BREAST BIOPSY Right     Stereotactic breast biopsy (1)    Right breast    • CATARACT EXTRACTION W/ INTRAOCULAR  LENS IMPLANT Right 2/18/2022    Procedure: REMOVE CATARACT AND IMPLANT   INTRAOCULAR LENS;  Surgeon: Dani Bonner MD;  Location: [facility] OR;  Service: Ophthalmology;  Laterality: Right;   • CHOLECYSTECTOMY OPEN  1989   • ERCP Left 1/10/2019    Procedure: ENDOSCOPIC RETROGRADE CHOLANGIOPANCREATOGRAPHY;  Surgeon: Homero Allen DO;  Location: [facility] ENDOSCOPY;  Service: Gastroenterology   • ERCP Left 3/14/2019    Procedure: ENDOSCOPIC RETROGRADE CHOLANGIOPANCREATOGRAPHY;  Surgeon: Homero Allen DO;  Location: [facility] ENDOSCOPY;  Service: Gastroenterology   • ERCP Left 9/10/2019    Procedure: ENDOSCOPIC RETROGRADE CHOLANGIOPANCREATOGRAPHY;  Surgeon: Homero Allen DO;  Location: [facility] ENDOSCOPY;  Service: Gastroenterology   • ERCP Left 3/12/2020    Procedure: ENDOSCOPIC RETROGRADE CHOLANGIOPANCREATOGRAPHY;  Surgeon: Homero Allen DO;  Location: [facility] ENDOSCOPY;  Service: Gastroenterology;  Laterality: Left;   • ERCP Left 8/10/2020    Procedure: ENDOSCOPIC RETROGRADE CHOLANGIOPANCREATOGRAPHY;  Surgeon: Homero Allen DO;  Location: [facility] ENDOSCOPY;  Service: Gastroenterology;  Laterality: Left;   • ERCP Left 1/19/2021    Procedure: ENDOSCOPIC RETROGRADE CHOLANGIOPANCREATOGRAPHY;  Surgeon: Homero Allen DO;  Location: [facility] ENDOSCOPY;  Service: Gastroenterology;  Laterality: Left;   • ERCP Left 12/2/2021    Procedure: ENDOSCOPIC RETROGRADE CHOLANGIOPANCREATOGRAPHY;  Surgeon: Homero Allen DO;  Location: [facility] ENDOSCOPY;  Service: Gastroenterology;  Laterality: Left;   • ERCP WITH STENT PLACEMENT N/A 6/17/2021    Procedure: ENDOSCOPIC RETROGRADE CHOLANGIOPANCREATOGRAPHY WITH STENT PLACEMENT;  Surgeon: Homero Allen DO;  Location: [facility] ENDOSCOPY;  Service: Gastroenterology;  Laterality: N/A;   • HERNIA REPAIR  06/16/2014    Anesth, repair of hernia (1)    laparoscopic ventral hernioplasty with mesh. Ventral hernia.    • HYSTERECTOMY  10/17/2001    Anesth, hysterectomy  (1)    Laparoscopic subtotal hysterectomy & BSO. Enterolysis of sigmoid colon.    • TUBAL ABDOMINAL LIGATION         Visit Dx:     ICD-10-CM ICD-9-CM   1. History of total knee arthroplasty, right  Z96.651 V43.65     Medications (Admitted on 4/18/2022)         acyclovir (ZOVIRAX) 400 MG tablet    azelastine (ASTELIN) 0.1 % nasal spray    CALCIUM PO    ciprofloxacin (Cipro) 500 MG tablet    desvenlafaxine (PRISTIQ) 100 MG 24 hr tablet    fenofibrate (TRICOR) 145 MG tablet    fluticasone (FLONASE) 50 MCG/ACT nasal spray    guaiFENesin (MUCINEX) 600 MG 12 hr tablet    HYDROcodone-acetaminophen (NORCO)  MG per tablet    hydrOXYzine (ATARAX) 10 MG tablet    levothyroxine (Synthroid) 50 MCG tablet    loratadine (Claritin) 10 MG tablet    losartan (Cozaar) 25 MG tablet    Magnesium 500 MG capsule    metoprolol succinate XL (TOPROL-XL) 25 MG 24 hr tablet    omeprazole (priLOSEC) 40 MG capsule    promethazine (PHENERGAN) 25 MG tablet    rOPINIRole (REQUIP) 0.25 MG tablet    traZODone (DESYREL) 50 MG tablet     Allergies        ClonazepamConfusion   HydromorphoneConfusion   MorphineHeadache   Percocet [Oxycodone-acetaminophen]Palpitations   AripiprazoleNausea And Vomiting       PT Ortho     Row Name 04/18/22 5721       Subjective Comments    Subjective Comments 77 yo female with elective R TKA on 4/1/22 with Dr. Obregon. No overnight hospital stay. Had home health PT for 2 weeks. Ambulating with a cane at this point long distances. , lives in [address] [place] home. Ramp access.  -BS       Precautions and Contraindications    Precautions/Limitations no known precautions/limitations  -BS       Subjective Pain    Able to rate subjective pain? yes  -BS    Pre-Treatment Pain Level 3  -BS    Post-Treatment Pain Level 4  -BS    Subjective Pain Comment R calf region 3/10, 2/10 R knee  -BS       Posture/Observations    Posture/Observations Comments ambulates without AD with non-antalgic gait pattern  -BS       General ROM     GENERAL ROM COMMENTS AROM: R knee 0-122 L knee 0-147  -BS       MMT (Manual Muscle Testing)    General MMT Comments R LE-hip flex 4+/5 hip abd 4/5 knee ext 4+/5 knee flex 5/5 ankle DF 5/5; LLE 5/5 except hip abd 4+/5  -BS       Sensation    Light Touch No apparent deficits  -BS       Balance Skills Training    SLS R 1-2 sec; L 2-3 sec  -BS          User Key  (r) = Recorded By, (t) = Taken By, (c) = Cosigned By    Initials Name Provider Type    Teodoro Hines, PT Physical Therapist                                   PT OP Goals     Row Name 04/18/22 134          PT Short Term Goals    STG Date to Achieve 05/09/22  -BS     STG 1 Improve R knee ext/R hip abduction MMT to 5/5  -BS     STG 1 Progress New  -BS     STG 2 Improve R SLS to >/=10 sec consistently  -BS     STG 2 Progress New  -BS     STG 3 Improve LEFS score to >/=49  -BS     STG 3 Progress New  -BS     STG 4 Reduce 5x's STS test to 14 sec or less  -BS     STG 4 Progress New  -BS            Time Calculation    PT Goal Re-Cert Due Date 05/09/22  -BS           User Key  (r) = Recorded By, (t) = Taken By, (c) = Cosigned By    Initials Name Provider Type    Teodoro Hines, PT Physical Therapist                 PT Assessment/Plan     Row Name 04/18/22 9565          PT Assessment    Functional Limitations Performance in work activities;Limitation in home management;Performance in leisure activities  -BS     Impairments Balance;Muscle strength;Pain;Impaired flexibility  -BS     Assessment Comments 77 yo female with R knee pain s/p R TKA on 4/1/22. Limited by R knee quad and hip abductor weakness, no R knee ROM restrictions, imbalance with single limb stance on the R>L LE and loss in function per LEFS findings.  -BS     Please refer to paper survey for additional self-reported information Yes  -BS     Rehab Potential Good  -BS     Patient/caregiver participated in establishment of treatment plan and goals Yes  -BS     Patient would benefit from skilled therapy  intervention Yes  -BS            PT Plan    PT Frequency 2x/week  -BS     Predicted Duration of Therapy Intervention (PT) 2-3 weeks  -BS     Planned CPT's? PT EVAL MOD COMPLELITY: 67004;PT THER PROC EA 15 MIN: 79957;PT RE-EVAL: 36500;PT THER ACT EA 15 MIN: 38219;PT MANUAL THERAPY EA 15 MIN: 35067;PT NEUROMUSC RE-EDUCATION EA 15 MIN: 92278;PT HOT OR COLD PACK TREAT MCARE;PT ELECTRICAL STIM UNATTEND: ;PT THER SUPP EA 15 MIN  -BS     Physical Therapy Interventions (Optional Details) balance training;gait training;home exercise program;joint mobilization;manual therapy techniques;modalities;neuromuscular re-education;patient/family education;ROM (Range of Motion);strengthening;stretching;stair training  -BS     PT Plan Comments Address standing balance-SLS on/off Airex, step ups. Address quad/hip abductor weakness-step ups, 553's.  -BS           User Key  (r) = Recorded By, (t) = Taken By, (c) = Cosigned By    Initials Name Provider Type    Teodoro Hines, PT Physical Therapist                   OP Exercises     Row Name 04/18/22 1344             Precautions    Existing Precautions/Restrictions no known precautions/restrictions  -BS              Subjective Comments    Subjective Comments 75 yo female with elective R TKA on 4/1/22 with Dr. Obregon. No overnight hospital stay. Had home health PT for 2 weeks. Ambulating with a cane at this point long distances. , lives in 1 [place] home. Ramp access.  -BS              Subjective Pain    Able to rate subjective pain? yes  -BS      Pre-Treatment Pain Level 3  -BS      Post-Treatment Pain Level 4  -BS      Subjective Pain Comment R calf region 3/10, 2/10 R knee  -BS              Exercise 1    Exercise Name 1 sit to stand, B UE A  -BS      Sets 1 1  -BS      Reps 1 5  -BS              Exercise 2    Exercise Name 2 SLS on R  -BS      Sets 2 1  -BS      Reps 2 2 trials  -BS      Time 2 1-2 sec  -BS              Exercise 3    Exercise Name 3 SLS on L  -BS      Reps 3  2-3 sec max  -BS      Time 3 2 trials  -BS              Exercise 4    Exercise Name 4 R SLR  -BS      Sets 4 2  -BS      Reps 4 5  -BS              Exercise 5    Exercise Name 5 SL R hip abduction AROM  -BS      Sets 5 1  -BS      Reps 5 10  -BS              Exercise 6    Exercise Name 6 MS  -BS      Sets 6 1  -BS      Reps 6 10  -BS              Exercise 7    Exercise Name 7 deferred ice  -BS            User Key  (r) = Recorded By, (t) = Taken By, (c) = Cosigned By    Initials Name Provider Type    Teodoro Hines, PT Physical Therapist                              Outcome Measure Options: Lower Extremity Functional Scale (LEFS), 5x Sit to Stand  5 Times Sit to Stand  5 Times Sit to Stand (seconds): 16.5 seconds  5 Times Sit to Stand Comments: B UE A  Lower Extremity Functional Index  Any of your usual work, housework or school activities: Moderate difficulty  Your usual hobbies, recreational or sporting activities: Extreme difficulty or unable to perform activity  Getting into or out of the bath: No difficulty  Walking between rooms: No difficulty  Putting on your shoes or socks: A little bit of difficulty  Squatting: Moderate difficulty  Lifting an object, like a bag of groceries from the floor: Moderate difficulty  Performing light activities around your home: A little bit of difficulty  Performing heavy activities around your home: Quite a bit of difficulty  Getting into or out of a car: No difficulty  Walking 2 blocks: Moderate difficulty  Walking a mile: Extreme difficulty or unable to perform activity  Going up or down 10 stairs (about 1 flight of stairs): Moderate difficulty  Standing for 1 hour: Moderate difficulty  Sitting for 1 hour: No difficulty  Running on even ground: Extreme difficulty or unable to perform activity  Running on uneven ground: Extreme difficulty or unable to perform activity  Making sharp turns while running fast: Extreme difficulty or unable to perform activity  Hopping: Extreme  difficulty or unable to perform activity  Rolling over in bed: No difficulty  Total: 39      Time Calculation:     Start Time: 1344  Stop Time: 1422  Time Calculation (min): 38 min  Total Timed Code Minutes- PT: 38 minute(s)     Therapy Charges for Today     Code Description Service Date Service Provider Modifiers Qty    [id number] HC PT EVAL MOD COMPLEXITY 3 4/18/2022 Teodoro Macdonald, PT GP 1          PT G-Codes  Outcome Measure Options: Lower Extremity Functional Scale (LEFS), 5x Sit to Stand  Total: 39         Teodoro Macdonald, PT  4/18/2022

## 2022-04-21 ENCOUNTER — HOSPITAL ENCOUNTER (OUTPATIENT)
Dept: PHYSICAL THERAPY | Facility: HOSPITAL | Age: 77
Setting detail: THERAPIES SERIES
Discharge: HOME | End: 2022-04-21

## 2022-04-21 DIAGNOSIS — Z96.651 HISTORY OF TOTAL KNEE ARTHROPLASTY, RIGHT: Primary | ICD-10-CM

## 2022-04-21 PROCEDURE — 97110 THERAPEUTIC EXERCISES: CPT

## 2022-04-21 PROCEDURE — G0283 ELEC STIM OTHER THAN WOUND: HCPCS

## 2022-04-21 NOTE — THERAPY TREATMENT NOTE
Outpatient Physical Therapy Ortho Treatment Note  [facility]     Patient Name: Alva Mills  : 1945  MRN: [id number]  Today's Date: 2022      Visit Date: 2022     Subjective Improvement not assessed this date  Visits 2/2  Visits approved 6 until 2022  RTMD 2 weeks  Recert Date 2022    S/P r TKA 2022  Post op 2 weeks 6 days    Visit Dx:    ICD-10-CM ICD-9-CM   1. History of total knee arthroplasty, right  Z96.651 V43.65       Patient Active Problem List   Diagnosis   • Anxiety   • Depression   • Malaise and fatigue   • Acute upper respiratory infection   • Essential hypertension   • Soft tissue swelling   • Rash   • Cough   • Itching   • Toenail fungus   • Ventral hernia without obstruction or gangrene   • Nausea   • Pneumobilia   • Absolute anemia   • Eye irritation   • CVA tenderness   • Acute bilateral low back pain without sciatica   • Restless leg syndrome   • Obstruction of bile duct        Past Medical History:   Diagnosis Date   • Abdominal pain    • Acquired hypothyroidism    • Acute bronchitis    • Acute sinusitis    • Acute upper respiratory infection    • Benign hypertension    • Breast cyst    • Breast lump     history of, right   • Calf pain    • Common bile duct calculus    • Constipation    • Cough    • Depressive disorder    • Elevated cholesterol    • Epigastric pain    • External hemorrhoids without complication    • Fibrocystic breast    • Functional heart murmur    • Gastroesophageal reflux disease    • General medical exam    • Hemorrhoids    • Hyperlipidemia    • Incisional hernia     no evident recurrence at this juncture   • Ingrown toenail    • Localized, primary osteoarthritis of ankle or foot    • Muscle strain    • Skin cancer     basal cell on face   • Skin lesion         Past Surgical History:   Procedure Laterality Date   • APPENDECTOMY      done with hysterectomy   • BACK SURGERY      x 3   • BILE DUCT EXPLORATION  12/10/2013    Remove  bile duct stone (1)    Common duct exploration, stone extraction, choledochoduodenostomy and choledochoscopy.    • BREAST BIOPSY Right 1991    Stereotactic breast biopsy (1)    Right breast    • CATARACT EXTRACTION W/ INTRAOCULAR LENS IMPLANT Right 2/18/2022    Procedure: REMOVE CATARACT AND IMPLANT   INTRAOCULAR LENS;  Surgeon: Dani Bonner MD;  Location: [facility] OR;  Service: Ophthalmology;  Laterality: Right;   • CHOLECYSTECTOMY OPEN  1989   • ERCP Left 1/10/2019    Procedure: ENDOSCOPIC RETROGRADE CHOLANGIOPANCREATOGRAPHY;  Surgeon: Homero Allen DO;  Location: [facility] ENDOSCOPY;  Service: Gastroenterology   • ERCP Left 3/14/2019    Procedure: ENDOSCOPIC RETROGRADE CHOLANGIOPANCREATOGRAPHY;  Surgeon: Homero Allen DO;  Location: [facility] ENDOSCOPY;  Service: Gastroenterology   • ERCP Left 9/10/2019    Procedure: ENDOSCOPIC RETROGRADE CHOLANGIOPANCREATOGRAPHY;  Surgeon: Homero Allen DO;  Location: [facility] ENDOSCOPY;  Service: Gastroenterology   • ERCP Left 3/12/2020    Procedure: ENDOSCOPIC RETROGRADE CHOLANGIOPANCREATOGRAPHY;  Surgeon: Homero Allen DO;  Location: [facility] ENDOSCOPY;  Service: Gastroenterology;  Laterality: Left;   • ERCP Left 8/10/2020    Procedure: ENDOSCOPIC RETROGRADE CHOLANGIOPANCREATOGRAPHY;  Surgeon: Homero Allen DO;  Location: [facility] ENDOSCOPY;  Service: Gastroenterology;  Laterality: Left;   • ERCP Left 1/19/2021    Procedure: ENDOSCOPIC RETROGRADE CHOLANGIOPANCREATOGRAPHY;  Surgeon: Homero Allen DO;  Location: [facility] ENDOSCOPY;  Service: Gastroenterology;  Laterality: Left;   • ERCP Left 12/2/2021    Procedure: ENDOSCOPIC RETROGRADE CHOLANGIOPANCREATOGRAPHY;  Surgeon: Homero Allen DO;  Location: [facility] ENDOSCOPY;  Service: Gastroenterology;  Laterality: Left;   • ERCP WITH STENT PLACEMENT N/A 6/17/2021    Procedure: ENDOSCOPIC RETROGRADE CHOLANGIOPANCREATOGRAPHY WITH STENT PLACEMENT;  Surgeon: Homero Allen DO;  Location: [facility]  "ENDOSCOPY;  Service: Gastroenterology;  Laterality: N/A;   • HERNIA REPAIR  06/16/2014    Anesth, repair of hernia (1)    laparoscopic ventral hernioplasty with mesh. Ventral hernia.    • HYSTERECTOMY  10/17/2001    Anesth, hysterectomy (1)    Laparoscopic subtotal hysterectomy & BSO. Enterolysis of sigmoid colon.    • TUBAL ABDOMINAL LIGATION          PT Ortho     Row Name 04/21/22 1400       Subjective Comments    Subjective Comments Patient states that she has pain the in lower leg  -CP       Precautions and Contraindications    Precautions R TKA 4-  -CP    Contraindications Post op2 weeks 6 days  -CP       Subjective Pain    Able to rate subjective pain? yes  -CP    Pre-Treatment Pain Level 3  -CP       Posture/Observations    Posture/Observations Comments Amb with SPC  -CP       General ROM    RT Lower Ext Rt Knee Extension/Flexion  -CP       Right Lower Ext    Rt Knee Extension/Flexion AROM 0-130 after ther ex  -CP          User Key  (r) = Recorded By, (t) = Taken By, (c) = Cosigned By    Initials Name Provider Type    CP Brandi Hammer PTA Physical Therapist Assistant                             PT Assessment/Plan     Row Name 04/21/22 1452          PT Assessment    Assessment Comments Very good tolerance to ther ex with increase knee fl of 130 after ther ex.  She did have a \"pocket\" of fluid in distal knee.  -CP            PT Plan    PT Frequency 2x/week  -CP     Predicted Duration of Therapy Intervention (PT) 2-3 weeks  -CP     PT Plan Comments Cont with POC.  side stepping on 4\"  -CP           User Key  (r) = Recorded By, (t) = Taken By, (c) = Cosigned By    Initials Name Provider Type    Brandi Duran PTA Physical Therapist Assistant                 Modalities     Row Name 04/21/22 1400             Ice    Ice Applied Yes  -CP      Location right knee with ifc  -CP      PT Ice Rx Minutes 15  -CP      Ice S/P Rx Yes  -CP              ELECTRICAL STIMULATION    Attended/Unattended Unattended " " -CP      Stimulation Type IFC  -CP      Location/Electrode Placement/Other right knee with ifc  -CP      PT E-Stim Unattended Minutes 15  -CP            User Key  (r) = Recorded By, (t) = Taken By, (c) = Cosigned By    Initials Name Provider Type    CP Brandi Hammer PTA Physical Therapist Assistant               OP Exercises     Row Name 04/21/22 1523 04/21/22 1400          Subjective Comments    Subjective Comments -- Patient states that she has pain the in lower leg  -CP            Subjective Pain    Able to rate subjective pain? -- yes  -CP     Pre-Treatment Pain Level -- 3  -CP     Post-Treatment Pain Level -- --  numb from ice  -CP            Total Minutes    56963 - PT Therapeutic Exercise Minutes 40  -CP --            Exercise 1    Exercise Name 1 -- Pro I level 2  -CP     Time 1 -- 10  -CP            Exercise 2    Exercise Name 2 -- Incline stretch  -CP     Cueing 2 -- Verbal  -CP     Sets 2 -- 3  -CP     Time 2 -- 30  -CP            Exercise 3    Exercise Name 3 -- CR/TR  -CP     Cueing 3 -- Verbal  -CP     Reps 3 -- 20  -CP            Exercise 4    Exercise Name 4 -- standing Marching B LE  -CP     Cueing 4 -- Verbal;Demo  -CP     Reps 4 -- 20  -CP     Time 4 -- B LE  -CP            Exercise 5    Exercise Name 5 -- step up 4\"  -CP     Cueing 5 -- Verbal;Demo  -CP     Reps 5 -- 20  -CP            Exercise 6    Exercise Name 6 -- side stepping at table  -CP     Cueing 6 -- Verbal;Demo  -CP     Reps 6 -- 6  -CP            Exercise 7    Exercise Name 7 -- heelslides with strap  -CP     Cueing 7 -- Verbal;Tactile  -CP     Reps 7 -- 20  -CP            Exercise 8    Exercise Name 8 -- see manual  -CP           User Key  (r) = Recorded By, (t) = Taken By, (c) = Cosigned By    Initials Name Provider Type    Brandi Duran PTA Physical Therapist Assistant                         Manual Rx (last 36 hours)     Manual Treatments     Row Name 04/21/22 1300             Manual Rx 1    Manual Rx 1 Location " right knee  -CP      Manual Rx 1 Type scar massage  -CP      Manual Rx 1 Duration 5  -CP              Manual Rx 2    Manual Rx 2 Location right knee  -CP      Manual Rx 2 Type edema massasge  -CP      Manual Rx 2 Duration 3  -CP            User Key  (r) = Recorded By, (t) = Taken By, (c) = Cosigned By    Initials Name Provider Type    CP Brandi Hammer PTA Physical Therapist Assistant                 PT OP Goals     Row Name 04/21/22 1400          PT Short Term Goals    STG Date to Achieve 05/09/22  -CP     STG 1 Improve R knee ext/R hip abduction MMT to 5/5  -CP     STG 1 Progress Progressing  -CP     STG 2 Improve R SLS to >/=10 sec consistently  -CP     STG 2 Progress Not Met  -CP     STG 3 Improve LEFS score to >/=49  -CP     STG 3 Progress Not Met  -CP     STG 4 Reduce 5x's STS test to 14 sec or less  -CP     STG 4 Progress Not Met  -CP            Time Calculation    PT Goal Re-Cert Due Date 05/09/22  -CP           User Key  (r) = Recorded By, (t) = Taken By, (c) = Cosigned By    Initials Name Provider Type    CP Brandi Hammer PTA Physical Therapist Assistant                Therapy Education  Education Details: side stepping, CR/TR, heelslides with strap  Given: HEP  Program: New  How Provided: Verbal, Demonstration  Provided to: Patient  Level of Understanding: Teach back education performed, Verbalized, Demonstrated              Time Calculation:   Start Time: 1345  Stop Time: 1445  Time Calculation (min): 60 min  Total Timed Code Minutes- PT: 40 minute(s)  Timed Charges  20358 - PT Therapeutic Exercise Minutes: 40  Untimed Charges  PT E-Stim Unattended Minutes: 15  PT Ice Rx Minutes: 15  Total Minutes  Timed Charges Total Minutes: 40  Untimed Charges Total Minutes: 30   Total Minutes: 40  Therapy Charges for Today     Code Description Service Date Service Provider Modifiers Qty    85211747913 HC PT ELECTRICAL STIM UNATTENDED 4/21/2022 Brandi Hammer PTA CQ 1    48464185061 HC PT THER PROC EA 15  MIN 4/21/2022 Brandi Hammer, EZEQUIEL GP, CQ 3                    Brandi Hammer PTA  4/21/2022

## 2022-04-26 ENCOUNTER — HOSPITAL ENCOUNTER (OUTPATIENT)
Dept: PHYSICAL THERAPY | Facility: HOSPITAL | Age: 77
Setting detail: THERAPIES SERIES
Discharge: HOME | End: 2022-04-26

## 2022-04-26 DIAGNOSIS — Z96.651 HISTORY OF TOTAL KNEE ARTHROPLASTY, RIGHT: Primary | ICD-10-CM

## 2022-04-26 PROCEDURE — 97530 THERAPEUTIC ACTIVITIES: CPT

## 2022-04-26 PROCEDURE — 97110 THERAPEUTIC EXERCISES: CPT

## 2022-04-26 NOTE — THERAPY TREATMENT NOTE
Outpatient Physical Therapy Ortho Treatment Note  [facility]     Patient Name: Alva Mills  : 1945  MRN: [id number]  Today's Date: 2022      Visit Date: 2022     Subjective Improvement getting better  Visits 3/3  Visits approved 6 until 2022  RTMD 1 week?  Recert Date 2022    S/P R TKA 2022  Post op 3 weeks 4 days    Visit Dx:    ICD-10-CM ICD-9-CM   1. History of total knee arthroplasty, right  Z96.651 V43.65       Patient Active Problem List   Diagnosis   • Anxiety   • Depression   • Malaise and fatigue   • Acute upper respiratory infection   • Essential hypertension   • Soft tissue swelling   • Rash   • Cough   • Itching   • Toenail fungus   • Ventral hernia without obstruction or gangrene   • Nausea   • Pneumobilia   • Absolute anemia   • Eye irritation   • CVA tenderness   • Acute bilateral low back pain without sciatica   • Restless leg syndrome   • Obstruction of bile duct        Past Medical History:   Diagnosis Date   • Abdominal pain    • Acquired hypothyroidism    • Acute bronchitis    • Acute sinusitis    • Acute upper respiratory infection    • Benign hypertension    • Breast cyst    • Breast lump     history of, right   • Calf pain    • Common bile duct calculus    • Constipation    • Cough    • Depressive disorder    • Elevated cholesterol    • Epigastric pain    • External hemorrhoids without complication    • Fibrocystic breast    • Functional heart murmur    • Gastroesophageal reflux disease    • General medical exam    • Hemorrhoids    • Hyperlipidemia    • Incisional hernia     no evident recurrence at this juncture   • Ingrown toenail    • Localized, primary osteoarthritis of ankle or foot    • Muscle strain    • Skin cancer     basal cell on face   • Skin lesion         Past Surgical History:   Procedure Laterality Date   • APPENDECTOMY      done with hysterectomy   • BACK SURGERY      x 3   • BILE DUCT EXPLORATION  12/10/2013    Remove bile  duct stone (1)    Common duct exploration, stone extraction, choledochoduodenostomy and choledochoscopy.    • BREAST BIOPSY Right 1991    Stereotactic breast biopsy (1)    Right breast    • CATARACT EXTRACTION W/ INTRAOCULAR LENS IMPLANT Right 2/18/2022    Procedure: REMOVE CATARACT AND IMPLANT   INTRAOCULAR LENS;  Surgeon: Dani Bonner MD;  Location: [facility] OR;  Service: Ophthalmology;  Laterality: Right;   • CHOLECYSTECTOMY OPEN  1989   • ERCP Left 1/10/2019    Procedure: ENDOSCOPIC RETROGRADE CHOLANGIOPANCREATOGRAPHY;  Surgeon: Homero Allen DO;  Location: [facility] ENDOSCOPY;  Service: Gastroenterology   • ERCP Left 3/14/2019    Procedure: ENDOSCOPIC RETROGRADE CHOLANGIOPANCREATOGRAPHY;  Surgeon: Homero Allen DO;  Location: [facility] ENDOSCOPY;  Service: Gastroenterology   • ERCP Left 9/10/2019    Procedure: ENDOSCOPIC RETROGRADE CHOLANGIOPANCREATOGRAPHY;  Surgeon: Homero Allen DO;  Location: [facility] ENDOSCOPY;  Service: Gastroenterology   • ERCP Left 3/12/2020    Procedure: ENDOSCOPIC RETROGRADE CHOLANGIOPANCREATOGRAPHY;  Surgeon: Homero Allen DO;  Location: [facility] ENDOSCOPY;  Service: Gastroenterology;  Laterality: Left;   • ERCP Left 8/10/2020    Procedure: ENDOSCOPIC RETROGRADE CHOLANGIOPANCREATOGRAPHY;  Surgeon: Homero Allen DO;  Location: [facility] ENDOSCOPY;  Service: Gastroenterology;  Laterality: Left;   • ERCP Left 1/19/2021    Procedure: ENDOSCOPIC RETROGRADE CHOLANGIOPANCREATOGRAPHY;  Surgeon: Homero Allen DO;  Location: [facility] ENDOSCOPY;  Service: Gastroenterology;  Laterality: Left;   • ERCP Left 12/2/2021    Procedure: ENDOSCOPIC RETROGRADE CHOLANGIOPANCREATOGRAPHY;  Surgeon: Homero Allen DO;  Location: [facility] ENDOSCOPY;  Service: Gastroenterology;  Laterality: Left;   • ERCP WITH STENT PLACEMENT N/A 6/17/2021    Procedure: ENDOSCOPIC RETROGRADE CHOLANGIOPANCREATOGRAPHY WITH STENT PLACEMENT;  Surgeon: Homero Allen DO;  Location: [facility] ENDOSCOPY;   Service: Gastroenterology;  Laterality: N/A;   • HERNIA REPAIR  06/16/2014    Anesth, repair of hernia (1)    laparoscopic ventral hernioplasty with mesh. Ventral hernia.    • HYSTERECTOMY  10/17/2001    Anesth, hysterectomy (1)    Laparoscopic subtotal hysterectomy & BSO. Enterolysis of sigmoid colon.    • TUBAL ABDOMINAL LIGATION          PT Ortho     Row Name 04/26/22 1000       Right Lower Ext    Rt Knee Extension/Flexion AROM 0-130  -CP    Row Name 04/26/22 0900       Precautions and Contraindications    Precautions R TKA 4-  -CP    Contraindications post op 3 wks 4 days  -CP       Posture/Observations    Posture/Observations Comments amb independent  -CP          User Key  (r) = Recorded By, (t) = Taken By, (c) = Cosigned By    Initials Name Provider Type    Brandi Duran, EZEQUIEL Physical Therapist Assistant                             PT Assessment/Plan     Row Name 04/26/22 1008          PT Assessment    Assessment Comments Patient is progressing very well.  She is able to maintain 130 degree of knee fl AROM.  -CP            PT Plan    PT Frequency 2x/week  -CP     Predicted Duration of Therapy Intervention (PT) 2-3 weeks  -CP     PT Plan Comments cont with POC.  SLS next and sit to stand  -CP           User Key  (r) = Recorded By, (t) = Taken By, (c) = Cosigned By    Initials Name Provider Type    Brandi Duran PTA Physical Therapist Assistant                 Modalities     Row Name 04/26/22 0900             Ice    Ice Applied Yes  -CP      Location right knee  -CP      PT Ice Rx Minutes 15  -CP      Ice S/P Rx Yes  -CP              ELECTRICAL STIMULATION    Attended/Unattended --  -CP      Stimulation Type --  -CP      Location/Electrode Placement/Other --  -CP      PT E-Stim Unattended Minutes --  -CP            User Key  (r) = Recorded By, (t) = Taken By, (c) = Cosigned By    Initials Name Provider Type    Brandi Duran, EZEQUIEL Physical Therapist Assistant               OP Exercises  "    Row Name 04/26/22 1059 04/26/22 0900          Subjective Comments    Subjective Comments -- Patient states that her knee cont to feel stiff  -CP            Subjective Pain    Able to rate subjective pain? -- yes  -CP     Pre-Treatment Pain Level -- 3  -CP     Post-Treatment Pain Level -- --  numb  -CP            Total Minutes    57639 - PT Therapeutic Exercise Minutes 32  -CP --     81505 - PT Therapeutic Activity Minutes 10  -CP --            Exercise 1    Exercise Name 1 -- PRo II level 2  -CP     Time 1 -- 13  -CP            Exercise 2    Exercise Name 2 -- incline stretch  -CP     Cueing 2 -- Verbal;Demo  -CP     Sets 2 -- 3  -CP     Reps 2 -- 30  -CP            Exercise 3    Exercise Name 3 -- step up 6\"  -CP     Cueing 3 -- Verbal;Demo  -CP     Sets 3 -- 2  -CP     Reps 3 -- 10  -CP            Exercise 4    Exercise Name 4 -- lat step up 4\"  -CP     Cueing 4 -- Verbal;Demo  -CP     Sets 4 -- 2  -CP     Reps 4 -- 10  -CP            Exercise 5    Exercise Name 5 -- CR/TR on airex  -CP     Cueing 5 -- Verbal;Demo  -CP     Sets 5 -- 2  -CP     Reps 5 -- 10  -CP            Exercise 6    Exercise Name 6 -- ramp walk  -CP     Cueing 6 -- Verbal;Demo  -CP     Reps 6 -- 5  -CP     Time 6 -- forward up and down  -CP            Exercise 7    Exercise Name 7 -- LAQ  -CP     Cueing 7 -- Verbal;Demo  -CP     Sets 7 -- 2  -CP     Reps 7 -- 10  -CP     Time 7 -- 5\" holds  -CP            Exercise 8    Exercise Name 8 -- seated knee fl with tband  -CP     Cueing 8 -- Verbal;Tactile  -CP     Sets 8 -- 2  -CP     Reps 8 -- 10  -CP            Exercise 9    Exercise Name 9 -- SLR  -CP     Cueing 9 -- Verbal;Tactile  -CP     Sets 9 -- 2  -CP     Reps 9 -- 10  -CP           User Key  (r) = Recorded By, (t) = Taken By, (c) = Cosigned By    Initials Name Provider Type    CP Brandi Hammer, PTA Physical Therapist Assistant                              PT OP Goals     Row Name 04/26/22 0900          PT Short Term Goals    STG Date " to Achieve 05/09/22  -CP     STG 1 Improve R knee ext/R hip abduction MMT to 5/5  -CP     STG 1 Progress Progressing  -CP     STG 2 Improve R SLS to >/=10 sec consistently  -CP     STG 2 Progress Not Met  -CP     STG 3 Improve LEFS score to >/=49  -CP     STG 3 Progress Not Met  -CP     STG 4 Reduce 5x's STS test to 14 sec or less  -CP     STG 4 Progress Not Met  -CP            Time Calculation    PT Goal Re-Cert Due Date 05/09/22  -CP           User Key  (r) = Recorded By, (t) = Taken By, (c) = Cosigned By    Initials Name Provider Type    CP Brandi Hammer, EZEQUIEL Physical Therapist Assistant                Therapy Education  Education Details: LAQ  Given: HEP  Program: New  How Provided: Verbal, Demonstration, Written  Provided to: Patient  Level of Understanding: Teach back education performed, Verbalized, Demonstrated              Time Calculation:   Start Time: 0925  Stop Time: 1025  Time Calculation (min): 60 min  Total Timed Code Minutes- PT: 42 minute(s)  Timed Charges  23160 - PT Therapeutic Exercise Minutes: 32  15229 - PT Therapeutic Activity Minutes: 10  Untimed Charges  PT Ice Rx Minutes: 15  Total Minutes  Timed Charges Total Minutes: 42  Untimed Charges Total Minutes: 15   Total Minutes: 42  Therapy Charges for Today     Code Description Service Date Service Provider Modifiers Qty    30500648458 HC PT THER SUPP EA 15 MIN 4/26/2022 Brandi Hammer, EZEQUIEL GP 1    71280234888 HC PT THERAPEUTIC ACT EA 15 MIN 4/26/2022 Brandi Hammer PTA GP, CQ 1    69513109834 HC PT THER PROC EA 15 MIN 4/26/2022 Brandi Hammer PTA GP, CQ 2                    Brandi Hammer PTA  4/26/2022

## 2022-04-27 ENCOUNTER — OFFICE VISIT (OUTPATIENT)
Dept: FAMILY MEDICINE CLINIC | Facility: CLINIC | Age: 77
End: 2022-04-27

## 2022-04-27 VITALS
DIASTOLIC BLOOD PRESSURE: 88 MMHG | OXYGEN SATURATION: 97 % | HEART RATE: 93 BPM | SYSTOLIC BLOOD PRESSURE: 130 MMHG | WEIGHT: 110 LBS | HEIGHT: 61 IN | BODY MASS INDEX: 20.77 KG/M2

## 2022-04-27 DIAGNOSIS — F41.9 ANXIETY: ICD-10-CM

## 2022-04-27 DIAGNOSIS — R11.0 NAUSEA: Primary | ICD-10-CM

## 2022-04-27 PROCEDURE — 99213 OFFICE O/P EST LOW 20 MIN: CPT | Performed by: NURSE PRACTITIONER

## 2022-04-27 RX ORDER — PAROXETINE 10 MG/1
10 TABLET, FILM COATED ORAL EVERY MORNING
Qty: 30 TABLET | Refills: 11 | Status: SHIPPED | OUTPATIENT
Start: 2022-04-27 | End: 2022-06-03

## 2022-04-27 NOTE — PROGRESS NOTES
Chief Complaint   Patient presents with   • Vomiting     Nausea    • Anxiety     Having hard time eating      Subjective   Alva Mills is a 76 y.o. female.           Vomiting   This is a recurrent problem. The current episode started 1 to 4 weeks ago. The problem occurs intermittently. The problem has been waxing and waning. There has been no fever. Associated symptoms include abdominal pain and diarrhea. Pertinent negatives include no arthralgias, chest pain, chills, coughing, dizziness, fever, headaches, myalgias, sweats, URI or weight loss. The treatment provided moderate relief.   Anxiety  Presents for follow-up visit. Symptoms include excessive worry, irritability, nervous/anxious behavior and panic. Patient reports no chest pain, decreased concentration, depressed mood, dizziness, hyperventilation, impotence, insomnia, malaise, muscle tension, nausea, obsessions, palpitations, restlessness, shortness of breath or suicidal ideas. Symptoms occur most days.     Compliance with medications is %.        The following portions of the patient's history were reviewed and updated as appropriate: allergies, current medications, past social history and problem list.    Review of Systems   Constitutional: Positive for irritability. Negative for chills, fever and weight loss.   HENT: Negative for congestion, dental problem, ear discharge and ear pain.    Eyes: Negative.  Negative for photophobia, redness and visual disturbance.   Respiratory: Negative.  Negative for cough and shortness of breath.    Cardiovascular: Negative for chest pain, palpitations and leg swelling.   Gastrointestinal: Positive for abdominal pain, diarrhea and vomiting. Negative for abdominal distention and nausea.   Endocrine: Negative.  Negative for cold intolerance, heat intolerance, polydipsia, polyphagia and polyuria.   Genitourinary: Negative.  Negative for difficulty urinating, dyspareunia and impotence.   Musculoskeletal:  "Positive for gait problem. Negative for arthralgias, back pain and myalgias.        Total right knee replacement -3 weeks ago    Skin: Negative.    Allergic/Immunologic: Negative.  Negative for environmental allergies, food allergies and immunocompromised state.   Neurological: Negative for dizziness, facial asymmetry and headaches.   Hematological: Negative.  Negative for adenopathy. Does not bruise/bleed easily.   Psychiatric/Behavioral: Positive for agitation and sleep disturbance. Negative for decreased concentration and suicidal ideas. The patient is nervous/anxious. The patient does not have insomnia.        Objective   /88   Pulse 93   Ht 154.9 cm (61\")   Wt 49.9 kg (110 lb)   SpO2 97%   BMI 20.78 kg/m²   Physical Exam  Vitals and nursing note reviewed.   Constitutional:       Appearance: Normal appearance.   HENT:      Head: Normocephalic.      Nose: No congestion or rhinorrhea.      Mouth/Throat:      Pharynx: No oropharyngeal exudate.   Eyes:      General: No scleral icterus.        Right eye: No discharge.         Left eye: No discharge.      Pupils: Pupils are equal, round, and reactive to light.   Neck:      Vascular: No carotid bruit.   Cardiovascular:      Rate and Rhythm: Normal rate.      Pulses: Normal pulses.      Heart sounds: No murmur heard.    No friction rub. No gallop.   Pulmonary:      Effort: Pulmonary effort is normal. No respiratory distress.      Breath sounds: No stridor. No wheezing, rhonchi or rales.   Chest:      Chest wall: No tenderness.   Abdominal:      General: Abdomen is flat. There is no distension.      Palpations: There is no mass.      Tenderness: There is no abdominal tenderness. There is no guarding or rebound.      Hernia: No hernia is present.   Musculoskeletal:         General: No swelling, tenderness, deformity or signs of injury. Normal range of motion.      Cervical back: Normal range of motion. No rigidity or tenderness.      Right lower leg: No edema. "      Left lower leg: No edema.      Comments: Right knee pain with flexion anterior pain with movement -recent total knee replacement    Lymphadenopathy:      Cervical: No cervical adenopathy.   Skin:     General: Skin is warm and dry.      Coloration: Skin is not jaundiced or pale.      Findings: No bruising, erythema, lesion or rash.   Neurological:      General: No focal deficit present.      Mental Status: She is alert and oriented to person, place, and time.   Psychiatric:         Mood and Affect: Mood normal.         Behavior: Behavior normal.              Assessment/Plan     Problems Addressed this Visit        Gastrointestinal Abdominal     Nausea - Primary       Mental Health    Anxiety      Diagnoses       Codes Comments    Nausea    -  Primary ICD-10-CM: R11.0  ICD-9-CM: 787.02     Anxiety     ICD-10-CM: F41.9  ICD-9-CM: 300.00            New Medications Ordered This Visit   Medications   • PARoxetine (Paxil) 10 MG tablet     Sig: Take 1 tablet by mouth Every Morning.     Dispense:  30 tablet     Refill:  11     Current Outpatient Medications on File Prior to Visit   Medication Sig Dispense Refill   • acyclovir (ZOVIRAX) 400 MG tablet Take 400 mg by mouth 3 (Three) Times a Day As Needed (for outbreak). Take no more than 5 doses a day.     • azelastine (ASTELIN) 0.1 % nasal spray 2 sprays into the nostril(s) as directed by provider 2 (Two) Times a Day As Needed for Rhinitis. Use in each nostril as directed     • CALCIUM PO Take 1 tablet by mouth Daily With Breakfast. 500g     • desvenlafaxine (PRISTIQ) 100 MG 24 hr tablet Take 100 mg by mouth Daily.     • fenofibrate (TRICOR) 145 MG tablet Take 145 mg by mouth Daily.     • fluticasone (FLONASE) 50 MCG/ACT nasal spray 2 SPRAYS INTO THE NOSTRIL(S) AS DIRECTED BY PROVIDER DAILY. 16 g 11   • guaiFENesin (MUCINEX) 600 MG 12 hr tablet Take 1,200 mg by mouth 2 (Two) Times a Day As Needed for Cough.     • HYDROcodone-acetaminophen (NORCO)  MG per tablet Take  1 tablet by mouth Every 6 (Six) Hours As Needed for Moderate Pain .     • hydrOXYzine (ATARAX) 10 MG tablet Take 10 mg by mouth 3 (Three) Times a Day As Needed for Anxiety.     • levothyroxine (Synthroid) 50 MCG tablet Take 1 tablet by mouth Daily. 90 tablet 3   • loratadine (Claritin) 10 MG tablet Take 1 tablet by mouth Daily. 30 tablet 2   • losartan (Cozaar) 25 MG tablet Take 1 tablet by mouth Daily. 90 tablet 3   • Magnesium 500 MG capsule Take 1 capsule by mouth Daily.     • metoprolol succinate XL (TOPROL-XL) 25 MG 24 hr tablet Take 25 mg by mouth Daily.     • omeprazole (priLOSEC) 40 MG capsule Take 40 mg by mouth Daily.     • promethazine (PHENERGAN) 25 MG tablet TAKE 1 TABLET EVERY 6 HOURSAS NEEDED FOR NAUSEA OR    VOMITING 180 tablet 0   • rOPINIRole (REQUIP) 0.25 MG tablet Take 0.25 mg by mouth 3 (Three) Times a Day As Needed. Take 1 hour before bedtime.     • traZODone (DESYREL) 50 MG tablet Take 1-2 tablets by mouth Every Night. 180 tablet 1   • [DISCONTINUED] ciprofloxacin (Cipro) 500 MG tablet Take 1 tablet by mouth 2 (Two) Times a Day. 10 tablet 0     No current facility-administered medications on file prior to visit.       15 MINUTES   Follow Up   Return in about 4 weeks (around 5/25/2022).        .CONSIDER THERAPY -add paxil--meds as directed -follow up in 4 weeks-diet as directed     Diet discussed, meds reviewed

## 2022-04-28 ENCOUNTER — HOSPITAL ENCOUNTER (OUTPATIENT)
Dept: PHYSICAL THERAPY | Facility: HOSPITAL | Age: 77
Setting detail: THERAPIES SERIES
Discharge: HOME | End: 2022-04-28

## 2022-04-28 ENCOUNTER — TELEPHONE (OUTPATIENT)
Dept: FAMILY MEDICINE CLINIC | Facility: CLINIC | Age: 77
End: 2022-04-28

## 2022-04-28 DIAGNOSIS — Z96.651 HISTORY OF TOTAL KNEE ARTHROPLASTY, RIGHT: Primary | ICD-10-CM

## 2022-04-28 PROCEDURE — 97110 THERAPEUTIC EXERCISES: CPT | Performed by: PHYSICAL THERAPIST

## 2022-04-28 PROCEDURE — 97140 MANUAL THERAPY 1/> REGIONS: CPT | Performed by: PHYSICAL THERAPIST

## 2022-04-28 RX ORDER — HYDROXYZINE PAMOATE 25 MG/1
25 CAPSULE ORAL 3 TIMES DAILY PRN
Qty: 60 CAPSULE | Refills: 5 | Status: SHIPPED | OUTPATIENT
Start: 2022-04-28 | End: 2022-06-03

## 2022-04-28 NOTE — THERAPY TREATMENT NOTE
Outpatient Physical Therapy Ortho Treatment Note  [facility]     Patient Name: Alva Mills  : 1945  MRN: [id number]  Today's Date: 2022      Visit Date: 2022  Subjective Improvement getting better  Visits   Visits approved 6 until 2022  RTMD 1 week?  Recert Date 2022     S/P R TKA 2022  Post op 3 weeks 6 days    Visit Dx:    ICD-10-CM ICD-9-CM   1. History of total knee arthroplasty, right  Z96.651 V43.65       Patient Active Problem List   Diagnosis   • Anxiety   • Depression   • Malaise and fatigue   • Acute upper respiratory infection   • Essential hypertension   • Soft tissue swelling   • Rash   • Cough   • Itching   • Toenail fungus   • Ventral hernia without obstruction or gangrene   • Nausea   • Pneumobilia   • Absolute anemia   • Eye irritation   • CVA tenderness   • Acute bilateral low back pain without sciatica   • Restless leg syndrome   • Obstruction of bile duct        Past Medical History:   Diagnosis Date   • Abdominal pain    • Acquired hypothyroidism    • Acute bronchitis    • Acute sinusitis    • Acute upper respiratory infection    • Benign hypertension    • Breast cyst    • Breast lump     history of, right   • Calf pain    • Common bile duct calculus    • Constipation    • Cough    • Depressive disorder    • Elevated cholesterol    • Epigastric pain    • External hemorrhoids without complication    • Fibrocystic breast    • Functional heart murmur    • Gastroesophageal reflux disease    • General medical exam    • Hemorrhoids    • Hyperlipidemia    • Incisional hernia     no evident recurrence at this juncture   • Ingrown toenail    • Localized, primary osteoarthritis of ankle or foot    • Muscle strain    • Skin cancer     basal cell on face   • Skin lesion         Past Surgical History:   Procedure Laterality Date   • APPENDECTOMY      done with hysterectomy   • BACK SURGERY      x 3   • BILE DUCT EXPLORATION  12/10/2013    Remove bile duct  stone (1)    Common duct exploration, stone extraction, choledochoduodenostomy and choledochoscopy.    • BREAST BIOPSY Right 1991    Stereotactic breast biopsy (1)    Right breast    • CATARACT EXTRACTION W/ INTRAOCULAR LENS IMPLANT Right 2/18/2022    Procedure: REMOVE CATARACT AND IMPLANT   INTRAOCULAR LENS;  Surgeon: Dani Bnoner MD;  Location: [facility] OR;  Service: Ophthalmology;  Laterality: Right;   • CHOLECYSTECTOMY OPEN  1989   • ERCP Left 1/10/2019    Procedure: ENDOSCOPIC RETROGRADE CHOLANGIOPANCREATOGRAPHY;  Surgeon: Homero Allen DO;  Location: [facility] ENDOSCOPY;  Service: Gastroenterology   • ERCP Left 3/14/2019    Procedure: ENDOSCOPIC RETROGRADE CHOLANGIOPANCREATOGRAPHY;  Surgeon: Homero Allen DO;  Location: [facility] ENDOSCOPY;  Service: Gastroenterology   • ERCP Left 9/10/2019    Procedure: ENDOSCOPIC RETROGRADE CHOLANGIOPANCREATOGRAPHY;  Surgeon: Homero Allen DO;  Location: [facility] ENDOSCOPY;  Service: Gastroenterology   • ERCP Left 3/12/2020    Procedure: ENDOSCOPIC RETROGRADE CHOLANGIOPANCREATOGRAPHY;  Surgeon: Homero Allen DO;  Location: [facility] ENDOSCOPY;  Service: Gastroenterology;  Laterality: Left;   • ERCP Left 8/10/2020    Procedure: ENDOSCOPIC RETROGRADE CHOLANGIOPANCREATOGRAPHY;  Surgeon: Homero Allen DO;  Location: [facility] ENDOSCOPY;  Service: Gastroenterology;  Laterality: Left;   • ERCP Left 1/19/2021    Procedure: ENDOSCOPIC RETROGRADE CHOLANGIOPANCREATOGRAPHY;  Surgeon: Homero Allen DO;  Location: [facility] ENDOSCOPY;  Service: Gastroenterology;  Laterality: Left;   • ERCP Left 12/2/2021    Procedure: ENDOSCOPIC RETROGRADE CHOLANGIOPANCREATOGRAPHY;  Surgeon: Homero Allen DO;  Location: [facility] ENDOSCOPY;  Service: Gastroenterology;  Laterality: Left;   • ERCP WITH STENT PLACEMENT N/A 6/17/2021    Procedure: ENDOSCOPIC RETROGRADE CHOLANGIOPANCREATOGRAPHY WITH STENT PLACEMENT;  Surgeon: Homero Allen DO;  Location: [facility] ENDOSCOPY;   Service: Gastroenterology;  Laterality: N/A;   • HERNIA REPAIR  06/16/2014    Anesth, repair of hernia (1)    laparoscopic ventral hernioplasty with mesh. Ventral hernia.    • HYSTERECTOMY  10/17/2001    Anesth, hysterectomy (1)    Laparoscopic subtotal hysterectomy & BSO. Enterolysis of sigmoid colon.    • TUBAL ABDOMINAL LIGATION          PT Ortho     Row Name 04/28/22 1435       Subjective Comments    Subjective Comments Patient went shopping this morning.  -BS       Precautions and Contraindications    Precautions R TKA 4-  -BS    Contraindications post op 3 wks 6 days  -BS       Subjective Pain    Able to rate subjective pain? yes  -BS    Pre-Treatment Pain Level 4  -BS       MMT (Manual Muscle Testing)    General MMT Comments R hip flex 4+/5  -BS          User Key  (r) = Recorded By, (t) = Taken By, (c) = Cosigned By    Initials Name Provider Type    Teodoro Hines, PT Physical Therapist                             PT Assessment/Plan     Row Name 04/28/22 1435          PT Assessment    Assessment Comments Reduced L knee pain post MFR/TrP to the R medial hamstrings. Limited single limb stance on R due to excessive soreness/pain from shopping earlier today.  -BS            PT Plan    PT Frequency 2x/week  -BS     Predicted Duration of Therapy Intervention (PT) 2-3 weeks  -BS     PT Plan Comments push hip flexor strengthening.  -BS           User Key  (r) = Recorded By, (t) = Taken By, (c) = Cosigned By    Initials Name Provider Type    Teodoro Hines, PT Physical Therapist                 Modalities     Row Name 04/28/22 1436             Subjective Pain    Subjective Pain Comment R knee  -BS            User Key  (r) = Recorded By, (t) = Taken By, (c) = Cosigned By    Initials Name Provider Type    Teodoro Hines, PT Physical Therapist               OP Exercises     Row Name 04/28/22 1435             Subjective Comments    Subjective Comments Patient went shopping this morning.  -BS               "Subjective Pain    Able to rate subjective pain? yes  -BS      Pre-Treatment Pain Level 4  -BS      Post-Treatment Pain Level 3  -BS      Subjective Pain Comment R knee  -BS              Exercise 1    Exercise Name 1 PRo II level 1  -BS      Time 1 10  -BS              Exercise 2    Exercise Name 2 incline stretch  -BS      Sets 2 3  -BS      Reps 2 30  -BS              Exercise 3    Exercise Name 3 sit to stand, B UE A  -BS      Sets 3 1  -BS      Reps 3 10  -BS              Exercise 4    Exercise Name 4 SLS, R  -BS      Reps 4 3,3,8 sec  -BS              Exercise 5    Exercise Name 5 553's  -BS      Time 5 2 mins  -BS              Exercise 6    Exercise Name 6 standing B heel raises-2 legs up, R LE only down  -BS      Sets 6 1  -BS      Reps 6 10  -BS              Exercise 7    Exercise Name 7 see manual  -BS              Exercise 8    Exercise Name 8 lateral step ups, 6\"  -BS      Sets 8 1  -BS      Reps 8 10  -BS              Exercise 9    Exercise Name 9 seated resisted R HS curls with green TB  -BS      Sets 9 1  -BS      Reps 9 20  -BS              Exercise 10    Exercise Name 10 seated R knee flex AROM  -BS      Sets 10 1  -BS      Reps 10 5  -BS            User Key  (r) = Recorded By, (t) = Taken By, (c) = Cosigned By    Initials Name Provider Type    BS Teodoro Macdonald, PT Physical Therapist                         Manual Rx (last 36 hours)     Manual Treatments     Row Name 04/28/22 1435             Manual Rx 1    Manual Rx 1 Location seated, MFR/TrP to R semimembranosis + semitendinosis  -BS      Manual Rx 1 Type seated  -BS      Manual Rx 1 Duration 10 mins  -BS            User Key  (r) = Recorded By, (t) = Taken By, (c) = Cosigned By    Initials Name Provider Type    BS Teodoro Macdonald, PT Physical Therapist                 PT OP Goals     Row Name 04/28/22 1400          PT Short Term Goals    STG Date to Achieve 05/09/22  -BS     STG 1 Improve R knee ext/R hip abduction MMT to 5/5  -BS     STG 1 " Progress Progressing  -BS     STG 2 Improve R SLS to >/=10 sec consistently  -BS     STG 2 Progress Not Met  -BS     STG 3 Improve LEFS score to >/=49  -BS     STG 3 Progress Not Met  -BS     STG 4 Reduce 5x's STS test to 14 sec or less  -BS     STG 4 Progress Not Met  -BS            Time Calculation    PT Goal Re-Cert Due Date 05/09/22  -BS           User Key  (r) = Recorded By, (t) = Taken By, (c) = Cosigned By    Initials Name Provider Type    Teodoro Hines, PT Physical Therapist                               Time Calculation:   Start Time: 1435  Stop Time: 1522  Time Calculation (min): 47 min  Total Timed Code Minutes- PT: 47 minute(s)  Therapy Charges for Today     Code Description Service Date Service Provider Modifiers Qty    [id number]  PT THER PROC EA 15 MIN 4/28/2022 Teodoro Macdonald, PT GP 2    [id number] HC PT MANUAL THERAPY EA 15 MIN 4/28/2022 Teodoro Macdonald, PT GP 1                    Teodoro Macdonald PT  4/28/2022

## 2022-05-03 ENCOUNTER — HOSPITAL ENCOUNTER (OUTPATIENT)
Dept: PHYSICAL THERAPY | Facility: HOSPITAL | Age: 77
Setting detail: THERAPIES SERIES
Discharge: HOME | End: 2022-05-03

## 2022-05-03 DIAGNOSIS — Z96.651 HISTORY OF TOTAL KNEE ARTHROPLASTY, RIGHT: Primary | ICD-10-CM

## 2022-05-03 PROCEDURE — 97110 THERAPEUTIC EXERCISES: CPT | Performed by: PHYSICAL THERAPIST

## 2022-05-03 NOTE — THERAPY TREATMENT NOTE
Outpatient Physical Therapy Ortho Treatment Note  [facility]     Patient Name: Alva Mills  : 1945  MRN: [id number]  Today's Date: 5/3/2022      Visit Date: 2022  Subjective Improvement getting better  Visits /  Visits approved 6 until 2022  RTMD: 22  Recert Date 2022     S/P R TKA 2022  Post op 4 weeks 4 days    Visit Dx:    ICD-10-CM ICD-9-CM   1. History of total knee arthroplasty, right  Z96.651 V43.65       Patient Active Problem List   Diagnosis   • Anxiety   • Depression   • Malaise and fatigue   • Acute upper respiratory infection   • Essential hypertension   • Soft tissue swelling   • Rash   • Cough   • Itching   • Toenail fungus   • Ventral hernia without obstruction or gangrene   • Nausea   • Pneumobilia   • Absolute anemia   • Eye irritation   • CVA tenderness   • Acute bilateral low back pain without sciatica   • Restless leg syndrome   • Obstruction of bile duct        Past Medical History:   Diagnosis Date   • Abdominal pain    • Acquired hypothyroidism    • Acute bronchitis    • Acute sinusitis    • Acute upper respiratory infection    • Benign hypertension    • Breast cyst    • Breast lump     history of, right   • Calf pain    • Common bile duct calculus    • Constipation    • Cough    • Depressive disorder    • Elevated cholesterol    • Epigastric pain    • External hemorrhoids without complication    • Fibrocystic breast    • Functional heart murmur    • Gastroesophageal reflux disease    • General medical exam    • Hemorrhoids    • Hyperlipidemia    • Incisional hernia     no evident recurrence at this juncture   • Ingrown toenail    • Localized, primary osteoarthritis of ankle or foot    • Muscle strain    • Skin cancer     basal cell on face   • Skin lesion         Past Surgical History:   Procedure Laterality Date   • APPENDECTOMY      done with hysterectomy   • BACK SURGERY      x 3   • BILE DUCT EXPLORATION  12/10/2013    Remove bile duct  stone (1)    Common duct exploration, stone extraction, choledochoduodenostomy and choledochoscopy.    • BREAST BIOPSY Right 1991    Stereotactic breast biopsy (1)    Right breast    • CATARACT EXTRACTION W/ INTRAOCULAR LENS IMPLANT Right 2/18/2022    Procedure: REMOVE CATARACT AND IMPLANT   INTRAOCULAR LENS;  Surgeon: Dani Bonner MD;  Location: [facility] OR;  Service: Ophthalmology;  Laterality: Right;   • CHOLECYSTECTOMY OPEN  1989   • ERCP Left 1/10/2019    Procedure: ENDOSCOPIC RETROGRADE CHOLANGIOPANCREATOGRAPHY;  Surgeon: Homero Allen DO;  Location: [facility] ENDOSCOPY;  Service: Gastroenterology   • ERCP Left 3/14/2019    Procedure: ENDOSCOPIC RETROGRADE CHOLANGIOPANCREATOGRAPHY;  Surgeon: Homero Allen DO;  Location: [facility] ENDOSCOPY;  Service: Gastroenterology   • ERCP Left 9/10/2019    Procedure: ENDOSCOPIC RETROGRADE CHOLANGIOPANCREATOGRAPHY;  Surgeon: Homero Allen DO;  Location: [facility] ENDOSCOPY;  Service: Gastroenterology   • ERCP Left 3/12/2020    Procedure: ENDOSCOPIC RETROGRADE CHOLANGIOPANCREATOGRAPHY;  Surgeon: Homero Allen DO;  Location: [facility] ENDOSCOPY;  Service: Gastroenterology;  Laterality: Left;   • ERCP Left 8/10/2020    Procedure: ENDOSCOPIC RETROGRADE CHOLANGIOPANCREATOGRAPHY;  Surgeon: Homero Allen DO;  Location: [facility] ENDOSCOPY;  Service: Gastroenterology;  Laterality: Left;   • ERCP Left 1/19/2021    Procedure: ENDOSCOPIC RETROGRADE CHOLANGIOPANCREATOGRAPHY;  Surgeon: Homero Allen DO;  Location: [facility] ENDOSCOPY;  Service: Gastroenterology;  Laterality: Left;   • ERCP Left 12/2/2021    Procedure: ENDOSCOPIC RETROGRADE CHOLANGIOPANCREATOGRAPHY;  Surgeon: Homero Allen DO;  Location: [facility] ENDOSCOPY;  Service: Gastroenterology;  Laterality: Left;   • ERCP WITH STENT PLACEMENT N/A 6/17/2021    Procedure: ENDOSCOPIC RETROGRADE CHOLANGIOPANCREATOGRAPHY WITH STENT PLACEMENT;  Surgeon: Homero Allen DO;  Location: [facility] ENDOSCOPY;   Service: Gastroenterology;  Laterality: N/A;   • HERNIA REPAIR  06/16/2014    Anesth, repair of hernia (1)    laparoscopic ventral hernioplasty with mesh. Ventral hernia.    • HYSTERECTOMY  10/17/2001    Anesth, hysterectomy (1)    Laparoscopic subtotal hysterectomy & BSO. Enterolysis of sigmoid colon.    • TUBAL ABDOMINAL LIGATION          PT Ortho     Row Name 05/03/22 7906       Precautions and Contraindications    Precautions R TKA 4-  -BS    Contraindications post op 4 wks 4 days  -BS       Subjective Pain    Able to rate subjective pain? yes  -BS    Pre-Treatment Pain Level 3  -BS    Post-Treatment Pain Level 4  -BS       MMT (Manual Muscle Testing)    General MMT Comments R hip flex 4+/5 R knee ext 4+/5  -BS          User Key  (r) = Recorded By, (t) = Taken By, (c) = Cosigned By    Initials Name Provider Type    Teodoro Hines, PT Physical Therapist                             PT Assessment/Plan     Row Name 05/03/22 1527          PT Assessment    Assessment Comments Progressing overall, still limited by slight R quad and R hip flexor weakness, exceptional ROM of the R knee attained. Anticipate possible d/c after next session. Patient not motivated to continue skilled PT much longer.  -BS     Rehab Potential Good  -BS     Patient/caregiver participated in establishment of treatment plan and goals Yes  -BS     Patient would benefit from skilled therapy intervention Yes  -BS            PT Plan    PT Frequency 2x/week  -BS     Predicted Duration of Therapy Intervention (PT) 2-3 weeks  -BS     PT Plan Comments anticipate d/c after next session.  -BS           User Key  (r) = Recorded By, (t) = Taken By, (c) = Cosigned By    Initials Name Provider Type    Teodoro Hines, PT Physical Therapist                 Modalities     Row Name 05/03/22 8166             Subjective Comments    Subjective Comments Patient reports not motivated to exercise in general but doing her HEP at least once daily.  -BS          "   User Key  (r) = Recorded By, (t) = Taken By, (c) = Cosigned By    Initials Name Provider Type    BS Teodoro Macdonald, PT Physical Therapist               OP Exercises     Row Name 05/03/22 0930             Subjective Comments    Subjective Comments Patient reports not motivated to exercise in general but doing her HEP at least once daily.  -BS              Subjective Pain    Able to rate subjective pain? yes  -BS      Pre-Treatment Pain Level 3  -BS      Post-Treatment Pain Level 4  -BS              Exercise 1    Exercise Name 1 PRo II level 3  -BS      Time 1 10  -BS              Exercise 2    Exercise Name 2 incline stretch  -BS      Sets 2 3  -BS      Reps 2 30  -BS              Exercise 3    Exercise Name 3 Cybex: LP-2L  -BS      Sets 3 1  -BS      Reps 3 20  -BS      Additional Comments 70 lbs  -BS              Exercise 4    Exercise Name 4 Cybex: LP-1L  -BS      Sets 4 1  -BS      Reps 4 20  -BS      Additional Comments 50#  -BS              Exercise 5    Exercise Name 5 MS  -BS      Sets 5 2  -BS      Reps 5 10  -BS              Exercise 6    Exercise Name 6 multihip-3 way  -BS      Sets 6 1x15 w/ abd/flex w/ 1 plate  -BS      Reps 6 1x10 w/ ext-2 plates  -BS              Exercise 7    Exercise Name 7 fwd lunge S  -BS      Sets 7 1  -BS      Reps 7 3  -BS      Time 7 30\" hold  -BS              Exercise 8    Exercise Name 8 R SLR  -BS      Sets 8 1  -BS      Reps 8 10  -BS              Exercise 9    Exercise Name 9 standing R gastroc S  -BS      Sets 9 1  -BS      Reps 9 2  -BS      Time 9 30\" hold  -BS      Additional Comments at wall  -BS            User Key  (r) = Recorded By, (t) = Taken By, (c) = Cosigned By    Initials Name Provider Type    BS Teodoro Macdonald, PT Physical Therapist                              PT OP Goals     Row Name 05/03/22 0930          PT Short Term Goals    STG Date to Achieve 05/09/22  -BS     STG 1 Improve R knee ext/R hip abduction MMT to 5/5  -BS     STG 1 Progress Progressing  " -BS     STG 2 Improve R SLS to >/=10 sec consistently  -BS     STG 2 Progress Not Met  -BS     STG 3 Improve LEFS score to >/=49  -BS     STG 3 Progress Not Met  -BS     STG 4 Reduce 5x's STS test to 14 sec or less  -BS     STG 4 Progress Not Met  -BS            Time Calculation    PT Goal Re-Cert Due Date 05/09/22  -BS           User Key  (r) = Recorded By, (t) = Taken By, (c) = Cosigned By    Initials Name Provider Type    Teodoro Hines, PT Physical Therapist                               Time Calculation:   Start Time: 0930  Stop Time: 1024  Time Calculation (min): 54 min  Total Timed Code Minutes- PT: 54 minute(s)  Therapy Charges for Today     Code Description Service Date Service Provider Modifiers Qty    52297362248 HC PT THER PROC EA 15 MIN 5/3/2022 Teodoro Macdonald, PT GP 4                    Teodoro Macdonald, PT  5/3/2022

## 2022-05-06 ENCOUNTER — APPOINTMENT (OUTPATIENT)
Dept: PHYSICAL THERAPY | Facility: HOSPITAL | Age: 77
End: 2022-05-06

## 2022-05-25 ENCOUNTER — OFFICE VISIT (OUTPATIENT)
Dept: FAMILY MEDICINE CLINIC | Facility: CLINIC | Age: 77
End: 2022-05-25

## 2022-05-25 VITALS
HEIGHT: 61 IN | DIASTOLIC BLOOD PRESSURE: 80 MMHG | BODY MASS INDEX: 20.58 KG/M2 | WEIGHT: 109 LBS | HEART RATE: 81 BPM | OXYGEN SATURATION: 97 % | SYSTOLIC BLOOD PRESSURE: 110 MMHG

## 2022-05-25 DIAGNOSIS — R11.0 NAUSEA: Primary | ICD-10-CM

## 2022-05-25 DIAGNOSIS — F41.9 ANXIETY: ICD-10-CM

## 2022-05-25 DIAGNOSIS — Z12.31 ENCOUNTER FOR SCREENING MAMMOGRAM FOR MALIGNANT NEOPLASM OF BREAST: ICD-10-CM

## 2022-05-25 PROCEDURE — 99213 OFFICE O/P EST LOW 20 MIN: CPT | Performed by: NURSE PRACTITIONER

## 2022-05-25 NOTE — PROGRESS NOTES
Chief Complaint   Patient presents with   • Nausea     1 month follow up    • Anxiety     Subjective   Alva Mills is a 76 y.o. female.           Nausea  This is a recurrent problem. The current episode started more than 1 month ago. The problem has been resolved. Associated symptoms include abdominal pain and vomiting. Pertinent negatives include no arthralgias, chest pain, chills, congestion, coughing, fever, myalgias, nausea, numbness or weakness.   Anxiety  Presents for follow-up visit. Symptoms include excessive worry, irritability, nervous/anxious behavior and panic. Patient reports no chest pain, decreased concentration, depressed mood, dizziness, hyperventilation, impotence, insomnia, malaise, muscle tension, nausea, obsessions, palpitations, restlessness, shortness of breath or suicidal ideas. Symptoms occur most days.     Compliance with medications is %.        The following portions of the patient's history were reviewed and updated as appropriate: allergies, current medications, past social history and problem list.    Review of Systems   Constitutional: Positive for irritability. Negative for chills and fever.   HENT: Negative for congestion, dental problem, ear discharge, ear pain, hearing loss, mouth sores, nosebleeds and postnasal drip.    Eyes: Negative.  Negative for photophobia, redness and visual disturbance.   Respiratory: Negative.  Negative for cough, choking and shortness of breath.    Cardiovascular: Negative for chest pain, palpitations and leg swelling.   Gastrointestinal: Positive for abdominal pain, diarrhea and vomiting. Negative for abdominal distention and nausea.   Endocrine: Negative.  Negative for cold intolerance, heat intolerance, polydipsia, polyphagia and polyuria.   Genitourinary: Negative.  Negative for difficulty urinating, dyspareunia and impotence.   Musculoskeletal: Positive for gait problem. Negative for arthralgias, back pain and myalgias.        Total  "right knee replacement -3 weeks ago    Skin: Negative.    Allergic/Immunologic: Negative.  Negative for environmental allergies, food allergies and immunocompromised state.   Neurological: Negative for dizziness, tremors, seizures, syncope, facial asymmetry, speech difficulty, weakness and numbness.   Hematological: Negative.  Negative for adenopathy. Does not bruise/bleed easily.   Psychiatric/Behavioral: Positive for agitation and sleep disturbance. Negative for decreased concentration and suicidal ideas. The patient is nervous/anxious. The patient does not have insomnia.         Anxiety improved--still present but improved       Objective   /80   Pulse 81   Ht 154.9 cm (61\")   Wt 49.4 kg (109 lb)   SpO2 97%   BMI 20.60 kg/m²   Physical Exam  Vitals and nursing note reviewed.   Constitutional:       Appearance: Normal appearance.   HENT:      Head: Normocephalic.      Right Ear: There is no impacted cerumen.      Left Ear: There is no impacted cerumen.      Nose: No congestion or rhinorrhea.      Mouth/Throat:      Pharynx: No oropharyngeal exudate.   Eyes:      General: No scleral icterus.        Right eye: No discharge.         Left eye: No discharge.      Pupils: Pupils are equal, round, and reactive to light.   Neck:      Vascular: No carotid bruit.   Cardiovascular:      Rate and Rhythm: Normal rate.      Pulses: Normal pulses.      Heart sounds: No murmur heard.    No friction rub. No gallop.   Pulmonary:      Effort: Pulmonary effort is normal. No respiratory distress.      Breath sounds: No stridor. No wheezing, rhonchi or rales.   Chest:      Chest wall: No tenderness.   Abdominal:      General: Abdomen is flat. There is no distension.      Palpations: There is no mass.      Tenderness: There is no abdominal tenderness. There is no guarding or rebound.      Hernia: No hernia is present.   Musculoskeletal:         General: No swelling, tenderness, deformity or signs of injury. Normal range of " motion.      Cervical back: Normal range of motion. No rigidity or tenderness.      Right lower leg: No edema.      Left lower leg: No edema.   Lymphadenopathy:      Cervical: No cervical adenopathy.   Skin:     General: Skin is warm and dry.      Coloration: Skin is not jaundiced or pale.      Findings: No bruising, erythema, lesion or rash.   Neurological:      General: No focal deficit present.      Mental Status: She is alert and oriented to person, place, and time.      Cranial Nerves: No cranial nerve deficit.      Sensory: No sensory deficit.      Motor: No weakness.      Coordination: Coordination normal.      Gait: Gait normal.      Deep Tendon Reflexes: Reflexes normal.   Psychiatric:         Mood and Affect: Mood normal.         Behavior: Behavior normal.              Assessment & Plan     Problems Addressed this Visit        Gastrointestinal Abdominal     Nausea - Primary       Mental Health    Anxiety      Other Visit Diagnoses     Encounter for screening mammogram for malignant neoplasm of breast        Relevant Orders    Mammo Screening Digital Tomosynthesis Bilateral With CAD      Diagnoses       Codes Comments    Nausea    -  Primary ICD-10-CM: R11.0  ICD-9-CM: 787.02     Anxiety     ICD-10-CM: F41.9  ICD-9-CM: 300.00     Encounter for screening mammogram for malignant neoplasm of breast     ICD-10-CM: Z12.31  ICD-9-CM: V76.12          No orders of the defined types were placed in this encounter.    Current Outpatient Medications on File Prior to Visit   Medication Sig Dispense Refill   • acyclovir (ZOVIRAX) 400 MG tablet Take 400 mg by mouth 3 (Three) Times a Day As Needed (for outbreak). Take no more than 5 doses a day.     • azelastine (ASTELIN) 0.1 % nasal spray 2 sprays into the nostril(s) as directed by provider 2 (Two) Times a Day As Needed for Rhinitis. Use in each nostril as directed     • CALCIUM PO Take 1 tablet by mouth Daily With Breakfast. 500g     • desvenlafaxine (PRISTIQ) 100 MG 24  hr tablet Take 100 mg by mouth Daily.     • fenofibrate (TRICOR) 145 MG tablet Take 145 mg by mouth Daily.     • fluticasone (FLONASE) 50 MCG/ACT nasal spray 2 SPRAYS INTO THE NOSTRIL(S) AS DIRECTED BY PROVIDER DAILY. 16 g 11   • guaiFENesin (MUCINEX) 600 MG 12 hr tablet Take 1,200 mg by mouth 2 (Two) Times a Day As Needed for Cough.     • HYDROcodone-acetaminophen (NORCO)  MG per tablet Take 1 tablet by mouth Every 6 (Six) Hours As Needed for Moderate Pain .     • hydrOXYzine (ATARAX) 10 MG tablet Take 10 mg by mouth 3 (Three) Times a Day As Needed for Anxiety.     • hydrOXYzine pamoate (Vistaril) 25 MG capsule Take 1 capsule by mouth 3 (Three) Times a Day As Needed for Anxiety. 60 capsule 5   • levothyroxine (Synthroid) 50 MCG tablet Take 1 tablet by mouth Daily. 90 tablet 3   • loratadine (Claritin) 10 MG tablet Take 1 tablet by mouth Daily. 30 tablet 2   • losartan (Cozaar) 25 MG tablet Take 1 tablet by mouth Daily. 90 tablet 3   • Magnesium 500 MG capsule Take 1 capsule by mouth Daily.     • metoprolol succinate XL (TOPROL-XL) 25 MG 24 hr tablet Take 25 mg by mouth Daily.     • omeprazole (priLOSEC) 40 MG capsule Take 40 mg by mouth Daily.     • PARoxetine (Paxil) 10 MG tablet Take 1 tablet by mouth Every Morning. 30 tablet 11   • promethazine (PHENERGAN) 25 MG tablet TAKE 1 TABLET EVERY 6 HOURSAS NEEDED FOR NAUSEA OR    VOMITING 180 tablet 0   • rOPINIRole (REQUIP) 0.25 MG tablet Take 0.25 mg by mouth 3 (Three) Times a Day As Needed. Take 1 hour before bedtime.     • traZODone (DESYREL) 50 MG tablet Take 1-2 tablets by mouth Every Night. 180 tablet 1     No current facility-administered medications on file prior to visit.       15 minutes   Follow Up   No follow-ups on file.        Continue meds as directed     meds as directed -follow up in 3 months -no changes for now

## 2022-06-03 RX ORDER — CETIRIZINE HYDROCHLORIDE 10 MG/1
10 TABLET ORAL DAILY
COMMUNITY
End: 2022-06-06 | Stop reason: HOSPADM

## 2022-06-06 RX ORDER — ROPINIROLE 0.25 MG/1
TABLET, FILM COATED ORAL
Qty: 270 TABLET | Refills: 3 | Status: SHIPPED | OUTPATIENT
Start: 2022-06-06 | End: 2023-03-20

## 2022-06-08 RX ORDER — FENOFIBRATE 145 MG/1
TABLET, COATED ORAL
Qty: 90 TABLET | Refills: 3 | Status: SHIPPED | OUTPATIENT
Start: 2022-06-08

## 2022-06-16 RX ORDER — LOSARTAN POTASSIUM 25 MG/1
25 TABLET ORAL DAILY
Qty: 90 TABLET | Refills: 3 | Status: SHIPPED | OUTPATIENT
Start: 2022-06-16 | End: 2023-04-06

## 2022-07-05 RX ORDER — DESVENLAFAXINE 100 MG/1
TABLET, EXTENDED RELEASE ORAL
Qty: 90 TABLET | Refills: 3 | Status: SHIPPED | OUTPATIENT
Start: 2022-07-05

## 2022-07-18 ENCOUNTER — TELEPHONE (OUTPATIENT)
Dept: FAMILY MEDICINE CLINIC | Facility: CLINIC | Age: 77
End: 2022-07-18

## 2022-07-18 RX ORDER — GUAIFENESIN 600 MG/1
600 TABLET, EXTENDED RELEASE ORAL 2 TIMES DAILY
Qty: 20 TABLET | Refills: 1 | Status: SHIPPED | OUTPATIENT
Start: 2022-07-18 | End: 2022-08-08 | Stop reason: SDUPTHER

## 2022-07-18 RX ORDER — CLARITHROMYCIN 500 MG/1
500 TABLET, COATED ORAL 2 TIMES DAILY
Qty: 20 TABLET | Refills: 0 | Status: SHIPPED | OUTPATIENT
Start: 2022-07-18 | End: 2022-08-08

## 2022-07-18 RX ORDER — LEVOTHYROXINE SODIUM 50 MCG
TABLET ORAL
Qty: 90 TABLET | Refills: 3 | Status: SHIPPED | OUTPATIENT
Start: 2022-07-18

## 2022-07-18 NOTE — TELEPHONE ENCOUNTER
Alva called and asked if you would give her something for her sinuses and allergies. Send it to [facility]

## 2022-08-08 ENCOUNTER — OFFICE VISIT (OUTPATIENT)
Dept: FAMILY MEDICINE CLINIC | Facility: CLINIC | Age: 77
End: 2022-08-08

## 2022-08-08 VITALS
OXYGEN SATURATION: 97 % | TEMPERATURE: 98.6 F | BODY MASS INDEX: 20.96 KG/M2 | HEART RATE: 74 BPM | HEIGHT: 61 IN | DIASTOLIC BLOOD PRESSURE: 80 MMHG | SYSTOLIC BLOOD PRESSURE: 120 MMHG | WEIGHT: 111 LBS

## 2022-08-08 DIAGNOSIS — J32.9 RECURRENT SINUSITIS: Primary | ICD-10-CM

## 2022-08-08 PROCEDURE — 99213 OFFICE O/P EST LOW 20 MIN: CPT | Performed by: NURSE PRACTITIONER

## 2022-08-08 RX ORDER — LEVOFLOXACIN 500 MG/1
500 TABLET, FILM COATED ORAL DAILY
Qty: 10 TABLET | Refills: 0 | Status: SHIPPED | OUTPATIENT
Start: 2022-08-08 | End: 2022-10-03

## 2022-08-08 RX ORDER — GUAIFENESIN 600 MG/1
600 TABLET, EXTENDED RELEASE ORAL 2 TIMES DAILY
Qty: 20 TABLET | Refills: 1 | Status: SHIPPED | OUTPATIENT
Start: 2022-08-08 | End: 2022-10-17

## 2022-08-08 NOTE — PROGRESS NOTES
"  Chief Complaint   Patient presents with   • Sinusitis     Subjective   Alva Mills is a 77 y.o. female.           Sinusitis  This is a recurrent problem. The current episode started more than 1 month ago. The problem has been gradually worsening since onset. Her pain is at a severity of 6/10. Associated symptoms include congestion, sinus pressure, sneezing and a sore throat. Pertinent negatives include no coughing, diaphoresis, ear pain, headaches, hoarse voice, neck pain, shortness of breath or swollen glands. Past treatments include antibiotics. The treatment provided mild relief.        The following portions of the patient's history were reviewed and updated as appropriate: allergies, current medications, past social history and problem list.    Review of Systems   Constitutional: Positive for activity change. Negative for diaphoresis.   HENT: Positive for congestion, postnasal drip, rhinorrhea, sinus pressure, sinus pain, sneezing and sore throat. Negative for ear pain and hoarse voice.    Eyes: Negative.  Negative for photophobia, redness and visual disturbance.   Respiratory: Negative.  Negative for cough and shortness of breath.    Cardiovascular: Negative.  Negative for chest pain, palpitations and leg swelling.   Gastrointestinal: Negative.    Endocrine: Negative.    Genitourinary: Negative.    Musculoskeletal: Negative.  Negative for neck pain.   Neurological: Negative for headaches.   Hematological: Negative.    Psychiatric/Behavioral: Negative.        Objective   /80   Pulse 74   Temp 98.6 °F (37 °C) (Tympanic)   Ht 154.9 cm (61\")   Wt 50.3 kg (111 lb)   SpO2 97%   BMI 20.97 kg/m²   Physical Exam  Vitals and nursing note reviewed.   HENT:      Head: Normocephalic.      Right Ear: Tympanic membrane normal.      Nose: Congestion and rhinorrhea present.      Mouth/Throat:      Mouth: Mucous membranes are moist.   Eyes:      Pupils: Pupils are equal, round, and reactive to light. "   Cardiovascular:      Rate and Rhythm: Normal rate.      Pulses: Normal pulses.   Pulmonary:      Effort: Pulmonary effort is normal. No respiratory distress.      Breath sounds: No stridor. No wheezing, rhonchi or rales.   Chest:      Chest wall: No tenderness.   Abdominal:      General: Abdomen is flat. There is no distension.      Palpations: There is no mass.      Tenderness: There is no abdominal tenderness.      Hernia: No hernia is present.   Musculoskeletal:         General: Normal range of motion.      Cervical back: Normal range of motion.   Skin:     General: Skin is warm.   Neurological:      General: No focal deficit present.      Mental Status: She is alert and oriented to person, place, and time.   Psychiatric:         Mood and Affect: Mood normal.              Assessment & Plan     Problems Addressed this Visit    None     Visit Diagnoses     Recurrent sinusitis    -  Primary      Diagnoses       Codes Comments    Recurrent sinusitis    -  Primary ICD-10-CM: J32.9  ICD-9-CM: 473.9            New Medications Ordered This Visit   Medications   • levoFLOXacin (Levaquin) 500 MG tablet     Sig: Take 1 tablet by mouth Daily.     Dispense:  10 tablet     Refill:  0   • guaiFENesin (Mucinex) 600 MG 12 hr tablet     Sig: Take 1 tablet by mouth 2 (Two) Times a Day.     Dispense:  20 tablet     Refill:  1     Current Outpatient Medications on File Prior to Visit   Medication Sig Dispense Refill   • acyclovir (ZOVIRAX) 400 MG tablet Take 400 mg by mouth 3 (Three) Times a Day As Needed (for outbreak). Take no more than 5 doses a day.     • CALCIUM PO Take 1 tablet by mouth Daily With Breakfast. 500g     • desvenlafaxine (PRISTIQ) 100 MG 24 hr tablet TAKE 1 TABLET DAILY 90 tablet 3   • fenofibrate (TRICOR) 145 MG tablet TAKE 1 TABLET DAILY 90 tablet 3   • HYDROcodone-acetaminophen (NORCO)  MG per tablet Take 1 tablet by mouth Every 6 (Six) Hours As Needed for Moderate Pain .     • losartan (Cozaar) 25 MG  tablet Take 1 tablet by mouth Daily. 90 tablet 3   • Magnesium 500 MG capsule Take 1 capsule by mouth Daily.     • metoprolol succinate XL (TOPROL-XL) 25 MG 24 hr tablet Take 25 mg by mouth Daily.     • omeprazole (priLOSEC) 40 MG capsule Take 40 mg by mouth Daily.     • promethazine (PHENERGAN) 25 MG tablet TAKE 1 TABLET EVERY 6 HOURSAS NEEDED FOR NAUSEA OR    VOMITING 180 tablet 0   • rOPINIRole (REQUIP) 0.25 MG tablet TAKE 1 TABLET 3 TIMES A  tablet 3   • Synthroid 50 MCG tablet TAKE 1 TABLET DAILY 90 tablet 3   • traZODone (DESYREL) 50 MG tablet Take 1-2 tablets by mouth Every Night. 180 tablet 1   • [DISCONTINUED] guaiFENesin (Mucinex) 600 MG 12 hr tablet Take 1 tablet by mouth 2 (Two) Times a Day. 20 tablet 1   • [DISCONTINUED] clarithromycin (Biaxin) 500 MG tablet Take 1 tablet by mouth 2 (Two) Times a Day. 20 tablet 0     No current facility-administered medications on file prior to visit.       15 minutes   Follow Up   Return for Next scheduled follow up.           It's not just what you eat, but when you eat  Eat breakfast, and eat smaller meals throughout the day. A healthy breakfast can jumpstart your metabolism, while eating small, healthy meals (rather than the standard three large meals) keeps your energy up.   Avoid eating at night. Try to eat dinner earlier and fast for 14-16 hours until breakfast the next morning. Studies suggest that eating only when you’re most active and giving your digestive system a long break each day may help to regulate weight.       Add levaquin and mucinex     Steroid if worsen   Patient agrees. No changes otherwise

## 2022-08-23 RX ORDER — METOPROLOL SUCCINATE 25 MG/1
TABLET, EXTENDED RELEASE ORAL
Qty: 90 TABLET | Refills: 3 | Status: SHIPPED | OUTPATIENT
Start: 2022-08-23

## 2022-09-06 RX ORDER — TRAZODONE HYDROCHLORIDE 50 MG/1
TABLET ORAL
Qty: 180 TABLET | Refills: 1 | Status: SHIPPED | OUTPATIENT
Start: 2022-09-06 | End: 2023-02-13

## 2022-09-27 ENCOUNTER — PREP FOR SURGERY (OUTPATIENT)
Dept: OTHER | Facility: HOSPITAL | Age: 77
End: 2022-09-27

## 2022-09-27 DIAGNOSIS — K83.1 OBSTRUCTION OF BILE DUCT: Primary | ICD-10-CM

## 2022-09-27 RX ORDER — DEXTROSE AND SODIUM CHLORIDE 5; .45 G/100ML; G/100ML
30 INJECTION, SOLUTION INTRAVENOUS CONTINUOUS PRN
Status: CANCELLED | OUTPATIENT
Start: 2022-10-20

## 2022-10-03 ENCOUNTER — LAB (OUTPATIENT)
Dept: LAB | Facility: HOSPITAL | Age: 77
End: 2022-10-03

## 2022-10-03 ENCOUNTER — OFFICE VISIT (OUTPATIENT)
Dept: FAMILY MEDICINE CLINIC | Facility: CLINIC | Age: 77
End: 2022-10-03

## 2022-10-03 VITALS
OXYGEN SATURATION: 98 % | WEIGHT: 112 LBS | HEART RATE: 78 BPM | DIASTOLIC BLOOD PRESSURE: 78 MMHG | BODY MASS INDEX: 21.14 KG/M2 | SYSTOLIC BLOOD PRESSURE: 130 MMHG | HEIGHT: 61 IN

## 2022-10-03 DIAGNOSIS — J01.11 ACUTE RECURRENT FRONTAL SINUSITIS: ICD-10-CM

## 2022-10-03 DIAGNOSIS — F33.2 SEVERE EPISODE OF RECURRENT MAJOR DEPRESSIVE DISORDER, WITHOUT PSYCHOTIC FEATURES: ICD-10-CM

## 2022-10-03 DIAGNOSIS — G89.29 CHRONIC PAIN OF LEFT KNEE: Primary | ICD-10-CM

## 2022-10-03 DIAGNOSIS — M25.562 CHRONIC PAIN OF LEFT KNEE: Primary | ICD-10-CM

## 2022-10-03 LAB
ALBUMIN SERPL-MCNC: 4.3 G/DL (ref 3.5–5.2)
ALBUMIN/GLOB SERPL: 1.5 G/DL
ALP SERPL-CCNC: 55 U/L (ref 39–117)
ALT SERPL W P-5'-P-CCNC: 13 U/L (ref 1–33)
ANION GAP SERPL CALCULATED.3IONS-SCNC: 10 MMOL/L (ref 5–15)
AST SERPL-CCNC: 23 U/L (ref 1–32)
BASOPHILS # BLD AUTO: 0.06 10*3/MM3 (ref 0–0.2)
BASOPHILS NFR BLD AUTO: 1.1 % (ref 0–1.5)
BILIRUB SERPL-MCNC: 0.2 MG/DL (ref 0–1.2)
BUN SERPL-MCNC: 19 MG/DL (ref 8–23)
BUN/CREAT SERPL: 28.4 (ref 7–25)
CALCIUM SPEC-SCNC: 9.5 MG/DL (ref 8.6–10.5)
CHLORIDE SERPL-SCNC: 102 MMOL/L (ref 98–107)
CO2 SERPL-SCNC: 27 MMOL/L (ref 22–29)
CREAT SERPL-MCNC: 0.67 MG/DL (ref 0.57–1)
DEPRECATED RDW RBC AUTO: 43.9 FL (ref 37–54)
EGFRCR SERPLBLD CKD-EPI 2021: 90.7 ML/MIN/1.73
EOSINOPHIL # BLD AUTO: 0.1 10*3/MM3 (ref 0–0.4)
EOSINOPHIL NFR BLD AUTO: 1.9 % (ref 0.3–6.2)
ERYTHROCYTE [DISTWIDTH] IN BLOOD BY AUTOMATED COUNT: 12.8 % (ref 12.3–15.4)
GLOBULIN UR ELPH-MCNC: 2.9 GM/DL
GLUCOSE SERPL-MCNC: 104 MG/DL (ref 65–99)
HCT VFR BLD AUTO: 35.8 % (ref 34–46.6)
HGB BLD-MCNC: 11.7 G/DL (ref 12–15.9)
IMM GRANULOCYTES # BLD AUTO: 0.02 10*3/MM3 (ref 0–0.05)
IMM GRANULOCYTES NFR BLD AUTO: 0.4 % (ref 0–0.5)
IRON 24H UR-MRATE: 28 MCG/DL (ref 37–145)
LYMPHOCYTES # BLD AUTO: 1.42 10*3/MM3 (ref 0.7–3.1)
LYMPHOCYTES NFR BLD AUTO: 26.5 % (ref 19.6–45.3)
MCH RBC QN AUTO: 30.9 PG (ref 26.6–33)
MCHC RBC AUTO-ENTMCNC: 32.7 G/DL (ref 31.5–35.7)
MCV RBC AUTO: 94.5 FL (ref 79–97)
MONOCYTES # BLD AUTO: 0.9 10*3/MM3 (ref 0.1–0.9)
MONOCYTES NFR BLD AUTO: 16.8 % (ref 5–12)
NEUTROPHILS NFR BLD AUTO: 2.85 10*3/MM3 (ref 1.7–7)
NEUTROPHILS NFR BLD AUTO: 53.3 % (ref 42.7–76)
NRBC BLD AUTO-RTO: 0 /100 WBC (ref 0–0.2)
PLATELET # BLD AUTO: 304 10*3/MM3 (ref 140–450)
PMV BLD AUTO: 9.8 FL (ref 6–12)
POTASSIUM SERPL-SCNC: 4.6 MMOL/L (ref 3.5–5.2)
PROT SERPL-MCNC: 7.2 G/DL (ref 6–8.5)
RBC # BLD AUTO: 3.79 10*6/MM3 (ref 3.77–5.28)
SODIUM SERPL-SCNC: 139 MMOL/L (ref 136–145)
TSH SERPL DL<=0.05 MIU/L-ACNC: 2.22 UIU/ML (ref 0.27–4.2)
WBC NRBC COR # BLD: 5.35 10*3/MM3 (ref 3.4–10.8)

## 2022-10-03 PROCEDURE — 84443 ASSAY THYROID STIM HORMONE: CPT | Performed by: NURSE PRACTITIONER

## 2022-10-03 PROCEDURE — 85025 COMPLETE CBC W/AUTO DIFF WBC: CPT | Performed by: NURSE PRACTITIONER

## 2022-10-03 PROCEDURE — 99213 OFFICE O/P EST LOW 20 MIN: CPT | Performed by: NURSE PRACTITIONER

## 2022-10-03 PROCEDURE — 80053 COMPREHEN METABOLIC PANEL: CPT | Performed by: NURSE PRACTITIONER

## 2022-10-03 PROCEDURE — 93010 ELECTROCARDIOGRAM REPORT: CPT | Performed by: INTERNAL MEDICINE

## 2022-10-03 PROCEDURE — 83540 ASSAY OF IRON: CPT | Performed by: NURSE PRACTITIONER

## 2022-10-03 PROCEDURE — 93005 ELECTROCARDIOGRAM TRACING: CPT | Performed by: NURSE PRACTITIONER

## 2022-10-03 PROCEDURE — 36415 COLL VENOUS BLD VENIPUNCTURE: CPT | Performed by: NURSE PRACTITIONER

## 2022-10-03 RX ORDER — BREXPIPRAZOLE 0.25 MG/1
1 TABLET ORAL DAILY
Qty: 30 TABLET | Refills: 11 | Status: SHIPPED | OUTPATIENT
Start: 2022-10-03 | End: 2022-11-03

## 2022-10-03 RX ORDER — CLARITHROMYCIN 500 MG/1
500 TABLET, COATED ORAL 2 TIMES DAILY
Qty: 20 TABLET | Refills: 0 | Status: SHIPPED | OUTPATIENT
Start: 2022-10-03 | End: 2022-10-17

## 2022-10-03 RX ORDER — GUAIFENESIN 600 MG/1
600 TABLET, EXTENDED RELEASE ORAL 2 TIMES DAILY
Qty: 60 TABLET | Refills: 11 | Status: SHIPPED | OUTPATIENT
Start: 2022-10-03 | End: 2022-10-17

## 2022-10-03 RX ORDER — FLUTICASONE PROPIONATE 50 MCG
2 SPRAY, SUSPENSION (ML) NASAL DAILY
Qty: 16 G | Refills: 11 | Status: SHIPPED | OUTPATIENT
Start: 2022-10-03

## 2022-10-03 RX ORDER — LORATADINE 10 MG/1
10 TABLET ORAL DAILY
Qty: 90 TABLET | Refills: 11 | Status: SHIPPED | OUTPATIENT
Start: 2022-10-03 | End: 2023-01-04 | Stop reason: SDUPTHER

## 2022-10-03 NOTE — PROGRESS NOTES
Chief Complaint   Patient presents with   • Pre-op Exam     TKA    • Sinus Problem     Subjective   Alva Mills is a 77 y.o. female.           Knee Pain   The incident occurred more than 1 week ago. There was no injury mechanism. The quality of the pain is described as cramping. The pain is at a severity of 3/10. The pain is mild. The pain has been fluctuating since onset. Associated symptoms include an inability to bear weight, muscle weakness and tingling. Pertinent negatives include no loss of motion, loss of sensation or numbness. The symptoms are aggravated by movement. She has tried acetaminophen, NSAIDs and ice for the symptoms. The treatment provided mild relief.   Sinusitis  This is a recurrent problem. The current episode started more than 1 month ago. The problem has been gradually worsening since onset. Her pain is at a severity of 2/10. The pain is mild. Associated symptoms include congestion and sinus pressure. Pertinent negatives include no chills, coughing, diaphoresis, ear pain, headaches, neck pain, shortness of breath, sneezing, sore throat or swollen glands. The treatment provided mild relief.   Depression  Visit Type: follow-up  Patient presents with the following symptoms: excessive worry, feelings of hopelessness, feelings of worthlessness, irritability and nervousness/anxiety.  Patient is not experiencing: choking sensation, confusion, decreased concentration, palpitations, shortness of breath and suicidal ideas.  Frequency of symptoms: occasionally   Nighttime awakenings: many  Compliance with medications:  %             The following portions of the patient's history were reviewed and updated as appropriate: allergies, current medications, past social history and problem list.    Review of Systems   Constitutional: Positive for irritability. Negative for activity change, appetite change, chills, diaphoresis, fatigue and fever.   HENT: Positive for congestion, rhinorrhea,  "sinus pressure and sinus pain. Negative for dental problem, drooling, ear discharge, ear pain, facial swelling, hearing loss, mouth sores, nosebleeds, postnasal drip, sneezing, sore throat, tinnitus and trouble swallowing.    Eyes: Negative.  Negative for photophobia, redness and visual disturbance.   Respiratory: Negative.  Negative for apnea, cough, choking, chest tightness and shortness of breath.    Cardiovascular: Negative for chest pain, palpitations and leg swelling.   Gastrointestinal: Positive for abdominal pain, diarrhea and vomiting. Negative for abdominal distention, anal bleeding, blood in stool and nausea.   Endocrine: Negative.  Negative for cold intolerance, heat intolerance, polydipsia, polyphagia and polyuria.   Genitourinary: Negative.  Negative for difficulty urinating and dyspareunia.   Musculoskeletal: Positive for arthralgias, gait problem and joint swelling. Negative for back pain, myalgias, neck pain and neck stiffness.   Skin: Negative.  Negative for color change, pallor, rash and wound.   Allergic/Immunologic: Negative.  Negative for environmental allergies, food allergies and immunocompromised state.   Neurological: Positive for tingling. Negative for dizziness, tremors, seizures, syncope, facial asymmetry, speech difficulty, weakness, light-headedness, numbness and headaches.   Hematological: Negative.  Negative for adenopathy. Does not bruise/bleed easily.   Psychiatric/Behavioral: Positive for agitation and sleep disturbance. Negative for behavioral problems, confusion, decreased concentration, dysphoric mood, hallucinations, self-injury and suicidal ideas. The patient is nervous/anxious. The patient is not hyperactive.         Anxiety improved--still present but improved       Objective   /78   Pulse 78   Ht 154.9 cm (61\")   Wt 50.8 kg (112 lb)   SpO2 98%   BMI 21.16 kg/m²   Physical Exam  Vitals and nursing note reviewed.   Constitutional:       Appearance: Normal " appearance.   HENT:      Head: Normocephalic.      Right Ear: There is no impacted cerumen.      Left Ear: There is no impacted cerumen.      Nose: Congestion and rhinorrhea present.      Mouth/Throat:      Pharynx: No oropharyngeal exudate or posterior oropharyngeal erythema.   Eyes:      General: No scleral icterus.        Right eye: No discharge.         Left eye: No discharge.      Pupils: Pupils are equal, round, and reactive to light.   Neck:      Vascular: No carotid bruit.   Cardiovascular:      Rate and Rhythm: Normal rate.      Pulses: Normal pulses.      Heart sounds: No murmur heard.    No friction rub. No gallop.   Pulmonary:      Effort: Pulmonary effort is normal. No respiratory distress.      Breath sounds: No stridor. No wheezing, rhonchi or rales.   Chest:      Chest wall: No tenderness.   Abdominal:      General: Abdomen is flat. There is no distension.      Palpations: There is no mass.      Tenderness: There is no abdominal tenderness. There is no right CVA tenderness, left CVA tenderness, guarding or rebound.      Hernia: No hernia is present.   Musculoskeletal:         General: Tenderness present. No swelling, deformity or signs of injury. Normal range of motion.      Cervical back: Normal range of motion. No rigidity or tenderness.      Right lower leg: No edema.      Left lower leg: No edema.        Legs:       Comments: Left knee    Lymphadenopathy:      Cervical: No cervical adenopathy.   Skin:     General: Skin is warm and dry.      Coloration: Skin is not jaundiced or pale.      Findings: No bruising, erythema, lesion or rash.   Neurological:      General: No focal deficit present.      Mental Status: She is alert and oriented to person, place, and time.      Cranial Nerves: No cranial nerve deficit.      Sensory: No sensory deficit.      Motor: No weakness.      Coordination: Coordination normal.   Psychiatric:         Mood and Affect: Mood normal.         Behavior: Behavior normal.               Assessment & Plan     Problems Addressed this Visit        Mental Health    Depression - Primary    Relevant Medications    Brexpiprazole (Rexulti) 0.25 MG tablet      Other Visit Diagnoses     Chronic pain of left knee        Relevant Orders    ECG 12 Lead (Completed)    XR Chest PA & Lateral    CBC & Differential (Completed)    Comprehensive Metabolic Panel    Iron    TSH    Acute recurrent frontal sinusitis          Diagnoses       Codes Comments    Severe episode of recurrent major depressive disorder, without psychotic features (HCC)    -  Primary ICD-10-CM: F33.2  ICD-9-CM: 296.33     Chronic pain of left knee     ICD-10-CM: M25.562, G89.29  ICD-9-CM: 719.46, 338.29     Acute recurrent frontal sinusitis     ICD-10-CM: J01.11  ICD-9-CM: 461.1            New Medications Ordered This Visit   Medications   • guaiFENesin (Mucinex) 600 MG 12 hr tablet     Sig: Take 1 tablet by mouth 2 (Two) Times a Day.     Dispense:  60 tablet     Refill:  11   • loratadine (Claritin) 10 MG tablet     Sig: Take 1 tablet by mouth Daily.     Dispense:  90 tablet     Refill:  11   • fluticasone (Flonase) 50 MCG/ACT nasal spray     Si sprays into the nostril(s) as directed by provider Daily.     Dispense:  16 g     Refill:  11   • clarithromycin (BIAXIN) 500 MG tablet     Sig: Take 1 tablet by mouth 2 (Two) Times a Day.     Dispense:  20 tablet     Refill:  0   • Brexpiprazole (Rexulti) 0.25 MG tablet     Sig: Take 1 tablet by mouth Daily.     Dispense:  30 tablet     Refill:  11     Current Outpatient Medications on File Prior to Visit   Medication Sig Dispense Refill   • acyclovir (ZOVIRAX) 400 MG tablet Take 400 mg by mouth 3 (Three) Times a Day As Needed (for outbreak). Take no more than 5 doses a day.     • CALCIUM PO Take 1 tablet by mouth Daily With Breakfast. 500g     • desvenlafaxine (PRISTIQ) 100 MG 24 hr tablet TAKE 1 TABLET DAILY 90 tablet 3   • fenofibrate (TRICOR) 145 MG tablet TAKE 1 TABLET DAILY 90  tablet 3   • guaiFENesin (Mucinex) 600 MG 12 hr tablet Take 1 tablet by mouth 2 (Two) Times a Day. 20 tablet 1   • HYDROcodone-acetaminophen (NORCO)  MG per tablet Take 1 tablet by mouth Every 6 (Six) Hours As Needed for Moderate Pain .     • losartan (Cozaar) 25 MG tablet Take 1 tablet by mouth Daily. 90 tablet 3   • Magnesium 500 MG capsule Take 1 capsule by mouth Daily.     • metoprolol succinate XL (TOPROL-XL) 25 MG 24 hr tablet TAKE 1 TABLET DAILY 90 tablet 3   • omeprazole (priLOSEC) 40 MG capsule Take 40 mg by mouth Daily.     • promethazine (PHENERGAN) 25 MG tablet TAKE 1 TABLET EVERY 6 HOURSAS NEEDED FOR NAUSEA OR    VOMITING 180 tablet 0   • rOPINIRole (REQUIP) 0.25 MG tablet TAKE 1 TABLET 3 TIMES A  tablet 3   • Synthroid 50 MCG tablet TAKE 1 TABLET DAILY 90 tablet 3   • traZODone (DESYREL) 50 MG tablet TAKE 1 TO 2 TABLETS AT     BEDTIME 180 tablet 1   • [DISCONTINUED] levoFLOXacin (Levaquin) 500 MG tablet Take 1 tablet by mouth Daily. 10 tablet 0     No current facility-administered medications on file prior to visit.       15 minutes    Follow Up   Return for Next scheduled follow up.        It's not just what you eat, but when you eat  Eat breakfast, and eat smaller meals throughout the day. A healthy breakfast can jumpstart your metabolism, while eating small, healthy meals (rather than the standard three large meals) keeps your energy up.   Avoid eating at night. Try to eat dinner earlier and fast for 14-16 hours until breakfast the next morning. Studies suggest that eating only when you’re most active and giving your digestive system a long break each day may help to regulate weight.     Treat as directed, follow up if worsen  Ekg, chest xray and labs as directed    Add meds for mdd-patient's request -consider therapy as directed  Diet discussed  meds reviewed    ekg same as 2016 and 2022   Cleared for left tka-patient is optimal health at this time and will benefit from this procedure.    Labs reviewed      Details    Reading Physician Reading Date Result Priority   Bobby Mancia MD  [phone] 10/3/2022      Narrative & Impression  EXAM DESCRIPTION:  XR CHEST 2 VIEWS     CLINICAL INDICATION: needs clearance for left tka, M25.562 Pain  in left knee G89.29 Other chronic pain      COMPARISON: None     FINDINGS: Mild cardiomegaly. Pulmonary vascularity appears within  normal limits. There is mild linear likely scarring in the right  lung base. No pleural effusion or pneumothorax. Postoperative  changes in the lumbar spine, incompletely visualized.     IMPRESSION:  1. Mild cardiomegaly.  2. Mild likely scarring in the right lung base.  3. No evidence for acute cardiopulmonary process.     Electronically signed by:  Bobby Mancia MD  10/3/2022 11:40 AM  CDT Workstation: [id number]       Needs otc slow iron daily

## 2022-10-04 LAB
QT INTERVAL: 398 MS
QTC INTERVAL: 420 MS

## 2022-10-20 ENCOUNTER — ANESTHESIA EVENT (OUTPATIENT)
Dept: GASTROENTEROLOGY | Facility: HOSPITAL | Age: 77
End: 2022-10-20

## 2022-10-20 ENCOUNTER — ANESTHESIA (OUTPATIENT)
Dept: GASTROENTEROLOGY | Facility: HOSPITAL | Age: 77
End: 2022-10-20

## 2022-10-20 ENCOUNTER — HOSPITAL ENCOUNTER (OUTPATIENT)
Facility: HOSPITAL | Age: 77
Setting detail: HOSPITAL OUTPATIENT SURGERY
Discharge: HOME | End: 2022-10-20
Attending: INTERNAL MEDICINE | Admitting: INTERNAL MEDICINE

## 2022-10-20 ENCOUNTER — HOSPITAL ENCOUNTER (OUTPATIENT)
Dept: GENERAL RADIOLOGY | Facility: HOSPITAL | Age: 77
Discharge: HOME | End: 2022-10-20

## 2022-10-20 VITALS
TEMPERATURE: 97.3 F | HEIGHT: 61 IN | WEIGHT: 109.4 LBS | SYSTOLIC BLOOD PRESSURE: 99 MMHG | RESPIRATION RATE: 16 BRPM | OXYGEN SATURATION: 98 % | BODY MASS INDEX: 20.65 KG/M2 | HEART RATE: 72 BPM | DIASTOLIC BLOOD PRESSURE: 60 MMHG

## 2022-10-20 DIAGNOSIS — K83.1 OBSTRUCTION OF BILE DUCT: ICD-10-CM

## 2022-10-20 PROCEDURE — 25010000002 CEFTRIAXONE PER 250 MG: Performed by: INTERNAL MEDICINE

## 2022-10-20 PROCEDURE — 25010000002 ONDANSETRON PER 1 MG

## 2022-10-20 PROCEDURE — 25010000002 IOPAMIDOL 61 % SOLUTION: Performed by: INTERNAL MEDICINE

## 2022-10-20 PROCEDURE — 25010000002 FENTANYL CITRATE (PF) 100 MCG/2ML SOLUTION

## 2022-10-20 PROCEDURE — C1769 GUIDE WIRE: HCPCS | Performed by: INTERNAL MEDICINE

## 2022-10-20 PROCEDURE — 74328 X-RAY BILE DUCT ENDOSCOPY: CPT

## 2022-10-20 PROCEDURE — 25010000002 PROPOFOL 10 MG/ML EMULSION

## 2022-10-20 RX ORDER — FENTANYL CITRATE 50 UG/ML
INJECTION, SOLUTION INTRAMUSCULAR; INTRAVENOUS AS NEEDED
Status: DISCONTINUED | OUTPATIENT
Start: 2022-10-20 | End: 2022-10-20 | Stop reason: SURG

## 2022-10-20 RX ORDER — PROPOFOL 10 MG/ML
VIAL (ML) INTRAVENOUS AS NEEDED
Status: DISCONTINUED | OUTPATIENT
Start: 2022-10-20 | End: 2022-10-20 | Stop reason: SURG

## 2022-10-20 RX ORDER — DEXTROSE AND SODIUM CHLORIDE 5; .45 G/100ML; G/100ML
30 INJECTION, SOLUTION INTRAVENOUS CONTINUOUS PRN
Status: DISCONTINUED | OUTPATIENT
Start: 2022-10-20 | End: 2022-10-20 | Stop reason: HOSPADM

## 2022-10-20 RX ORDER — ONDANSETRON 2 MG/ML
INJECTION INTRAMUSCULAR; INTRAVENOUS AS NEEDED
Status: DISCONTINUED | OUTPATIENT
Start: 2022-10-20 | End: 2022-10-20 | Stop reason: SURG

## 2022-10-20 RX ORDER — LIDOCAINE HYDROCHLORIDE 20 MG/ML
INJECTION, SOLUTION INTRAVENOUS AS NEEDED
Status: DISCONTINUED | OUTPATIENT
Start: 2022-10-20 | End: 2022-10-20 | Stop reason: SURG

## 2022-10-20 RX ADMIN — PROPOFOL 20 MG: 10 INJECTION, EMULSION INTRAVENOUS at 11:08

## 2022-10-20 RX ADMIN — LIDOCAINE HYDROCHLORIDE 60 MG: 20 INJECTION, SOLUTION INTRAVENOUS at 11:05

## 2022-10-20 RX ADMIN — PROPOFOL 20 MG: 10 INJECTION, EMULSION INTRAVENOUS at 11:12

## 2022-10-20 RX ADMIN — PROPOFOL 20 MG: 10 INJECTION, EMULSION INTRAVENOUS at 11:20

## 2022-10-20 RX ADMIN — PROPOFOL 10 MG: 10 INJECTION, EMULSION INTRAVENOUS at 11:10

## 2022-10-20 RX ADMIN — PROPOFOL 20 MG: 10 INJECTION, EMULSION INTRAVENOUS at 11:16

## 2022-10-20 RX ADMIN — PROPOFOL 10 MG: 10 INJECTION, EMULSION INTRAVENOUS at 11:09

## 2022-10-20 RX ADMIN — FENTANYL CITRATE 25 MCG: 50 INJECTION, SOLUTION INTRAMUSCULAR; INTRAVENOUS at 11:05

## 2022-10-20 RX ADMIN — IOPAMIDOL 5 ML: 612 INJECTION, SOLUTION INTRAVENOUS at 11:38

## 2022-10-20 RX ADMIN — ONDANSETRON 4 MG: 2 INJECTION INTRAMUSCULAR; INTRAVENOUS at 10:59

## 2022-10-20 RX ADMIN — PROPOFOL 40 MG: 10 INJECTION, EMULSION INTRAVENOUS at 11:05

## 2022-10-20 RX ADMIN — DEXTROSE AND SODIUM CHLORIDE 30 ML/HR: 5; 450 INJECTION, SOLUTION INTRAVENOUS at 10:33

## 2022-10-20 RX ADMIN — FENTANYL CITRATE 25 MCG: 50 INJECTION, SOLUTION INTRAMUSCULAR; INTRAVENOUS at 11:27

## 2022-10-20 RX ADMIN — PROPOFOL 20 MG: 10 INJECTION, EMULSION INTRAVENOUS at 11:28

## 2022-10-20 RX ADMIN — PROPOFOL 20 MG: 10 INJECTION, EMULSION INTRAVENOUS at 11:23

## 2022-10-20 RX ADMIN — FENTANYL CITRATE 50 MCG: 50 INJECTION, SOLUTION INTRAMUSCULAR; INTRAVENOUS at 10:58

## 2022-10-20 RX ADMIN — PROPOFOL 20 MG: 10 INJECTION, EMULSION INTRAVENOUS at 11:26

## 2022-10-20 RX ADMIN — PROPOFOL 20 MG: 10 INJECTION, EMULSION INTRAVENOUS at 11:19

## 2022-10-20 RX ADMIN — PROPOFOL 20 MG: 10 INJECTION, EMULSION INTRAVENOUS at 11:14

## 2022-10-20 RX ADMIN — PROPOFOL 20 MG: 10 INJECTION, EMULSION INTRAVENOUS at 11:06

## 2022-10-20 RX ADMIN — PROPOFOL 20 MG: 10 INJECTION, EMULSION INTRAVENOUS at 11:21

## 2022-10-20 RX ADMIN — PROPOFOL 20 MG: 10 INJECTION, EMULSION INTRAVENOUS at 11:17

## 2022-10-20 NOTE — H&P
Amanda Kearney DO,[facility]  Gastroenterology  Hepatology  Endoscopy  Board Certified in Internal Medicine and gastroenterology  [address], [address]  [place]. [address]  T- [phone]   F - [phone]     GASTROENTEROLOGY HISTORY AND PHYSICAL  NOTE   AMANDA KEARNEY DO.         SUBJECTIVE:   10/20/2022    Name: Alva Mills  DOD: 1945        Chief Complaint:     Subjective : Sump syndrome.  Here for cleaning of the common bile duct.  Not felt to be a surgical candidate by Dr. Greer    Patient is 77 y.o. female presents with desire for elective ERCP with removal of common bile duct stones as required.      ROS/HISTORY/ CURRENT MEDICATIONS/OBJECTIVE/VS/PE:   Review of Systems:  All systems unremarkable unless specified below.  Constitutional   HENT  Eyes   Respiratory    Cardiovascular  Gastrointestinal   Endocrine  Genitourinary    Musculoskeletal   Skin  Allergic/Immunologic    Neurological    Hematological  Psychiatric/Behavioral    History:     Past Medical History:   Diagnosis Date   • Abdominal pain    • Acquired hypothyroidism    • Acute bronchitis    • Acute sinusitis    • Acute upper respiratory infection    • Benign hypertension    • Breast cyst    • Breast lump     history of, right   • Calf pain    • Common bile duct calculus    • Constipation    • Cough    • Depressive disorder    • Elevated cholesterol    • Epigastric pain    • External hemorrhoids without complication    • Fibrocystic breast    • Functional heart murmur    • Gastroesophageal reflux disease    • General medical exam    • Hemorrhoids    • Hyperlipidemia    • Incisional hernia     no evident recurrence at this juncture   • Ingrown toenail    • Localized, primary osteoarthritis of ankle or foot    • Muscle strain    • Skin cancer     basal cell on face   • Skin lesion      Past Surgical History:   Procedure Laterality Date   • APPENDECTOMY      done with hysterectomy   • BACK SURGERY      x 3   • BILE DUCT  EXPLORATION  12/10/2013    Remove bile duct stone (1)    Common duct exploration, stone extraction, choledochoduodenostomy and choledochoscopy.    • BREAST BIOPSY Right 1991    Stereotactic breast biopsy (1)    Right breast    • CATARACT EXTRACTION W/ INTRAOCULAR LENS IMPLANT Right 2/18/2022    Procedure: REMOVE CATARACT AND IMPLANT   INTRAOCULAR LENS;  Surgeon: Dani Bonner MD;  Location[facility];  Service: Ophthalmology;  Laterality: Right;   • CHOLECYSTECTOMY OPEN  1989   • ERCP Left 1/10/2019    Procedure: ENDOSCOPIC RETROGRADE CHOLANGIOPANCREATOGRAPHY;  Surgeon: Homero Allen DO;  Location: [facility] ENDOSCOPY;  Service: Gastroenterology   • ERCP Left 3/14/2019    Procedure: ENDOSCOPIC RETROGRADE CHOLANGIOPANCREATOGRAPHY;  Surgeon: Homero Allen DO;  Location: [facility] ENDOSCOPY;  Service: Gastroenterology   • ERCP Left 9/10/2019    Procedure: ENDOSCOPIC RETROGRADE CHOLANGIOPANCREATOGRAPHY;  Surgeon: Homero Allen DO;  Location: [facility] ENDOSCOPY;  Service: Gastroenterology   • ERCP Left 3/12/2020    Procedure: ENDOSCOPIC RETROGRADE CHOLANGIOPANCREATOGRAPHY;  Surgeon: Homero Allen DO;  Location: [facility] ENDOSCOPY;  Service: Gastroenterology;  Laterality: Left;   • ERCP Left 8/10/2020    Procedure: ENDOSCOPIC RETROGRADE CHOLANGIOPANCREATOGRAPHY;  Surgeon: Homero Allen DO;  Location: [facility] ENDOSCOPY;  Service: Gastroenterology;  Laterality: Left;   • ERCP Left 1/19/2021    Procedure: ENDOSCOPIC RETROGRADE CHOLANGIOPANCREATOGRAPHY;  Surgeon: Homero Allen DO;  Location: [facility] ENDOSCOPY;  Service: Gastroenterology;  Laterality: Left;   • ERCP Left 12/2/2021    Procedure: ENDOSCOPIC RETROGRADE CHOLANGIOPANCREATOGRAPHY;  Surgeon: Homero Allen DO;  Location: [facility] ENDOSCOPY;  Service: Gastroenterology;  Laterality: Left;   • ERCP WITH STENT PLACEMENT N/A 6/17/2021    Procedure: ENDOSCOPIC RETROGRADE CHOLANGIOPANCREATOGRAPHY WITH STENT PLACEMENT;  Surgeon: Homero Allen  R, DO;  Location: [facility] ENDOSCOPY;  Service: Gastroenterology;  Laterality: N/A;   • HERNIA REPAIR  2014    Anesth, repair of hernia (1)    laparoscopic ventral hernioplasty with mesh. Ventral hernia.    • HYSTERECTOMY  10/17/2001    Anesth, hysterectomy (1)    Laparoscopic subtotal hysterectomy & BSO. Enterolysis of sigmoid colon.    • TUBAL ABDOMINAL LIGATION       Family History   Problem Relation Age of Onset   • Heart disease Other    • Hypertension Other    • Heart disease Father      Social History     Tobacco Use   • Smoking status: Former     Types: Cigarettes     Quit date: 1969     Years since quittin.8   • Smokeless tobacco: Never   Vaping Use   • Vaping Use: Never used   Substance Use Topics   • Alcohol use: No   • Drug use: No     Prior to Admission medications    Medication Sig Start Date End Date Taking? Authorizing Provider   acyclovir (ZOVIRAX) 400 MG tablet Take 400 mg by mouth 3 (Three) Times a Day As Needed (for outbreak). Take no more than 5 doses a day.   Yes Kev Gramajo MD   Brexpiprazole (Rexulti) 0.25 MG tablet Take 1 tablet by mouth Daily. 10/3/22  Yes Estefania Vicente APRN   CALCIUM PO Take 1 tablet by mouth Daily With Breakfast. 500g   Yes Kev Gramajo MD   desvenlafaxine (PRISTIQ) 100 MG 24 hr tablet TAKE 1 TABLET DAILY 22  Yes Estefania Vicente APRN   fenofibrate (TRICOR) 145 MG tablet TAKE 1 TABLET DAILY 22  Yes Estefania Vicente APRN   fluticasone (Flonase) 50 MCG/ACT nasal spray 2 sprays into the nostril(s) as directed by provider Daily. 10/3/22  Yes Estefania Vicente APRN   HYDROcodone-acetaminophen (NORCO)  MG per tablet Take 1 tablet by mouth Every 6 (Six) Hours As Needed for Moderate Pain .   Yes Kev Gramajo MD   loratadine (Claritin) 10 MG tablet Take 1 tablet by mouth Daily. 10/3/22  Yes Estefania Vicente APRN   losartan (Cozaar) 25 MG tablet Take 1 tablet by mouth Daily. 22  Yes Kenneth  Estefania Wylie APRN   Magnesium 500 MG capsule Take 1 capsule by mouth Daily.   Yes ProviderKev MD   metoprolol succinate XL (TOPROL-XL) 25 MG 24 hr tablet TAKE 1 TABLET DAILY 8/23/22  Yes Estefania Vicente APRN   omeprazole (priLOSEC) 40 MG capsule Take 40 mg by mouth Daily. 10/21/19  Yes Kev Gramajo MD   promethazine (PHENERGAN) 25 MG tablet TAKE 1 TABLET EVERY 6 HOURSAS NEEDED FOR NAUSEA OR    VOMITING 4/12/22  Yes Estefania Vicente APRN   rOPINIRole (REQUIP) 0.25 MG tablet TAKE 1 TABLET 3 TIMES A DAY 6/6/22  Yes Estefania Vicente APRN   Synthroid 50 MCG tablet TAKE 1 TABLET DAILY 7/18/22  Yes Estefania Vicente APRN   traZODone (DESYREL) 50 MG tablet TAKE 1 TO 2 TABLETS AT     BEDTIME 9/6/22  Yes Estefania Vicente APRN     Allergies:  Clonazepam, Hydromorphone, Morphine, and Percocet [oxycodone-acetaminophen]    I have reviewed the patients medical history, surgical history and family history in the available medical record system.     Current Medications:     Current Facility-Administered Medications   Medication Dose Route Frequency Provider Last Rate Last Admin   • dextrose 5 % and sodium chloride 0.45 % infusion  30 mL/hr Intravenous Continuous PRN Homero Allen,            Objective     Physical Exam:   Temp:  [96.8 °F (36 °C)] 96.8 °F (36 °C)  Heart Rate:  [80] 80  Resp:  [18] 18  BP: (168)/(72) 168/72    Physical Exam:  General Appearance:    Alert, cooperative, in no acute distress   Head:    Normocephalic, without obvious abnormality, atraumatic   Eyes:            Lids and lashes normal, conjunctivae and sclerae normal, no icterus, no pallor, corneas clear, PERRLA   Ears:    Ears appear intact with no abnormalities noted   Throat:   No oral lesions, no thrush, oral mucosa moist   Neck:   No adenopathy, supple, trachea midline, no thyromegaly, no  carotid bruit, no JVD   Back:     No kyphosis present, no scoliosis present, no skin lesions,   erythema or  scars, no tenderness to percussion or                 palpation,  range of motion normal   Lungs:     Clear to auscultation,respirations regular, even and         unlabored    Heart:    Regular rhythm and normal rate, normal S1 and S2, no  murmur, no gallop, no rub, no click   Breast Exam:    Deferred   Abdomen:     Normal bowel sounds, no masses, no organomegaly, soft  nontender, nondistended, no guarding, no rebound                 tenderness   Genitalia:    Deferred   Extremities:   Moves all extremities well, no edema, no cyanosis, no          redness   Pulses:   Pulses palpable and equal bilaterally   Skin:   No bleeding, bruising or rash   Lymph nodes:   No palpable adenopathy   Neurologic:   Cranial nerves 2 - 12 grossly intact, sensation intact, DTR     present and equal bilaterally      Results Review:     Lab Results   Component Value Date    WBC 5.35 10/03/2022    WBC 7.61 03/07/2022    WBC 4.35 (L) 02/11/2022    HGB 11.7 (L) 10/03/2022    HGB 12.0 03/07/2022    HGB 10.8 (L) 02/11/2022    HCT 35.8 10/03/2022    HCT 36.4 03/07/2022    HCT 33.2 (L) 02/11/2022     10/03/2022     03/07/2022     02/11/2022             No results found for: LIPASE  Lab Results   Component Value Date    INR 1.0 03/27/2016    INR 1.0 02/05/2015     No results found for: THROATCX    Radiology Review:  Imaging Results (Last 72 Hours)     ** No results found for the last 72 hours. **           I reviewed the patient's new clinical results.  I reviewed the patient's new imaging results and agree with the interpretation.     ASSESSMENT/PLAN:   ASSESSMENT:  1.  Choledocholithiasis without cholangitis  2.  Sump syndrome    PLAN:  1.  Endoscopic retrograde cholangiogram with removal of common bile duct stones    Risk and benefits associated with the procedure are reviewed.  The patient wished to proceed     Homero Allen DO  10/20/22  10:30 CDT

## 2022-10-20 NOTE — ANESTHESIA POSTPROCEDURE EVALUATION
Patient: Alva Mills    Procedure Summary     Date: 10/20/22 Room / Location: [facility] ENDOSCOPY 2 / [facility] ENDOSCOPY    Anesthesia Start: 1052 Anesthesia Stop: 1135    Procedure: ENDOSCOPIC RETROGRADE CHOLANGIOPANCREATOGRAPHY 11:00 Diagnosis:       Obstruction of bile duct      (Obstruction of bile duct [K83.1])    Surgeons: Homero Allen DO Provider: Selma Wilks CRNA    Anesthesia Type: general ASA Status: 3          Anesthesia Type: general    Vitals  Vitals Value Taken Time   BP 99/60 10/20/22 1145   Temp 97.3 °F (36.3 °C) 10/20/22 1140   Pulse 72 10/20/22 1145   Resp 16 10/20/22 1145   SpO2 98 % 10/20/22 1145           Post Anesthesia Care and Evaluation    Patient location during evaluation: bedside  Patient participation: complete - patient participated  Level of consciousness: awake and alert  Pain management: adequate    Airway patency: patent  Anesthetic complications: No anesthetic complications  PONV Status: none  Cardiovascular status: acceptable  Respiratory status: acceptable  Hydration status: acceptable    Comments: --------------------            10/20/22               1145     --------------------   BP:       99/60      Pulse:      72       Resp:       16       Temp:                SpO2:      98%      --------------------

## 2022-10-20 NOTE — ANESTHESIA PREPROCEDURE EVALUATION
Anesthesia Evaluation     Patient summary reviewed   no history of anesthetic complications:  NPO Solid Status: > 8 hours  NPO Liquid Status: > 4 hours           Airway   Mallampati: II  TM distance: >3 FB  Neck ROM: full  No difficulty expected  Dental - normal exam   (+) upper dentures        Pulmonary - normal exam   (+) a smoker Former, recent URI,   Cardiovascular - normal exam  Exercise tolerance: good (4-7 METS)    ECG reviewed  Patient on routine beta blocker    (+) hypertension well controlled less than 2 medications, valvular problems/murmurs murmur, hyperlipidemia,     ROS comment: Test Reason : M25.562  Blood Pressure :   */*   mmHG  Vent. Rate :  67 BPM     Atrial Rate :  67 BPM     P-R Int : 170 ms          QRS Dur :  82 ms      QT Int : 398 ms       P-R-T Axes :  31  -3  32 degrees     QTc Int : 420 ms     Normal sinus rhythm  Possible Anteroseptal infarct , age undetermined  Cannot rule out Inferior infarct , age undetermined  Abnormal ECG  When compared with ECG of 07-MAR-2022 10:58,  No significant change was found     Referred By: STANLEY BLACK           Confirmed By: STU DUVALL    Specimen Collected: 10/03/22 09:59 CDT          Neuro/Psych  (+) psychiatric history Anxiety and Depression,    GI/Hepatic/Renal/Endo    (+)  GERD well controlled,  thyroid problem hypothyroidism    Musculoskeletal     Abdominal  - normal exam   Substance History      OB/GYN          Other   arthritis,    history of cancer      Other Comment: Skin cancer basal cell on face                     Anesthesia Plan    ASA 3     general   total IV anesthesia  intravenous induction     Anesthetic plan, risks, benefits, and alternatives have been provided, discussed and informed consent has been obtained with: patient.

## 2022-11-03 ENCOUNTER — OFFICE VISIT (OUTPATIENT)
Dept: FAMILY MEDICINE CLINIC | Facility: CLINIC | Age: 77
End: 2022-11-03

## 2022-11-03 VITALS
BODY MASS INDEX: 21.14 KG/M2 | OXYGEN SATURATION: 97 % | SYSTOLIC BLOOD PRESSURE: 128 MMHG | HEART RATE: 64 BPM | WEIGHT: 112 LBS | DIASTOLIC BLOOD PRESSURE: 72 MMHG | HEIGHT: 61 IN

## 2022-11-03 DIAGNOSIS — F32.A DEPRESSION, UNSPECIFIED DEPRESSION TYPE: Primary | ICD-10-CM

## 2022-11-03 DIAGNOSIS — J01.91 ACUTE RECURRENT SINUSITIS, UNSPECIFIED LOCATION: ICD-10-CM

## 2022-11-03 PROCEDURE — 99213 OFFICE O/P EST LOW 20 MIN: CPT | Performed by: NURSE PRACTITIONER

## 2022-11-03 NOTE — PROGRESS NOTES
Chief Complaint   Patient presents with   • Depression     1 month follow up on new meds    • Sinusitis     Subjective   Alva Mills is a 77 y.o. female.           Depression  Visit Type: follow-up  Patient presents with the following symptoms: feelings of worthlessness and irritability.  Patient is not experiencing: choking sensation, confusion, decreased concentration, excessive worry, feelings of hopelessness, muscle tension, nausea, nervousness/anxiety, palpitations, shortness of breath and suicidal ideas.  Frequency of symptoms: occasionally   Severity: moderate   Nighttime awakenings: many  Compliance with medications:  %        Sinusitis  This is a recurrent problem. The current episode started more than 1 month ago. The problem has been waxing and waning since onset. There has been no fever. Her pain is at a severity of 1/10. The pain is mild. Associated symptoms include congestion. Pertinent negatives include no chills, coughing, diaphoresis, ear pain, headaches, neck pain, shortness of breath, sinus pressure, sneezing, sore throat or swollen glands. The treatment provided mild relief.        The following portions of the patient's history were reviewed and updated as appropriate: allergies, current medications, past social history and problem list.    Review of Systems   Constitutional: Positive for irritability. Negative for chills and diaphoresis.   HENT: Positive for congestion. Negative for ear pain, sinus pressure, sneezing and sore throat.    Eyes: Negative.  Negative for photophobia, redness and visual disturbance.   Respiratory: Negative.  Negative for apnea, cough, choking, chest tightness and shortness of breath.    Cardiovascular: Negative.  Negative for chest pain, palpitations and leg swelling.   Gastrointestinal: Negative.  Negative for abdominal distention, abdominal pain and anal bleeding.   Endocrine: Negative.    Genitourinary: Negative.    Musculoskeletal: Positive for  "arthralgias and back pain. Negative for myalgias and neck pain.   Skin: Negative.    Allergic/Immunologic: Negative.  Negative for environmental allergies, food allergies and immunocompromised state.   Neurological: Negative for headaches.   Hematological: Negative.    Psychiatric/Behavioral: Positive for agitation and sleep disturbance. Negative for confusion, decreased concentration, hallucinations and suicidal ideas. The patient is not nervous/anxious and is not hyperactive.         Depression and anxiety improved        Objective   /72   Pulse 64   Ht 154.9 cm (61\")   Wt 50.8 kg (112 lb)   SpO2 97%   BMI 21.16 kg/m²   Physical Exam  Vitals and nursing note reviewed.   Constitutional:       Appearance: Normal appearance.   HENT:      Head: Normocephalic.      Right Ear: There is no impacted cerumen.      Left Ear: There is no impacted cerumen.      Nose: Congestion and rhinorrhea present.      Comments: Clear nasal congestion thick pnd      Mouth/Throat:      Pharynx: No oropharyngeal exudate or posterior oropharyngeal erythema.   Eyes:      General: No scleral icterus.        Right eye: No discharge.         Left eye: No discharge.      Pupils: Pupils are equal, round, and reactive to light.   Neck:      Vascular: No carotid bruit.   Cardiovascular:      Rate and Rhythm: Normal rate.      Pulses: Normal pulses.      Heart sounds: No murmur heard.    No friction rub. No gallop.   Pulmonary:      Effort: Pulmonary effort is normal. No respiratory distress.      Breath sounds: No stridor. No wheezing, rhonchi or rales.   Chest:      Chest wall: No tenderness.   Abdominal:      General: Abdomen is flat. There is no distension.      Palpations: There is no mass.      Tenderness: There is no abdominal tenderness. There is no right CVA tenderness, left CVA tenderness, guarding or rebound.      Hernia: No hernia is present.   Musculoskeletal:         General: No swelling, tenderness, deformity or signs of " injury. Normal range of motion.      Cervical back: Normal range of motion. No rigidity or tenderness.      Right lower leg: No edema.      Left lower leg: No edema.   Lymphadenopathy:      Cervical: No cervical adenopathy.   Skin:     General: Skin is warm and dry.      Coloration: Skin is not jaundiced or pale.      Findings: No bruising, erythema, lesion or rash.   Neurological:      General: No focal deficit present.      Mental Status: She is alert and oriented to person, place, and time.      Cranial Nerves: No cranial nerve deficit.      Sensory: No sensory deficit.      Motor: No weakness.      Coordination: Coordination normal.   Psychiatric:         Mood and Affect: Mood normal.         Behavior: Behavior normal.              Assessment & Plan     Problems Addressed this Visit        Mental Health    Depression - Primary   Other Visit Diagnoses     Acute recurrent sinusitis, unspecified location          Diagnoses       Codes Comments    Depression, unspecified depression type    -  Primary ICD-10-CM: F32.A  ICD-9-CM: 311     Acute recurrent sinusitis, unspecified location     ICD-10-CM: J01.91  ICD-9-CM: 461.9          No orders of the defined types were placed in this encounter.    Current Outpatient Medications on File Prior to Visit   Medication Sig Dispense Refill   • acyclovir (ZOVIRAX) 400 MG tablet Take 400 mg by mouth 3 (Three) Times a Day As Needed (for outbreak). Take no more than 5 doses a day.     • CALCIUM PO Take 1 tablet by mouth Daily With Breakfast. 500g     • desvenlafaxine (PRISTIQ) 100 MG 24 hr tablet TAKE 1 TABLET DAILY 90 tablet 3   • fenofibrate (TRICOR) 145 MG tablet TAKE 1 TABLET DAILY 90 tablet 3   • fluticasone (Flonase) 50 MCG/ACT nasal spray 2 sprays into the nostril(s) as directed by provider Daily. 16 g 11   • HYDROcodone-acetaminophen (NORCO)  MG per tablet Take 1 tablet by mouth Every 6 (Six) Hours As Needed for Moderate Pain .     • loratadine (Claritin) 10 MG  tablet Take 1 tablet by mouth Daily. 90 tablet 11   • losartan (Cozaar) 25 MG tablet Take 1 tablet by mouth Daily. 90 tablet 3   • Magnesium 500 MG capsule Take 1 capsule by mouth Daily.     • metoprolol succinate XL (TOPROL-XL) 25 MG 24 hr tablet TAKE 1 TABLET DAILY 90 tablet 3   • omeprazole (priLOSEC) 40 MG capsule Take 40 mg by mouth Daily.     • promethazine (PHENERGAN) 25 MG tablet TAKE 1 TABLET EVERY 6 HOURSAS NEEDED FOR NAUSEA OR    VOMITING 180 tablet 0   • rOPINIRole (REQUIP) 0.25 MG tablet TAKE 1 TABLET 3 TIMES A  tablet 3   • Synthroid 50 MCG tablet TAKE 1 TABLET DAILY 90 tablet 3   • traZODone (DESYREL) 50 MG tablet TAKE 1 TO 2 TABLETS AT     BEDTIME 180 tablet 1   • [DISCONTINUED] Brexpiprazole (Rexulti) 0.25 MG tablet Take 1 tablet by mouth Daily. 30 tablet 11     No current facility-administered medications on file prior to visit.       15 minutes   Follow Up   Return in about 3 months (around 2/3/2023).     It's not just what you eat, but when you eat  Eat breakfast, and eat smaller meals throughout the day. A healthy breakfast can jumpstart your metabolism, while eating small, healthy meals (rather than the standard three large meals) keeps your energy up.   Avoid eating at night. Try to eat dinner earlier and fast for 14-16 hours until breakfast the next morning. Studies suggest that eating only when you’re most active and giving your digestive system a long break each day may help to regulate weight.         Continue meds as directed, follow up if worsen    Diet discussed   Mucinex, flonase, avoid antibiotics for now   Continue other meds

## 2022-11-04 ENCOUNTER — TRANSCRIBE ORDERS (OUTPATIENT)
Dept: PHYSICAL THERAPY | Facility: HOSPITAL | Age: 77
End: 2022-11-04

## 2022-11-04 DIAGNOSIS — M17.12 OSTEOARTHRITIS OF LEFT KNEE, UNSPECIFIED OSTEOARTHRITIS TYPE: Primary | ICD-10-CM

## 2022-11-07 RX ORDER — CLARITHROMYCIN 500 MG/1
TABLET, COATED ORAL
Qty: 20 TABLET | Refills: 0 | OUTPATIENT
Start: 2022-11-07

## 2022-11-21 ENCOUNTER — TRANSCRIBE ORDERS (OUTPATIENT)
Dept: HOME HEALTH SERVICES | Facility: HOME HEALTHCARE | Age: 77
End: 2022-11-21

## 2022-11-21 ENCOUNTER — HOME HEALTH ADMISSION (OUTPATIENT)
Dept: HOME HEALTH SERVICES | Facility: HOME HEALTHCARE | Age: 77
End: 2022-11-21

## 2022-11-21 DIAGNOSIS — Z47.1 AFTERCARE FOLLOWING LEFT KNEE JOINT REPLACEMENT SURGERY: Primary | ICD-10-CM

## 2022-11-21 DIAGNOSIS — Z96.652 AFTERCARE FOLLOWING LEFT KNEE JOINT REPLACEMENT SURGERY: Primary | ICD-10-CM

## 2022-11-22 ENCOUNTER — HOME CARE VISIT (OUTPATIENT)
Dept: HOME HEALTH SERVICES | Facility: CLINIC | Age: 77
End: 2022-11-22

## 2022-11-22 PROCEDURE — G0151 HHCP-SERV OF PT,EA 15 MIN: HCPCS

## 2022-11-23 VITALS
SYSTOLIC BLOOD PRESSURE: 110 MMHG | TEMPERATURE: 97.1 F | RESPIRATION RATE: 18 BRPM | HEART RATE: 72 BPM | OXYGEN SATURATION: 99 % | DIASTOLIC BLOOD PRESSURE: 70 MMHG

## 2022-11-23 NOTE — HOME HEALTH
"PATIENT HAD A LEFT TKA ON LEFT KNEE ON 11/21/2022. SHE HAD A SAME DAY SURGERY PERFORMED AND WAS DISCHARGED HOME LAST NIGHT. PATIENT HAS ALREADY HAD HER RIGHT KNEE DONE IN THE PAST. SHE HAS HAD A LOT OF BLEEDING FOLLOWING SURGERY AND THE DOCTOR OFFICE TOLD HER DAUGTHER TO PLACE GAUZE AND ANOTHER ACE WARP ON HER LEG. SHE IS DOING OK SINCE COMING HOME AT THIS TIME SHE JSUT DIDN'T GET MUCH SLEEP LAST NIGHT.    PMHX: RLS, DEPRESSION, ANXIETY, VENTRAL HERNIA    PATIENT GOAL : \"GET BACK TO WHERE SHE WAS\"    PRIOR LEVEL OF FUNCTION: PATIENT IS NORMALLY INDEPENDENT WITH ALL ACTIVITY WITHOUT DIFFICULTY    SENSATION: PATIENT HAS NO NUMBNESS OR TINGLING PRESENT AT THIS TIME"

## 2022-11-25 ENCOUNTER — HOME CARE VISIT (OUTPATIENT)
Dept: HOME HEALTH SERVICES | Facility: CLINIC | Age: 77
End: 2022-11-25

## 2022-11-25 PROCEDURE — G0157 HHC PT ASSISTANT EA 15: HCPCS

## 2022-11-27 VITALS
OXYGEN SATURATION: 96 % | RESPIRATION RATE: 18 BRPM | HEART RATE: 80 BPM | SYSTOLIC BLOOD PRESSURE: 138 MMHG | DIASTOLIC BLOOD PRESSURE: 80 MMHG | TEMPERATURE: 97.2 F

## 2022-11-28 ENCOUNTER — HOME CARE VISIT (OUTPATIENT)
Dept: HOME HEALTH SERVICES | Facility: CLINIC | Age: 77
End: 2022-11-28

## 2022-11-28 PROCEDURE — G0157 HHC PT ASSISTANT EA 15: HCPCS

## 2022-11-28 RX ORDER — PROMETHAZINE HYDROCHLORIDE 25 MG/1
TABLET ORAL
Qty: 180 TABLET | Refills: 0 | Status: SHIPPED | OUTPATIENT
Start: 2022-11-28

## 2022-11-29 VITALS
DIASTOLIC BLOOD PRESSURE: 80 MMHG | SYSTOLIC BLOOD PRESSURE: 166 MMHG | OXYGEN SATURATION: 96 % | HEART RATE: 74 BPM | RESPIRATION RATE: 18 BRPM | TEMPERATURE: 97.6 F

## 2022-11-30 ENCOUNTER — HOME CARE VISIT (OUTPATIENT)
Dept: HOME HEALTH SERVICES | Facility: CLINIC | Age: 77
End: 2022-11-30

## 2022-11-30 PROCEDURE — G0157 HHC PT ASSISTANT EA 15: HCPCS

## 2022-11-30 NOTE — HOME HEALTH
Patient states My knee is still draining a little at times and been hurting more. I don't think I'm over doing it.

## 2022-12-01 VITALS
SYSTOLIC BLOOD PRESSURE: 125 MMHG | TEMPERATURE: 97.6 F | OXYGEN SATURATION: 98 % | DIASTOLIC BLOOD PRESSURE: 77 MMHG | RESPIRATION RATE: 18 BRPM | HEART RATE: 77 BPM

## 2022-12-01 NOTE — HOME HEALTH
Patient states she is doing well with everything. I'm not having to use my walker in the house and doing fine.

## 2022-12-02 ENCOUNTER — HOME CARE VISIT (OUTPATIENT)
Dept: HOME HEALTH SERVICES | Facility: CLINIC | Age: 77
End: 2022-12-02

## 2022-12-02 PROCEDURE — G0157 HHC PT ASSISTANT EA 15: HCPCS

## 2022-12-04 VITALS
OXYGEN SATURATION: 97 % | TEMPERATURE: 97.6 F | DIASTOLIC BLOOD PRESSURE: 80 MMHG | RESPIRATION RATE: 18 BRPM | SYSTOLIC BLOOD PRESSURE: 145 MMHG | HEART RATE: 85 BPM

## 2022-12-05 ENCOUNTER — HOME CARE VISIT (OUTPATIENT)
Dept: HOME HEALTH SERVICES | Facility: CLINIC | Age: 77
End: 2022-12-05

## 2022-12-05 PROCEDURE — G0157 HHC PT ASSISTANT EA 15: HCPCS

## 2022-12-07 VITALS
DIASTOLIC BLOOD PRESSURE: 82 MMHG | RESPIRATION RATE: 18 BRPM | HEART RATE: 80 BPM | OXYGEN SATURATION: 96 % | SYSTOLIC BLOOD PRESSURE: 150 MMHG | TEMPERATURE: 97.9 F

## 2022-12-07 NOTE — CASE COMMUNICATION
Patient is progressing well toward all goals achieved.  Patient is pleased with progress and feels safe and able to manage independently without concern.  Patient would benefit from 1x visit to finalize and ensure independence with HEP and MMT. [facility] signed.

## 2022-12-07 NOTE — HOME HEALTH
Patient states she has been doing her exercises and walking and keeping it ice, but yet it still has pain.

## 2022-12-09 ENCOUNTER — HOME CARE VISIT (OUTPATIENT)
Dept: HOME HEALTH SERVICES | Facility: CLINIC | Age: 77
End: 2022-12-09

## 2022-12-09 PROCEDURE — G0151 HHCP-SERV OF PT,EA 15 MIN: HCPCS

## 2022-12-12 VITALS
SYSTOLIC BLOOD PRESSURE: 132 MMHG | RESPIRATION RATE: 18 BRPM | OXYGEN SATURATION: 98 % | TEMPERATURE: 97.5 F | DIASTOLIC BLOOD PRESSURE: 78 MMHG | HEART RATE: 75 BPM

## 2022-12-12 NOTE — HOME HEALTH
Patient reports her appointment with Dr Obregon went well and he told her that she did not need to go to outpatient PT.  She reports she is walking without her cane in the home but continues to use her cane in the community.

## 2022-12-19 ENCOUNTER — OFFICE VISIT (OUTPATIENT)
Dept: FAMILY MEDICINE CLINIC | Facility: CLINIC | Age: 77
End: 2022-12-19

## 2022-12-19 VITALS
SYSTOLIC BLOOD PRESSURE: 134 MMHG | OXYGEN SATURATION: 98 % | HEIGHT: 61 IN | WEIGHT: 115 LBS | DIASTOLIC BLOOD PRESSURE: 68 MMHG | BODY MASS INDEX: 21.71 KG/M2 | HEART RATE: 77 BPM

## 2022-12-19 DIAGNOSIS — F32.A DEPRESSION, UNSPECIFIED DEPRESSION TYPE: ICD-10-CM

## 2022-12-19 DIAGNOSIS — F41.9 ANXIETY: Primary | ICD-10-CM

## 2022-12-19 PROCEDURE — 99213 OFFICE O/P EST LOW 20 MIN: CPT | Performed by: NURSE PRACTITIONER

## 2022-12-19 RX ORDER — HYDROXYZINE PAMOATE 25 MG/1
25 CAPSULE ORAL 3 TIMES DAILY PRN
Qty: 90 CAPSULE | Refills: 3 | Status: SHIPPED | OUTPATIENT
Start: 2022-12-19

## 2022-12-19 NOTE — PROGRESS NOTES
Chief Complaint   Patient presents with   • Anxiety     More so then usual     Subjective   Alva Mills is a 77 y.o. female.           History of Present Illness  Presents with left knee pain and pressure -surgery 4 weeks ago-scheduled to see ortho this week   Depression  Visit Type: follow-up  Patient presents with the following symptoms: feelings of worthlessness and irritability.  Patient is not experiencing: choking sensation, confusion, decreased concentration, excessive worry, feelings of hopelessness, muscle tension, nausea, nervousness/anxiety, palpitations, shortness of breath and suicidal ideas.  Frequency of symptoms: occasionally   Severity: moderate   Nighttime awakenings: many  Compliance with medications:  %        Anxiety  Presents for follow-up visit. Symptoms include irritability. Patient reports no chest pain, confusion, decreased concentration, dizziness, excessive worry, muscle tension, nervous/anxious behavior, palpitations, shortness of breath or suicidal ideas. The severity of symptoms is mild. The quality of sleep is good.     Her past medical history is significant for depression. Compliance with medications is %.   Knee Pain   The incident occurred more than 1 week ago. There was no injury mechanism. The pain is present in the left knee. The quality of the pain is described as burning and cramping. The pain is at a severity of 3/10. The pain is mild. The pain has been fluctuating since onset. The symptoms are aggravated by movement. She has tried acetaminophen for the symptoms. The treatment provided no relief.        The following portions of the patient's history were reviewed and updated as appropriate: allergies, current medications, past social history and problem list.    Review of Systems   Constitutional: Positive for irritability.   Eyes: Negative.  Negative for photophobia, redness and visual disturbance.   Respiratory: Negative.  Negative for apnea,  "choking, chest tightness and shortness of breath.    Cardiovascular: Negative.  Negative for chest pain, palpitations and leg swelling.   Gastrointestinal: Negative.  Negative for abdominal distention, abdominal pain and anal bleeding.   Endocrine: Negative.  Negative for polydipsia, polyphagia and polyuria.   Genitourinary: Negative.    Musculoskeletal: Positive for arthralgias, back pain and joint swelling. Negative for myalgias.        Left knee pain    Skin: Negative.    Allergic/Immunologic: Negative.  Negative for environmental allergies, food allergies and immunocompromised state.   Neurological: Negative for dizziness, facial asymmetry and headaches.   Hematological: Negative.    Psychiatric/Behavioral: Positive for agitation and sleep disturbance. Negative for behavioral problems, confusion, decreased concentration, dysphoric mood, hallucinations, self-injury and suicidal ideas. The patient is not nervous/anxious and is not hyperactive.         Depression and anxiety worsening        Objective   /68   Pulse 77   Ht 154.9 cm (61\")   Wt 52.2 kg (115 lb)   SpO2 98%   BMI 21.73 kg/m²   Physical Exam  Vitals and nursing note reviewed.   Constitutional:       Appearance: Normal appearance.   HENT:      Head: Normocephalic.      Right Ear: There is no impacted cerumen.      Left Ear: There is no impacted cerumen.      Nose: No congestion or rhinorrhea.      Mouth/Throat:      Pharynx: No oropharyngeal exudate or posterior oropharyngeal erythema.   Eyes:      General: No scleral icterus.        Right eye: No discharge.         Left eye: No discharge.      Pupils: Pupils are equal, round, and reactive to light.   Neck:      Vascular: No carotid bruit.   Cardiovascular:      Rate and Rhythm: Normal rate.      Pulses: Normal pulses.      Heart sounds: No murmur heard.    No friction rub. No gallop.   Pulmonary:      Effort: Pulmonary effort is normal. No respiratory distress.      Breath sounds: No stridor. " No wheezing, rhonchi or rales.   Chest:      Chest wall: No tenderness.   Abdominal:      General: Abdomen is flat. There is no distension.      Palpations: There is no mass.      Tenderness: There is no abdominal tenderness. There is no right CVA tenderness, left CVA tenderness, guarding or rebound.      Hernia: No hernia is present.   Musculoskeletal:         General: Tenderness present. No swelling, deformity or signs of injury. Normal range of motion.      Cervical back: Normal range of motion. No rigidity or tenderness.      Right lower leg: No edema.      Left lower leg: Tenderness present. No edema.        Legs:    Lymphadenopathy:      Cervical: No cervical adenopathy.   Skin:     General: Skin is warm and dry.      Coloration: Skin is not jaundiced or pale.      Findings: No bruising, erythema, lesion or rash.   Neurological:      General: No focal deficit present.      Mental Status: She is alert and oriented to person, place, and time.      Cranial Nerves: No cranial nerve deficit.      Sensory: No sensory deficit.      Motor: No weakness.      Coordination: Coordination normal.   Psychiatric:         Mood and Affect: Mood normal.         Behavior: Behavior normal.              Assessment & Plan     Problems Addressed this Visit        Mental Health    Anxiety - Primary    Depression    Relevant Medications    hydrOXYzine pamoate (Vistaril) 25 MG capsule   Diagnoses       Codes Comments    Anxiety    -  Primary ICD-10-CM: F41.9  ICD-9-CM: 300.00     Depression, unspecified depression type     ICD-10-CM: F32.A  ICD-9-CM: 311            New Medications Ordered This Visit   Medications   • hydrOXYzine pamoate (Vistaril) 25 MG capsule     Sig: Take 1 capsule by mouth 3 (Three) Times a Day As Needed for Anxiety.     Dispense:  90 capsule     Refill:  3   • Diclofenac Sodium (VOLTAREN) 1 % gel gel     Sig: Apply 4 g topically to the appropriate area as directed 3 (Three) Times a Day.     Dispense:  150 g      Refill:  11     Current Outpatient Medications on File Prior to Visit   Medication Sig Dispense Refill   • acyclovir (ZOVIRAX) 400 MG tablet Take 400 mg by mouth 3 (Three) Times a Day As Needed (for outbreak). Take no more than 5 doses a day.     • aspirin 325 MG tablet Take 325 mg by mouth 2 (Two) Times a Day.     • CALCIUM PO Take 1 tablet by mouth Daily With Breakfast. 500g     • celecoxib (CeleBREX) 200 MG capsule Take 200 mg by mouth 2 (Two) Times a Day.     • desvenlafaxine (PRISTIQ) 100 MG 24 hr tablet TAKE 1 TABLET DAILY 90 tablet 3   • fenofibrate (TRICOR) 145 MG tablet TAKE 1 TABLET DAILY 90 tablet 3   • fluticasone (Flonase) 50 MCG/ACT nasal spray 2 sprays into the nostril(s) as directed by provider Daily. 16 g 11   • HYDROcodone-acetaminophen (NORCO)  MG per tablet Take 1 tablet by mouth Every 6 (Six) Hours As Needed for Moderate Pain .     • loratadine (Claritin) 10 MG tablet Take 1 tablet by mouth Daily. 90 tablet 11   • losartan (Cozaar) 25 MG tablet Take 1 tablet by mouth Daily. 90 tablet 3   • Magnesium 500 MG capsule Take 1 capsule by mouth Daily.     • metoprolol succinate XL (TOPROL-XL) 25 MG 24 hr tablet TAKE 1 TABLET DAILY 90 tablet 3   • omeprazole (priLOSEC) 40 MG capsule Take 40 mg by mouth Daily.     • promethazine (PHENERGAN) 25 MG tablet TAKE 1 TABLET EVERY 6 HOURSAS NEEDED FOR NAUSEA OR    VOMITING 180 tablet 0   • rOPINIRole (REQUIP) 0.25 MG tablet TAKE 1 TABLET 3 TIMES A  tablet 3   • Synthroid 50 MCG tablet TAKE 1 TABLET DAILY 90 tablet 3   • traZODone (DESYREL) 50 MG tablet TAKE 1 TO 2 TABLETS AT     BEDTIME 180 tablet 1     No current facility-administered medications on file prior to visit.       15 minutes   Follow Up   No follow-ups on file.     Ice to area, follow up with ortho as directed this week  voltaren tid prn   meds as directed add vistaril for anxiety  Continue current meds     Follow up if worsen   Continue pristiq as directed

## 2023-01-04 RX ORDER — LORATADINE 10 MG/1
10 TABLET ORAL DAILY
Qty: 90 TABLET | Refills: 3 | Status: SHIPPED | OUTPATIENT
Start: 2023-01-04

## 2023-01-04 RX ORDER — ONDANSETRON 4 MG/1
4 TABLET, FILM COATED ORAL EVERY 8 HOURS PRN
Qty: 60 TABLET | Refills: 3 | Status: SHIPPED | OUTPATIENT
Start: 2023-01-04

## 2023-02-09 ENCOUNTER — OFFICE VISIT (OUTPATIENT)
Dept: FAMILY MEDICINE CLINIC | Facility: CLINIC | Age: 78
End: 2023-02-09
Payer: COMMERCIAL

## 2023-02-09 VITALS
WEIGHT: 117 LBS | SYSTOLIC BLOOD PRESSURE: 130 MMHG | BODY MASS INDEX: 22.09 KG/M2 | HEIGHT: 61 IN | DIASTOLIC BLOOD PRESSURE: 70 MMHG | HEART RATE: 78 BPM | OXYGEN SATURATION: 97 %

## 2023-02-09 DIAGNOSIS — K59.00 CONSTIPATION, UNSPECIFIED CONSTIPATION TYPE: ICD-10-CM

## 2023-02-09 DIAGNOSIS — F41.9 ANXIETY: ICD-10-CM

## 2023-02-09 DIAGNOSIS — J06.9 UPPER RESPIRATORY TRACT INFECTION, UNSPECIFIED TYPE: Primary | ICD-10-CM

## 2023-02-09 PROCEDURE — 99213 OFFICE O/P EST LOW 20 MIN: CPT | Performed by: NURSE PRACTITIONER

## 2023-02-09 RX ORDER — PLECANATIDE 3 MG/1
1 TABLET ORAL DAILY
Qty: 30 TABLET | Refills: 11 | Status: SHIPPED | OUTPATIENT
Start: 2023-02-09

## 2023-02-09 RX ORDER — CEFUROXIME AXETIL 500 MG/1
500 TABLET ORAL 2 TIMES DAILY
Qty: 20 TABLET | Refills: 0 | Status: SHIPPED | OUTPATIENT
Start: 2023-02-09

## 2023-02-09 NOTE — PROGRESS NOTES
Chief Complaint   Patient presents with   • Nasal Congestion   • Anxiety     3 month check up      Subjective   Alva Mills is a 77 y.o. female.           Anxiety  Presents for follow-up visit. Symptoms include excessive worry, irritability and panic. Patient reports no chest pain, nausea or palpitations. Symptoms occur most days. The severity of symptoms is moderate. The quality of sleep is good.     Compliance with medications is %.   URI   This is a recurrent problem. The current episode started more than 1 month ago. The problem has been gradually worsening. The fever has been present for less than 1 day. Associated symptoms include congestion and rhinorrhea. Pertinent negatives include no abdominal pain, chest pain, coughing, diarrhea, headaches, nausea, neck pain, sneezing, sore throat or swollen glands. The treatment provided mild relief.   Constipation  This is a recurrent problem. The current episode started more than 1 month ago. The problem has been gradually worsening since onset. The patient is not on a high fiber diet. She does not exercise regularly. There has not been adequate water intake. Pertinent negatives include no abdominal pain, diarrhea, fecal incontinence, fever, flatus, melena or nausea. She has tried diet changes, laxatives and stool softeners for the symptoms. The treatment provided mild relief.        The following portions of the patient's history were reviewed and updated as appropriate: allergies, current medications, past social history and problem list.    Review of Systems   Constitutional: Positive for activity change and irritability. Negative for fever.   HENT: Positive for congestion, postnasal drip and rhinorrhea. Negative for sneezing and sore throat.    Eyes: Negative.  Negative for photophobia, redness and visual disturbance.   Respiratory: Negative.  Negative for cough.    Cardiovascular: Negative.  Negative for chest pain, palpitations and leg swelling.  "  Gastrointestinal: Positive for constipation. Negative for abdominal pain, diarrhea, flatus, melena and nausea.   Endocrine: Negative.    Genitourinary: Negative.    Musculoskeletal: Negative.  Negative for neck pain.   Allergic/Immunologic: Negative.  Negative for food allergies and immunocompromised state.   Neurological: Negative.  Negative for light-headedness and headaches.   Hematological: Negative.  Negative for adenopathy. Does not bruise/bleed easily.   Psychiatric/Behavioral: Negative.        Objective   /70   Pulse 78   Ht 154.9 cm (61\")   Wt 53.1 kg (117 lb)   SpO2 97%   BMI 22.11 kg/m²   Physical Exam  Vitals and nursing note reviewed.   Constitutional:       Appearance: Normal appearance.   HENT:      Head: Normocephalic and atraumatic.      Right Ear: Tympanic membrane normal. There is no impacted cerumen.      Left Ear: There is no impacted cerumen.      Nose: Congestion and rhinorrhea present.      Comments: Thick pnd present      Mouth/Throat:      Pharynx: No oropharyngeal exudate or posterior oropharyngeal erythema.   Eyes:      General: No scleral icterus.        Right eye: No discharge.         Left eye: No discharge.      Pupils: Pupils are equal, round, and reactive to light.   Neck:      Vascular: No carotid bruit.   Cardiovascular:      Rate and Rhythm: Normal rate.      Pulses: Normal pulses.      Heart sounds: No murmur heard.    No friction rub. No gallop.   Pulmonary:      Effort: Pulmonary effort is normal. No respiratory distress.      Breath sounds: No stridor. No wheezing, rhonchi or rales.   Chest:      Chest wall: No tenderness.   Abdominal:      General: Abdomen is flat. There is no distension.      Palpations: There is no mass.      Tenderness: There is no abdominal tenderness. There is no right CVA tenderness, left CVA tenderness, guarding or rebound.      Hernia: No hernia is present.   Musculoskeletal:         General: No swelling, tenderness, deformity or signs " of injury. Normal range of motion.      Cervical back: Normal range of motion. No rigidity or tenderness.      Right lower leg: No edema.      Left lower leg: No edema.   Lymphadenopathy:      Cervical: No cervical adenopathy.   Skin:     General: Skin is warm and dry.      Coloration: Skin is not jaundiced or pale.      Findings: No bruising, erythema, lesion or rash.   Neurological:      General: No focal deficit present.      Mental Status: She is alert and oriented to person, place, and time.      Cranial Nerves: No cranial nerve deficit.      Sensory: No sensory deficit.      Motor: No weakness.      Coordination: Coordination normal.   Psychiatric:         Mood and Affect: Mood normal.         Behavior: Behavior normal.              Assessment & Plan     Problems Addressed this Visit        Mental Health    Anxiety   Other Visit Diagnoses     Upper respiratory tract infection, unspecified type    -  Primary    Relevant Medications    cefuroxime (CEFTIN) 500 MG tablet    Constipation, unspecified constipation type          Diagnoses       Codes Comments    Upper respiratory tract infection, unspecified type    -  Primary ICD-10-CM: J06.9  ICD-9-CM: 465.9     Anxiety     ICD-10-CM: F41.9  ICD-9-CM: 300.00     Constipation, unspecified constipation type     ICD-10-CM: K59.00  ICD-9-CM: 564.00            New Medications Ordered This Visit   Medications   • Plecanatide (Trulance) 3 MG tablet     Sig: Take 1 tablet by mouth Daily.     Dispense:  30 tablet     Refill:  11   • cefuroxime (CEFTIN) 500 MG tablet     Sig: Take 1 tablet by mouth 2 (Two) Times a Day.     Dispense:  20 tablet     Refill:  0     Current Outpatient Medications on File Prior to Visit   Medication Sig Dispense Refill   • acyclovir (ZOVIRAX) 400 MG tablet Take 400 mg by mouth 3 (Three) Times a Day As Needed (for outbreak). Take no more than 5 doses a day.     • aspirin 325 MG tablet Take 325 mg by mouth 2 (Two) Times a Day.     • CALCIUM PO Take  1 tablet by mouth Daily With Breakfast. 500g     • celecoxib (CeleBREX) 200 MG capsule Take 200 mg by mouth 2 (Two) Times a Day.     • desvenlafaxine (PRISTIQ) 100 MG 24 hr tablet TAKE 1 TABLET DAILY 90 tablet 3   • Diclofenac Sodium (VOLTAREN) 1 % gel gel Apply 4 g topically to the appropriate area as directed 3 (Three) Times a Day. 150 g 11   • fenofibrate (TRICOR) 145 MG tablet TAKE 1 TABLET DAILY 90 tablet 3   • fluticasone (Flonase) 50 MCG/ACT nasal spray 2 sprays into the nostril(s) as directed by provider Daily. 16 g 11   • HYDROcodone-acetaminophen (NORCO)  MG per tablet Take 1 tablet by mouth Every 6 (Six) Hours As Needed for Moderate Pain .     • hydrOXYzine pamoate (Vistaril) 25 MG capsule Take 1 capsule by mouth 3 (Three) Times a Day As Needed for Anxiety. 90 capsule 3   • loratadine (Claritin) 10 MG tablet Take 1 tablet by mouth Daily. 90 tablet 3   • losartan (Cozaar) 25 MG tablet Take 1 tablet by mouth Daily. 90 tablet 3   • Magnesium 500 MG capsule Take 1 capsule by mouth Daily.     • metoprolol succinate XL (TOPROL-XL) 25 MG 24 hr tablet TAKE 1 TABLET DAILY 90 tablet 3   • omeprazole (priLOSEC) 40 MG capsule Take 40 mg by mouth Daily.     • ondansetron (Zofran) 4 MG tablet Take 1 tablet by mouth Every 8 (Eight) Hours As Needed for Nausea or Vomiting. 60 tablet 3   • promethazine (PHENERGAN) 25 MG tablet TAKE 1 TABLET EVERY 6 HOURSAS NEEDED FOR NAUSEA OR    VOMITING 180 tablet 0   • rOPINIRole (REQUIP) 0.25 MG tablet TAKE 1 TABLET 3 TIMES A  tablet 3   • Synthroid 50 MCG tablet TAKE 1 TABLET DAILY 90 tablet 3   • traZODone (DESYREL) 50 MG tablet TAKE 1 TO 2 TABLETS AT     BEDTIME 180 tablet 1     No current facility-administered medications on file prior to visit.       15 minutes   Follow Up     meds reviewed   No follow-ups on file.     meds as directed, labs as directed  trulance for constipation         mucinex bid     ceftin bid as directed     Add trulance as directed, follow up if  worsen       See back in 3 months as directed

## 2023-02-13 ENCOUNTER — TELEPHONE (OUTPATIENT)
Dept: FAMILY MEDICINE CLINIC | Facility: CLINIC | Age: 78
End: 2023-02-13
Payer: COMMERCIAL

## 2023-02-13 RX ORDER — TRAZODONE HYDROCHLORIDE 50 MG/1
TABLET ORAL
Qty: 180 TABLET | Refills: 1 | Status: SHIPPED | OUTPATIENT
Start: 2023-02-13

## 2023-02-13 RX ORDER — CIPROFLOXACIN HYDROCHLORIDE 3.5 MG/ML
2 SOLUTION/ DROPS TOPICAL 3 TIMES DAILY
Qty: 5 ML | Refills: 1 | Status: SHIPPED | OUTPATIENT
Start: 2023-02-13

## 2023-03-20 RX ORDER — ROPINIROLE 0.25 MG/1
TABLET, FILM COATED ORAL
Qty: 270 TABLET | Refills: 3 | Status: SHIPPED | OUTPATIENT
Start: 2023-03-20

## 2023-04-06 RX ORDER — LOSARTAN POTASSIUM 25 MG/1
TABLET ORAL
Qty: 90 TABLET | Refills: 3 | Status: SHIPPED | OUTPATIENT
Start: 2023-04-06

## 2023-04-07 ENCOUNTER — PREP FOR SURGERY (OUTPATIENT)
Dept: OTHER | Facility: HOSPITAL | Age: 78
End: 2023-04-07
Payer: COMMERCIAL

## 2023-04-07 DIAGNOSIS — K80.50 GALLSTONES, COMMON BILE DUCT: Primary | ICD-10-CM

## 2023-04-07 RX ORDER — SODIUM CHLORIDE 9 MG/ML
40 INJECTION, SOLUTION INTRAVENOUS AS NEEDED
Status: CANCELLED | OUTPATIENT
Start: 2023-04-13

## 2023-04-07 RX ORDER — DEXTROSE AND SODIUM CHLORIDE 5; .45 G/100ML; G/100ML
30 INJECTION, SOLUTION INTRAVENOUS CONTINUOUS PRN
Status: CANCELLED | OUTPATIENT
Start: 2023-04-13

## 2023-04-13 ENCOUNTER — APPOINTMENT (OUTPATIENT)
Dept: GENERAL RADIOLOGY | Facility: HOSPITAL | Age: 78
End: 2023-04-13
Payer: MEDICARE

## 2023-04-13 ENCOUNTER — HOSPITAL ENCOUNTER (OUTPATIENT)
Facility: HOSPITAL | Age: 78
Setting detail: HOSPITAL OUTPATIENT SURGERY
Discharge: HOME | End: 2023-04-13
Attending: INTERNAL MEDICINE | Admitting: INTERNAL MEDICINE
Payer: MEDICARE

## 2023-04-13 ENCOUNTER — ANESTHESIA EVENT (OUTPATIENT)
Dept: GASTROENTEROLOGY | Facility: HOSPITAL | Age: 78
End: 2023-04-13
Payer: MEDICARE

## 2023-04-13 ENCOUNTER — ANESTHESIA (OUTPATIENT)
Dept: GASTROENTEROLOGY | Facility: HOSPITAL | Age: 78
End: 2023-04-13
Payer: MEDICARE

## 2023-04-13 VITALS
HEIGHT: 61 IN | DIASTOLIC BLOOD PRESSURE: 55 MMHG | OXYGEN SATURATION: 97 % | TEMPERATURE: 98.6 F | HEART RATE: 56 BPM | BODY MASS INDEX: 21.34 KG/M2 | RESPIRATION RATE: 18 BRPM | WEIGHT: 113 LBS | SYSTOLIC BLOOD PRESSURE: 106 MMHG

## 2023-04-13 DIAGNOSIS — K80.50 GALLSTONES, COMMON BILE DUCT: ICD-10-CM

## 2023-04-13 PROCEDURE — 25010000002 CEFTRIAXONE PER 250 MG: Performed by: INTERNAL MEDICINE

## 2023-04-13 PROCEDURE — 25510000001 IOPAMIDOL 61 % SOLUTION: Performed by: INTERNAL MEDICINE

## 2023-04-13 PROCEDURE — 74328 X-RAY BILE DUCT ENDOSCOPY: CPT

## 2023-04-13 PROCEDURE — 25010000002 PROPOFOL 10 MG/ML EMULSION: Performed by: NURSE ANESTHETIST, CERTIFIED REGISTERED

## 2023-04-13 RX ORDER — LIDOCAINE HYDROCHLORIDE 20 MG/ML
INJECTION, SOLUTION INTRAVENOUS AS NEEDED
Status: DISCONTINUED | OUTPATIENT
Start: 2023-04-13 | End: 2023-04-13 | Stop reason: SURG

## 2023-04-13 RX ORDER — SODIUM CHLORIDE 9 MG/ML
40 INJECTION, SOLUTION INTRAVENOUS AS NEEDED
Status: DISCONTINUED | OUTPATIENT
Start: 2023-04-13 | End: 2023-04-13 | Stop reason: HOSPADM

## 2023-04-13 RX ORDER — DESVENLAFAXINE 100 MG/1
TABLET, EXTENDED RELEASE ORAL
Qty: 90 TABLET | Refills: 3 | Status: SHIPPED | OUTPATIENT
Start: 2023-04-13

## 2023-04-13 RX ORDER — DEXTROSE AND SODIUM CHLORIDE 5; .45 G/100ML; G/100ML
30 INJECTION, SOLUTION INTRAVENOUS CONTINUOUS PRN
Status: DISCONTINUED | OUTPATIENT
Start: 2023-04-13 | End: 2023-04-13 | Stop reason: HOSPADM

## 2023-04-13 RX ORDER — DEXTROSE AND SODIUM CHLORIDE 5; .45 G/100ML; G/100ML
100 INJECTION, SOLUTION INTRAVENOUS CONTINUOUS
Status: DISCONTINUED | OUTPATIENT
Start: 2023-04-13 | End: 2023-04-13 | Stop reason: HOSPADM

## 2023-04-13 RX ORDER — PROPOFOL 10 MG/ML
VIAL (ML) INTRAVENOUS AS NEEDED
Status: DISCONTINUED | OUTPATIENT
Start: 2023-04-13 | End: 2023-04-13 | Stop reason: SURG

## 2023-04-13 RX ADMIN — LIDOCAINE HYDROCHLORIDE 50 MG: 20 INJECTION, SOLUTION INTRAVENOUS at 11:12

## 2023-04-13 RX ADMIN — PROPOFOL 30 MG: 10 INJECTION, EMULSION INTRAVENOUS at 11:17

## 2023-04-13 RX ADMIN — IOPAMIDOL 7.5 ML: 612 INJECTION, SOLUTION INTRAVENOUS at 11:24

## 2023-04-13 RX ADMIN — DEXTROSE AND SODIUM CHLORIDE 30 ML/HR: 5; 450 INJECTION, SOLUTION INTRAVENOUS at 10:30

## 2023-04-13 RX ADMIN — PROPOFOL 30 MG: 10 INJECTION, EMULSION INTRAVENOUS at 11:14

## 2023-04-13 RX ADMIN — PROPOFOL 40 MG: 10 INJECTION, EMULSION INTRAVENOUS at 10:34

## 2023-04-13 RX ADMIN — PROPOFOL 100 MG: 10 INJECTION, EMULSION INTRAVENOUS at 11:12

## 2023-04-13 NOTE — H&P
Amanda Kearney DO,[facility]  Gastroenterology  Hepatology  Endoscopy  Board Certified in Internal Medicine and gastroenterology  [address], [address]  [place]. [address]  - [phone]   F - [phone]     GASTROENTEROLOGY HISTORY AND PHYSICAL  NOTE   AMANDA KEARNEY DO.         SUBJECTIVE:   4/13/2023    Name: Alva Mills  DOD: 1945        Chief Complaint:     Subjective : Sump syndrome after choledochoduodenostomy    Patient is 77 y.o. female presents with desire for elective ERCP with removal of any debris within the patient's bile duct.      ROS/HISTORY/ CURRENT MEDICATIONS/OBJECTIVE/VS/PE:   Review of Systems:  All systems unremarkable unless specified below.  Constitutional   HENT  Eyes   Respiratory    Cardiovascular  Gastrointestinal   Endocrine  Genitourinary    Musculoskeletal   Skin  Allergic/Immunologic    Neurological    Hematological  Psychiatric/Behavioral    History:     Past Medical History:   Diagnosis Date   • Abdominal pain    • Acquired hypothyroidism    • Acute bronchitis    • Acute sinusitis    • Acute upper respiratory infection    • Benign hypertension    • Breast cyst    • Breast lump     history of, right   • Calf pain    • Common bile duct calculus    • Constipation    • Cough    • Depressive disorder    • Elevated cholesterol    • Epigastric pain    • External hemorrhoids without complication    • Fibrocystic breast    • Functional heart murmur    • Gastroesophageal reflux disease    • General medical exam    • Hemorrhoids    • Hyperlipidemia    • Incisional hernia     no evident recurrence at this juncture   • Ingrown toenail    • Localized, primary osteoarthritis of ankle or foot    • Muscle strain    • Skin cancer     basal cell on face   • Skin lesion      Past Surgical History:   Procedure Laterality Date   • APPENDECTOMY      done with hysterectomy   • BACK SURGERY      x 3   • BILE DUCT EXPLORATION  12/10/2013    Remove bile duct stone (1)     Common duct exploration, stone extraction, choledochoduodenostomy and choledochoscopy.    • BREAST BIOPSY Right 1991    Stereotactic breast biopsy (1)    Right breast    • CATARACT EXTRACTION W/ INTRAOCULAR LENS IMPLANT Right 02/18/2022    Procedure: REMOVE CATARACT AND IMPLANT   INTRAOCULAR LENS;  Surgeon: Dani Bonner MD;  Location: [facility] OR;  Service: Ophthalmology;  Laterality: Right;   • CHOLECYSTECTOMY OPEN  1989   • ERCP Left 01/10/2019    Procedure: ENDOSCOPIC RETROGRADE CHOLANGIOPANCREATOGRAPHY;  Surgeon: Homero Allen DO;  Location: [facility] ENDOSCOPY;  Service: Gastroenterology   • ERCP Left 03/14/2019    Procedure: ENDOSCOPIC RETROGRADE CHOLANGIOPANCREATOGRAPHY;  Surgeon: Homero Allen DO;  Location: [facility] ENDOSCOPY;  Service: Gastroenterology   • ERCP Left 09/10/2019    Procedure: ENDOSCOPIC RETROGRADE CHOLANGIOPANCREATOGRAPHY;  Surgeon: Homero Allen DO;  Location: [facility] ENDOSCOPY;  Service: Gastroenterology   • ERCP Left 03/12/2020    Procedure: ENDOSCOPIC RETROGRADE CHOLANGIOPANCREATOGRAPHY;  Surgeon: Homero Allen DO;  Location: [facility] ENDOSCOPY;  Service: Gastroenterology;  Laterality: Left;   • ERCP Left 08/10/2020    Procedure: ENDOSCOPIC RETROGRADE CHOLANGIOPANCREATOGRAPHY;  Surgeon: Homero Allen DO;  Location: [facility] ENDOSCOPY;  Service: Gastroenterology;  Laterality: Left;   • ERCP Left 01/19/2021    Procedure: ENDOSCOPIC RETROGRADE CHOLANGIOPANCREATOGRAPHY;  Surgeon: Homero Allen DO;  Location: [facility] ENDOSCOPY;  Service: Gastroenterology;  Laterality: Left;   • ERCP Left 12/02/2021    Procedure: ENDOSCOPIC RETROGRADE CHOLANGIOPANCREATOGRAPHY;  Surgeon: Homero Allen DO;  Location: [facility] ENDOSCOPY;  Service: Gastroenterology;  Laterality: Left;   • ERCP N/A 10/20/2022    Procedure: ENDOSCOPIC RETROGRADE CHOLANGIOPANCREATOGRAPHY 11:00;  Surgeon: Homero Allen DO;  Location: [facility] ENDOSCOPY;  Service: Gastroenterology;  Laterality: N/A;    • ERCP WITH STENT PLACEMENT N/A 2021    Procedure: ENDOSCOPIC RETROGRADE CHOLANGIOPANCREATOGRAPHY WITH STENT PLACEMENT;  Surgeon: Homero Allen DO;  Location: [facility] ENDOSCOPY;  Service: Gastroenterology;  Laterality: N/A;   • HERNIA REPAIR  2014    Anesth, repair of hernia (1)    laparoscopic ventral hernioplasty with mesh. Ventral hernia.    • HYSTERECTOMY  10/17/2001    Anesth, hysterectomy (1)    Laparoscopic subtotal hysterectomy & BSO. Enterolysis of sigmoid colon.    • REPLACEMENT TOTAL KNEE Bilateral    • TUBAL ABDOMINAL LIGATION       Family History   Problem Relation Age of Onset   • Heart disease Other    • Hypertension Other    • Heart disease Father      Social History     Tobacco Use   • Smoking status: Former     Types: Cigarettes     Quit date: 1969     Years since quittin.3   • Smokeless tobacco: Never   Vaping Use   • Vaping Use: Never used   Substance Use Topics   • Alcohol use: No   • Drug use: No     Prior to Admission medications    Medication Sig Start Date End Date Taking? Authorizing Provider   desvenlafaxine (PRISTIQ) 100 MG 24 hr tablet TAKE 1 TABLET DAILY 23  Yes Estefania Vicente APRN   fenofibrate (TRICOR) 145 MG tablet TAKE 1 TABLET DAILY 22  Yes Estefania Vicente APRN   HYDROcodone-acetaminophen (NORCO)  MG per tablet Take 1 tablet by mouth Every 6 (Six) Hours As Needed for Moderate Pain.   Yes Kev Gramajo MD   loratadine (Claritin) 10 MG tablet Take 1 tablet by mouth Daily. 23  Yes Estefania Vicente APRN   losartan (COZAAR) 25 MG tablet TAKE 1 TABLET DAILY 23  Yes Estefania iVcente APRN   Magnesium 500 MG capsule Take 1 capsule by mouth Daily.   Yes Kev Gramajo MD   metoprolol succinate XL (TOPROL-XL) 25 MG 24 hr tablet TAKE 1 TABLET DAILY 22  Yes Estefania Vicente APRN   omeprazole (priLOSEC) 40 MG capsule Take 1 capsule by mouth Daily. 10/21/19  Yes Kev Gramajo MD    rOPINIRole (REQUIP) 0.25 MG tablet TAKE 1 TABLET 3 TIMES A DAY 3/20/23  Yes Estefania Vicente APRN   Synthroid 50 MCG tablet TAKE 1 TABLET DAILY 7/18/22  Yes Estefania Vicente APRN   traZODone (DESYREL) 50 MG tablet TAKE 1 TO 2 TABLETS AT     BEDTIME 2/13/23  Yes Estefania Vicente APRN   acyclovir (ZOVIRAX) 400 MG tablet Take 1 tablet by mouth 3 (Three) Times a Day As Needed (for outbreak). Take no more than 5 doses a day.    Provider, MD Kev   CALCIUM PO Take 1 tablet by mouth Daily With Breakfast. 500g    ProviderKev MD   fluticasone (Flonase) 50 MCG/ACT nasal spray 2 sprays into the nostril(s) as directed by provider Daily. 10/3/22   Estefania Vicente APRN   hydrOXYzine pamoate (Vistaril) 25 MG capsule Take 1 capsule by mouth 3 (Three) Times a Day As Needed for Anxiety. 12/19/22   Estefania Vicente APRN   ondansetron (Zofran) 4 MG tablet Take 1 tablet by mouth Every 8 (Eight) Hours As Needed for Nausea or Vomiting. 1/4/23   Estefania Vicente APRN   promethazine (PHENERGAN) 25 MG tablet TAKE 1 TABLET EVERY 6 HOURSAS NEEDED FOR NAUSEA OR    VOMITING 11/28/22   Estefania Vicente APRN   desvenlafaxine (PRISTIQ) 100 MG 24 hr tablet TAKE 1 TABLET DAILY 7/5/22 4/13/23  Estefania Vicente APRN     Allergies:  Clonazepam, Hydromorphone, Morphine, and Percocet [oxycodone-acetaminophen]    I have reviewed the patients medical history, surgical history and family history in the available medical record system.     Current Medications:     Current Facility-Administered Medications   Medication Dose Route Frequency Provider Last Rate Last Admin   • cefTRIAXone (ROCEPHIN) 1 g/100 mL 0.9% NS (MBP)  1 g Intravenous Once Homero Allen DO       • dextrose 5 % and sodium chloride 0.45 % infusion  30 mL/hr Intravenous Continuous PRN Homero Allen DO 30 mL/hr at 04/13/23 1030 30 mL/hr at 04/13/23 1030   • sodium chloride 0.9 % infusion 40 mL  40 mL Intravenous PRN  Homero Allen,            Objective     Physical Exam:   Temp:  [97.3 °F (36.3 °C)] 97.3 °F (36.3 °C)  Heart Rate:  [60] 60  Resp:  [18] 18  BP: (204)/(74) 204/74    Physical Exam:  General Appearance:    Alert, cooperative, in no acute distress   Head:    Normocephalic, without obvious abnormality, atraumatic   Eyes:            Lids and lashes normal, conjunctivae and sclerae normal, no icterus, no pallor, corneas clear, PERRLA   Ears:    Ears appear intact with no abnormalities noted   Throat:   No oral lesions, no thrush, oral mucosa moist   Neck:   No adenopathy, supple, trachea midline, no thyromegaly, no  carotid bruit, no JVD   Back:     No kyphosis present, no scoliosis present, no skin lesions,   erythema or scars, no tenderness to percussion or                 palpation,  range of motion normal   Lungs:     Clear to auscultation,respirations regular, even and         unlabored    Heart:    Regular rhythm and normal rate, normal S1 and S2, no  murmur, no gallop, no rub, no click   Breast Exam:    Deferred   Abdomen:     Normal bowel sounds, no masses, no organomegaly, soft  nontender, nondistended, no guarding, no rebound                 tenderness   Genitalia:    Deferred   Extremities:   Moves all extremities well, no edema, no cyanosis, no          redness   Pulses:   Pulses palpable and equal bilaterally   Skin:   No bleeding, bruising or rash   Lymph nodes:   No palpable adenopathy   Neurologic:   Cranial nerves 2 - 12 grossly intact, sensation intact, DTR     present and equal bilaterally      Results Review:     Lab Results   Component Value Date    WBC 5.35 10/03/2022    WBC 7.61 03/07/2022    WBC 4.35 (L) 02/11/2022    HGB 11.7 (L) 10/03/2022    HGB 12.0 03/07/2022    HGB 10.8 (L) 02/11/2022    HCT 35.8 10/03/2022    HCT 36.4 03/07/2022    HCT 33.2 (L) 02/11/2022     10/03/2022     03/07/2022     02/11/2022             No results found for: LIPASE  Lab Results   Component  Value Date    INR 1.0 03/27/2016    INR 1.0 02/05/2015     No results found for: CULTURE    Radiology Review:  Imaging Results (Last 72 Hours)     ** No results found for the last 72 hours. **           I reviewed the patient's new clinical results.  I reviewed the patient's new imaging results and agree with the interpretation.     ASSESSMENT/PLAN:   ASSESSMENT:  1.  Sump syndrome    PLAN:  1.  Endoscopic retrograde cholangiogram with removal of any bile duct stones or debris within the bile duct    Risk and benefits associated with the procedure are reviewed with the patient.  The patient wished to proceed     Homero Allen DO  04/13/23  10:43 CDT

## 2023-04-13 NOTE — ANESTHESIA POSTPROCEDURE EVALUATION
Patient: Alva Mills    Procedure Summary     Date: 04/13/23 Room / Location: [facility] ENDOSCOPY 2 / [facility] ENDOSCOPY    Anesthesia Start: 1052 Anesthesia Stop: 1121    Procedure: ENDOSCOPIC RETROGRADE CHOLANGIOPANCREATOGRAPHY 11:00 Diagnosis:       Gallstones, common bile duct      (Gallstones, common bile duct [K80.50])    Surgeons: Homero Allen DO Provider: Bryn Serra CRNA    Anesthesia Type: general ASA Status: 3          Anesthesia Type: general    Vitals  No vitals data found for the desired time range.          Post Anesthesia Care and Evaluation    Patient location during evaluation: PHASE II  Patient participation: waiting for patient participation  Level of consciousness: sleepy but conscious  Pain management: adequate    Airway patency: patent  Anesthetic complications: No anesthetic complications  PONV Status: none  Cardiovascular status: acceptable  Respiratory status: acceptable, spontaneous ventilation and room air  Hydration status: acceptable

## 2023-04-18 ENCOUNTER — PREP FOR SURGERY (OUTPATIENT)
Dept: OTHER | Facility: HOSPITAL | Age: 78
End: 2023-04-18
Payer: COMMERCIAL

## 2023-04-18 DIAGNOSIS — K80.50 CALCULUS OF BILE DUCT: Primary | ICD-10-CM

## 2023-05-01 RX ORDER — LEVOTHYROXINE SODIUM 50 MCG
TABLET ORAL
Qty: 90 TABLET | Refills: 3 | Status: SHIPPED | OUTPATIENT
Start: 2023-05-01

## 2023-05-09 RX ORDER — HYDROXYZINE PAMOATE 25 MG/1
CAPSULE ORAL
Qty: 180 CAPSULE | Refills: 3 | Status: SHIPPED | OUTPATIENT
Start: 2023-05-09

## 2023-05-23 RX ORDER — FENOFIBRATE 145 MG/1
TABLET, COATED ORAL
Qty: 90 TABLET | Refills: 3 | Status: SHIPPED | OUTPATIENT
Start: 2023-05-23

## 2023-06-08 RX ORDER — METOPROLOL SUCCINATE 25 MG/1
TABLET, EXTENDED RELEASE ORAL
Qty: 90 TABLET | Refills: 3 | Status: SHIPPED | OUTPATIENT
Start: 2023-06-08

## 2023-08-14 ENCOUNTER — TELEPHONE (OUTPATIENT)
Dept: FAMILY MEDICINE CLINIC | Facility: CLINIC | Age: 78
End: 2023-08-14
Payer: COMMERCIAL

## 2023-08-14 RX ORDER — HYDROXYZINE PAMOATE 25 MG/1
25 CAPSULE ORAL 3 TIMES DAILY PRN
Qty: 90 CAPSULE | Refills: 5 | Status: SHIPPED | OUTPATIENT
Start: 2023-08-14

## 2023-08-14 NOTE — TELEPHONE ENCOUNTER
-Per DOMI Mac, Ms. Mills has been called with recent Screening Mammogram results & recommendations.  Continue current medications and follow-up as planned or sooner if any problems.       ---- Message from DOMI Chisholm sent at 8/14/2023 12:03 PM CDT -----  Can you let her know normal

## 2023-09-20 ENCOUNTER — PREP FOR SURGERY (OUTPATIENT)
Dept: OTHER | Facility: HOSPITAL | Age: 78
End: 2023-09-20
Payer: COMMERCIAL

## 2023-09-20 DIAGNOSIS — K80.50 CALCULUS OF BILE DUCT: Primary | ICD-10-CM

## 2023-09-20 RX ORDER — DEXTROSE AND SODIUM CHLORIDE 5; .45 G/100ML; G/100ML
30 INJECTION, SOLUTION INTRAVENOUS CONTINUOUS PRN
OUTPATIENT
Start: 2023-09-20

## 2023-09-21 ENCOUNTER — OFFICE VISIT (OUTPATIENT)
Dept: FAMILY MEDICINE CLINIC | Facility: CLINIC | Age: 78
End: 2023-09-21
Payer: COMMERCIAL

## 2023-09-21 VITALS
HEIGHT: 61 IN | HEART RATE: 73 BPM | DIASTOLIC BLOOD PRESSURE: 78 MMHG | SYSTOLIC BLOOD PRESSURE: 120 MMHG | BODY MASS INDEX: 21.34 KG/M2 | WEIGHT: 113 LBS | OXYGEN SATURATION: 98 % | TEMPERATURE: 98.7 F

## 2023-09-21 DIAGNOSIS — T78.40XS ALLERGY, SEQUELA: ICD-10-CM

## 2023-09-21 DIAGNOSIS — J06.9 UPPER RESPIRATORY TRACT INFECTION, UNSPECIFIED TYPE: Primary | ICD-10-CM

## 2023-09-21 RX ORDER — CEFUROXIME AXETIL 500 MG/1
500 TABLET ORAL 2 TIMES DAILY
Qty: 20 TABLET | Refills: 0 | Status: SHIPPED | OUTPATIENT
Start: 2023-09-21

## 2023-09-21 RX ORDER — TRIAMCINOLONE ACETONIDE 40 MG/ML
80 INJECTION, SUSPENSION INTRA-ARTICULAR; INTRAMUSCULAR ONCE
Status: COMPLETED | OUTPATIENT
Start: 2023-09-21 | End: 2023-09-21

## 2023-09-21 RX ORDER — FLUTICASONE PROPIONATE 50 MCG
2 SPRAY, SUSPENSION (ML) NASAL DAILY
Qty: 16 G | Refills: 1 | Status: SHIPPED | OUTPATIENT
Start: 2023-09-21

## 2023-09-21 RX ADMIN — TRIAMCINOLONE ACETONIDE 80 MG: 40 INJECTION, SUSPENSION INTRA-ARTICULAR; INTRAMUSCULAR at 09:50

## 2023-09-21 NOTE — PROGRESS NOTES
Chief Complaint   Patient presents with    Allergies    Sinusitis     Subjective   Alva Mills is a 78 y.o. female.           Allergies  This is a recurrent problem. The current episode started more than 1 month ago. The problem occurs daily. The problem has been waxing and waning. Associated symptoms include fatigue, joint swelling, myalgias, neck pain and numbness. Pertinent negatives include no arthralgias, change in bowel habit or fever. The treatment provided mild relief.   URI   This is a recurrent problem. The current episode started more than 1 month ago. The problem has been gradually worsening. The fever has been present for Less than 1 day. Associated symptoms include neck pain, rhinorrhea and sinus pain. The treatment provided mild relief.   Fatigue  This is a recurrent problem. The current episode started more than 1 month ago. The problem has been rapidly worsening. Associated symptoms include fatigue, joint swelling, myalgias, neck pain and numbness. Pertinent negatives include no arthralgias, change in bowel habit or fever. Nothing aggravates the symptoms. Treatments tried: b12 in the past. The treatment provided mild relief.   Neck Pain   This is a chronic problem. The current episode started more than 1 year ago. The problem occurs daily. The problem has been waxing and waning. The pain is associated with nothing. The pain is present in the anterior neck and right side. The quality of the pain is described as cramping. The pain is at a severity of 7/10. The pain is severe. The pain is Same all the time. Associated symptoms include numbness and pain with swallowing. Pertinent negatives include no fever. She has tried bed rest, ice and NSAIDs for the symptoms. The treatment provided mild relief.      The following portions of the patient's history were reviewed and updated as appropriate: allergies, current medications, past social history and problem list.    Review of Systems  "  Constitutional:  Positive for fatigue. Negative for activity change, appetite change, fever and unexpected weight change.   HENT:  Positive for postnasal drip, rhinorrhea and sinus pain. Negative for hearing loss, mouth sores and nosebleeds.    Respiratory:  Negative for choking.    Gastrointestinal:  Negative for change in bowel habit.   Endocrine: Negative.    Genitourinary: Negative.    Musculoskeletal:  Positive for joint swelling, myalgias, neck pain and neck stiffness. Negative for arthralgias.        Recurrent neck pain    Allergic/Immunologic: Negative.    Neurological:  Positive for numbness.     Objective   /78   Pulse 73   Temp 98.7 °F (37.1 °C)   Ht 154.9 cm (61\")   Wt 51.3 kg (113 lb)   SpO2 98%   BMI 21.35 kg/m²   Physical Exam  Vitals and nursing note reviewed.   Constitutional:       Appearance: Normal appearance.   HENT:      Head: Normocephalic and atraumatic.      Right Ear: Tympanic membrane normal. There is no impacted cerumen.      Left Ear: There is no impacted cerumen.      Nose: Congestion and rhinorrhea present.      Comments: Thick pnd present      Mouth/Throat:      Mouth: Mucous membranes are moist.      Pharynx: No oropharyngeal exudate or posterior oropharyngeal erythema.   Eyes:      General: No scleral icterus.        Right eye: No discharge.         Left eye: No discharge.      Pupils: Pupils are equal, round, and reactive to light.   Neck:      Vascular: No carotid bruit.   Cardiovascular:      Rate and Rhythm: Normal rate.      Pulses: Normal pulses.      Heart sounds: No murmur heard.    No friction rub. No gallop.   Pulmonary:      Effort: Pulmonary effort is normal. No respiratory distress.      Breath sounds: No stridor.   Chest:      Chest wall: No tenderness.   Abdominal:      General: Abdomen is flat. There is no distension.      Palpations: There is no mass.      Tenderness: There is no abdominal tenderness. There is no right CVA tenderness, left CVA " tenderness, guarding or rebound.      Hernia: No hernia is present.   Musculoskeletal:         General: Tenderness present. No swelling, deformity or signs of injury. Normal range of motion.      Cervical back: Normal range of motion. No rigidity or tenderness.      Lumbar back: Spasms and tenderness present.        Back:       Right lower leg: No edema.      Left lower leg: No edema.   Lymphadenopathy:      Cervical: No cervical adenopathy.   Skin:     General: Skin is warm and dry.      Coloration: Skin is not jaundiced or pale.      Findings: No bruising, erythema, lesion or rash.   Neurological:      General: No focal deficit present.      Mental Status: She is alert and oriented to person, place, and time.      Cranial Nerves: No cranial nerve deficit.      Sensory: No sensory deficit.      Motor: No weakness.      Coordination: Coordination normal.   Psychiatric:         Mood and Affect: Mood normal.         Behavior: Behavior normal.            Assessment & Plan     Problems Addressed this Visit    None  Diagnoses    None.          New Medications Ordered This Visit   Medications    cefuroxime (CEFTIN) 500 MG tablet     Sig: Take 1 tablet by mouth 2 (Two) Times a Day.     Dispense:  20 tablet     Refill:  0    fluticasone (FLONASE) 50 MCG/ACT nasal spray     Si sprays into the nostril(s) as directed by provider Daily.     Dispense:  16 g     Refill:  1     Current Outpatient Medications on File Prior to Visit   Medication Sig Dispense Refill    acyclovir (ZOVIRAX) 400 MG tablet Take 1 tablet by mouth 3 (Three) Times a Day As Needed (for outbreak). Take no more than 5 doses a day.      CALCIUM PO Take 1 tablet by mouth Daily With Breakfast. 500g      desvenlafaxine (PRISTIQ) 100 MG 24 hr tablet TAKE 1 TABLET DAILY 90 tablet 3    fenofibrate (TRICOR) 145 MG tablet TAKE 1 TABLET DAILY 90 tablet 3    fluticasone (Flonase) 50 MCG/ACT nasal spray 2 sprays into the nostril(s) as directed by provider Daily. 16 g  11    HYDROcodone-acetaminophen (NORCO)  MG per tablet Take 1 tablet by mouth Every 6 (Six) Hours As Needed for Moderate Pain.      hydrOXYzine pamoate (VISTARIL) 25 MG capsule TAKE 1 TO 2 CAPSULES       NIGHTLY FOR SLEEP 180 capsule 3    hydrOXYzine pamoate (Vistaril) 25 MG capsule Take 1 capsule by mouth 3 (Three) Times a Day As Needed for Anxiety. 90 capsule 5    loratadine (Claritin) 10 MG tablet Take 1 tablet by mouth Daily. 90 tablet 3    losartan (COZAAR) 25 MG tablet TAKE 1 TABLET DAILY 90 tablet 3    Magnesium 500 MG capsule Take 1 capsule by mouth Daily.      metoprolol succinate XL (TOPROL-XL) 25 MG 24 hr tablet TAKE 1 TABLET DAILY 90 tablet 3    omeprazole (priLOSEC) 40 MG capsule Take 1 capsule by mouth Daily.      ondansetron (Zofran) 4 MG tablet Take 1 tablet by mouth Every 8 (Eight) Hours As Needed for Nausea or Vomiting. 60 tablet 3    promethazine (PHENERGAN) 25 MG tablet TAKE 1 TABLET EVERY 6 HOURSAS NEEDED FOR NAUSEA OR    VOMITING 180 tablet 0    rOPINIRole (REQUIP) 0.25 MG tablet TAKE 1 TABLET 3 TIMES A  tablet 3    Synthroid 50 MCG tablet TAKE 1 TABLET DAILY 90 tablet 3    traZODone (DESYREL) 50 MG tablet TAKE 1 TO 2 TABLETS AT     BEDTIME 180 tablet 1    [DISCONTINUED] clindamycin (Cleocin) 150 MG capsule Take 1 capsule by mouth 3 (Three) Times a Day. 30 capsule 1     Current Facility-Administered Medications on File Prior to Visit   Medication Dose Route Frequency Provider Last Rate Last Admin    cyanocobalamin injection 1,000 mcg  1,000 mcg Intramuscular Q28 Days Estefania Wylie APRN   1,000 mcg at 07/13/23 1250       18 minutes  Follow Up   No follow-ups on file.    Meds as directed, follow up if worsen  Patient agrees with plan of action   Continue mucinex   Kenalog as directed, referral to pt if worsen

## 2023-09-22 ENCOUNTER — TELEPHONE (OUTPATIENT)
Dept: FAMILY MEDICINE CLINIC | Facility: CLINIC | Age: 78
End: 2023-09-22
Payer: COMMERCIAL

## 2023-09-22 RX ORDER — MECLIZINE HYDROCHLORIDE 25 MG/1
25 TABLET ORAL 3 TIMES DAILY PRN
Qty: 90 TABLET | Refills: 2 | Status: SHIPPED | OUTPATIENT
Start: 2023-09-22

## 2023-09-22 NOTE — TELEPHONE ENCOUNTER
Patient forgot to mention yesterday that she has been having a lot of dizziness. Please advise on what she should do.

## 2023-11-26 NOTE — PROGRESS NOTES
Chief Complaint   Patient presents with   • Leg Pain     bilateral leg pain     Subjective   Alva Mills is a 74 y.o. female.     Lower Extremity Issue   This is a recurrent problem. The current episode started 1 to 4 weeks ago. The problem occurs daily. The problem has been gradually worsening. Associated symptoms include arthralgias, fatigue, joint swelling, myalgias and numbness. Pertinent negatives include no abdominal pain, anorexia, change in bowel habit, chest pain, chills, congestion, coughing, diaphoresis, fever, headaches, nausea, neck pain, rash, sore throat, swollen glands, urinary symptoms, vertigo, visual change, vomiting or weakness. The symptoms are aggravated by standing. She has tried rest and acetaminophen (she used her 's requip and it helped ) for the symptoms. The treatment provided mild relief.   Hypertension   This is a recurrent problem. The current episode started 1 to 4 weeks ago. The problem has been gradually worsening since onset. The problem is controlled. Associated symptoms include malaise/fatigue. Pertinent negatives include no anxiety, blurred vision, chest pain, headaches, neck pain, orthopnea, palpitations, peripheral edema, PND, shortness of breath or sweats. Risk factors for coronary artery disease include sedentary lifestyle. Past treatments include beta blockers. Current antihypertension treatment includes beta blockers. The current treatment provides significant improvement. Compliance problems include exercise and medication side effects.  There is no history of angina, kidney disease, CAD/MI, CVA, heart failure, left ventricular hypertrophy, PVD or retinopathy.        The following portions of the patient's history were reviewed and updated as appropriate: allergies, current medications, past social history and problem list.    Review of Systems   Constitutional: Positive for activity change, appetite change, fatigue and malaise/fatigue. Negative for  chills, diaphoresis, fever and unexpected weight change.        Hot flashes    HENT: Negative.  Negative for congestion, dental problem, drooling, ear discharge, ear pain, facial swelling, hearing loss, mouth sores and sore throat.    Eyes: Negative.  Negative for blurred vision, photophobia, pain, discharge, redness, itching and visual disturbance.   Respiratory: Negative for apnea, cough, choking, chest tightness, shortness of breath, wheezing and stridor.    Cardiovascular: Negative.  Negative for chest pain, palpitations, orthopnea, leg swelling and PND.   Gastrointestinal: Negative for abdominal distention, abdominal pain, anal bleeding, anorexia, blood in stool, change in bowel habit, constipation, diarrhea, nausea, rectal pain and vomiting.   Endocrine: Negative.  Negative for cold intolerance, heat intolerance, polydipsia, polyphagia and polyuria.   Genitourinary: Negative.  Negative for difficulty urinating, dyspareunia and dysuria.   Musculoskeletal: Positive for arthralgias, joint swelling and myalgias. Negative for back pain, gait problem, neck pain and neck stiffness.        Lower extremity pain and pressure -legs cramping and jumping    Skin: Negative.  Negative for color change, pallor, rash and wound.   Allergic/Immunologic: Positive for environmental allergies. Negative for food allergies and immunocompromised state.   Neurological: Positive for numbness. Negative for dizziness, vertigo, tremors, syncope, facial asymmetry, speech difficulty, weakness, light-headedness and headaches.   Hematological: Negative.  Negative for adenopathy. Does not bruise/bleed easily.   Psychiatric/Behavioral: Positive for agitation, behavioral problems and sleep disturbance. Negative for confusion, decreased concentration, dysphoric mood, hallucinations, self-injury and suicidal ideas. The patient is nervous/anxious. The patient is not hyperactive.         Is going to therapy -not scheduled regularly    All other  "systems reviewed and are negative.      Objective   /68   Ht 154.9 cm (61\")   Wt 54.9 kg (121 lb)   BMI 22.86 kg/m²   Physical Exam   Constitutional: She is oriented to person, place, and time. She appears well-developed and well-nourished.   Family stressors, patient is very stressed at this time    HENT:   Head: Normocephalic and atraumatic.   Right Ear: External ear normal.   Left Ear: External ear normal.   Mouth/Throat: Oropharynx is clear and moist. No oropharyngeal exudate.   Oral cavity dry improving    Eyes: Conjunctivae and EOM are normal. Pupils are equal, round, and reactive to light. Right eye exhibits no discharge. Left eye exhibits no discharge. No scleral icterus.   Neck: Normal range of motion. Neck supple. No JVD present. No tracheal deviation present. No thyromegaly present.   Cardiovascular: Normal rate, regular rhythm, normal heart sounds and intact distal pulses. Exam reveals no gallop and no friction rub.   No murmur heard.  Pulmonary/Chest: Effort normal and breath sounds normal. No stridor. No respiratory distress. She has no wheezes. She has no rales. She exhibits no tenderness.   Abdominal: Soft. Bowel sounds are normal. She exhibits no distension and no mass. There is tenderness. There is no rebound and no guarding. No hernia.   Musculoskeletal: Normal range of motion. She exhibits tenderness. She exhibits no edema or deformity.        Thoracic back: She exhibits tenderness, bony tenderness, pain and spasm.        Back:    Lymphadenopathy:     She has no cervical adenopathy.   Neurological: She is alert and oriented to person, place, and time. She has normal reflexes. She displays normal reflexes. No cranial nerve deficit or sensory deficit. She exhibits normal muscle tone. Coordination normal.   Skin: Skin is warm and dry. No rash noted. No erythema. No pallor.   Nursing note and vitals reviewed.      Assessment/Plan   Problem List Items Addressed This Visit        " Cardiovascular and Mediastinum    Essential hypertension - Primary    Relevant Medications    rOPINIRole (REQUIP) 0.25 MG tablet    Other Relevant Orders    CBC & Differential    Comprehensive Metabolic Panel    Iron    Vitamin B12    Vitamin D 25 Hydroxy    TSH    Magnesium    Lipid Panel    CBC & Differential    Comprehensive Metabolic Panel       Other    Restless leg syndrome    Relevant Medications    rOPINIRole (REQUIP) 0.25 MG tablet    Other Relevant Orders    CBC & Differential    Comprehensive Metabolic Panel    Iron    Vitamin B12    Vitamin D 25 Hydroxy    TSH    Magnesium    Lipid Panel    CBC & Differential    Comprehensive Metabolic Panel           New Medications Ordered This Visit   Medications   • rOPINIRole (REQUIP) 0.25 MG tablet     Sig: Take 1 tablet by mouth 3 (Three) Times a Day. Take 1 hour before bedtime.     Dispense:  90 tablet     Refill:  5       It's not just what you eat, but when you eat  Eat breakfast, and eat smaller meals throughout the day. A healthy breakfast can jumpstart your metabolism, while eating small, healthy meals (rather than the standard three large meals) keeps your energy up.   Avoid eating at night. Try to eat dinner earlier and fast for 14-16 hours until breakfast the next morning. Studies suggest that eating only when you’re most active and giving your digestive system a long break each day may help to regulate weight.     Labs as directed, requip daily as directed, and use prn, follow up if worsen    Weakness

## 2024-11-04 ENCOUNTER — TRANSCRIBE ORDERS (OUTPATIENT)
Dept: ADMINISTRATIVE | Facility: HOSPITAL | Age: 79
End: 2024-11-04
Payer: COMMERCIAL

## 2024-11-04 DIAGNOSIS — M48.062 SPINAL STENOSIS, LUMBAR REGION WITH NEUROGENIC CLAUDICATION: Primary | ICD-10-CM

## (undated) DEVICE — WIREGUIDED RETRIEVAL BASKET: Brand: TRAPEZOID RX

## (undated) DEVICE — DEV BIL POSITION RX LK OLYMPUS/FUJINON CAP SYS

## (undated) DEVICE — SINGLE USE CANNULA: Brand: SINGLE USE CANNULA

## (undated) DEVICE — GLV SURG SENSICARE PI PF LF 7 GRN STRL

## (undated) DEVICE — SINGLE USE 3-LUMEN EXTRACTION BALLOON V: Brand: SINGLE USE 3-LUMEN EXTRACTION BALLOON V

## (undated) DEVICE — BITEBLOCK ENDO W/STRAP 60F A/ LF DISP

## (undated) DEVICE — SOL IRR H2O BTL 1000ML STRL

## (undated) DEVICE — GW VISIGLIDE STRTIP .025 7X450CM

## (undated) DEVICE — Device

## (undated) DEVICE — THE DISPOSABLE ROTH NET POLYP RETRIEVAL DEVICE IS USED IN THE ENDOSCOPIC RETRIEVAL OF FOREIGN BODY, FOOD BOLUS AND EXCISED TISSUE SUCH AS POLYPS.: Brand: ROTH NET

## (undated) DEVICE — Device: Brand: DISPOSABLE ELECTROSURGICAL SNARE

## (undated) DEVICE — SINGLE-USE BIOPSY FORCEPS: Brand: RADIAL JAW 4

## (undated) DEVICE — GLV SURG SENSICARE MICRO PF LF 7.5 STRL

## (undated) DEVICE — STERILE POLYISOPRENE POWDER-FREE SURGICAL GLOVES WITH EMOLLIENT COATING: Brand: PROTEXIS

## (undated) DEVICE — BILIARY STENT WITH DELIVERY SYSTEM
Type: IMPLANTABLE DEVICE | Status: NON-FUNCTIONAL
Brand: FLEXIMA™ BILIARY
Removed: 2019-09-10

## (undated) DEVICE — SINGLE USE INJECTOR: Brand: SINGLE USE INJECTOR

## (undated) DEVICE — CANN SMPL SOFTECH BIFLO ETCO2 A/M 7FT